# Patient Record
Sex: FEMALE | Race: WHITE | Employment: FULL TIME | URBAN - METROPOLITAN AREA
[De-identification: names, ages, dates, MRNs, and addresses within clinical notes are randomized per-mention and may not be internally consistent; named-entity substitution may affect disease eponyms.]

---

## 2018-06-26 ENCOUNTER — HOSPITAL ENCOUNTER (OUTPATIENT)
Age: 58
Discharge: HOME | End: 2018-06-28

## 2018-06-26 PROCEDURE — 88305 TISSUE EXAM BY PATHOLOGIST: CPT

## 2018-06-26 PROCEDURE — 88342 IMHCHEM/IMCYTCHM 1ST ANTB: CPT

## 2018-11-28 ENCOUNTER — HOSPITAL ENCOUNTER (OUTPATIENT)
Dept: GENERAL RADIOLOGY | Age: 58
Discharge: HOME | End: 2018-11-30
Payer: COMMERCIAL

## 2018-11-28 ENCOUNTER — HOSPITAL ENCOUNTER (OUTPATIENT)
Age: 58
Discharge: HOME | End: 2018-11-30
Payer: COMMERCIAL

## 2018-11-28 DIAGNOSIS — M54.9 DORSALGIA: ICD-10-CM

## 2018-11-28 PROCEDURE — 72110 X-RAY EXAM L-2 SPINE 4/>VWS: CPT

## 2018-12-13 ENCOUNTER — HOSPITAL ENCOUNTER (OUTPATIENT)
Age: 58
Discharge: HOME | End: 2018-12-15
Payer: COMMERCIAL

## 2018-12-13 ENCOUNTER — HOSPITAL ENCOUNTER (OUTPATIENT)
Dept: GENERAL RADIOLOGY | Age: 58
Discharge: HOME | End: 2018-12-15
Payer: COMMERCIAL

## 2018-12-13 DIAGNOSIS — R07.9 CHEST PAIN, UNSPECIFIED TYPE: ICD-10-CM

## 2018-12-13 PROCEDURE — 71101 X-RAY EXAM UNILAT RIBS/CHEST: CPT

## 2018-12-18 ENCOUNTER — HOSPITAL ENCOUNTER (OUTPATIENT)
Dept: ULTRASOUND IMAGING | Age: 58
Discharge: HOME | End: 2018-12-20
Payer: COMMERCIAL

## 2018-12-18 DIAGNOSIS — R10.9 STOMACH ACHE: ICD-10-CM

## 2018-12-18 PROCEDURE — 76700 US EXAM ABDOM COMPLETE: CPT

## 2019-07-05 ENCOUNTER — HOSPITAL ENCOUNTER (OUTPATIENT)
Age: 59
Discharge: HOME | End: 2019-07-07
Payer: COMMERCIAL

## 2019-07-05 ENCOUNTER — HOSPITAL ENCOUNTER (OUTPATIENT)
Dept: ULTRASOUND IMAGING | Age: 59
Discharge: HOME | End: 2019-07-07
Payer: COMMERCIAL

## 2019-07-05 DIAGNOSIS — R60.9 SWELLING: ICD-10-CM

## 2019-07-05 PROCEDURE — 93971 EXTREMITY STUDY: CPT

## 2020-02-14 ENCOUNTER — HOSPITAL ENCOUNTER (EMERGENCY)
Age: 60
Discharge: HOME | End: 2020-02-15
Attending: EMERGENCY MEDICINE
Payer: COMMERCIAL

## 2020-02-14 LAB
ALBUMIN SERPL-MCNC: 3.9 G/DL (ref 3.5–5.2)
ALP BLD-CCNC: 180 U/L (ref 35–104)
ALT SERPL-CCNC: 24 U/L (ref 0–32)
ANION GAP SERPL CALCULATED.3IONS-SCNC: 12 MMOL/L (ref 7–16)
AST SERPL-CCNC: 32 U/L (ref 0–31)
BILIRUB SERPL-MCNC: 0.4 MG/DL (ref 0–1.2)
BUN BLDV-MCNC: 9 MG/DL (ref 6–20)
CALCIUM SERPL-MCNC: 8.8 MG/DL (ref 8.6–10.2)
CHLORIDE BLD-SCNC: 103 MMOL/L (ref 98–107)
CO2: 24 MMOL/L (ref 22–29)
CREAT SERPL-MCNC: 0.6 MG/DL (ref 0.5–1)
GFR AFRICAN AMERICAN: >60
GFR NON-AFRICAN AMERICAN: >60 ML/MIN/1.73
GLUCOSE BLD-MCNC: 168 MG/DL (ref 74–99)
LIPASE: 57 U/L (ref 13–60)
POTASSIUM REFLEX MAGNESIUM: 3.9 MMOL/L (ref 3.5–5)
SODIUM BLD-SCNC: 139 MMOL/L (ref 132–146)
TOTAL PROTEIN: 6.7 G/DL (ref 6.4–8.3)

## 2020-02-14 PROCEDURE — 83690 ASSAY OF LIPASE: CPT

## 2020-02-14 PROCEDURE — 85025 COMPLETE CBC W/AUTO DIFF WBC: CPT

## 2020-02-14 PROCEDURE — 99284 EMERGENCY DEPT VISIT MOD MDM: CPT

## 2020-02-14 PROCEDURE — 80053 COMPREHEN METABOLIC PANEL: CPT

## 2020-02-14 ASSESSMENT — PAIN DESCRIPTION - PAIN TYPE: TYPE: CHRONIC PAIN

## 2020-02-14 ASSESSMENT — PAIN SCALES - GENERAL: PAINLEVEL_OUTOF10: 2

## 2020-02-15 VITALS
DIASTOLIC BLOOD PRESSURE: 60 MMHG | WEIGHT: 160 LBS | SYSTOLIC BLOOD PRESSURE: 95 MMHG | HEART RATE: 92 BPM | RESPIRATION RATE: 14 BRPM | BODY MASS INDEX: 29.44 KG/M2 | TEMPERATURE: 98.4 F | OXYGEN SATURATION: 97 % | HEIGHT: 62 IN

## 2020-02-15 LAB
ANISOCYTOSIS: ABNORMAL
BASOPHILS ABSOLUTE: 0 E9/L (ref 0–0.2)
BASOPHILS RELATIVE PERCENT: 0 % (ref 0–2)
EOSINOPHILS ABSOLUTE: 0.08 E9/L (ref 0.05–0.5)
EOSINOPHILS RELATIVE PERCENT: 2.6 % (ref 0–6)
HCT VFR BLD CALC: 35.2 % (ref 34–48)
HEMOGLOBIN: 11.7 G/DL (ref 11.5–15.5)
LYMPHOCYTES ABSOLUTE: 0.32 E9/L (ref 1.5–4)
LYMPHOCYTES RELATIVE PERCENT: 11.3 % (ref 20–42)
MCH RBC QN AUTO: 28.8 PG (ref 26–35)
MCHC RBC AUTO-ENTMCNC: 33.2 % (ref 32–34.5)
MCV RBC AUTO: 86.7 FL (ref 80–99.9)
MONOCYTES ABSOLUTE: 0.12 E9/L (ref 0.1–0.95)
MONOCYTES RELATIVE PERCENT: 3.5 % (ref 2–12)
NEUTROPHILS ABSOLUTE: 2.41 E9/L (ref 1.8–7.3)
NEUTROPHILS RELATIVE PERCENT: 82.6 % (ref 43–80)
NUCLEATED RED BLOOD CELLS: 0 /100 WBC
OVALOCYTES: ABNORMAL
PDW BLD-RTO: 15.2 FL (ref 11.5–15)
PLATELET # BLD: 92 E9/L (ref 130–450)
PLATELET CONFIRMATION: NORMAL
PMV BLD AUTO: 9.7 FL (ref 7–12)
POIKILOCYTES: ABNORMAL
RBC # BLD: 4.06 E12/L (ref 3.5–5.5)
WBC # BLD: 2.9 E9/L (ref 4.5–11.5)

## 2020-02-15 RX ORDER — ONDANSETRON 4 MG/1
4 TABLET, ORALLY DISINTEGRATING ORAL EVERY 8 HOURS PRN
Qty: 10 TABLET | Refills: 0 | Status: SHIPPED | OUTPATIENT
Start: 2020-02-15 | End: 2020-11-10 | Stop reason: ALTCHOICE

## 2020-02-15 ASSESSMENT — ENCOUNTER SYMPTOMS
SORE THROAT: 0
DIARRHEA: 0
EYE PAIN: 0
COLOR CHANGE: 0
NAUSEA: 1
RHINORRHEA: 0
VOMITING: 0
ABDOMINAL PAIN: 0
WHEEZING: 0
SINUS PRESSURE: 0
EYE REDNESS: 0
COUGH: 0
SHORTNESS OF BREATH: 0

## 2020-02-15 NOTE — ED NOTES
Pt given d/c instructions. Pt to f.u with pcp in 3 days. Pt given instructions on when to return to ED. Pt verbalized understanding of instructions. Pt left in stable and ambulatory condition. Pt IV d/c without any complications.      Jonathan Escobar RN  02/15/20 [id number]

## 2020-02-15 NOTE — ED PROVIDER NOTES
myalgias. Skin: Negative for color change and rash. Allergic/Immunologic: Positive for immunocompromised state. Neurological: Negative for syncope, light-headedness, numbness and headaches. Hematological: Negative for adenopathy. Does not bruise/bleed easily. Physical Exam  Vitals signs and nursing note reviewed. Constitutional:       General: She is not in acute distress. Appearance: Normal appearance. She is well-developed. She is not diaphoretic. HENT:      Head: Normocephalic and atraumatic. Mouth/Throat:      Pharynx: No oropharyngeal exudate. Eyes:      General: No scleral icterus. Right eye: No discharge. Left eye: No discharge. Conjunctiva/sclera: Conjunctivae normal.      Pupils: Pupils are equal, round, and reactive to light. Neck:      Musculoskeletal: Normal range of motion and neck supple. Thyroid: No thyromegaly. Vascular: No JVD. Trachea: No tracheal deviation. Cardiovascular:      Rate and Rhythm: Normal rate and regular rhythm. Pulses: Normal pulses. Heart sounds: Normal heart sounds. No murmur. No friction rub. No gallop. Pulmonary:      Effort: Pulmonary effort is normal. No accessory muscle usage or respiratory distress. Breath sounds: Normal breath sounds. No wheezing, rhonchi or rales. Chest:      Chest wall: No tenderness or crepitus. Abdominal:      General: Bowel sounds are normal. There is no distension or abdominal bruit. Palpations: Abdomen is soft. There is splenomegaly. There is no mass. Tenderness: There is no abdominal tenderness. There is no guarding or rebound. Hernia: No hernia is present. Musculoskeletal: Normal range of motion. General: No tenderness or deformity. Lymphadenopathy:      Cervical: No cervical adenopathy. Skin:     General: Skin is warm and dry. Capillary Refill: Capillary refill takes less than 2 seconds. Coloration: Skin is not pale. Findings: No erythema or rash. Neurological:      Mental Status: She is alert and oriented to person, place, and time. Cranial Nerves: No cranial nerve deficit. Motor: No abnormal muscle tone. Coordination: Coordination normal.          Procedures     MDM     ED Course as of Feb 15 0116   Sat Feb 15, 2020   0028 Patient is resting comfortably. She is in no distress is feeling well. She does report began to develop a dry very mild cough starting yesterday which may indicate a developing viral infection which may be exacerbating her leukopenia. I discussed with her her test results and the need to follow-up with her doctors for continued evaluation. I discussed with her strict return precautions if she develops worsening or other worrisome symptoms that may indicate inflammatory or obstructive process of the bowels or splenic rupture. Patient is agreeable with plan for discharge. All questions were answered. [JL]      ED Course User Index  [JL] [facility],         --------------------------------------------- PAST HISTORY ---------------------------------------------  Past Medical History:  has a past medical history of Abnormal Pap smear of vagina, Asthma, Diabetes mellitus ([facility] [address] Hypothyroidism, and Thyroid disease. Past Surgical History:  has a past surgical history that includes Hysterectomy (); Carpal tunnel release ();  section (); Tonsillectomy; Cholecystectomy (); Colonoscopy (2012); Upper gastrointestinal endoscopy (2012); and liver biopsy (2017). Social History:  reports that she has never smoked. She has never used smokeless tobacco. She reports current alcohol use. She reports that she does not use drugs. Family History: family history includes Diabetes in her mother; Heart Disease (age of onset: 79) in her father and mother; High Blood Pressure in her mother.      The patients home medications have been

## 2020-03-10 ENCOUNTER — HOSPITAL ENCOUNTER (OUTPATIENT)
Age: 60
Discharge: HOME | End: 2020-03-12
Payer: COMMERCIAL

## 2020-03-10 ENCOUNTER — HOSPITAL ENCOUNTER (OUTPATIENT)
Dept: GENERAL RADIOLOGY | Age: 60
Discharge: HOME | End: 2020-03-12
Payer: COMMERCIAL

## 2020-03-10 PROCEDURE — 73030 X-RAY EXAM OF SHOULDER: CPT

## 2020-03-10 PROCEDURE — 72110 X-RAY EXAM L-2 SPINE 4/>VWS: CPT

## 2020-03-13 ENCOUNTER — HOSPITAL ENCOUNTER (OUTPATIENT)
Dept: MRI IMAGING | Age: 60
Discharge: HOME | End: 2020-03-15
Payer: COMMERCIAL

## 2020-03-13 PROCEDURE — 72148 MRI LUMBAR SPINE W/O DYE: CPT

## 2020-11-06 ENCOUNTER — HOSPITAL ENCOUNTER (OUTPATIENT)
Age: 60
Discharge: HOME | End: 2020-11-08
Payer: COMMERCIAL

## 2020-11-06 PROCEDURE — U0003 INFECTIOUS AGENT DETECTION BY NUCLEIC ACID (DNA OR RNA); SEVERE ACUTE RESPIRATORY SYNDROME CORONAVIRUS 2 (SARS-COV-2) (CORONAVIRUS DISEASE [COVID-19]), AMPLIFIED PROBE TECHNIQUE, MAKING USE OF HIGH THROUGHPUT TECHNOLOGIES AS DESCRIBED BY CMS-2020-01-R: HCPCS

## 2020-11-08 LAB
SARS-COV-2: NOT DETECTED
SOURCE: NORMAL

## 2020-11-10 RX ORDER — SEMAGLUTIDE 1.34 MG/ML
0.5 INJECTION, SOLUTION SUBCUTANEOUS WEEKLY
COMMUNITY

## 2020-11-10 RX ORDER — ALENDRONATE SODIUM 70 MG/1
70 TABLET ORAL
COMMUNITY

## 2020-11-10 NOTE — PROGRESS NOTES
Have you been tested for COVID  Yes  Tested on 11-6-20 for OR scheduled 11-13-20          Have you been told you were positive for COVID No  Have you had any known exposure to someone that is positive for COVID No  Do you have a cough                   No              Do you have shortness of breath No                 Do you have a sore throat            No                Are you having chills                    No                Are you having muscle aches. No                    Please come to the hospital wearing a mask and have your significant other wear a mask as well. Both of you should check your temperature before leaving to come here,  if it is 100 or higher please call [phone] for instruction. [facility]    The Preadmission Testing patient is instructed accordingly using the following criteria (check applicable):    ARRIVAL INSTRUCTIONS:  [x] Parking the day of Surgery is located in the Main Entrance lot. Upon entering the door, make an immediate right to the surgery reception desk    [x] Bring photo ID and insurance card    [x] Bring in a copy of Living will or Durable Power of  papers.     [x] Please be sure to arrange for responsible adult to provide transportation to and from the hospital    [x] Please arrange for responsible adult to be with you for the 24 hour period post procedure due to having anesthesia      GENERAL INSTRUCTIONS:    [x] Nothing by mouth after midnight, including gum, candy, mints or water    [x] You may brush your teeth, but do not swallow any water    [] Take medications as instructed with 1-2 oz of water    [x] Stop herbal supplements and vitamins 5 days prior to procedure    [x] Follow preop dosing of blood thinners per physician instructions    [] Take 1/2 dose of evening insulin, but no insulin after midnight    [] No oral diabetic medications after midnight    [] If diabetic and have low blood sugar or feel symptomatic, take 1-2oz apple juice only    [] Bring inhalers day of surgery    [] Bring C-PAP/ Bi-Pap day of surgery    [] Bring urine specimen day of surgery    [x] Shower or bath with soap, lather and rinse well, AM of Surgery, no lotion, powders or creams to surgical site    [] Follow bowel prep as instructed per surgeon    [x] No tobacco products within 24 hours of surgery     [x] No alcohol or illegal drug use within 24 hours of surgery.     [x] Jewelry, body piercing's, eyeglasses, contact lenses and dentures are not permitted into surgery (bring cases)      [x] Please do not wear any nail polish, make up or hair products on the day of surgery    [x] You can expect a call the business day prior to procedure to notify you if your arrival time changes    [x] If you receive a survey after surgery we would greatly appreciate your comments    [] Parent/guardian of a minor must accompany their child and remain on the premises  the entire time they are under our care     [] Pediatric patients may bring favorite toy, blanket or comfort item with them    [] A caregiver or family member must remain with the patient during their stay if they are mentally handicapped, have dementia, disoriented or unable to use a call light or would be a safety concern if left unattended    [x] Please notify surgeon if you develop any illness between now and time of surgery (cold, cough, sore throat, fever, nausea, vomiting) or any signs of infections  including skin, wounds, and dental.    [x]  The Outpatient Pharmacy is available to fill your prescription here on your day of surgery, ask your preop nurse for details    [x] Other instructions    EDUCATIONAL MATERIALS PROVIDED:    [] PAT Preoperative Education Packet/Booklet     [] Medication List    [] Transfusion bracelet applied with instructions    [] Shower with soap, lather and rinse well, and use CHG wipes provided the evening before surgery as instructed    [] Incentive spirometer with instructions

## 2020-11-13 ENCOUNTER — HOSPITAL ENCOUNTER (OUTPATIENT)
Age: 60
Setting detail: OUTPATIENT SURGERY
Discharge: HOME | End: 2020-11-13
Attending: OTOLARYNGOLOGY | Admitting: OTOLARYNGOLOGY
Payer: COMMERCIAL

## 2020-11-13 ENCOUNTER — ANESTHESIA (OUTPATIENT)
Dept: OPERATING ROOM | Age: 60
End: 2020-11-13
Payer: COMMERCIAL

## 2020-11-13 ENCOUNTER — ANESTHESIA EVENT (OUTPATIENT)
Dept: OPERATING ROOM | Age: 60
End: 2020-11-13
Payer: COMMERCIAL

## 2020-11-13 VITALS
HEIGHT: 62 IN | BODY MASS INDEX: 33.13 KG/M2 | RESPIRATION RATE: 12 BRPM | OXYGEN SATURATION: 95 % | SYSTOLIC BLOOD PRESSURE: 109 MMHG | DIASTOLIC BLOOD PRESSURE: 55 MMHG | HEART RATE: 71 BPM | WEIGHT: 180 LBS | TEMPERATURE: 96.4 F

## 2020-11-13 VITALS — SYSTOLIC BLOOD PRESSURE: 140 MMHG | OXYGEN SATURATION: 96 % | DIASTOLIC BLOOD PRESSURE: 65 MMHG

## 2020-11-13 LAB
ANION GAP SERPL CALCULATED.3IONS-SCNC: 7 MMOL/L (ref 7–16)
BUN BLDV-MCNC: 10 MG/DL (ref 6–20)
CALCIUM SERPL-MCNC: 9 MG/DL (ref 8.6–10.2)
CHLORIDE BLD-SCNC: 104 MMOL/L (ref 98–107)
CO2: 25 MMOL/L (ref 22–29)
CREAT SERPL-MCNC: 0.6 MG/DL (ref 0.5–1)
EKG ATRIAL RATE: 75 BPM
EKG P AXIS: 22 DEGREES
EKG P-R INTERVAL: 160 MS
EKG Q-T INTERVAL: 402 MS
EKG QRS DURATION: 86 MS
EKG QTC CALCULATION (BAZETT): 448 MS
EKG R AXIS: 27 DEGREES
EKG T AXIS: 48 DEGREES
EKG VENTRICULAR RATE: 75 BPM
GFR AFRICAN AMERICAN: >60
GFR NON-AFRICAN AMERICAN: >60 ML/MIN/1.73
GLUCOSE BLD-MCNC: 216 MG/DL (ref 74–99)
HCT VFR BLD CALC: 40.7 % (ref 34–48)
HEMOGLOBIN: 12.9 G/DL (ref 11.5–15.5)
MCH RBC QN AUTO: 25.1 PG (ref 26–35)
MCHC RBC AUTO-ENTMCNC: 31.7 % (ref 32–34.5)
MCV RBC AUTO: 79.3 FL (ref 80–99.9)
METER GLUCOSE: 191 MG/DL (ref 74–99)
PDW BLD-RTO: 15.2 FL (ref 11.5–15)
PLATELET # BLD: 102 E9/L (ref 130–450)
PMV BLD AUTO: 9.9 FL (ref 7–12)
POTASSIUM SERPL-SCNC: 4.3 MMOL/L (ref 3.5–5)
RBC # BLD: 5.13 E12/L (ref 3.5–5.5)
SODIUM BLD-SCNC: 136 MMOL/L (ref 132–146)
WBC # BLD: 3.3 E9/L (ref 4.5–11.5)

## 2020-11-13 PROCEDURE — 2500000003 HC RX 250 WO HCPCS: Performed by: NURSE ANESTHETIST, CERTIFIED REGISTERED

## 2020-11-13 PROCEDURE — 88305 TISSUE EXAM BY PATHOLOGIST: CPT

## 2020-11-13 PROCEDURE — 2580000003 HC RX 258: Performed by: OTOLARYNGOLOGY

## 2020-11-13 PROCEDURE — 3600000012 HC SURGERY LEVEL 2 ADDTL 15MIN: Performed by: OTOLARYNGOLOGY

## 2020-11-13 PROCEDURE — 88331 PATH CONSLTJ SURG 1 BLK 1SPC: CPT

## 2020-11-13 PROCEDURE — 6360000002 HC RX W HCPCS: Performed by: NURSE ANESTHETIST, CERTIFIED REGISTERED

## 2020-11-13 PROCEDURE — 85027 COMPLETE CBC AUTOMATED: CPT

## 2020-11-13 PROCEDURE — 7100000001 HC PACU RECOVERY - ADDTL 15 MIN: Performed by: OTOLARYNGOLOGY

## 2020-11-13 PROCEDURE — 3600000002 HC SURGERY LEVEL 2 BASE: Performed by: OTOLARYNGOLOGY

## 2020-11-13 PROCEDURE — 82962 GLUCOSE BLOOD TEST: CPT

## 2020-11-13 PROCEDURE — 2500000003 HC RX 250 WO HCPCS: Performed by: OTOLARYNGOLOGY

## 2020-11-13 PROCEDURE — 6360000002 HC RX W HCPCS: Performed by: OTOLARYNGOLOGY

## 2020-11-13 PROCEDURE — 80048 BASIC METABOLIC PNL TOTAL CA: CPT

## 2020-11-13 PROCEDURE — 36415 COLL VENOUS BLD VENIPUNCTURE: CPT

## 2020-11-13 PROCEDURE — 7100000000 HC PACU RECOVERY - FIRST 15 MIN: Performed by: OTOLARYNGOLOGY

## 2020-11-13 PROCEDURE — 2709999900 HC NON-CHARGEABLE SUPPLY: Performed by: OTOLARYNGOLOGY

## 2020-11-13 PROCEDURE — 93005 ELECTROCARDIOGRAM TRACING: CPT

## 2020-11-13 PROCEDURE — 6370000000 HC RX 637 (ALT 250 FOR IP)

## 2020-11-13 PROCEDURE — 7100000010 HC PHASE II RECOVERY - FIRST 15 MIN: Performed by: OTOLARYNGOLOGY

## 2020-11-13 PROCEDURE — 3700000001 HC ADD 15 MINUTES (ANESTHESIA): Performed by: OTOLARYNGOLOGY

## 2020-11-13 PROCEDURE — 7100000011 HC PHASE II RECOVERY - ADDTL 15 MIN: Performed by: OTOLARYNGOLOGY

## 2020-11-13 PROCEDURE — 3700000000 HC ANESTHESIA ATTENDED CARE: Performed by: OTOLARYNGOLOGY

## 2020-11-13 RX ORDER — SODIUM CHLORIDE 0.9 % (FLUSH) 0.9 %
10 SYRINGE (ML) INJECTION PRN
Status: DISCONTINUED | OUTPATIENT
Start: 2020-11-13 | End: 2020-11-13 | Stop reason: HOSPADM

## 2020-11-13 RX ORDER — SODIUM CHLORIDE 0.9 % (FLUSH) 0.9 %
10 SYRINGE (ML) INJECTION EVERY 12 HOURS SCHEDULED
Status: DISCONTINUED | OUTPATIENT
Start: 2020-11-13 | End: 2020-11-13 | Stop reason: HOSPADM

## 2020-11-13 RX ORDER — ONDANSETRON 2 MG/ML
INJECTION INTRAMUSCULAR; INTRAVENOUS PRN
Status: DISCONTINUED | OUTPATIENT
Start: 2020-11-13 | End: 2020-11-13 | Stop reason: SDUPTHER

## 2020-11-13 RX ORDER — PROPOFOL 10 MG/ML
INJECTION, EMULSION INTRAVENOUS PRN
Status: DISCONTINUED | OUTPATIENT
Start: 2020-11-13 | End: 2020-11-13 | Stop reason: SDUPTHER

## 2020-11-13 RX ORDER — GLYCOPYRROLATE 1 MG/5 ML
SYRINGE (ML) INTRAVENOUS PRN
Status: DISCONTINUED | OUTPATIENT
Start: 2020-11-13 | End: 2020-11-13 | Stop reason: SDUPTHER

## 2020-11-13 RX ORDER — MIDAZOLAM HYDROCHLORIDE 1 MG/ML
INJECTION INTRAMUSCULAR; INTRAVENOUS PRN
Status: DISCONTINUED | OUTPATIENT
Start: 2020-11-13 | End: 2020-11-13 | Stop reason: SDUPTHER

## 2020-11-13 RX ORDER — ROCURONIUM BROMIDE 10 MG/ML
INJECTION, SOLUTION INTRAVENOUS PRN
Status: DISCONTINUED | OUTPATIENT
Start: 2020-11-13 | End: 2020-11-13 | Stop reason: SDUPTHER

## 2020-11-13 RX ORDER — LIDOCAINE HYDROCHLORIDE AND EPINEPHRINE 10; 10 MG/ML; UG/ML
INJECTION, SOLUTION INFILTRATION; PERINEURAL PRN
Status: DISCONTINUED | OUTPATIENT
Start: 2020-11-13 | End: 2020-11-13 | Stop reason: ALTCHOICE

## 2020-11-13 RX ORDER — SODIUM CHLORIDE, SODIUM LACTATE, POTASSIUM CHLORIDE, CALCIUM CHLORIDE 600; 310; 30; 20 MG/100ML; MG/100ML; MG/100ML; MG/100ML
INJECTION, SOLUTION INTRAVENOUS CONTINUOUS
Status: DISCONTINUED | OUTPATIENT
Start: 2020-11-13 | End: 2020-11-13 | Stop reason: HOSPADM

## 2020-11-13 RX ORDER — NEOSTIGMINE METHYLSULFATE 1 MG/ML
INJECTION, SOLUTION INTRAVENOUS PRN
Status: DISCONTINUED | OUTPATIENT
Start: 2020-11-13 | End: 2020-11-13 | Stop reason: SDUPTHER

## 2020-11-13 RX ORDER — DEXAMETHASONE SODIUM PHOSPHATE 4 MG/ML
INJECTION, SOLUTION INTRA-ARTICULAR; INTRALESIONAL; INTRAMUSCULAR; INTRAVENOUS; SOFT TISSUE PRN
Status: DISCONTINUED | OUTPATIENT
Start: 2020-11-13 | End: 2020-11-13 | Stop reason: SDUPTHER

## 2020-11-13 RX ORDER — FENTANYL CITRATE 50 UG/ML
INJECTION, SOLUTION INTRAMUSCULAR; INTRAVENOUS PRN
Status: DISCONTINUED | OUTPATIENT
Start: 2020-11-13 | End: 2020-11-13 | Stop reason: SDUPTHER

## 2020-11-13 RX ADMIN — PROPOFOL 200 MG: 10 INJECTION, EMULSION INTRAVENOUS at 10:21

## 2020-11-13 RX ADMIN — ONDANSETRON 4 MG: 2 INJECTION INTRAMUSCULAR; INTRAVENOUS at 10:21

## 2020-11-13 RX ADMIN — MIDAZOLAM 2 MG: 1 INJECTION INTRAMUSCULAR; INTRAVENOUS at 09:48

## 2020-11-13 RX ADMIN — Medication 3 MG: at 10:35

## 2020-11-13 RX ADMIN — ROCURONIUM BROMIDE 30 MG: 10 INJECTION, SOLUTION INTRAVENOUS at 10:21

## 2020-11-13 RX ADMIN — DEXAMETHASONE SODIUM PHOSPHATE 10 MG: 4 INJECTION, SOLUTION INTRAMUSCULAR; INTRAVENOUS at 10:24

## 2020-11-13 RX ADMIN — SUGAMMADEX 326 MG: 100 INJECTION, SOLUTION INTRAVENOUS at 10:36

## 2020-11-13 RX ADMIN — SODIUM CHLORIDE, POTASSIUM CHLORIDE, SODIUM LACTATE AND CALCIUM CHLORIDE: 600; 310; 30; 20 INJECTION, SOLUTION INTRAVENOUS at 11:27

## 2020-11-13 RX ADMIN — Medication 2 G: at 10:12

## 2020-11-13 RX ADMIN — SODIUM CHLORIDE, POTASSIUM CHLORIDE, SODIUM LACTATE AND CALCIUM CHLORIDE: 600; 310; 30; 20 INJECTION, SOLUTION INTRAVENOUS at 10:11

## 2020-11-13 RX ADMIN — Medication 0.6 MG: at 10:35

## 2020-11-13 RX ADMIN — FENTANYL CITRATE 50 MCG: 50 INJECTION, SOLUTION INTRAMUSCULAR; INTRAVENOUS at 10:23

## 2020-11-13 ASSESSMENT — PULMONARY FUNCTION TESTS
PIF_VALUE: 30
PIF_VALUE: 0
PIF_VALUE: 21
PIF_VALUE: 22
PIF_VALUE: 1
PIF_VALUE: 22
PIF_VALUE: 22
PIF_VALUE: 21
PIF_VALUE: 21
PIF_VALUE: 5
PIF_VALUE: 20
PIF_VALUE: 22
PIF_VALUE: 44
PIF_VALUE: 22
PIF_VALUE: 18
PIF_VALUE: 20
PIF_VALUE: 26
PIF_VALUE: 2
PIF_VALUE: 22
PIF_VALUE: 0
PIF_VALUE: 1
PIF_VALUE: 0
PIF_VALUE: 2
PIF_VALUE: 0
PIF_VALUE: 4
PIF_VALUE: 2
PIF_VALUE: 23
PIF_VALUE: 2

## 2020-11-13 ASSESSMENT — LIFESTYLE VARIABLES: SMOKING_STATUS: 0

## 2020-11-13 ASSESSMENT — PAIN SCALES - GENERAL
PAINLEVEL_OUTOF10: 0

## 2020-11-13 ASSESSMENT — PAIN - FUNCTIONAL ASSESSMENT: PAIN_FUNCTIONAL_ASSESSMENT: 0-10

## 2020-11-13 ASSESSMENT — ENCOUNTER SYMPTOMS: SHORTNESS OF BREATH: 0

## 2020-11-13 NOTE — ANESTHESIA PRE PROCEDURE
Past Surgical History:        Procedure Laterality Date    CARPAL TUNNEL RELEASE  2000    bilateral     SECTION  1992    CHOLECYSTECTOMY  1998    COLONOSCOPY  2012    HYSTERECTOMY  2003    LIVER BIOPSY  2017    TONSILLECTOMY      UPPER GASTROINTESTINAL ENDOSCOPY  2012       Social History:    Social History     Tobacco Use    Smoking status: Never Smoker    Smokeless tobacco: Never Used   Substance Use Topics    Alcohol use: Yes     Comment: occasional                                Counseling given: Not Answered      Vital Signs (Current):   Vitals:    11/10/20 1530   Weight: 180 lb (81.6 kg)   Height: 5' 2\" (1.575 m)                                              BP Readings from Last 3 Encounters:   02/15/20 95/60   18 110/73   18 (!) 121/57       NPO Status:                                                                                 BMI:   Wt Readings from Last 3 Encounters:   11/10/20 180 lb (81.6 kg)   20 160 lb (72.6 kg)   18 166 lb (75.3 kg)     Body mass index is 32.92 kg/m². CBC:   Lab Results   Component Value Date    WBC 2.9 2020    RBC 4.06 2020    HGB 11.7 2020    HCT 35.2 2020    MCV 86.7 2020    RDW 15.2 2020    PLT 92 2020       CMP:   Lab Results   Component Value Date     2020    K 3.9 2020     2020    CO2 24 2020    BUN 9 2020    CREATININE 0.6 2020    GFRAA >60 2020    LABGLOM >60 2020    GLUCOSE 168 2020    GLUCOSE 171 2012    PROT 6.7 2020    CALCIUM 8.8 2020    BILITOT 0.4 2020    ALKPHOS 180 2020    AST 32 2020    ALT 24 2020       POC Tests: No results for input(s): POCGLU, POCNA, POCK, POCCL, POCBUN, POCHEMO, POCHCT in the last 72 hours.     Coags:   Lab Results   Component Value Date    PROTIME 12.2 2017    PROTIME 12.4 2012    INR 1.1 2017    APTT 25.5 05/07/2012       HCG (If Applicable): No results found for: PREGTESTUR, PREGSERUM, HCG, HCGQUANT     ABGs: No results found for: PHART, PO2ART, BJB4CAH, GWT3HDO, BEART, Y1CGVKLS     Type & Screen (If Applicable):  No results found for: LABABO, LABRH    Drug/Infectious Status (If Applicable):  No results found for: HIV, HEPCAB    COVID-19 Screening (If Applicable):   Lab Results   Component Value Date    COVID19 Not Detected 11/06/2020         Anesthesia Evaluation  Patient summary reviewed and Nursing notes reviewed no history of anesthetic complications:   Airway: Mallampati: II  TM distance: >3 FB   Neck ROM: full  Mouth opening: > = 3 FB Dental:    (+) caps      Pulmonary: breath sounds clear to auscultation  (+) asthma:     (-) shortness of breath, recent URI and not a current smoker                           Cardiovascular:Negative CV ROS  Exercise tolerance: good (>4 METS),         ECG reviewed  Rhythm: regular  Rate: normal           Beta Blocker:  Not on Beta Blocker         Neuro/Psych:   Negative Neuro/Psych ROS              GI/Hepatic/Renal:             Endo/Other:    (+) DiabetesType II DM, , hypothyroidism: arthritis:., .                 Abdominal:           Vascular: negative vascular ROS. Anesthesia Plan      general     ASA 3       Induction: intravenous. Anesthetic plan and risks discussed with patient.                       Mary Mcleod MD   11/13/2020

## 2020-11-13 NOTE — ANESTHESIA POSTPROCEDURE EVALUATION
Department of Anesthesiology  Postprocedure Note    Patient: Gustavo Goyal  MRN: [id number]  YOB: 1960  Date of evaluation: 11/13/2020  Time:  5:35 PM     Procedure Summary     Date:  11/13/20 Room / Location:  [facility] 01 / 106 [facility]    Anesthesia Start:  1011 Anesthesia Stop:  6681    Procedure:  EXCISIONAL BIOPSY OF RIGHT NARES (PATHOLOGY NOTIFIED) (N/A Nose) Diagnosis:  (NASAL LESION)    Surgeon:  Rivka Rivera MD Responsible Provider:  Poncho Ricardo MD    Anesthesia Type:  general ASA Status:  3          Anesthesia Type: general    Mary Jo Phase I: Mary Jo Score: 9    Mary Jo Phase II: Mary Jo Score: 10    Last vitals: Reviewed and per EMR flowsheets.        Anesthesia Post Evaluation    Patient location during evaluation: PACU  Patient participation: complete - patient participated  Level of consciousness: awake and alert  Airway patency: patent  Nausea & Vomiting: no vomiting and no nausea  Complications: no  Cardiovascular status: blood pressure returned to baseline  Respiratory status: acceptable  Hydration status: euvolemic

## 2020-11-13 NOTE — PROGRESS NOTES
INTRAOPERATIVE CONSULTATION (with FROZEN SECTION)    Right nasal mass: Cutaneous horn. The differential diagnosis includes a burnt out verruca vulgaris and keratoacanthoma. The lesion appears completely excised.

## 2020-11-13 NOTE — BRIEF OP NOTE
Brief Postoperative Note      Patient: Arcadio Godinez  YOB: 1960  MRN: [id number]    Date of Procedure: 11/13/2020    Pre-Op Diagnosis: NASAL LESION    Post-Op Diagnosis: Same       Procedure(s):  EXCISIONAL BIOPSY OF RIGHT NARES (PATHOLOGY NOTIFIED)    Surgeon(s):  Arnol Swain MD    Assistant:  Resident: Antonietta Nuñez DO; Izzy Torrez DO    Anesthesia: General    Estimated Blood Loss (mL): Minimal    Complications: None    Specimens:   ID Type Source Tests Collected by Time Destination   A : RIGHT NASAL MASS Tissue Tissue SURGICAL PATHOLOGY Arnol Swain MD 11/13/2020 1027        Implants:  * No implants in log *      Drains: * No LDAs found *    Findings: cutaneous horn as the junction of the nasal vestibule-columella superiorly on the right    Electronically signed by Antnoietta Nuñez DO on 11/13/2020 at 11:26 AM

## 2020-11-13 NOTE — H&P
H&P reviewed, no changes. No issues. Questions and concerns were answered to the patient's satisfaction.  Will proceed with procedure    Will discuss with attending     Electronically signed by Cami Dolan DO on 11/13/20 at 9:39 AM EST

## 2020-11-14 NOTE — OP NOTE
[id number] [address]                                OPERATIVE REPORT    PATIENT NAME: Anai Chopra                     :        1960  MED REC NO:   [id number]                            ROOM:  ACCOUNT NO:   [de-identified]                           ADMIT DATE: 2020  PROVIDER:     Nicolas Dlegadillo DO    DATE OF PROCEDURE:  2020    PREOPERATIVE DIAGNOSIS:  Right nasal lesion. POSTOPERATIVE DIAGNOSIS:  Right nasal lesion. PROCEDURE PERFORMED:  Excisional biopsy of right nasal lesion. ANESTHESIA:  General endotracheal intubation. SURGEON:  Mayra Perez MD.    ASSISTANTS:  Angely Fernando DO, PGY-5 and Nicolas Delgadillo DO, PGY-2.    FLUIDS:  IV crystalloid. SPECIMENS:  Right nasal lesion. FINDINGS:  pedunculated lesion on right nasal septum just posterior to columella    INDICATIONS FOR PROCEDURE:  This is a 82-year-old female with history of  a prolonged right nasal lesion. Risks and benefits of the procedure  were discussed, but not limited to bleeding, infection, need for further  procedures, damage to adjacent structures were discussed with the  patient who agreed to proceed with the procedure. DESCRIPTION OF THE PROCEDURE:  The patient was brought back to the  operating room, placed supine on the table. SCDs were placed and  functioning. The patient was then handed to Anesthesia for proper  endotracheal intubation. The head of the bed was then turned 90 degrees  counterclockwise from the Anesthesia Unit. A 1% lidocaine with  epinephrine was injected over the lesion of interest.  Using a 15 blade  scalpel, an elliptical incision was made excising the right nasal  lesion. The right nasal lesion was handed off the field. A 4-0 chromic  suture was then placed in a horizontal mattress fashion closing the surgical defect. No further bleeding was noted.   The patient  tolerated the procedure without complication. The patient was handed  back to Anesthesia for proper awakening.         Magui Alvarez DO    D: 11/13/2020 10:34:15       T: 11/13/2020 11:43:38     [id number]  Job#: [id number]     Doc#: [id number]    CC:

## 2022-11-03 ENCOUNTER — HOSPITAL ENCOUNTER (OUTPATIENT)
Dept: HOSPITAL 83 - RESCLI | Age: 62
Discharge: HOME | End: 2022-11-03
Attending: EMERGENCY MEDICINE
Payer: COMMERCIAL

## 2022-11-03 DIAGNOSIS — Z79.899: ICD-10-CM

## 2022-11-03 DIAGNOSIS — E03.9: ICD-10-CM

## 2022-11-03 DIAGNOSIS — Z82.49: ICD-10-CM

## 2022-11-03 DIAGNOSIS — D86.9: ICD-10-CM

## 2022-11-03 DIAGNOSIS — D50.9: ICD-10-CM

## 2022-11-03 DIAGNOSIS — Z90.710: ICD-10-CM

## 2022-11-03 DIAGNOSIS — E11.9: Primary | ICD-10-CM

## 2022-11-03 DIAGNOSIS — Z90.49: ICD-10-CM

## 2022-11-03 DIAGNOSIS — M81.0: ICD-10-CM

## 2022-11-03 DIAGNOSIS — Z98.890: ICD-10-CM

## 2023-06-29 ENCOUNTER — HOSPITAL ENCOUNTER (OUTPATIENT)
Dept: ULTRASOUND IMAGING | Age: 63
Discharge: HOME | End: 2023-07-01
Payer: COMMERCIAL

## 2023-06-29 DIAGNOSIS — R22.42 MASS OF LOWER LEG, LEFT: ICD-10-CM

## 2023-06-29 PROCEDURE — 93971 EXTREMITY STUDY: CPT

## 2023-07-27 ENCOUNTER — HOSPITAL ENCOUNTER (OUTPATIENT)
Dept: ULTRASOUND IMAGING | Age: 63
Discharge: HOME | End: 2023-07-29
Payer: COMMERCIAL

## 2023-07-27 DIAGNOSIS — R10.9 STOMACH ACHE: ICD-10-CM

## 2023-07-27 PROCEDURE — 76856 US EXAM PELVIC COMPLETE: CPT

## 2023-09-18 ENCOUNTER — HOSPITAL ENCOUNTER (EMERGENCY)
Age: 63
Discharge: HOME | End: 2023-09-18
Attending: STUDENT IN AN ORGANIZED HEALTH CARE EDUCATION/TRAINING PROGRAM
Payer: COMMERCIAL

## 2023-09-18 ENCOUNTER — APPOINTMENT (OUTPATIENT)
Dept: GENERAL RADIOLOGY | Age: 63
End: 2023-09-18
Payer: COMMERCIAL

## 2023-09-18 VITALS
SYSTOLIC BLOOD PRESSURE: 112 MMHG | WEIGHT: 169 LBS | RESPIRATION RATE: 16 BRPM | DIASTOLIC BLOOD PRESSURE: 69 MMHG | OXYGEN SATURATION: 96 % | TEMPERATURE: 97.8 F | BODY MASS INDEX: 31.1 KG/M2 | HEIGHT: 62 IN | HEART RATE: 80 BPM

## 2023-09-18 DIAGNOSIS — R07.89 ATYPICAL CHEST PAIN: Primary | ICD-10-CM

## 2023-09-18 LAB
ALBUMIN SERPL-MCNC: 4.1 G/DL (ref 3.5–5.2)
ALP SERPL-CCNC: 99 U/L (ref 35–104)
ALT SERPL-CCNC: 21 U/L (ref 0–32)
ANION GAP SERPL CALCULATED.3IONS-SCNC: 10 MMOL/L (ref 7–16)
AST SERPL-CCNC: 21 U/L (ref 0–31)
BASOPHILS # BLD: 0.06 K/UL (ref 0–0.2)
BASOPHILS NFR BLD: 1 % (ref 0–2)
BILIRUB SERPL-MCNC: 1.9 MG/DL (ref 0–1.2)
BUN SERPL-MCNC: 13 MG/DL (ref 6–23)
CALCIUM SERPL-MCNC: 9.5 MG/DL (ref 8.6–10.2)
CHLORIDE SERPL-SCNC: 104 MMOL/L (ref 98–107)
CO2 SERPL-SCNC: 26 MMOL/L (ref 22–29)
CREAT SERPL-MCNC: 0.5 MG/DL (ref 0.5–1)
EOSINOPHIL # BLD: 0.15 K/UL (ref 0.05–0.5)
EOSINOPHILS RELATIVE PERCENT: 2 % (ref 0–6)
ERYTHROCYTE [DISTWIDTH] IN BLOOD BY AUTOMATED COUNT: 16.3 % (ref 11.5–15)
FLUAV RNA RESP QL NAA+PROBE: NOT DETECTED
FLUBV RNA RESP QL NAA+PROBE: NOT DETECTED
GFR SERPL CREATININE-BSD FRML MDRD: >60 ML/MIN/1.73M2
GLUCOSE SERPL-MCNC: 184 MG/DL (ref 74–99)
HCT VFR BLD AUTO: 41.2 % (ref 34–48)
HGB BLD-MCNC: 13.8 G/DL (ref 11.5–15.5)
IMM GRANULOCYTES # BLD AUTO: 0.04 K/UL (ref 0–0.58)
IMM GRANULOCYTES NFR BLD: 1 % (ref 0–5)
LYMPHOCYTES NFR BLD: 1 K/UL (ref 1.5–4)
LYMPHOCYTES RELATIVE PERCENT: 13 % (ref 20–42)
MCH RBC QN AUTO: 28.4 PG (ref 26–35)
MCHC RBC AUTO-ENTMCNC: 33.5 G/DL (ref 32–34.5)
MCV RBC AUTO: 84.8 FL (ref 80–99.9)
MONOCYTES NFR BLD: 0.63 K/UL (ref 0.1–0.95)
MONOCYTES NFR BLD: 8 % (ref 2–12)
NEUTROPHILS NFR BLD: 75 % (ref 43–80)
NEUTS SEG NFR BLD: 5.64 K/UL (ref 1.8–7.3)
PLATELET # BLD AUTO: 178 K/UL (ref 130–450)
PMV BLD AUTO: 8.9 FL (ref 7–12)
POTASSIUM SERPL-SCNC: 3.8 MMOL/L (ref 3.5–5)
PROT SERPL-MCNC: 6.4 G/DL (ref 6.4–8.3)
RBC # BLD AUTO: 4.86 M/UL (ref 3.5–5.5)
SARS-COV-2 RNA RESP QL NAA+PROBE: NOT DETECTED
SODIUM SERPL-SCNC: 140 MMOL/L (ref 132–146)
SOURCE: NORMAL
SPECIMEN DESCRIPTION: NORMAL
TROPONIN I SERPL HS-MCNC: <6 NG/L (ref 0–9)
TROPONIN I SERPL HS-MCNC: <6 NG/L (ref 0–9)
WBC OTHER # BLD: 7.5 K/UL (ref 4.5–11.5)

## 2023-09-18 PROCEDURE — 99285 EMERGENCY DEPT VISIT HI MDM: CPT

## 2023-09-18 PROCEDURE — 6370000000 HC RX 637 (ALT 250 FOR IP)

## 2023-09-18 PROCEDURE — 71045 X-RAY EXAM CHEST 1 VIEW: CPT

## 2023-09-18 PROCEDURE — 93005 ELECTROCARDIOGRAM TRACING: CPT

## 2023-09-18 PROCEDURE — 87636 SARSCOV2 & INF A&B AMP PRB: CPT

## 2023-09-18 PROCEDURE — 84484 ASSAY OF TROPONIN QUANT: CPT

## 2023-09-18 PROCEDURE — 80053 COMPREHEN METABOLIC PANEL: CPT

## 2023-09-18 PROCEDURE — 85025 COMPLETE CBC W/AUTO DIFF WBC: CPT

## 2023-09-18 RX ADMIN — ALUMINUM HYDROXIDE, MAGNESIUM HYDROXIDE, AND SIMETHICONE: 200; 200; 20 SUSPENSION ORAL at 18:51

## 2023-09-18 ASSESSMENT — LIFESTYLE VARIABLES
HOW MANY STANDARD DRINKS CONTAINING ALCOHOL DO YOU HAVE ON A TYPICAL DAY: PATIENT DOES NOT DRINK
HOW OFTEN DO YOU HAVE A DRINK CONTAINING ALCOHOL: NEVER

## 2023-09-18 NOTE — ED PROVIDER NOTES
[place]        Pt Name: Nya Cronin  MRN: [id number]  [address] [place] 1960  Date of evaluation: 9/18/2023  Provider: Kaushik Reed DO  PCP: Cuca Singer MD  Note Started: 6:20 PM EDT 9/18/23    CHIEF COMPLAINT       Chief Complaint   Patient presents with    Chest Pain    Shortness of Breath     States very fatigued and acid reflux        HISTORY OF PRESENT ILLNESS: 1 or more Elements   History From: Patient      Nya Cronin is a 63 y.o. female who presents to the emergency department for concerns of shortness of breath. Patient states that she woke up this morning and is felt short of breath since then. Patient states she is also had some fatigue. Patient has also been having indigestion. Patient states that she was so tired she can go to work today. Patient states she would like to be checked for COVID. Patient is also concerned patient has a stye in her right eye. Patient denies taking any medications for her symptoms. Patient states she would like a COVID test.    Review of Systems   Constitutional:  Positive for fatigue. Negative for chills and fever. HENT:  Negative for congestion, sinus pressure, sinus pain and sore throat. Respiratory:  Negative for cough and shortness of breath. Cardiovascular:  Positive for chest pain. Negative for palpitations and leg swelling. Gastrointestinal:  Negative for abdominal distention, abdominal pain, diarrhea, nausea and vomiting. Skin:  Negative for rash. Neurological:  Negative for light-headedness and headaches. Nursing Notes were all reviewed and agreed with or any disagreements were addressed in the HPI. REVIEW OF SYSTEMS :      Positives and Pertinent negatives as per HPI.      SURGICAL HISTORY     Past Surgical History:   Procedure Laterality Date    CARPAL TUNNEL RELEASE  2000    bilateral    [address]

## 2023-09-19 LAB
EKG ATRIAL RATE: 77 BPM
EKG P AXIS: 36 DEGREES
EKG P-R INTERVAL: 150 MS
EKG Q-T INTERVAL: 394 MS
EKG QRS DURATION: 90 MS
EKG QTC CALCULATION (BAZETT): 445 MS
EKG R AXIS: 24 DEGREES
EKG T AXIS: 46 DEGREES
EKG VENTRICULAR RATE: 77 BPM

## 2023-09-19 PROCEDURE — 93010 ELECTROCARDIOGRAM REPORT: CPT | Performed by: INTERNAL MEDICINE

## 2023-09-19 ASSESSMENT — ENCOUNTER SYMPTOMS
SINUS PAIN: 0
SINUS PRESSURE: 0
DIARRHEA: 0
ABDOMINAL DISTENTION: 0
VOMITING: 0
COUGH: 0
ABDOMINAL PAIN: 0
SORE THROAT: 0
SHORTNESS OF BREATH: 0
NAUSEA: 0

## 2023-09-20 ENCOUNTER — HOSPITAL ENCOUNTER (OUTPATIENT)
Dept: CT IMAGING | Age: 63
Discharge: HOME | End: 2023-09-22
Payer: COMMERCIAL

## 2023-09-20 DIAGNOSIS — R41.82 ALTERED MENTAL STATUS, UNSPECIFIED ALTERED MENTAL STATUS TYPE: ICD-10-CM

## 2023-09-20 DIAGNOSIS — R27.8 LOSS OF COORDINATION: ICD-10-CM

## 2023-09-20 PROCEDURE — 70450 CT HEAD/BRAIN W/O DYE: CPT

## 2023-09-25 ENCOUNTER — HOSPITAL ENCOUNTER (OUTPATIENT)
Dept: MRI IMAGING | Age: 63
Discharge: HOME | End: 2023-09-27
Payer: COMMERCIAL

## 2023-09-25 DIAGNOSIS — R41.82 ALTERED MENTAL STATUS, UNSPECIFIED ALTERED MENTAL STATUS TYPE: ICD-10-CM

## 2023-09-25 DIAGNOSIS — R27.8 OTHER LACK OF COORDINATION: ICD-10-CM

## 2023-09-25 DIAGNOSIS — D86.9 SARCOIDOSIS: ICD-10-CM

## 2023-09-25 PROCEDURE — 70551 MRI BRAIN STEM W/O DYE: CPT

## 2024-01-09 ENCOUNTER — HOSPITAL ENCOUNTER (OUTPATIENT)
Dept: OCCUPATIONAL THERAPY | Age: 64
Setting detail: THERAPIES SERIES
Discharge: HOME | End: 2024-01-09
Payer: COMMERCIAL

## 2024-01-09 PROCEDURE — 97165 OT EVAL LOW COMPLEX 30 MIN: CPT

## 2024-01-09 NOTE — PROGRESS NOTES
increased safety/participation in ADL/IADL tasks  * Therapeutic exercise to improve motor endurance, ROM, and functional strength for ADLs/functional transfers  * Therapeutic activities to facilitate/challenge dynamic balance, stand tolerance for increased safety and independence with ADLs  * Therapeutic activities to facilitate gross/fine motor skills for increased independence with ADLs  * Neuro-muscular re-education: facilitation of righting/equilibrium reactions, midline orientation, scapular stability/mobility, normalization of muscle tone, and facilitation of volitional active controled movement      Patient Specific Goal: Pt wants to work on memory and coordination                             GOALS (Long term same as Short term):  1) Patient will demonstrate good understanding of home program (exercises/activities/diagnosis/prognosis/goals) with good accuracy.   2) Increase in fine motor function as evidenced by ability to complete 9 hole peg test in 25 seconds or less with BUEs for enhanced ADL participation  3) Patient will pay her bills with [employer] [place] and use of adaptive strategies as needed  4) Patient will decrease QuickDASH score to 15% or less for increased participation in daily functional activities.   5) Patient to demonstrate proper follow through of home modification/adaptive recommendations (e.g., memory aides) to increase safe functional ADL/IADLs.  6) Patient will demonstrate Fair+ dynamic standing balance during ADL/IADLs with ability to use righting reactions 100% of time when LOB occurs  7) Patient will complete meal prep and with Indep and no cueing for problem solving, memory, or sequencing        PMH/PRIOR LEVEL OF FUNCTION     Past Medical History:   Past Medical History:   Diagnosis Date    Asthma     stable, is mild    Back pain     Diabetes mellitus (HCC)     Hypothyroidism     Mass of nasal sinus     for OR 11-13-20     Thyroid disease      Past Surgical History:   Past  glasses

## 2024-01-16 ENCOUNTER — APPOINTMENT (OUTPATIENT)
Dept: OCCUPATIONAL THERAPY | Age: 64
End: 2024-01-16
Payer: MEDICAID

## 2024-01-18 ENCOUNTER — HOSPITAL ENCOUNTER (OUTPATIENT)
Dept: OCCUPATIONAL THERAPY | Age: 64
Setting detail: THERAPIES SERIES
Discharge: HOME | End: 2024-01-18
Payer: MEDICAID

## 2024-01-18 PROCEDURE — 97530 THERAPEUTIC ACTIVITIES: CPT

## 2024-01-18 NOTE — PROGRESS NOTES
Occupational Therapy    OCCUPATIONAL THERAPY DAILY NOTE    Date:  2024  Initial Evaluation Date: 24                                  Evaluating Therapist: Lucila Hensley OT     Patient Name:  Erna Stoner                      :  1960     Restrictions/Precautions:  none, moderate fall risk  Diagnosis:  R27.8 (ICD-10-CM) - Other lack of coordination                                                             Date of Surgery/Injury: Ongoing since September     Insurance/Certification information:  [place] Medicaid (no auth required)  Plan of care signed (Y/N): N  Visit# / total visits: -      Referring Practitioner:  Elia Daniel MD   Specific Practitioner Orders: OT eval & Treat     Reason for Referral: In September pt experienced AMS, fatigue, and now demonstrates a lack of coordination. Pt was referred to OT for eval & treat.      Assessment of current deficits   [x] Functional mobility             [x] ADLs           [] Strength                  [x] Cognition   [] Functional transfers           [x] IADLs          [x] Safety Awareness  [x] Endurance   [x] Fine Motor Coordination    [x] Balance      [x] Vision/perception    [] Sensation     [x] Gross Motor Coordination [] ROM           [x] Pain                        [] Edema          [] Scar Adhesion/Skin Integrity [x] Motor Control [] Postural Alignment      OT PLAN OF CARE   OT POC based on physician orders, patient diagnosis and results of clinical assessment     Frequency/Duration: Frequency/Duration 1-2 / week for 12- 18 visits.   Certification period From: 24  To: 24  Specific OT Treatment to include:      * Instruction/training on adapted ADL techniques and AE recommendations to increase functional independence within precautions       * Recommendation of environmental modifications for increased safety with functional transfers/mobility and ADLs  * Cognitive retraining/development of therapeutic activities to

## 2024-01-23 ENCOUNTER — HOSPITAL ENCOUNTER (OUTPATIENT)
Dept: OCCUPATIONAL THERAPY | Age: 64
Setting detail: THERAPIES SERIES
Discharge: HOME | End: 2024-01-23
Payer: MEDICAID

## 2024-01-23 PROCEDURE — 97530 THERAPEUTIC ACTIVITIES: CPT

## 2024-01-23 NOTE — PROGRESS NOTES
Occupational Therapy    OCCUPATIONAL THERAPY DAILY NOTE    Date:  2024  Initial Evaluation Date: 24                                  Evaluating Therapist: Lucila Hensley OT     Patient Name:  Erna Stoner                      :  1960     Restrictions/Precautions:  none, moderate fall risk  Diagnosis:  R27.8 (ICD-10-CM) - Other lack of coordination                                                             Date of Surgery/Injury: Ongoing since September     Insurance/Certification information:  [place] Medicaid (no auth required)  Plan of care signed (Y/N): N  Visit# / total visits: 3 / 12-      Referring Practitioner:  Elia Daniel MD   Specific Practitioner Orders: OT eval & Treat     Reason for Referral: In September pt experienced AMS, fatigue, and now demonstrates a lack of coordination. Pt was referred to OT for eval & treat.      Assessment of current deficits   [x] Functional mobility             [x] ADLs           [] Strength                  [x] Cognition   [] Functional transfers           [x] IADLs          [x] Safety Awareness  [x] Endurance   [x] Fine Motor Coordination    [x] Balance      [x] Vision/perception    [] Sensation     [x] Gross Motor Coordination [] ROM           [x] Pain                        [] Edema          [] Scar Adhesion/Skin Integrity [x] Motor Control [] Postural Alignment      OT PLAN OF CARE   OT POC based on physician orders, patient diagnosis and results of clinical assessment     Frequency/Duration: Frequency/Duration 1-2 / week for 12- 18 visits.   Certification period From: 24  To: 24  Specific OT Treatment to include:      * Instruction/training on adapted ADL techniques and AE recommendations to increase functional independence within precautions       * Recommendation of environmental modifications for increased safety with functional transfers/mobility and ADLs  * Cognitive retraining/development of therapeutic activities to

## 2024-01-30 ENCOUNTER — HOSPITAL ENCOUNTER (OUTPATIENT)
Dept: OCCUPATIONAL THERAPY | Age: 64
Setting detail: THERAPIES SERIES
Discharge: HOME | End: 2024-01-30
Payer: MEDICAID

## 2024-01-30 PROCEDURE — 97530 THERAPEUTIC ACTIVITIES: CPT

## 2024-01-30 NOTE — PROGRESS NOTES
improve problem solving, judgement, memory, and attention for increased safety/participation in ADL/IADL tasks  * Therapeutic exercise to improve motor endurance, ROM, and functional strength for ADLs/functional transfers  * Therapeutic activities to facilitate/challenge dynamic balance, stand tolerance for increased safety and independence with ADLs  * Therapeutic activities to facilitate gross/fine motor skills for increased independence with ADLs  * Neuro-muscular re-education: facilitation of righting/equilibrium reactions, midline orientation, scapular stability/mobility, normalization of muscle tone, and facilitation of volitional active controled movement     Patient Specific Goal: Pt wants to work on memory and coordination                             GOALS (Long term same as Short term):  1) Patient will demonstrate good understanding of home program (exercises/activities/diagnosis/prognosis/goals) with good accuracy.   2) Increase in fine motor function as evidenced by ability to complete 9 hole peg test in 25 seconds or less with BUEs for enhanced ADL participation  3) Patient will pay her bills with [employer] [place] and use of adaptive strategies as needed  4) Patient will decrease QuickDASH score to 15% or less for increased participation in daily functional activities.   5) Patient to demonstrate proper follow through of home modification/adaptive recommendations (e.g., memory aides) to increase safe functional ADL/IADLs.  6) Patient will demonstrate Fair+ dynamic standing balance during ADL/IADLs with ability to use righting reactions 100% of time when LOB occurs  7) Patient will complete meal prep and with Indep and no cueing for problem solving, memory, or sequencing       Pain Level: No pain reports    Subjective: \" I'm confused\" several times during therapy session tasks requiring added explanation of task    Objective:  Updated POC to be completed by 10th visit.    INTERVENNotes      TION:

## 2024-02-06 ENCOUNTER — HOSPITAL ENCOUNTER (OUTPATIENT)
Dept: PHYSICAL THERAPY | Age: 64
Setting detail: THERAPIES SERIES
Discharge: HOME | End: 2024-02-06
Payer: COMMERCIAL

## 2024-02-06 ENCOUNTER — HOSPITAL ENCOUNTER (OUTPATIENT)
Dept: OCCUPATIONAL THERAPY | Age: 64
Setting detail: THERAPIES SERIES
Discharge: HOME | End: 2024-02-06
Payer: COMMERCIAL

## 2024-02-06 PROCEDURE — 97161 PT EVAL LOW COMPLEX 20 MIN: CPT

## 2024-02-06 PROCEDURE — 97530 THERAPEUTIC ACTIVITIES: CPT

## 2024-02-06 NOTE — PROGRESS NOTES
her established POC goals as tolerated.    .   -Rehab Potential: Good    -Requires OT Follow Up: Yes    Time In: 12;30 pm            Time Out: 1:30pm            Visit #: 5/12- 18    Treatment Charges: Mins Units   Modalities     Ther Exercise     Manual Therapy     Thera Activities 60 4   ADL/Home Mgt      Neuro Re-education     Gait Training     Group Therapy     Non-Billable Service Time     Other     Total Time/Units 60 4       -Response to Treatment: Good. Pt likes the challenges presented today    Plan:   [x]  Continues Plan of care: Treatment covered based on POC and graduated to patient's progress. Pt education continues at each visit to obtain maximum benefits from skilled OT intervention.  []  Alter Plan of care:   []  Discharge:      Jelena VAZQUEZ, [id number]

## 2024-02-06 NOTE — PROGRESS NOTES
Physical Therapy    Physical Therapy: Initial Evaluation    Patient: Erna Stoner (63 y.o. female)   Examination Date: 2024  Plan of Care Certification Period: 2024 to  2024      :  1960 ;    Confirmed: Yes MRN: [id number]  CSN: [id number]   Insurance: Payor: [facility] / Plan: [facility] MEDICAID / Product Type: *No Product type* /   Insurance ID: [id number] - (Medicaid Managed) Secondary Insurance (if applicable):    Referring Physician: Elia Daniel MD     PCP: Elia Daniel MD Visits to Date/Visits Approved:   /      No Show/Cancelled Appts:   /       Medical Diagnosis: No admission diagnoses are documented for this encounter.    Treatment Diagnosis:       PERTINENT MEDICAL HISTORY           Medical History:     Past Medical History:   Diagnosis Date    Asthma     stable, is mild    Back pain     Diabetes mellitus (HCC)     Hypothyroidism     Mass of nasal sinus     for OR 20     Thyroid disease      Surgical History:   Past Surgical History:   Procedure Laterality Date    CARPAL TUNNEL RELEASE      bilateral     SECTION      CHOLECYSTECTOMY      COLONOSCOPY  2012    HYSTERECTOMY (CERVIX STATUS UNKNOWN)  2003    LIVER BIOPSY  2017    NOSE SURGERY N/A 2020    EXCISIONAL BIOPSY OF RIGHT NARES (PATHOLOGY NOTIFIED) performed by Bandar Solis Jr., MD at [facility] OR    TONSILLECTOMY      UPPER GASTROINTESTINAL ENDOSCOPY  2012       Medications:   Current Outpatient Medications:     Semaglutide, 1 MG/DOSE, (OZEMPIC, 1 MG/DOSE,) 2 MG/1.5ML SOPN, Inject 0.5 mg into the skin once a week , Disp: , Rfl:     alendronate (FOSAMAX) 70 MG tablet, Take 70 mg by mouth every 7 days , Disp: , Rfl:     ferrous sulfate 325 (65 Fe) MG tablet, Take 325 mg by mouth daily (with breakfast) , Disp: , Rfl:     levothyroxine (SYNTHROID) 100 MCG tablet, Take 88 mcg by mouth daily. Instructed to take morning of surgery with a sip

## 2024-02-13 ENCOUNTER — HOSPITAL ENCOUNTER (OUTPATIENT)
Dept: OCCUPATIONAL THERAPY | Age: 64
Setting detail: THERAPIES SERIES
Discharge: HOME | End: 2024-02-13
Payer: COMMERCIAL

## 2024-02-13 ENCOUNTER — HOSPITAL ENCOUNTER (OUTPATIENT)
Dept: PHYSICAL THERAPY | Age: 64
Setting detail: THERAPIES SERIES
Discharge: HOME | End: 2024-02-13
Payer: COMMERCIAL

## 2024-02-13 ENCOUNTER — APPOINTMENT (OUTPATIENT)
Dept: OCCUPATIONAL THERAPY | Age: 64
End: 2024-02-13
Payer: COMMERCIAL

## 2024-02-13 PROCEDURE — 97530 THERAPEUTIC ACTIVITIES: CPT

## 2024-02-13 NOTE — PROGRESS NOTES
Occupational Therapy    OCCUPATIONAL THERAPY DAILY NOTE    Date:  2024  Initial Evaluation Date: 24                                  Evaluating Therapist: Lucila Hensley OT     Patient Name:  Erna Stoner                      :  1960     Restrictions/Precautions:  none, moderate fall risk  Diagnosis:  R27.8 (ICD-10-CM) - Other lack of coordination                                                             Date of Surgery/Injury: Ongoing since September     Insurance/Certification information:  [place] Medicaid (no auth required)  Plan of care signed (Y/N): N  Visit# / total visits: -      Referring Practitioner:  Elia Daniel MD   Specific Practitioner Orders: OT eval & Treat     Reason for Referral: In September pt experienced AMS, fatigue, and now demonstrates a lack of coordination. Pt was referred to OT for eval & treat.      Assessment of current deficits   [x] Functional mobility             [x] ADLs           [] Strength                  [x] Cognition   [] Functional transfers           [x] IADLs          [x] Safety Awareness  [x] Endurance   [x] Fine Motor Coordination    [x] Balance      [x] Vision/perception    [] Sensation     [x] Gross Motor Coordination [] ROM           [x] Pain                        [] Edema          [] Scar Adhesion/Skin Integrity [x] Motor Control [] Postural Alignment      OT PLAN OF CARE   OT POC based on physician orders, patient diagnosis and results of clinical assessment     Frequency/Duration: Frequency/Duration 1-2 / week for 12- 18 visits.   Certification period From: 24  To: 24  Specific OT Treatment to include:      * Instruction/training on adapted ADL techniques and AE recommendations to increase functional independence within precautions       * Recommendation of environmental modifications for increased safety with functional transfers/mobility and ADLs  * Cognitive retraining/development of therapeutic activities to improve

## 2024-02-13 NOTE — PROGRESS NOTES
[facility]          Phone: [phone] Fax: [phone]    Physical Therapy Daily Treatment Note  Date:  2024    Physical Therapy: Initial Evaluation    Patient: Erna Stoner (63 y.o. female)   Examination Date: 2024  Plan of Care Certification Period: 2024 to  2024   :  1960 ;    Confirmed: Yes MRN: [id number]  CSN: [id number]   Insurance: Payor: [facility] / Plan: [facility] MEDICAID / Product Type: *No Product type* /   Insurance ID: [id number] - (Medicaid Managed) Secondary Insurance (if applicable):    Referring Physician: Elia Daniel MD     PCP: Elia Daniel MD Visits to Date/Visits Approved:   /      No Show/Cancelled Appts:   /       Medical Diagnosis: No admission diagnoses are documented for this encounter.    Treatment Diagnosis:        Visit# / total visits:    Pain level: 0/10   Time In:  1415  Time Out:  1500    Subjective:  Pt reports feeling good today. Pt continues to report memory deficits, but denies having any imbalance or falls since last visit.        OBJECTIVE:     Functional Activities:   Transfers (sit to stand ):  WFL    Gait Testing:   Gait Deviations (firm surface/linoleum): None  Assistive Device Used: None  Steps: 16 steps with 1 rail in reciprocal manner.    Strength Testing: NT  ROM: NT    Exercises:  Exercise/Equipment Resistance/Repetitions Other comments     Negotiating around 6 cones placed 2 feet apart 4x       Stepping over 6 cones placed 2 feet apart 4x R/L Close SBA for safety     Picking up 6 cones placed 2 feet apart  No imbalance observed     4 way resisted walking 5x ea Mild imbalance when stepping to the left     [place]:  Stepping fwd  Alternating Fwd/behind   30 feet x 2  30 feet x 2 Verbal cues for technique, hands on for safety     Hip/Knee flexion/extension 20x ea with black band Verbal cues for proper technique

## 2024-02-14 ENCOUNTER — TRANSCRIBE ORDERS (OUTPATIENT)
Dept: ADMINISTRATIVE | Age: 64
End: 2024-02-14

## 2024-02-14 ENCOUNTER — HOSPITAL ENCOUNTER (OUTPATIENT)
Dept: ULTRASOUND IMAGING | Age: 64
Discharge: HOME | End: 2024-02-16
Payer: COMMERCIAL

## 2024-02-14 DIAGNOSIS — I51.9 MYXEDEMA HEART DISEASE: ICD-10-CM

## 2024-02-14 DIAGNOSIS — E03.9 HYPOTHYROIDISM, UNSPECIFIED TYPE: ICD-10-CM

## 2024-02-14 DIAGNOSIS — R94.6 NONSPECIFIC ABNORMAL RESULTS OF THYROID FUNCTION STUDY: ICD-10-CM

## 2024-02-14 DIAGNOSIS — R94.6 ABNORMAL RESULTS OF THYROID FUNCTION STUDIES: Primary | ICD-10-CM

## 2024-02-14 DIAGNOSIS — E04.9 ENLARGEMENT OF THYROID: ICD-10-CM

## 2024-02-14 DIAGNOSIS — E03.9 MYXEDEMA HEART DISEASE: ICD-10-CM

## 2024-02-14 PROCEDURE — 76536 US EXAM OF HEAD AND NECK: CPT

## 2024-02-20 ENCOUNTER — HOSPITAL ENCOUNTER (OUTPATIENT)
Dept: OCCUPATIONAL THERAPY | Age: 64
Setting detail: THERAPIES SERIES
Discharge: HOME | End: 2024-02-20
Payer: COMMERCIAL

## 2024-02-20 ENCOUNTER — HOSPITAL ENCOUNTER (OUTPATIENT)
Dept: PHYSICAL THERAPY | Age: 64
Setting detail: THERAPIES SERIES
Discharge: HOME | End: 2024-02-20
Payer: COMMERCIAL

## 2024-02-20 ENCOUNTER — APPOINTMENT (OUTPATIENT)
Dept: OCCUPATIONAL THERAPY | Age: 64
End: 2024-02-20
Payer: COMMERCIAL

## 2024-02-20 NOTE — PROGRESS NOTES
[facility]  Phone: [phone] Fax: [phone]     Physical Therapy  Cancellation/No-show Note  Patient Name:  Erna Stoner  :  1960   Date:  2024    For today's appointment patient:  [x]  Cancelled  []  Rescheduled appointment  []  No-show     Reason given by patient:  [x]  Patient ill  []  Conflicting appointment  []  No transportation    []  Conflict with work  []  No reason given  []  Other:     Comments:      Electronically signed by:    Fernanda Sparks PT, DPT  License [id number]

## 2024-02-20 NOTE — PROGRESS NOTES
Occupational Therapy      Phone: [phone] Fax: [phone]     Occupational Therapy   Cancellation Note     Patient Name:  Erna Stoner  : 1960  Date:  2024  MRN: [id number]    For today's appointment patient:   [x]  Cancelled   []  Rescheduled appointment   []  No-show     Reason given by patient:   [x]  Patient ill   []  Conflicting appointment   []  No transportation   []  Conflict with work   []  No reason given   []  Other:    Comments:     Electronically signed by: Jelena VAZQUEZ, [id number]

## 2024-02-27 ENCOUNTER — HOSPITAL ENCOUNTER (OUTPATIENT)
Dept: PHYSICAL THERAPY | Age: 64
Setting detail: THERAPIES SERIES
Discharge: HOME | End: 2024-02-27
Payer: COMMERCIAL

## 2024-02-27 ENCOUNTER — HOSPITAL ENCOUNTER (OUTPATIENT)
Dept: OCCUPATIONAL THERAPY | Age: 64
Setting detail: THERAPIES SERIES
Discharge: HOME | End: 2024-02-27
Payer: COMMERCIAL

## 2024-02-27 ENCOUNTER — APPOINTMENT (OUTPATIENT)
Dept: OCCUPATIONAL THERAPY | Age: 64
End: 2024-02-27
Payer: COMMERCIAL

## 2024-02-27 PROCEDURE — 97530 THERAPEUTIC ACTIVITIES: CPT

## 2024-02-27 NOTE — PROGRESS NOTES
feet, no imbalance  Tandem gait in // bars 8 feet x 4 with use of UE for balance    Modified CTSIB  Balance Screen:      mCTSIB/[place] SOP (N=Normal, S=Sway, F=Fall):   Condition #1 (Romberg eyes open): N  Condition #2 ([place] eyes closed): S  Condition #3 (Tandem [place] eyes open):  S  Condition #4 (Tandem [place] eyes closed): S  Condition #5 (CTSIB eyes open):  S  Condition #6 (CTSIB eyes closed):  F  Condition #7 (Stepping [place]): NT      Interpretation of Score: Modified CTSIB is a timed test that systematically measures the influence of visual, vestibular, and somatosensory input on standing balance.     Home Exercise Program:  NA    Manual Treatments:  NA    Modalities:  NA    Comments:  Pt did very well with exercises despite stating she was feeling a little weak. Pt with good balance when performing [place], and she was able to remember sequencing without further cues from therapist. Pt did require occasional rest breaks but able to perform all exercises. Will progress as able.      Treatment Charges: Mins Units   Initial Evaluation       Re-Evaluation       Ther Exercise         TE     Manual Therapy     MT       Ther Activities        TA 45   3   Gait Training          GT       Neuro Re-education NR       Modalities     Non-Billable Service Time     Other       Total Time/Units 45 3        Treatment/Activity Tolerance:  [x] Patient tolerated treatment well [] Patient limited by fatigue  [] Patient limited by pain  [] Patient limited by other medical complications  [] Other:     Prognosis: [] Good [] Fair  [] Poor    Patient Requires Follow-up: [] Yes  [] No    Plan:   [x] Continue per plan of care [] Alter current plan (see comments)  [] Plan of care initiated [] Hold pending MD visit [] Discharge  Plan for Next Session:        Electronically signed by:    Fernanda Sparks PT, DPT  License [id number]

## 2024-02-27 NOTE — PROGRESS NOTES
Occupational Therapy    OCCUPATIONAL THERAPY DAILY NOTE    Date:  2024  Initial Evaluation Date: 24                                  Evaluating Therapist: Lucila Hensley OT     Patient Name:  Erna Stoner                      :  1960     Restrictions/Precautions:  none, moderate fall risk  Diagnosis:  R27.8 (ICD-10-CM) - Other lack of coordination                                                             Date of Surgery/Injury: Ongoing since September     Insurance/Certification information:  [place] Medicaid (no auth required)  Plan of care signed (Y/N): N  Visit# / total visits: -      Referring Practitioner:  Elia Daniel MD   Specific Practitioner Orders: OT eval & Treat     Reason for Referral: In September pt experienced AMS, fatigue, and now demonstrates a lack of coordination. Pt was referred to OT for eval & treat.      Assessment of current deficits   [x] Functional mobility             [x] ADLs           [] Strength                  [x] Cognition   [] Functional transfers           [x] IADLs          [x] Safety Awareness  [x] Endurance   [x] Fine Motor Coordination    [x] Balance      [x] Vision/perception    [] Sensation     [x] Gross Motor Coordination [] ROM           [x] Pain                        [] Edema          [] Scar Adhesion/Skin Integrity [x] Motor Control [] Postural Alignment      OT PLAN OF CARE   OT POC based on physician orders, patient diagnosis and results of clinical assessment     Frequency/Duration: Frequency/Duration 1-2 / week for 12- 18 visits.   Certification period From: 24  To: 24  Specific OT Treatment to include:      * Instruction/training on adapted ADL techniques and AE recommendations to increase functional independence within precautions       * Recommendation of environmental modifications for increased safety with functional transfers/mobility and ADLs  * Cognitive retraining/development of therapeutic activities to improve

## 2024-02-29 ENCOUNTER — OFFICE VISIT (OUTPATIENT)
Dept: PODIATRY | Age: 64
End: 2024-02-29
Payer: COMMERCIAL

## 2024-02-29 VITALS — HEIGHT: 62 IN | BODY MASS INDEX: 28.16 KG/M2 | WEIGHT: 153 LBS

## 2024-02-29 DIAGNOSIS — G60.8 HEREDITARY SENSORY NEUROPATHY: ICD-10-CM

## 2024-02-29 DIAGNOSIS — Z79.4 TYPE 2 DIABETES MELLITUS WITHOUT COMPLICATION, WITH LONG-TERM CURRENT USE OF INSULIN (HCC): Primary | ICD-10-CM

## 2024-02-29 DIAGNOSIS — L84 CORNS AND CALLOSITIES: ICD-10-CM

## 2024-02-29 DIAGNOSIS — B35.1 TINEA UNGUIUM: ICD-10-CM

## 2024-02-29 DIAGNOSIS — E11.9 TYPE 2 DIABETES MELLITUS WITHOUT COMPLICATION, WITH LONG-TERM CURRENT USE OF INSULIN (HCC): Primary | ICD-10-CM

## 2024-02-29 PROCEDURE — 1036F TOBACCO NON-USER: CPT | Performed by: PODIATRIST

## 2024-02-29 PROCEDURE — G8427 DOCREV CUR MEDS BY ELIG CLIN: HCPCS | Performed by: PODIATRIST

## 2024-02-29 PROCEDURE — 3046F HEMOGLOBIN A1C LEVEL >9.0%: CPT | Performed by: PODIATRIST

## 2024-02-29 PROCEDURE — G8484 FLU IMMUNIZE NO ADMIN: HCPCS | Performed by: PODIATRIST

## 2024-02-29 PROCEDURE — 11056 PARNG/CUTG B9 HYPRKR LES 2-4: CPT | Performed by: PODIATRIST

## 2024-02-29 PROCEDURE — 11721 DEBRIDE NAIL 6 OR MORE: CPT | Performed by: PODIATRIST

## 2024-02-29 PROCEDURE — 3017F COLORECTAL CA SCREEN DOC REV: CPT | Performed by: PODIATRIST

## 2024-02-29 PROCEDURE — 99203 OFFICE O/P NEW LOW 30 MIN: CPT | Performed by: PODIATRIST

## 2024-02-29 PROCEDURE — G8419 CALC BMI OUT NRM PARAM NOF/U: HCPCS | Performed by: PODIATRIST

## 2024-02-29 PROCEDURE — 2022F DILAT RTA XM EVC RTNOPTHY: CPT | Performed by: PODIATRIST

## 2024-02-29 RX ORDER — ALBUTEROL SULFATE 90 UG/1
AEROSOL, METERED RESPIRATORY (INHALATION)
COMMUNITY
Start: 2024-02-08

## 2024-02-29 RX ORDER — CELECOXIB 200 MG/1
CAPSULE ORAL
COMMUNITY
Start: 2024-01-10

## 2024-02-29 RX ORDER — METHOTREXATE 25 MG/ML
INJECTION, SOLUTION INTRA-ARTERIAL; INTRAMUSCULAR; INTRAVENOUS
COMMUNITY

## 2024-02-29 RX ORDER — EMPAGLIFLOZIN, METFORMIN HYDROCHLORIDE 12.5; 1 MG/1; MG/1
TABLET, EXTENDED RELEASE ORAL
COMMUNITY
Start: 2024-02-23

## 2024-02-29 RX ORDER — FOLIC ACID 1 MG/1
TABLET ORAL
COMMUNITY
Start: 2023-04-27

## 2024-02-29 RX ORDER — RIFAXIMIN 550 MG/1
TABLET ORAL
COMMUNITY
Start: 2024-01-16

## 2024-02-29 RX ORDER — LACTULOSE 10 G/15ML
SOLUTION ORAL
COMMUNITY
Start: 2024-02-17

## 2024-02-29 RX ORDER — SYRINGE,SAFETY WITH NEEDLE,1ML 28GX1/2"
SYRINGE, EMPTY DISPOSABLE MISCELLANEOUS
COMMUNITY
Start: 2023-12-19

## 2024-02-29 RX ORDER — OMEPRAZOLE 40 MG/1
CAPSULE, DELAYED RELEASE ORAL
COMMUNITY
Start: 2023-12-18

## 2024-02-29 RX ORDER — ZINC GLUCONATE 100 MG
100 TABLET ORAL DAILY
COMMUNITY
Start: 2024-01-18 | End: 2024-04-17

## 2024-02-29 RX ORDER — HYDROXYZINE HYDROCHLORIDE 25 MG/1
TABLET, FILM COATED ORAL
COMMUNITY
Start: 2024-02-16

## 2024-02-29 RX ORDER — AMMONIUM LACTATE 12 G/100G
LOTION TOPICAL
Qty: 400 G | Refills: 2 | Status: SHIPPED | OUTPATIENT
Start: 2024-02-29

## 2024-02-29 NOTE — PROGRESS NOTES
Erna Stoner is here today for nail care. her PCP is Elia Daniel MD last OV was 2024.     24  Erna Stoner : 1960 Sex: female  Age: 63 y.o.    Patient was referred by Elia Daniel MD    CC:   Diabetic foot exam and painful elongated toenails 1-5 right and left    HPI:   This pleasant 63-year-old female patient referred to me today diabetic foot ankle exam.  Does have dry skin on bottom both feet.  No open wounds.  Callus tissue on the bottom both feet which does occasionally bother her.  Denies any history of diabetic ulcerations of her foot or ankle.  Here today for diabetic foot exam and painful elongated toenails 1-5 right and left.    No additional pedal complaints at this time.    ROS:  Const: Denies constitutional symptoms  Musculo: Denies symptoms other than stated above  Skin: Denies symptoms other than stated above      Current Outpatient Medications:     albuterol sulfate HFA (PROVENTIL;VENTOLIN;PROAIR) 108 (90 Base) MCG/ACT inhaler, INHALE 1 TO 2 PUFFS BY MOUTH EVERY 4 TO 6 HOURS AS NEEDED, Disp: , Rfl:     celecoxib (CELEBREX) 200 MG capsule, , Disp: , Rfl:     vitamin D (CHOLECALCIFEROL) 50 MCG (2000 UT) CAPS capsule, Take 1 capsule by mouth daily, Disp: , Rfl:     SYNJARDY XR 12.5-1000 MG TB24, TAKE (1)ONE TABLET TWICE DAILY WITH MEALS. SWALLOW WHOLE-DO NOT CRUSH OR CHEW., Disp: , Rfl:     folic acid (FOLVITE) 1 MG tablet, Take by mouth, Disp: , Rfl:     hydrOXYzine HCl (ATARAX) 25 MG tablet, Take by mouth, Disp: , Rfl:     lactulose (CHRONULAC) 10 GM/15ML solution, TAKE 15 ML BY MOUTH TWICE DAILY, Disp: , Rfl:     omeprazole (PRILOSEC) 40 MG delayed release capsule, TAKE 1 CAPSULE BY MOUTH IN THE MORNING 1/2 HOUR PRIOR TO BREAKFAST, Disp: , Rfl:     XIFAXAN 550 MG tablet, , Disp: , Rfl:     MONOJECT TB SAFETY SYRINGE 28G X 1/2\" 1 ML MISC, USE WITH METHOTREXATE, Disp: , Rfl:     zinc gluconate 100 MG tablet, Take 1 tablet by mouth daily, Disp: , Rfl:

## 2024-03-05 ENCOUNTER — HOSPITAL ENCOUNTER (OUTPATIENT)
Dept: OCCUPATIONAL THERAPY | Age: 64
Setting detail: THERAPIES SERIES
Discharge: HOME | End: 2024-03-05
Payer: COMMERCIAL

## 2024-03-05 ENCOUNTER — HOSPITAL ENCOUNTER (OUTPATIENT)
Dept: PHYSICAL THERAPY | Age: 64
Setting detail: THERAPIES SERIES
Discharge: HOME | End: 2024-03-05
Payer: COMMERCIAL

## 2024-03-05 PROCEDURE — 97530 THERAPEUTIC ACTIVITIES: CPT

## 2024-03-05 NOTE — PROGRESS NOTES
[facility]          Phone: [phone] Fax: [phone]    Physical Therapy Daily Treatment Note  Date:  3/5/2024    Physical Therapy: Initial Evaluation    Patient: Erna Stoner (63 y.o. female)   Examination Date: 2024  Plan of Care Certification Period: 2024 to  2024   :  1960 ;    Confirmed: Yes MRN: [id number]  CSN: [id number]   Insurance: Payor: [facility] / Plan: [facility] MEDICAID / Product Type: *No Product type* /   Insurance ID: [id number] - (Medicaid Managed) Secondary Insurance (if applicable):    Referring Physician: Elia Daniel MD     PCP: Elia Daniel MD Visits to Date/Visits Approved:   /      No Show/Cancelled Appts:   /       Medical Diagnosis: No admission diagnoses are documented for this encounter.    Treatment Diagnosis:        Visit# / total visits:    Pain level: 0/10   Time In:  1400  Time Out:  1445    Subjective:  Pt with no c/o today other than being a little tired.        OBJECTIVE:     Functional Activities:   Transfers (sit to stand ):  WFL    Gait Testing:   Gait Deviations (firm surface/linoleum): None  Assistive Device Used: None  Steps: NT    Strength Testing: NT  ROM: NT    Exercises:  Exercise/Equipment Resistance/Repetitions Other comments     Negotiating around 6 cones placed 2 feet apart 4x       Stepping over 6 cones placed 2 feet apart 4x R/L Supervision     Picking up 6 cones placed 2 feet apart  No imbalance observed     4 way resisted walking 5x ea Improved balance     [place]:  Stepping fwd  Stepping behind  Alternating Fwd/behind   30 feet x 2  30 feet x 2  30 feet x 2 Good balance and memory with completing tasks, mild imbalance with stepping behind     Hip/Knee flexion/extension 20x ea with purple band Verbal cues for proper technique     Bike  10 min                                                              Other Therapeutic Activities:    Tandem gait 30 feet x 4

## 2024-03-05 NOTE — PROGRESS NOTES
problem solving, judgement, memory, and attention for increased safety/participation in ADL/IADL tasks  * Therapeutic exercise to improve motor endurance, ROM, and functional strength for ADLs/functional transfers  * Therapeutic activities to facilitate/challenge dynamic balance, stand tolerance for increased safety and independence with ADLs  * Therapeutic activities to facilitate gross/fine motor skills for increased independence with ADLs  * Neuro-muscular re-education: facilitation of righting/equilibrium reactions, midline orientation, scapular stability/mobility, normalization of muscle tone, and facilitation of volitional active controled movement     Patient Specific Goal: Pt wants to work on memory and coordination                             GOALS (Long term same as Short term):  1) Patient will demonstrate good understanding of home program (exercises/activities/diagnosis/prognosis/goals) with good accuracy.   2) Increase in fine motor function as evidenced by ability to complete 9 hole peg test in 25 seconds or less with BUEs for enhanced ADL participation  3) Patient will pay her bills with [employer] [place] and use of adaptive strategies as needed  4) Patient will decrease QuickDASH score to 15% or less for increased participation in daily functional activities.   5) Patient to demonstrate proper follow through of home modification/adaptive recommendations (e.g., memory aides) to increase safe functional ADL/IADLs.  6) Patient will demonstrate Fair+ dynamic standing balance during ADL/IADLs with ability to use righting reactions 100% of time when LOB occurs  7) Patient will complete meal prep and with Indep and no cueing for problem solving, memory, or sequencing       Pain Level: No pain reports    Subjective: Pt states,\" I am feeling a little stronger but the confusion is still there. \"    Objective:  Updated POC to be completed by 10th visit.    INTERVENNotes      TION: COMPLETED:

## 2024-03-12 ENCOUNTER — HOSPITAL ENCOUNTER (OUTPATIENT)
Dept: PHYSICAL THERAPY | Age: 64
Setting detail: THERAPIES SERIES
Discharge: HOME | End: 2024-03-12
Payer: COMMERCIAL

## 2024-03-12 ENCOUNTER — HOSPITAL ENCOUNTER (OUTPATIENT)
Dept: SPEECH THERAPY | Age: 64
Setting detail: THERAPIES SERIES
Discharge: HOME | End: 2024-03-12
Payer: COMMERCIAL

## 2024-03-12 ENCOUNTER — HOSPITAL ENCOUNTER (OUTPATIENT)
Dept: OCCUPATIONAL THERAPY | Age: 64
Setting detail: THERAPIES SERIES
Discharge: HOME | End: 2024-03-12
Payer: COMMERCIAL

## 2024-03-12 PROCEDURE — 92523 SPEECH SOUND LANG COMPREHEN: CPT | Performed by: SPEECH-LANGUAGE PATHOLOGIST

## 2024-03-12 PROCEDURE — 97530 THERAPEUTIC ACTIVITIES: CPT

## 2024-03-12 NOTE — PROGRESS NOTES
active problem list, as listed below have been reviewed prior to initiation of this evaluation.        ACTIVE PROBLEM LIST: There is no problem list on file for this patient.      Jennifer Miles MS, CCC-SLP    [facility]        Phone: [phone]     If you have any questions or concerns, please don't hesitate to call.  Thank you for your referral.    Physician/Provider Signature:________________________________Date:__________________  By signing above, the therapist’s plan is approved by the physician/provider. All patients under Medicare Insurance must be signed by physician/provider.

## 2024-03-12 NOTE — PROGRESS NOTES
instructs to assist pt though she did overall display better memory with task then previous attempt   Tasks for building kiran hand endurance X  X     Weighted hand ball tasks  Isometric use of hand balls for fingertip holds  Red Therabar tasks        Other:                 Assessment/Comments: Pt arrives to therapy appt reporting she has her first appt with speech therapy today. Therapist focuses more of todays session tasks on pts coordination & strength/ endurance while adding memory & cognition element. Therapist gives pt verbal instructions for how to complete an exercise activity and pts short term memory is challenged to remember steps of tasks where she isn't as focused on task. Pt endurance to light strengthening & coordination tasks overall improving. Pt conts to display minimal confusion & short term memory deficits during OT session tasks today. Pt also reports she feels off balance with hand balancing tasks utilizing Bosu ball.  Will cont to progress pt toward her established POC goals as tolerated.    .   -Rehab Potential: Good    -Requires OT Follow Up: Yes    Time In: 1 pm            Time Out: 2 pm            Visit #: 9/12- 18    Treatment Charges: Mins Units   Modalities     Ther Exercise     Manual Therapy     Thera Activities 60 4   ADL/Home Mgt      Neuro Re-education     Gait Training     Group Therapy     Non-Billable Service Time     Other     Total Time/Units 60 4       -Response to Treatment: Good. Pt likes the challenges presented today    Plan:   [x]  Continues Plan of care: Treatment covered based on POC and graduated to patient's progress. Pt education continues at each visit to obtain maximum benefits from skilled OT intervention.  []  Alter Plan of care:   []  Discharge:      Jelena VAZQUEZ, [id number]

## 2024-03-19 ENCOUNTER — HOSPITAL ENCOUNTER (OUTPATIENT)
Dept: PHYSICAL THERAPY | Age: 64
Setting detail: THERAPIES SERIES
Discharge: HOME | End: 2024-03-19
Payer: COMMERCIAL

## 2024-03-19 ENCOUNTER — HOSPITAL ENCOUNTER (OUTPATIENT)
Dept: OCCUPATIONAL THERAPY | Age: 64
Setting detail: THERAPIES SERIES
Discharge: HOME | End: 2024-03-19
Payer: COMMERCIAL

## 2024-03-19 PROCEDURE — 97530 THERAPEUTIC ACTIVITIES: CPT

## 2024-03-19 NOTE — PROGRESS NOTES
[facility]          Phone: [phone] Fax: [phone]    Physical Therapy Daily Treatment Note  Date:  3/19/2024    Physical Therapy: Initial Evaluation    Patient: Erna Stoner (63 y.o. female)   Examination Date: 2024  Plan of Care Certification Period: 2024 to  2024   :  1960 ;    Confirmed: Yes MRN: [id number]  CSN: [id number]   Insurance: Payor: [facility] / Plan: [facility] [place] MEDICAID / Product Type: *No Product type* /   Insurance ID: [id number] - (Medicaid Managed) Secondary Insurance (if applicable):    Referring Physician: Elia Daniel MD     PCP: Elia Daniel MD Visits to Date/Visits Approved:   /      No Show/Cancelled Appts:   /       Medical Diagnosis: No admission diagnoses are documented for this encounter.    Treatment Diagnosis:        Visit# / total visits:    Pain level: 0/10   Time In:  1400  Time Out:  1445    Subjective:  Pt reports OT really working my arms today. Pt reports some fatigue following session.        OBJECTIVE:     Functional Activities:   Transfers (sit to stand ):  WFL    Gait Testing:   Gait Deviations (firm surface/linoleum): None  Assistive Device Used: None  Steps: NT    Strength Testing: NT  ROM: NT    Exercises:  Exercise/Equipment Resistance/Repetitions Other comments     Negotiating around 6 cones placed 2 feet apart 4x  No imbalance     Stepping over 6 cones placed 2 feet apart 4x R/L No imbalance     Picking up 6 cones placed 2 feet apart  No imbalance     4 way resisted walking 6x ea Improved balance     [place]:  Stepping fwd  Stepping behind  Alternating Fwd/behind   30 feet x 4  30 feet x 4  Good balance and memory with completing tasks     Hip/Knee flexion/extension 20x ea with purple band Verbal cues for proper technique     Bike  10 min      Downward rows with green TB while seated on yellow ball      Rows with green TB while seated on yellow ball      LAQ while

## 2024-03-19 NOTE — PROGRESS NOTES
Occupational Therapy    OCCUPATIONAL THERAPY PROGRESS UPDATE    Date:  3/19/2024  Initial Evaluation Date: 24                                  Evaluating Therapist: Lucila Hensley OT     Patient Name:  Erna Stoner                      :  1960     Restrictions/Precautions:  none, moderate fall risk  Diagnosis:  R27.8 (ICD-10-CM) - Other lack of coordination                                                             Date of Surgery/Injury: Ongoing since September     Insurance/Certification information:  [place] Medicaid (no auth required)  Plan of care signed (Y/N): N  Visit# / total visits: 10/ 12-      Referring Practitioner:  Elia Daniel MD   Specific Practitioner Orders: OT eval & Treat     Reason for Referral: In September pt experienced AMS, fatigue, and now demonstrates a lack of coordination. Pt was referred to OT for eval & treat.      Assessment of current deficits   [x] Functional mobility             [x] ADLs           [] Strength                  [x] Cognition   [] Functional transfers           [x] IADLs          [x] Safety Awareness  [x] Endurance   [x] Fine Motor Coordination    [x] Balance      [x] Vision/perception    [] Sensation     [x] Gross Motor Coordination [] ROM           [x] Pain                        [] Edema          [] Scar Adhesion/Skin Integrity [x] Motor Control [] Postural Alignment      OT PLAN OF CARE   OT POC based on physician orders, patient diagnosis and results of clinical assessment     Frequency/Duration: Frequency/Duration 1-2 / week for 12- 18 visits.   Certification period From: 24  To: 24  Specific OT Treatment to include:      * Instruction/training on adapted ADL techniques and AE recommendations to increase functional independence within precautions       * Recommendation of environmental modifications for increased safety with functional transfers/mobility and ADLs  * Cognitive retraining/development of therapeutic activities to

## 2024-03-26 ENCOUNTER — HOSPITAL ENCOUNTER (OUTPATIENT)
Dept: PHYSICAL THERAPY | Age: 64
Setting detail: THERAPIES SERIES
Discharge: HOME | End: 2024-03-26
Payer: COMMERCIAL

## 2024-03-26 ENCOUNTER — HOSPITAL ENCOUNTER (OUTPATIENT)
Dept: OCCUPATIONAL THERAPY | Age: 64
Setting detail: THERAPIES SERIES
Discharge: HOME | End: 2024-03-26
Payer: COMMERCIAL

## 2024-03-26 ENCOUNTER — HOSPITAL ENCOUNTER (OUTPATIENT)
Dept: SPEECH THERAPY | Age: 64
Setting detail: THERAPIES SERIES
Discharge: HOME | End: 2024-03-26
Payer: COMMERCIAL

## 2024-03-26 PROCEDURE — 97129 THER IVNTJ 1ST 15 MIN: CPT | Performed by: SPEECH-LANGUAGE PATHOLOGIST

## 2024-03-26 PROCEDURE — 97130 THER IVNTJ EA ADDL 15 MIN: CPT | Performed by: SPEECH-LANGUAGE PATHOLOGIST

## 2024-03-26 PROCEDURE — 97530 THERAPEUTIC ACTIVITIES: CPT

## 2024-03-26 PROCEDURE — 97110 THERAPEUTIC EXERCISES: CPT

## 2024-03-26 NOTE — PROGRESS NOTES
[facility]          Phone: [phone] Fax: [phone]    Physical Therapy Daily Treatment Note  Date:  3/26/2024    Physical Therapy: Initial Evaluation    Patient: Erna Stoner (63 y.o. female)   Examination Date: 2024  Plan of Care Certification Period: 2024 to  2024   :  1960 ;    Confirmed: Yes MRN: [id number]  CSN: [id number]   Insurance: Payor: [facility] / Plan: [facility] [place] MEDICAID / Product Type: *No Product type* /   Insurance ID: [id number] - (Medicaid Managed) Secondary Insurance (if applicable):    Referring Physician: Elia Daniel MD     PCP: Elia Daniel MD Visits to Date/Visits Approved:   /      No Show/Cancelled Appts:   /       Medical Diagnosis: No admission diagnoses are documented for this encounter.    Treatment Diagnosis:        Visit# / total visits:    Pain level: 0/10   Time In:  1410  Time Out:  1445    Subjective:  Pt reports being really tired today and reports not being able to eat too much since having an injection for her liver.         OBJECTIVE:     Functional Activities:   Transfers (sit to stand ):  WFL    Gait Testing:   Gait Deviations (firm surface/linoleum): None  Assistive Device Used: None  Steps: ascend/descend 14 steps 2x with left rail.    Strength Testing: NT  ROM: NT    Exercises:  Exercise/Equipment Resistance/Repetitions Other comments     Negotiating around 6 cones placed 2 feet apart nt     Stepping over 6 cones placed 2 feet apart nt     Picking up 6 cones placed 2 feet apart  nt     4 way resisted walking 6x ea Improved balance     [place]:  Stepping fwd  Stepping behind  Alternating Fwd/behind   30 feet x 4  30 feet x 4  30 feet x 2 Good balance and fair memory with completing tasks     Hip/Knee flexion/extension 20x ea with purple band Verbal cues for proper technique     Bike       Downward rows with green TB while seated on yellow ball      Rows with green TB

## 2024-03-26 NOTE — PROGRESS NOTES
SPEECH LANGUAGE PATHOLOGY  DAILY PROGRESS NOTE        PATIENT NAME:  Erna Stoner      :  1960          TODAY'S DATE:  3/26/2024        POC:      Pt will improve immediate, short term, recent memory during structured and unstructured tasks with 75% accuracy.     Pt will improve problem solving/thought organization during structured and unstructured tasks with 75% accuracy.          Subjective:    Pt was seen this date for 30 minute cognitive therapy session. Pt was engaged and cooperative during all therapy activities. Pt reported no change since evaluation.     Objective:    The clinician utilized this session to administer the remaining \"reasoning\" portion of the SCATBI to further assess problem solving and thought processing. The pt demonstrated significant difficulty in the areas of following directions/processing for moderately complex problem solving activities. Further difficulties noted in the areas of pattern completion, proverb/idiom identification, and deductive reasoning puzzles. The pt benefited from repetitions of directions, modeling of problem solving strategies, and break down of problems into step-by step manageable parts. Pt often stated \"I can't think\" or \"I don't know\" during activities.       Assessment:    Pt continues to make progress toward therapy goals.       Plan:    Continue to implement current intervention plan.                                                       Jennifer Miles MS, CCC-SLP      CPT code(s) 03241  therapeutic interventions that focus on cognitive function , initial  15 min  96363  therapeutic interventions that focus on cognitive function, each additional 15 min  Total minutes :  30 minutes

## 2024-03-26 NOTE — PROGRESS NOTES
Follow Up: Yes    Time In: 1 pm            Time Out: 2 pm            Visit #: 11/12- 18    Treatment Charges: Mins Units   Modalities     Ther Exercise 30 2   Manual Therapy     Thera Activities 30 2   ADL/Home Mgt      Neuro Re-education     Gait Training     Group Therapy     Non-Billable Service Time     Other     Total Time/Units 60 4       -Response to Treatment: Good.     Plan:   [x]  Continues Plan of care: Treatment covered based on POC and graduated to patient's progress. Pt education continues at each visit to obtain maximum benefits from skilled OT intervention.  []  Alter Plan of care:   []  Discharge:      Jelena VAZQUEZ, [id number]

## 2024-04-02 ENCOUNTER — HOSPITAL ENCOUNTER (OUTPATIENT)
Dept: OCCUPATIONAL THERAPY | Age: 64
Setting detail: THERAPIES SERIES
End: 2024-04-02
Payer: COMMERCIAL

## 2024-04-09 ENCOUNTER — APPOINTMENT (OUTPATIENT)
Dept: OCCUPATIONAL THERAPY | Age: 64
End: 2024-04-09
Payer: COMMERCIAL

## 2024-04-10 ENCOUNTER — HOSPITAL ENCOUNTER (OUTPATIENT)
Dept: SPEECH THERAPY | Age: 64
Setting detail: THERAPIES SERIES
Discharge: HOME | End: 2024-04-10
Payer: COMMERCIAL

## 2024-04-10 PROCEDURE — 97129 THER IVNTJ 1ST 15 MIN: CPT | Performed by: SPEECH-LANGUAGE PATHOLOGIST

## 2024-04-10 PROCEDURE — 97130 THER IVNTJ EA ADDL 15 MIN: CPT | Performed by: SPEECH-LANGUAGE PATHOLOGIST

## 2024-04-11 ENCOUNTER — HOSPITAL ENCOUNTER (OUTPATIENT)
Dept: MAMMOGRAPHY | Age: 64
Discharge: HOME | End: 2024-04-13
Payer: COMMERCIAL

## 2024-04-11 ENCOUNTER — HOSPITAL ENCOUNTER (OUTPATIENT)
Dept: PHYSICAL THERAPY | Age: 64
Setting detail: THERAPIES SERIES
Discharge: HOME | End: 2024-04-11
Payer: COMMERCIAL

## 2024-04-11 ENCOUNTER — HOSPITAL ENCOUNTER (OUTPATIENT)
Dept: OCCUPATIONAL THERAPY | Age: 64
Setting detail: THERAPIES SERIES
Discharge: HOME | End: 2024-04-11
Payer: COMMERCIAL

## 2024-04-11 VITALS — HEIGHT: 62 IN | BODY MASS INDEX: 28.71 KG/M2 | WEIGHT: 156 LBS

## 2024-04-11 DIAGNOSIS — Z12.31 ENCOUNTER FOR SCREENING MAMMOGRAM FOR MALIGNANT NEOPLASM OF BREAST: ICD-10-CM

## 2024-04-11 PROCEDURE — 77063 BREAST TOMOSYNTHESIS BI: CPT

## 2024-04-11 PROCEDURE — 97530 THERAPEUTIC ACTIVITIES: CPT

## 2024-04-11 PROCEDURE — 97110 THERAPEUTIC EXERCISES: CPT

## 2024-04-11 NOTE — PROGRESS NOTES
I spoke with Neela Ramirez and updated pt on results/recommendations from provider.  Pt verbalized understanding and has no further questions at this time.    Thank you,    Kathryn Pascual, RN  Triage [facility]  07/27/23 15:22 EDT    
Left voicemail for Neela REBECA James requesting callback.    Thank you,  Liliana MACIAS RN  Triage Nurse [facility]   09:43 EDT   
Pls call - potassium slightly high - avoid oranges, raisins, apricots, avocadoes and bananas.  Otherwise labs look good    rm  
score noted below  5) Patient to demonstrate proper follow through of home modification/adaptive recommendations (e.g., memory aides) to increase safe functional ADL/IADLs. Goal Progressing: Pt uses Alis, Post it notes for cognitive aide. Pt uses grab bars in shower for safety.   6) Patient will demonstrate Fair+ dynamic standing balance during ADL/IADLs with ability to use righting reactions 100% of time when LOB occurs Goal Progressing: Pt reports she is not losing her balance as much  7) Patient will complete meal prep and with Indep and no cueing for problem solving, memory, or sequencing Goal Progressing: Pt reports she is trying to cook more but she still struggles remembering ingredients and forgetting steps to what she is doing       Pain Level: No pain reports    Subjective: Pt states,\" I'm sorry I don't feel good today. I didn't sleep good last night.\"    Objective:  Updated POC completed 3/19/24.    INTERVENNotes      TION: COMPLETED: SPECIFICS/COMMENTS:   Modality:                              Coordination:     Gross motor coordination  Ball tasks with 1# & 2# hand balls. Synchronized w/ self & therapist & counted tasks included   Fine motor coordination X Tasks w/ small puzzle pieces. Pt not pushing all pieces together as needed   NeuroMusular Amor:     Tasks to facilitate /challenge dynamic balance            Standing ball tasks. No loss of balance   Bosu ball BUE weight bearing & rocking standing tasks- pt reports she feels \"off balance\"  Balance disc utilized when pt performs ball ex's with good result- no dizziness   Tasks for facilitation of righting/equilibrium reactions  Standing ball tasks.    Therapeutic Activities:     Cognition       X  X Card games- also involving simple subtraction/addition. multiplication/division  Problem solving tasks  Puzzle activity. Pt able to complete border of a 100 piece puzzle with 50% assist from therapist. Pt displays visual discrimination & field ground

## 2024-04-11 NOTE — PROGRESS NOTES
[facility]  Phone: [phone] Fax: [phone]     Physical Therapy  Cancellation/No-show Note  Patient Name:  Erna Stoner  :  1960   Date:  2024    For today's appointment patient:  [x]  Cancelled  []  Rescheduled appointment  []  No-show     Reason given by patient:  [x]  Patient ill  []  Conflicting appointment  []  No transportation    []  Conflict with work  []  No reason given  []  Other:     Comments:  Pt was in OT prior to PT appointment. Pt reported to OT that she was feeling ill and had to leave and not able to stay for PT session.    Electronically signed by:    Fernanda Sparks PT, DPT  License [id number]

## 2024-04-11 NOTE — PROGRESS NOTES
SPEECH LANGUAGE PATHOLOGY  DAILY PROGRESS NOTE        PATIENT NAME:  Erna Stoner      :  1960          TODAY'S DATE:  2024        POC:      Pt will improve immediate, short term, recent memory during structured and unstructured tasks with 75% accuracy.     Pt will improve problem solving/thought organization during structured and unstructured tasks with 75% accuracy.          Subjective:    Pt was seen this date for 30 minute cognitive therapy session. Pt was engaged and cooperative during all therapy activities. Pt reported no change in the past several weeks- pt to have MRI next week for monitoring purposes.    Objective:    The clinician utilized deductive reasoning puzzles to target problem solving and thought processing. The pt was given a grid and asked to fill out the grid given a list of 10 clues- some of the clues were straight forward and others required inferential reasoning to complete. The clinician completed the first puzzle with the pt by modeling the thought process and how to work through each clue. The clinician then provided the pt with a puzzle and asked her to complete independently- the pt worked through approximately half the puzzle and then benefited from clinician models and prompts to complete the inferential portion of the clues. Homework provided.      Assessment:    Pt continues to make progress toward therapy goals.       Plan:    Continue to implement current intervention plan.                                                       Jennifer Miles MS, CCC-SLP      CPT code(s) 25462  therapeutic interventions that focus on cognitive function , initial  15 min  79302  therapeutic interventions that focus on cognitive function, each additional 15 min  Total minutes :  30 minutes

## 2024-04-16 ENCOUNTER — APPOINTMENT (OUTPATIENT)
Dept: GENERAL RADIOLOGY | Age: 64
DRG: 813 | End: 2024-04-16
Payer: COMMERCIAL

## 2024-04-16 ENCOUNTER — HOSPITAL ENCOUNTER (INPATIENT)
Age: 64
LOS: 2 days | Discharge: ANOTHER HOSPITAL | DRG: 813 | End: 2024-04-18
Attending: EMERGENCY MEDICINE | Admitting: INTERNAL MEDICINE
Payer: COMMERCIAL

## 2024-04-16 ENCOUNTER — HOSPITAL ENCOUNTER (OUTPATIENT)
Dept: PHYSICAL THERAPY | Age: 64
Setting detail: THERAPIES SERIES
Discharge: HOME | End: 2024-04-16
Payer: COMMERCIAL

## 2024-04-16 ENCOUNTER — HOSPITAL ENCOUNTER (OUTPATIENT)
Dept: OCCUPATIONAL THERAPY | Age: 64
Setting detail: THERAPIES SERIES
Discharge: HOME | End: 2024-04-16
Payer: COMMERCIAL

## 2024-04-16 DIAGNOSIS — K92.0 HEMATEMESIS WITHOUT NAUSEA: ICD-10-CM

## 2024-04-16 DIAGNOSIS — K92.2 GASTROINTESTINAL HEMORRHAGE, UNSPECIFIED GASTROINTESTINAL HEMORRHAGE TYPE: ICD-10-CM

## 2024-04-16 DIAGNOSIS — K92.0 HEMATEMESIS, UNSPECIFIED WHETHER NAUSEA PRESENT: Primary | ICD-10-CM

## 2024-04-16 LAB
ABO + RH BLD: NORMAL
ALBUMIN SERPL-MCNC: 3.7 G/DL (ref 3.5–5.2)
ALP SERPL-CCNC: 70 U/L (ref 35–104)
ALT SERPL-CCNC: 24 U/L (ref 0–32)
ANION GAP SERPL CALCULATED.3IONS-SCNC: 11 MMOL/L (ref 7–16)
ARM BAND NUMBER: NORMAL
AST SERPL-CCNC: 37 U/L (ref 0–31)
BASOPHILS # BLD: 0.03 K/UL (ref 0–0.2)
BASOPHILS NFR BLD: 1 % (ref 0–2)
BILIRUB SERPL-MCNC: 1.8 MG/DL (ref 0–1.2)
BLOOD BANK SAMPLE EXPIRATION: NORMAL
BLOOD GROUP ANTIBODIES SERPL: NEGATIVE
BUN SERPL-MCNC: 29 MG/DL (ref 6–23)
CALCIUM SERPL-MCNC: 9 MG/DL (ref 8.6–10.2)
CHLORIDE SERPL-SCNC: 105 MMOL/L (ref 98–107)
CO2 SERPL-SCNC: 25 MMOL/L (ref 22–29)
CREAT SERPL-MCNC: 0.5 MG/DL (ref 0.5–1)
EOSINOPHIL # BLD: 0.12 K/UL (ref 0.05–0.5)
EOSINOPHILS RELATIVE PERCENT: 2 % (ref 0–6)
ERYTHROCYTE [DISTWIDTH] IN BLOOD BY AUTOMATED COUNT: 16 % (ref 11.5–15)
GFR SERPL CREATININE-BSD FRML MDRD: >90 ML/MIN/1.73M2
GLUCOSE SERPL-MCNC: 79 MG/DL (ref 74–99)
HCT VFR BLD AUTO: 28.4 % (ref 34–48)
HGB BLD-MCNC: 9.3 G/DL (ref 11.5–15.5)
IMM GRANULOCYTES # BLD AUTO: <0.03 K/UL (ref 0–0.58)
IMM GRANULOCYTES NFR BLD: 0 % (ref 0–5)
LACTATE BLDV-SCNC: 2.8 MMOL/L (ref 0.5–2.2)
LIPASE SERPL-CCNC: 55 U/L (ref 13–60)
LYMPHOCYTES NFR BLD: 1.07 K/UL (ref 1.5–4)
LYMPHOCYTES RELATIVE PERCENT: 20 % (ref 20–42)
MCH RBC QN AUTO: 29.2 PG (ref 26–35)
MCHC RBC AUTO-ENTMCNC: 32.7 G/DL (ref 32–34.5)
MCV RBC AUTO: 89.3 FL (ref 80–99.9)
MONOCYTES NFR BLD: 0.42 K/UL (ref 0.1–0.95)
MONOCYTES NFR BLD: 8 % (ref 2–12)
NEUTROPHILS NFR BLD: 70 % (ref 43–80)
NEUTS SEG NFR BLD: 3.84 K/UL (ref 1.8–7.3)
PLATELET # BLD AUTO: 162 K/UL (ref 130–450)
PMV BLD AUTO: 10.1 FL (ref 7–12)
POTASSIUM SERPL-SCNC: 4.5 MMOL/L (ref 3.5–5)
PROT SERPL-MCNC: 5.6 G/DL (ref 6.4–8.3)
RBC # BLD AUTO: 3.18 M/UL (ref 3.5–5.5)
SODIUM SERPL-SCNC: 141 MMOL/L (ref 132–146)
WBC OTHER # BLD: 5.5 K/UL (ref 4.5–11.5)

## 2024-04-16 PROCEDURE — 83605 ASSAY OF LACTIC ACID: CPT

## 2024-04-16 PROCEDURE — 97530 THERAPEUTIC ACTIVITIES: CPT

## 2024-04-16 PROCEDURE — 1200000000 HC SEMI PRIVATE

## 2024-04-16 PROCEDURE — A4216 STERILE WATER/SALINE, 10 ML: HCPCS

## 2024-04-16 PROCEDURE — 86900 BLOOD TYPING SEROLOGIC ABO: CPT

## 2024-04-16 PROCEDURE — 80053 COMPREHEN METABOLIC PANEL: CPT

## 2024-04-16 PROCEDURE — 71045 X-RAY EXAM CHEST 1 VIEW: CPT

## 2024-04-16 PROCEDURE — 96374 THER/PROPH/DIAG INJ IV PUSH: CPT

## 2024-04-16 PROCEDURE — 85025 COMPLETE CBC W/AUTO DIFF WBC: CPT

## 2024-04-16 PROCEDURE — C9113 INJ PANTOPRAZOLE SODIUM, VIA: HCPCS

## 2024-04-16 PROCEDURE — 97110 THERAPEUTIC EXERCISES: CPT

## 2024-04-16 PROCEDURE — 2580000003 HC RX 258

## 2024-04-16 PROCEDURE — 6360000002 HC RX W HCPCS

## 2024-04-16 PROCEDURE — 71046 X-RAY EXAM CHEST 2 VIEWS: CPT

## 2024-04-16 PROCEDURE — 99285 EMERGENCY DEPT VISIT HI MDM: CPT

## 2024-04-16 PROCEDURE — 83690 ASSAY OF LIPASE: CPT

## 2024-04-16 PROCEDURE — 86850 RBC ANTIBODY SCREEN: CPT

## 2024-04-16 PROCEDURE — 86901 BLOOD TYPING SEROLOGIC RH(D): CPT

## 2024-04-16 RX ORDER — 0.9 % SODIUM CHLORIDE 0.9 %
1000 INTRAVENOUS SOLUTION INTRAVENOUS ONCE
Status: COMPLETED | OUTPATIENT
Start: 2024-04-16 | End: 2024-04-16

## 2024-04-16 RX ORDER — OCTREOTIDE ACETATE 50 UG/ML
50 INJECTION, SOLUTION INTRAVENOUS; SUBCUTANEOUS ONCE
Status: DISCONTINUED | OUTPATIENT
Start: 2024-04-16 | End: 2024-04-16

## 2024-04-16 RX ADMIN — SODIUM CHLORIDE 1000 ML: 9 INJECTION, SOLUTION INTRAVENOUS at 20:40

## 2024-04-16 RX ADMIN — PANTOPRAZOLE SODIUM 80 MG: 40 INJECTION, POWDER, FOR SOLUTION INTRAVENOUS at 20:35

## 2024-04-16 ASSESSMENT — PAIN DESCRIPTION - ORIENTATION: ORIENTATION: LOWER;MID

## 2024-04-16 ASSESSMENT — PAIN DESCRIPTION - PAIN TYPE: TYPE: ACUTE PAIN

## 2024-04-16 ASSESSMENT — PAIN DESCRIPTION - FREQUENCY: FREQUENCY: CONTINUOUS

## 2024-04-16 ASSESSMENT — PAIN - FUNCTIONAL ASSESSMENT: PAIN_FUNCTIONAL_ASSESSMENT: NONE - DENIES PAIN

## 2024-04-16 ASSESSMENT — PAIN DESCRIPTION - LOCATION: LOCATION: ABDOMEN

## 2024-04-16 NOTE — PROGRESS NOTES
limited by pain  [] Patient limited by other medical complications  [] Other:     Prognosis: [] Good [x] Fair  [] Poor    Patient Requires Follow-up: [x] Yes  [] No    Plan:   [x] Continue per plan of care [] Alter current plan (see comments)  [] Plan of care initiated [] Hold pending MD visit [] Discharge  Plan for Next Session:        Electronically signed by:    Fernanda Sparks PT, DPT  License [id number]

## 2024-04-16 NOTE — PROGRESS NOTES
Occupational Therapy    OCCUPATIONAL THERAPY DAILY NOTE    Date:  2024  Initial Evaluation Date: 24                                  Evaluating Therapist: Lucila Hensley OT     Patient Name:  Erna Stoner                      :  1960     Restrictions/Precautions:  none, moderate fall risk  Diagnosis:  R27.8 (ICD-10-CM) - Other lack of coordination                                                             Date of Surgery/Injury: Ongoing since September     Insurance/Certification information:  [place] Medicaid (no auth required)  Plan of care signed (Y/N): N  Visit# / total visits: -      Referring Practitioner:  Elia Daniel MD   Specific Practitioner Orders: OT eval & Treat     Reason for Referral: In September pt experienced AMS, fatigue, and now demonstrates a lack of coordination. Pt was referred to OT for eval & treat.      Assessment of current deficits   [x] Functional mobility             [x] ADLs           [] Strength                  [x] Cognition   [] Functional transfers           [x] IADLs          [x] Safety Awareness  [x] Endurance   [x] Fine Motor Coordination    [x] Balance      [x] Vision/perception    [] Sensation     [x] Gross Motor Coordination [] ROM           [x] Pain                        [] Edema          [] Scar Adhesion/Skin Integrity [x] Motor Control [] Postural Alignment      OT PLAN OF CARE   OT POC based on physician orders, patient diagnosis and results of clinical assessment     Frequency/Duration: Frequency/Duration 1-2 / week for 12- 18 visits.   Certification period From: 24  To: 24  Specific OT Treatment to include:      * Instruction/training on adapted ADL techniques and AE recommendations to increase functional independence within precautions       * Recommendation of environmental modifications for increased safety with functional transfers/mobility and ADLs  * Cognitive retraining/development of therapeutic activities to

## 2024-04-17 ENCOUNTER — ANESTHESIA (OUTPATIENT)
Dept: ENDOSCOPY | Age: 64
End: 2024-04-17
Payer: COMMERCIAL

## 2024-04-17 ENCOUNTER — ANESTHESIA EVENT (OUTPATIENT)
Dept: ENDOSCOPY | Age: 64
End: 2024-04-17
Payer: COMMERCIAL

## 2024-04-17 LAB
ALBUMIN SERPL-MCNC: 3 G/DL (ref 3.5–5.2)
ALP SERPL-CCNC: 53 U/L (ref 35–104)
ALT SERPL-CCNC: 19 U/L (ref 0–32)
ANION GAP SERPL CALCULATED.3IONS-SCNC: 9 MMOL/L (ref 7–16)
AST SERPL-CCNC: 27 U/L (ref 0–31)
BILIRUB SERPL-MCNC: 1.3 MG/DL (ref 0–1.2)
BUN SERPL-MCNC: 22 MG/DL (ref 6–23)
CALCIUM SERPL-MCNC: 8 MG/DL (ref 8.6–10.2)
CHLORIDE SERPL-SCNC: 109 MMOL/L (ref 98–107)
CO2 SERPL-SCNC: 23 MMOL/L (ref 22–29)
CREAT SERPL-MCNC: 0.5 MG/DL (ref 0.5–1)
GFR SERPL CREATININE-BSD FRML MDRD: >90 ML/MIN/1.73M2
GLUCOSE BLD-MCNC: 64 MG/DL (ref 74–99)
GLUCOSE BLD-MCNC: 65 MG/DL (ref 74–99)
GLUCOSE BLD-MCNC: 67 MG/DL (ref 74–99)
GLUCOSE BLD-MCNC: 67 MG/DL (ref 74–99)
GLUCOSE BLD-MCNC: 73 MG/DL (ref 74–99)
GLUCOSE BLD-MCNC: 91 MG/DL (ref 74–99)
GLUCOSE BLD-MCNC: 97 MG/DL (ref 74–99)
GLUCOSE SERPL-MCNC: 67 MG/DL (ref 74–99)
HCT VFR BLD AUTO: 22.5 % (ref 34–48)
HCT VFR BLD AUTO: 22.5 % (ref 34–48)
HCT VFR BLD AUTO: 22.6 % (ref 34–48)
HGB BLD-MCNC: 7.3 G/DL (ref 11.5–15.5)
HGB BLD-MCNC: 7.4 G/DL (ref 11.5–15.5)
HGB BLD-MCNC: 7.4 G/DL (ref 11.5–15.5)
INR PPP: 1.4
IRON SATN MFR SERPL: 11 % (ref 15–50)
IRON SERPL-MCNC: 29 UG/DL (ref 37–145)
LACTATE BLDV-SCNC: 2.1 MMOL/L (ref 0.5–2.2)
PARTIAL THROMBOPLASTIN TIME: 24 SEC (ref 24.5–35.1)
POTASSIUM SERPL-SCNC: 3.8 MMOL/L (ref 3.5–5)
PROT SERPL-MCNC: 4.5 G/DL (ref 6.4–8.3)
PROTHROMBIN TIME: 15 SEC (ref 9.3–12.4)
SODIUM SERPL-SCNC: 141 MMOL/L (ref 132–146)
TIBC SERPL-MCNC: 271 UG/DL (ref 250–450)

## 2024-04-17 PROCEDURE — 3700000000 HC ANESTHESIA ATTENDED CARE: Performed by: SURGERY

## 2024-04-17 PROCEDURE — 88305 TISSUE EXAM BY PATHOLOGIST: CPT

## 2024-04-17 PROCEDURE — 0W3P8ZZ CONTROL BLEEDING IN GASTROINTESTINAL TRACT, VIA NATURAL OR ARTIFICIAL OPENING ENDOSCOPIC: ICD-10-PCS | Performed by: SURGERY

## 2024-04-17 PROCEDURE — 2580000003 HC RX 258: Performed by: STUDENT IN AN ORGANIZED HEALTH CARE EDUCATION/TRAINING PROGRAM

## 2024-04-17 PROCEDURE — 0DB78ZX EXCISION OF STOMACH, PYLORUS, VIA NATURAL OR ARTIFICIAL OPENING ENDOSCOPIC, DIAGNOSTIC: ICD-10-PCS | Performed by: SURGERY

## 2024-04-17 PROCEDURE — 2720000010 HC SURG SUPPLY STERILE: Performed by: SURGERY

## 2024-04-17 PROCEDURE — 2060000000 HC ICU INTERMEDIATE R&B

## 2024-04-17 PROCEDURE — 6360000002 HC RX W HCPCS: Performed by: STUDENT IN AN ORGANIZED HEALTH CARE EDUCATION/TRAINING PROGRAM

## 2024-04-17 PROCEDURE — 3700000001 HC ADD 15 MINUTES (ANESTHESIA): Performed by: SURGERY

## 2024-04-17 PROCEDURE — 2580000003 HC RX 258: Performed by: FAMILY MEDICINE

## 2024-04-17 PROCEDURE — 80053 COMPREHEN METABOLIC PANEL: CPT

## 2024-04-17 PROCEDURE — 2709999900 HC NON-CHARGEABLE SUPPLY: Performed by: SURGERY

## 2024-04-17 PROCEDURE — 7100000011 HC PHASE II RECOVERY - ADDTL 15 MIN: Performed by: SURGERY

## 2024-04-17 PROCEDURE — 2580000003 HC RX 258

## 2024-04-17 PROCEDURE — A4216 STERILE WATER/SALINE, 10 ML: HCPCS | Performed by: STUDENT IN AN ORGANIZED HEALTH CARE EDUCATION/TRAINING PROGRAM

## 2024-04-17 PROCEDURE — 83540 ASSAY OF IRON: CPT

## 2024-04-17 PROCEDURE — 6370000000 HC RX 637 (ALT 250 FOR IP)

## 2024-04-17 PROCEDURE — 85014 HEMATOCRIT: CPT

## 2024-04-17 PROCEDURE — 83550 IRON BINDING TEST: CPT

## 2024-04-17 PROCEDURE — C9113 INJ PANTOPRAZOLE SODIUM, VIA: HCPCS | Performed by: STUDENT IN AN ORGANIZED HEALTH CARE EDUCATION/TRAINING PROGRAM

## 2024-04-17 PROCEDURE — 83036 HEMOGLOBIN GLYCOSYLATED A1C: CPT

## 2024-04-17 PROCEDURE — 6360000002 HC RX W HCPCS

## 2024-04-17 PROCEDURE — 82962 GLUCOSE BLOOD TEST: CPT

## 2024-04-17 PROCEDURE — 7100000010 HC PHASE II RECOVERY - FIRST 15 MIN: Performed by: SURGERY

## 2024-04-17 PROCEDURE — 85018 HEMOGLOBIN: CPT

## 2024-04-17 PROCEDURE — 85610 PROTHROMBIN TIME: CPT

## 2024-04-17 PROCEDURE — 85730 THROMBOPLASTIN TIME PARTIAL: CPT

## 2024-04-17 PROCEDURE — 3609013000 HC EGD TRANSORAL CONTROL BLEEDING ANY METHOD: Performed by: SURGERY

## 2024-04-17 RX ORDER — ONDANSETRON 2 MG/ML
4 INJECTION INTRAMUSCULAR; INTRAVENOUS EVERY 6 HOURS PRN
Status: CANCELLED | OUTPATIENT
Start: 2024-04-17

## 2024-04-17 RX ORDER — VITAMIN B COMPLEX
2000 TABLET ORAL DAILY
Status: DISCONTINUED | OUTPATIENT
Start: 2024-04-17 | End: 2024-04-18 | Stop reason: HOSPADM

## 2024-04-17 RX ORDER — FERROUS SULFATE 325(65) MG
325 TABLET ORAL
Status: DISCONTINUED | OUTPATIENT
Start: 2024-04-17 | End: 2024-04-18 | Stop reason: HOSPADM

## 2024-04-17 RX ORDER — FERROUS SULFATE 325(65) MG
325 TABLET ORAL
Status: CANCELLED | OUTPATIENT
Start: 2024-04-18

## 2024-04-17 RX ORDER — METFORMIN HYDROCHLORIDE 500 MG/1
1000 TABLET, EXTENDED RELEASE ORAL 2 TIMES DAILY WITH MEALS
Status: DISCONTINUED | OUTPATIENT
Start: 2024-04-17 | End: 2024-04-18 | Stop reason: HOSPADM

## 2024-04-17 RX ORDER — DEXTROSE MONOHYDRATE 100 MG/ML
INJECTION, SOLUTION INTRAVENOUS CONTINUOUS PRN
Status: DISCONTINUED | OUTPATIENT
Start: 2024-04-17 | End: 2024-04-18 | Stop reason: HOSPADM

## 2024-04-17 RX ORDER — PROPOFOL 10 MG/ML
INJECTION, EMULSION INTRAVENOUS PRN
Status: DISCONTINUED | OUTPATIENT
Start: 2024-04-17 | End: 2024-04-17 | Stop reason: SDUPTHER

## 2024-04-17 RX ORDER — ALENDRONATE SODIUM 70 MG/1
70 TABLET ORAL
Status: DISCONTINUED | OUTPATIENT
Start: 2024-04-17 | End: 2024-04-17 | Stop reason: CLARIF

## 2024-04-17 RX ORDER — ONDANSETRON 4 MG/1
4 TABLET, ORALLY DISINTEGRATING ORAL EVERY 8 HOURS PRN
Status: CANCELLED | OUTPATIENT
Start: 2024-04-17

## 2024-04-17 RX ORDER — SODIUM CHLORIDE 9 MG/ML
INJECTION, SOLUTION INTRAVENOUS CONTINUOUS
Status: CANCELLED | OUTPATIENT
Start: 2024-04-17

## 2024-04-17 RX ORDER — LEVOTHYROXINE SODIUM 88 UG/1
88 TABLET ORAL DAILY
Status: DISCONTINUED | OUTPATIENT
Start: 2024-04-17 | End: 2024-04-18 | Stop reason: HOSPADM

## 2024-04-17 RX ORDER — INSULIN LISPRO 100 [IU]/ML
0-4 INJECTION, SOLUTION INTRAVENOUS; SUBCUTANEOUS NIGHTLY
Status: CANCELLED | OUTPATIENT
Start: 2024-04-17

## 2024-04-17 RX ORDER — SODIUM CHLORIDE 0.9 % (FLUSH) 0.9 %
5-40 SYRINGE (ML) INJECTION PRN
Status: DISCONTINUED | OUTPATIENT
Start: 2024-04-17 | End: 2024-04-18 | Stop reason: HOSPADM

## 2024-04-17 RX ORDER — METFORMIN HYDROCHLORIDE 500 MG/1
1000 TABLET, EXTENDED RELEASE ORAL 2 TIMES DAILY WITH MEALS
Status: CANCELLED | OUTPATIENT
Start: 2024-04-18

## 2024-04-17 RX ORDER — LEVOTHYROXINE SODIUM 88 UG/1
88 TABLET ORAL DAILY
Status: CANCELLED | OUTPATIENT
Start: 2024-04-18

## 2024-04-17 RX ORDER — CELECOXIB 100 MG/1
200 CAPSULE ORAL DAILY
Status: DISCONTINUED | OUTPATIENT
Start: 2024-04-17 | End: 2024-04-18 | Stop reason: HOSPADM

## 2024-04-17 RX ORDER — DEXTROSE MONOHYDRATE 100 MG/ML
INJECTION, SOLUTION INTRAVENOUS CONTINUOUS PRN
Status: CANCELLED | OUTPATIENT
Start: 2024-04-17

## 2024-04-17 RX ORDER — ALBUTEROL SULFATE 2.5 MG/3ML
2.5 SOLUTION RESPIRATORY (INHALATION) EVERY 4 HOURS PRN
Status: CANCELLED | OUTPATIENT
Start: 2024-04-17

## 2024-04-17 RX ORDER — ZINC GLUCONATE 100 MG
100 TABLET ORAL DAILY
Status: DISCONTINUED | OUTPATIENT
Start: 2024-04-17 | End: 2024-04-17 | Stop reason: CLARIF

## 2024-04-17 RX ORDER — SODIUM CHLORIDE 0.9 % (FLUSH) 0.9 %
5-40 SYRINGE (ML) INJECTION PRN
Status: CANCELLED | OUTPATIENT
Start: 2024-04-17

## 2024-04-17 RX ORDER — PANTOPRAZOLE SODIUM 40 MG/1
40 TABLET, DELAYED RELEASE ORAL 2 TIMES DAILY
Status: ON HOLD | COMMUNITY
Start: 2024-04-15

## 2024-04-17 RX ORDER — INSULIN LISPRO 100 [IU]/ML
0-4 INJECTION, SOLUTION INTRAVENOUS; SUBCUTANEOUS
Status: CANCELLED | OUTPATIENT
Start: 2024-04-18

## 2024-04-17 RX ORDER — DEXTROSE AND SODIUM CHLORIDE 5; .45 G/100ML; G/100ML
INJECTION, SOLUTION INTRAVENOUS CONTINUOUS
Status: DISCONTINUED | OUTPATIENT
Start: 2024-04-17 | End: 2024-04-18 | Stop reason: HOSPADM

## 2024-04-17 RX ORDER — GLUCAGON 1 MG/ML
1 KIT INJECTION PRN
Status: CANCELLED | OUTPATIENT
Start: 2024-04-17

## 2024-04-17 RX ORDER — ONDANSETRON 2 MG/ML
4 INJECTION INTRAMUSCULAR; INTRAVENOUS EVERY 6 HOURS PRN
Status: DISCONTINUED | OUTPATIENT
Start: 2024-04-17 | End: 2024-04-18 | Stop reason: HOSPADM

## 2024-04-17 RX ORDER — INSULIN LISPRO 100 [IU]/ML
0-4 INJECTION, SOLUTION INTRAVENOUS; SUBCUTANEOUS
Status: DISCONTINUED | OUTPATIENT
Start: 2024-04-17 | End: 2024-04-18 | Stop reason: HOSPADM

## 2024-04-17 RX ORDER — ZINC SULFATE 50(220)MG
50 CAPSULE ORAL DAILY
Status: DISCONTINUED | OUTPATIENT
Start: 2024-04-17 | End: 2024-04-18 | Stop reason: HOSPADM

## 2024-04-17 RX ORDER — ACETAMINOPHEN 650 MG/1
650 SUPPOSITORY RECTAL EVERY 6 HOURS PRN
Status: DISCONTINUED | OUTPATIENT
Start: 2024-04-17 | End: 2024-04-18 | Stop reason: HOSPADM

## 2024-04-17 RX ORDER — INSULIN LISPRO 100 [IU]/ML
0-4 INJECTION, SOLUTION INTRAVENOUS; SUBCUTANEOUS NIGHTLY
Status: DISCONTINUED | OUTPATIENT
Start: 2024-04-17 | End: 2024-04-18 | Stop reason: HOSPADM

## 2024-04-17 RX ORDER — SODIUM CHLORIDE 9 MG/ML
INJECTION, SOLUTION INTRAVENOUS CONTINUOUS PRN
Status: DISCONTINUED | OUTPATIENT
Start: 2024-04-17 | End: 2024-04-17 | Stop reason: SDUPTHER

## 2024-04-17 RX ORDER — IRON POLYSACCHARIDE COMPLEX 180 MG
180 CAPSULE ORAL EVERY MORNING
Status: ON HOLD | COMMUNITY

## 2024-04-17 RX ORDER — GLUCAGON 1 MG/ML
1 KIT INJECTION PRN
Status: DISCONTINUED | OUTPATIENT
Start: 2024-04-17 | End: 2024-04-18 | Stop reason: HOSPADM

## 2024-04-17 RX ORDER — FOLIC ACID 1 MG/1
1 TABLET ORAL DAILY
Status: DISCONTINUED | OUTPATIENT
Start: 2024-04-17 | End: 2024-04-18 | Stop reason: HOSPADM

## 2024-04-17 RX ORDER — ZINC SULFATE 50(220)MG
50 CAPSULE ORAL DAILY
Status: CANCELLED | OUTPATIENT
Start: 2024-04-18

## 2024-04-17 RX ORDER — SODIUM CHLORIDE 9 MG/ML
INJECTION, SOLUTION INTRAVENOUS CONTINUOUS
Status: DISCONTINUED | OUTPATIENT
Start: 2024-04-17 | End: 2024-04-18

## 2024-04-17 RX ORDER — ONDANSETRON 2 MG/ML
INJECTION INTRAMUSCULAR; INTRAVENOUS PRN
Status: DISCONTINUED | OUTPATIENT
Start: 2024-04-17 | End: 2024-04-17 | Stop reason: SDUPTHER

## 2024-04-17 RX ORDER — VITAMIN B COMPLEX
2000 TABLET ORAL DAILY
Status: CANCELLED | OUTPATIENT
Start: 2024-04-18

## 2024-04-17 RX ORDER — SODIUM CHLORIDE 0.9 % (FLUSH) 0.9 %
5-40 SYRINGE (ML) INJECTION EVERY 12 HOURS SCHEDULED
Status: CANCELLED | OUTPATIENT
Start: 2024-04-17

## 2024-04-17 RX ORDER — FOLIC ACID 1 MG/1
1 TABLET ORAL DAILY
Status: CANCELLED | OUTPATIENT
Start: 2024-04-18

## 2024-04-17 RX ORDER — ALBUTEROL SULFATE 2.5 MG/3ML
2.5 SOLUTION RESPIRATORY (INHALATION) EVERY 4 HOURS PRN
Status: DISCONTINUED | OUTPATIENT
Start: 2024-04-17 | End: 2024-04-18 | Stop reason: HOSPADM

## 2024-04-17 RX ORDER — ONDANSETRON 4 MG/1
4 TABLET, FILM COATED ORAL EVERY 8 HOURS PRN
Status: ON HOLD | COMMUNITY
Start: 2024-03-13

## 2024-04-17 RX ORDER — ALBUTEROL SULFATE 2.5 MG/3ML
2.5 SOLUTION RESPIRATORY (INHALATION) EVERY 4 HOURS PRN
Status: ON HOLD | COMMUNITY
Start: 2024-03-15

## 2024-04-17 RX ORDER — ONDANSETRON 4 MG/1
4 TABLET, ORALLY DISINTEGRATING ORAL EVERY 8 HOURS PRN
Status: DISCONTINUED | OUTPATIENT
Start: 2024-04-17 | End: 2024-04-18 | Stop reason: HOSPADM

## 2024-04-17 RX ORDER — ACETAMINOPHEN 325 MG/1
650 TABLET ORAL EVERY 6 HOURS PRN
Status: DISCONTINUED | OUTPATIENT
Start: 2024-04-17 | End: 2024-04-18 | Stop reason: HOSPADM

## 2024-04-17 RX ORDER — ACETAMINOPHEN 325 MG/1
650 TABLET ORAL EVERY 6 HOURS PRN
Status: CANCELLED | OUTPATIENT
Start: 2024-04-17

## 2024-04-17 RX ORDER — AMMONIUM LACTATE 12 G/100G
LOTION TOPICAL 2 TIMES DAILY PRN
Status: ON HOLD | COMMUNITY

## 2024-04-17 RX ORDER — SUCRALFATE 1 G/1
1 TABLET ORAL
Status: ON HOLD | COMMUNITY

## 2024-04-17 RX ORDER — SODIUM CHLORIDE 0.9 % (FLUSH) 0.9 %
5-40 SYRINGE (ML) INJECTION EVERY 12 HOURS SCHEDULED
Status: DISCONTINUED | OUTPATIENT
Start: 2024-04-17 | End: 2024-04-18 | Stop reason: HOSPADM

## 2024-04-17 RX ORDER — ACETAMINOPHEN 650 MG/1
650 SUPPOSITORY RECTAL EVERY 6 HOURS PRN
Status: CANCELLED | OUTPATIENT
Start: 2024-04-17

## 2024-04-17 RX ORDER — SODIUM CHLORIDE 9 MG/ML
INJECTION, SOLUTION INTRAVENOUS PRN
Status: DISCONTINUED | OUTPATIENT
Start: 2024-04-17 | End: 2024-04-18 | Stop reason: HOSPADM

## 2024-04-17 RX ADMIN — DEXTROSE MONOHYDRATE 125 ML: 100 INJECTION, SOLUTION INTRAVENOUS at 12:50

## 2024-04-17 RX ADMIN — SODIUM CHLORIDE, PRESERVATIVE FREE 10 ML: 5 INJECTION INTRAVENOUS at 09:47

## 2024-04-17 RX ADMIN — DEXTROSE MONOHYDRATE 125 ML: 100 INJECTION, SOLUTION INTRAVENOUS at 09:44

## 2024-04-17 RX ADMIN — SODIUM CHLORIDE: 9 INJECTION, SOLUTION INTRAVENOUS at 15:44

## 2024-04-17 RX ADMIN — PANTOPRAZOLE SODIUM 40 MG: 40 INJECTION, POWDER, FOR SOLUTION INTRAVENOUS at 09:47

## 2024-04-17 RX ADMIN — SODIUM CHLORIDE: 9 INJECTION, SOLUTION INTRAVENOUS at 06:49

## 2024-04-17 RX ADMIN — PROPOFOL 310 MG: 10 INJECTION, EMULSION INTRAVENOUS at 15:26

## 2024-04-17 RX ADMIN — ACETAMINOPHEN 650 MG: 325 TABLET ORAL at 20:07

## 2024-04-17 RX ADMIN — SODIUM CHLORIDE: 9 INJECTION, SOLUTION INTRAVENOUS at 15:24

## 2024-04-17 RX ADMIN — PANTOPRAZOLE SODIUM 40 MG: 40 INJECTION, POWDER, FOR SOLUTION INTRAVENOUS at 20:06

## 2024-04-17 RX ADMIN — DEXTROSE AND SODIUM CHLORIDE: 5; 450 INJECTION, SOLUTION INTRAVENOUS at 17:57

## 2024-04-17 RX ADMIN — ONDANSETRON 4 MG: 2 INJECTION INTRAMUSCULAR; INTRAVENOUS at 15:39

## 2024-04-17 ASSESSMENT — PAIN - FUNCTIONAL ASSESSMENT
PAIN_FUNCTIONAL_ASSESSMENT: NONE - DENIES PAIN
PAIN_FUNCTIONAL_ASSESSMENT: NONE - DENIES PAIN
PAIN_FUNCTIONAL_ASSESSMENT: ACTIVITIES ARE NOT PREVENTED
PAIN_FUNCTIONAL_ASSESSMENT: 0-10
PAIN_FUNCTIONAL_ASSESSMENT: NONE - DENIES PAIN
PAIN_FUNCTIONAL_ASSESSMENT: 0-10

## 2024-04-17 ASSESSMENT — PAIN SCALES - GENERAL: PAINLEVEL_OUTOF10: 7

## 2024-04-17 ASSESSMENT — LIFESTYLE VARIABLES
HOW OFTEN DO YOU HAVE A DRINK CONTAINING ALCOHOL: MONTHLY OR LESS
HOW MANY STANDARD DRINKS CONTAINING ALCOHOL DO YOU HAVE ON A TYPICAL DAY: 1 OR 2
SMOKING_STATUS: 0

## 2024-04-17 ASSESSMENT — PAIN DESCRIPTION - DESCRIPTORS: DESCRIPTORS: ACHING;DISCOMFORT;SORE

## 2024-04-17 ASSESSMENT — PAIN DESCRIPTION - LOCATION: LOCATION: HEAD

## 2024-04-17 NOTE — PROGRESS NOTES
Erna Stoner was ordered semaglutide (Ozempic) which is a nonformulary medication. Nurse is going to check with patient to see if home supply of this medication will be brought in to the hospital for inpatient use.  A pharmacist will follow-up with the nurse of the patient to assess the capability of the patient to bring in the medication.  If it is determined that the patient cannot supply the medication and it is not available to be dispensed from the pharmacy, a call will be placed to the ordering provider to discuss alternative options.     Sergo Gaffney, PharmD  04/17/24 4:53 PM

## 2024-04-17 NOTE — ANESTHESIA POSTPROCEDURE EVALUATION
Department of Anesthesiology  Postprocedure Note    Patient: Erna Stoner  MRN: [id number]  YOB: 1960  Date of evaluation: 4/17/2024    Procedure Summary       Date: 04/17/24 Room / Location: [place] [address] / [facility]    Anesthesia Start: 1524 Anesthesia Stop: 1551    Procedures:       ESOPHAGOGASTRODUODENOSCOPY CONTROL HEMORRHAGE      ESOPHAGOGASTRODUODENOSCOPY BIOPSY Diagnosis:       Hematemesis without nausea      (Hematemesis without nausea [K92.0])    Surgeons: Noé Montilla MD Responsible Provider: Milly Ku DO    Anesthesia Type: MAC ASA Status: 3            Anesthesia Type: No value filed.    Mary Jo Phase I: Mary Jo Score: 10    Mary Jo Phase II:      Anesthesia Post Evaluation    Patient location during evaluation: bedside  Patient participation: complete - patient participated  Level of consciousness: awake  Pain score: 0  Airway patency: patent  Nausea & Vomiting: no nausea and no vomiting  Cardiovascular status: blood pressure returned to baseline and hemodynamically stable  Respiratory status: acceptable  Hydration status: euvolemic  Pain management: adequate        No notable events documented.

## 2024-04-17 NOTE — ED NOTES
Zev called to inform this RN that the patient is scheduled for an EGD at 1500 this afternoon and to hold all PO medications. Zev also requests that they be notified at #[id number] of the patient's location if they leave the emergency department.

## 2024-04-17 NOTE — ED PROVIDER NOTES
[facility] EMERGENCY DEPARTMENT  EMERGENCY DEPARTMENT ENCOUNTER      Pt Name: Erna Stoner  MRN: [id number]  Birthdate 1960  Date of evaluation: 2024  Provider: Sai Jones MD  PCP: Elia Daniel MD  Note Started: 8:05 PM EDT 24    CHIEF COMPLAINT       Chief Complaint   Patient presents with    Hematemesis     Started today. Sent by PCP for eval.       HISTORY OF PRESENT ILLNESS: 1 or more Elements   History From: Patient  Limitations to history : None    Erna Stoner is a 63 y.o. female who presents with complaints of hematemesis associated with nausea, emesis and generalized abdominal pain onset past couple days.  Patient reports she was seen as an outpatient at PCP office and advised to present to ED for further evaluation.  He does report that she takes Celebrex and was recently changed to Celebrex for due to concern for ulcers.  Per chart patient status post EGD most recently in October which did show isolated gastric varices.  She currently denies fever, chills, chest pain, shortness of breath, dizziness, weakness, numbness, tingling extremities, dysuria, constipation, diarrhea, melena.    Nursing Notes were all reviewed and agreed with or any disagreements were addressed in the HPI.    REVIEW OF SYSTEMS :    Positives and Pertinent negatives as per HPI.     PAST MEDICAL HISTORY/Chronic Conditions Affecting Care    has a past medical history of Asthma, Back pain, Diabetes mellitus (HCC), Hypothyroidism, Mass of nasal sinus, and Thyroid disease.     SURGICAL HISTORY     Past Surgical History:   Procedure Laterality Date    CARPAL TUNNEL RELEASE      bilateral     SECTION      CHOLECYSTECTOMY      COLONOSCOPY  2012    HYSTERECTOMY (CERVIX STATUS UNKNOWN)  2003    LIVER BIOPSY  2017    NOSE SURGERY N/A 2020    EXCISIONAL BIOPSY OF RIGHT NARES (PATHOLOGY NOTIFIED) performed by Bandar Solis Jr., MD at [facility]  in chart which was 13.0/41.2.    Chest x-ray pending at this time.    On reevaluation patient malia in no acute distress.  She will require admission for likely endoscopy with GI.  Consulted internal medicine for admission, Dr. Pizano will admit patient.  Patient received 1 L fluids and 80 mg Protonix IV.    I discussed the results of the workup with the patient as well as the recommendation for admission.  Patient verbalizes their understanding and is agreeable and amenable to the plan at this time.  All questions answered, through shared decision making we will plan for admission.    Disposition Considerations (Tests not ordered but considered, Shared Decision Making, Pt Expectation of Test or Tx.): Admit    FINAL IMPRESSION      1. Hematemesis, unspecified whether nausea present    2. Gastrointestinal hemorrhage, unspecified gastrointestinal hemorrhage type          DISPOSITION/PLAN     DISPOSITION Admitted 04/16/2024 08:46:02 PM    PATIENT REFERRED TO:  No follow-up provider specified.    DISCHARGE MEDICATIONS:  New Prescriptions    No medications on file       DISCONTINUED MEDICATIONS:  Discontinued Medications    No medications on file            (Please note that portions of this note were completed with a voice recognition program.  Efforts were made to edit the dictations but occasionally words are mis-transcribed.)    Sai Jones MD (electronically signed)

## 2024-04-17 NOTE — H&P
[facility]  History and Physical      CHIEF COMPLAINT:    Chief Complaint   Patient presents with    Hematemesis     Started today. Sent by PCP for eval.        Patient of Elia Daniel MD presents with:  Hematemesis    History of Present Illness:   Patient is a 63-year-old female with a past medical history of asthma, back pain, diabetes, hypothyroidism, and gastric ulcers, sarcoidosis with liver involvement and esophageal varices for which she follows at [facility] with a hepatologist.  Patient presented to the ED at the recommendation of her PCP secondary to vomiting up blood.  Patient went to see her PCP that she was having hematic emesis associated with nausea and generalized abdominal pain that has been ongoing for the past 2 days.  Patient does admit that she takes Celebrex and was recently changed to Celebrex for due to concerns for ulcers.    Patient had an EGD in October which did show isolated gastric varices.    ER workup revealed lactic acid 2.8, total bilirubin 1.8, H&H 9.3/28.4-repeat 7.4/22.5, INR 1.4.  Patient was started on IV fluids, general surgery has been consulted and patient is admitted to telemetry unit for further treatment.  She is able to provide somewhat of a limited history on my evaluation but does indicate that she follows at [facility].  She was told that she has fatty liver however on further review of records it appears that she has had coiling performed historically.  She denies any complaints of hematochezia or melena.  No other complaints are reported on my evaluation.  Her blood pressure is in the 90s systolic but she indicates this is normal and baseline for her to be in the low range.    REVIEW OF SYSTEMS:  Pertinent negatives are above in HPI.  10 point ROS otherwise negative.      Past Medical History:   Diagnosis Date    Asthma     stable, is mild    Back pain     Diabetes mellitus (HCC)     Hypothyroidism     Mass of nasal sinus     for OR 11-13-20     Thyroid  disease          Past Surgical History:   Procedure Laterality Date    CARPAL TUNNEL RELEASE      bilateral     SECTION      CHOLECYSTECTOMY      COLONOSCOPY  2012    HYSTERECTOMY (CERVIX STATUS UNKNOWN)      LIVER BIOPSY  2017    NOSE SURGERY N/A 2020    EXCISIONAL BIOPSY OF RIGHT NARES (PATHOLOGY NOTIFIED) performed by Bandar Solis Jr., MD at [facility] OR    OVARY REMOVAL      TONSILLECTOMY      UPPER GASTROINTESTINAL ENDOSCOPY  2012       Medications Prior to Admission:    Not in a hospital admission.    Note that the patient's home medications were reviewed and the above list is accurate to the best of my knowledge at the time of the exam.    Allergies:    Patient has no known allergies.    Social History:    reports that she has never smoked. She has never used smokeless tobacco. She reports current alcohol use. She reports that she does not use drugs.    Family History:   family history includes Breast Cancer (age of onset: 80 - 89) in her maternal aunt and maternal aunt; Diabetes in her mother; Heart Disease (age of onset: 70) in her father and mother; High Blood Pressure in her mother.      PHYSICAL EXAM:    Vitals:  BP (!) 106/54   Pulse 85   Temp 98.6 °F (37 °C) (Oral)   Resp 17   Ht 1.575 m (5' 2\")   Wt 70.8 kg (156 lb)   SpO2 98%   BMI 28.53 kg/m²       General appearance: NAD, conversant  Eyes: Sclerae anicteric, PERRLA  HEENT: AT/NC, MMM  Neck: FROM, supple, no thyromegaly  Lymph: No cervical / supraclavicular lymphadenopathy  Lungs: Clear to auscultation, WOB normal  CV: RRR, no MRGs, no lower extremity edema  Abdomen: Soft, non-tender; no masses or HSM, +BS  Extremities: FROM without synovitis.  No clubbing or cyanosis of the hands.  Skin: no rash, induration, lesions, or ulcers  Psych: Calm and cooperative.  Normal judgement and insight.  Normal mood and affect.  Neuro: Alert and interactive, face symmetric, speech fluent.    LABS:  All labs  reviewed.  Of note:  CBC with Differential:    Lab Results   Component Value Date/Time    WBC 5.5 04/16/2024 07:37 PM    RBC 3.18 04/16/2024 07:37 PM    HGB 7.4 04/17/2024 06:39 AM    HCT 22.5 04/17/2024 06:39 AM     04/16/2024 07:37 PM    MCV 89.3 04/16/2024 07:37 PM    MCH 29.2 04/16/2024 07:37 PM    MCHC 32.7 04/16/2024 07:37 PM    RDW 16.0 04/16/2024 07:37 PM    NRBC 0.0 02/14/2020 10:39 PM    SEGSPCT 60 05/07/2012 10:20 AM    LYMPHOPCT 20 04/16/2024 07:37 PM    MONOPCT 8 04/16/2024 07:37 PM    BASOPCT 1 04/16/2024 07:37 PM    MONOSABS 0.42 04/16/2024 07:37 PM    LYMPHSABS 1.07 04/16/2024 07:37 PM    EOSABS 0.12 04/16/2024 07:37 PM    BASOSABS 0.03 04/16/2024 07:37 PM     CMP:    Lab Results   Component Value Date/Time     04/16/2024 07:37 PM    K 4.5 04/16/2024 07:37 PM    K 3.9 02/14/2020 10:39 PM     04/16/2024 07:37 PM    CO2 25 04/16/2024 07:37 PM    BUN 29 04/16/2024 07:37 PM    CREATININE 0.5 04/16/2024 07:37 PM    GFRAA >60 11/13/2020 09:35 AM    LABGLOM >90 04/16/2024 07:37 PM    GLUCOSE 79 04/16/2024 07:37 PM    GLUCOSE 171 05/07/2012 10:20 AM    PROT 5.6 04/16/2024 07:37 PM    CALCIUM 9.0 04/16/2024 07:37 PM    BILITOT 1.8 04/16/2024 07:37 PM    ALKPHOS 70 04/16/2024 07:37 PM    AST 37 04/16/2024 07:37 PM    ALT 24 04/16/2024 07:37 PM       Imaging:  CXR: No acute process.    EKG:  I've personally reviewed the patient's EKG:  NSR    Telemetry:  I've personally reviewed the patient's telemetry:      ASSESSMENT/PLAN:  Principal Problem:    Hematemesis  Resolved Problems:    * No resolved hospital problems. *    63-year-old female with with a history of sarcoidosis with liver involvement/esophageal varices, diabetes, gastric ulcers and hypothyroidism presents to the ED with complaints of hematemesis with abdominal pain and is admitted to telemetry unit     Hematemesis in patient with known history of sarcoid liver disease with history of varices status post coiling at [facility]  EGD per

## 2024-04-17 NOTE — ED NOTES
ED to Inpatient Handoff Report    Notified 6W Raya that electronic handoff available and patient ready for transport to room 604.    Safety Risks: None identified    Patient in Restraints: no    Constant Observer or Patient : no    Telemetry Monitoring Ordered: Yes          Order to transfer to unit without monitor: NO    Last MEWS: 2 Time completed: 1432    Deterioration Index: 21.65    Vitals:    04/17/24 1151 04/17/24 1251 04/17/24 1402 04/17/24 1432   BP: (!) 97/54 (!) 98/53 (!) 98/52 (!) 99/50   Pulse: 85 87 88 84   Resp: 18 19 19 18   Temp:    98.7 °F (37.1 °C)   TempSrc:       SpO2: 95% 96% 96% 96%   Weight:       Height:           Opportunity for questions and clarification was provided.

## 2024-04-17 NOTE — PROGRESS NOTES
4 Eyes Skin Assessment     NAME:  Erna Stoner  YOB: 1960  MEDICAL RECORD NUMBER:  [id number]    The patient is being assessed for  Admission    I agree that at least one RN has performed a thorough Head to Toe Skin Assessment on the patient. ALL assessment sites listed below have been assessed.      Areas assessed by both nurses:    Head, Face, Ears, Shoulders, Back, Chest, Arms, Elbows, Hands, Sacrum. Buttock, Coccyx, Ischium, Legs. Feet and Heels, and Under Medical Devices         Does the Patient have a Wound? No noted wound(s)       Jose M Prevention initiated by RN: No  Wound Care Orders initiated by RN: No    Pressure Injury (Stage 3,4, Unstageable, DTI, NWPT, and Complex wounds) if present, place Wound referral order by RN under : No    New Ostomies, if present place, Ostomy referral order under : No     Nurse 1 eSignature: Electronically signed by Shae Tamez RN on 4/17/24 at 7:50 PM EDT    **SHARE this note so that the co-signing nurse can place an eSignature**    Nurse 2 eSignature: {Esignature:[id number]}

## 2024-04-17 NOTE — ANESTHESIA PRE PROCEDURE
Take 1 tablet by mouth 2 times daily      albuterol (PROVENTIL) (2.5 MG/3ML) 0.083% nebulizer solution Take 3 mLs by nebulization every 4 hours as needed for Wheezing or Shortness of Breath      sucralfate (CARAFATE) 1 GM tablet Take 1 tablet by mouth 4 times daily (before meals and nightly)      PROFE 391.3 (180 Fe) MG CAPS Take 180 capsules by mouth every morning      ondansetron (ZOFRAN) 4 MG tablet Take 1 tablet by mouth every 8 hours as needed for Nausea or Vomiting      albuterol sulfate HFA (PROVENTIL;VENTOLIN;PROAIR) 108 (90 Base) MCG/ACT inhaler Inhale 1-2 puffs into the lungs every 4-6 hours as needed for Wheezing or Shortness of Breath      vitamin D (CHOLECALCIFEROL) 50 MCG (2000 UT) CAPS capsule Take 1 capsule by mouth every morning      SYNJARDY XR 12.5-1000 MG TB24 Take 2 tablets by mouth every morning      folic acid (FOLVITE) 1 MG tablet Take 1 tablet by mouth every morning      zinc gluconate 100 MG tablet Take 1 tablet by mouth every morning      Semaglutide, 1 MG/DOSE, (OZEMPIC, 1 MG/DOSE,) 2 MG/1.5ML SOPN Inject 0.5 mg into the skin once a week **      alendronate (FOSAMAX) 70 MG tablet Take 1 tablet by mouth once a week **      ferrous sulfate 325 (65 Fe) MG tablet Take 1 tablet by mouth every morning      levothyroxine (SYNTHROID) 100 MCG tablet Take 1 tablet by mouth every morning (before breakfast)         Allergies:  No Known Allergies    Problem List:    Patient Active Problem List   Diagnosis Code    Hematemesis K92.0       Past Medical History:        Diagnosis Date    Asthma     stable, is mild    Back pain     Diabetes mellitus (HCC)     Hypothyroidism     Mass of nasal sinus     for OR 20     Thyroid disease        Past Surgical History:        Procedure Laterality Date    CARPAL TUNNEL RELEASE      bilateral     SECTION  1992    CHOLECYSTECTOMY      COLONOSCOPY  2012    HYSTERECTOMY (CERVIX STATUS UNKNOWN)      LIVER BIOPSY

## 2024-04-17 NOTE — OP NOTE
ESOPHAGOGASTRODUODENOSCOPY PROCEDURE NOTE    DATE OF PROCEDURE: 4/17/2024    PREOPERATIVE DIAGNOSIS:  gib    POSTOPERATIVE DIAGNOSIS/FINDINGS:  bleeding from eroded coil for IR procedure     SURGEON: Noé Montilla MD    ASSISTANT: None    OPERATION: Esophagogastroduodenoscopy control of bleeding     ANESTHESIA: Local monitored anesthesia.     CONDITION: Stable    COMPLICATIONS: None.     Specimens Removed: as above    EBL: None    BRIEF HISTORY:  This is a 63 y.o. female who presents with the complaint of gib.  It was recommended the patient undergo an esophagogastrduodenoscopy.  The risks/benefits/alternatives/expected outcomes were explained the the patient.  The patient verbalized understanding and agreed to proceed.    PROCEDURE:  The patient was brought into the endoscopy suite and placed in the left lateral decubitus position.  A bite block was placed in the patients mouth.  After the initiation of LMAC anesthesia, a gastroscope was inserted into the patient's mouth and passed into the esophagus and into the stomach.  Immediately upon entering the stomach the note of coil at proximal stomach was made.  The scope was advanced through the pylorus into the first and second portion of the duodenum making the note of normal mucosa.  The scope was then withdrawn back into the stomach where it was retroflexed.  There was small hiatal hernia noted.  The scope was then straightened and antrum bx done.  Suture also appeared near the coil.  Upon pulling on the suture the coil was moved as well and bright bleeding noted.  Controlled with hemoclip x 1 without any problems. Monitored for appropriate time and no further bleeding noted . The scope was then withdrawn the entire length of the esophagus, making the note of a normal esophagus without masses, ulcerations, or lesions noted.  The scope was withdrawn entirely.  The patient tolerated the procedure well and there were no complications.         Noé Montilla MD,  MD  4/17/2024

## 2024-04-17 NOTE — PROGRESS NOTES
Patient goes to outpatient PT/OT/SLP at [facility].    Electronically signed by Shae Tamez RN on 4/17/2024 at 6:21 PM

## 2024-04-18 ENCOUNTER — ANESTHESIA EVENT (OUTPATIENT)
Dept: ENDOSCOPY | Age: 64
End: 2024-04-18
Payer: COMMERCIAL

## 2024-04-18 ENCOUNTER — HOSPITAL ENCOUNTER (INPATIENT)
Age: 64
LOS: 11 days | Discharge: HOME | DRG: 260 | End: 2024-04-29
Attending: INTERNAL MEDICINE | Admitting: INTERNAL MEDICINE
Payer: COMMERCIAL

## 2024-04-18 ENCOUNTER — ANESTHESIA (OUTPATIENT)
Dept: ENDOSCOPY | Age: 64
End: 2024-04-18
Payer: COMMERCIAL

## 2024-04-18 VITALS
TEMPERATURE: 98.8 F | RESPIRATION RATE: 16 BRPM | BODY MASS INDEX: 27.92 KG/M2 | HEART RATE: 95 BPM | DIASTOLIC BLOOD PRESSURE: 61 MMHG | OXYGEN SATURATION: 100 % | SYSTOLIC BLOOD PRESSURE: 107 MMHG | HEIGHT: 62 IN | WEIGHT: 151.7 LBS

## 2024-04-18 DIAGNOSIS — Z95.828 S/P TIPS (TRANSJUGULAR INTRAHEPATIC PORTOSYSTEMIC SHUNT): ICD-10-CM

## 2024-04-18 DIAGNOSIS — I86.4 GASTRIC VARICES: Primary | ICD-10-CM

## 2024-04-18 LAB
ALBUMIN SERPL-MCNC: 2.9 G/DL (ref 3.5–5.2)
ALP SERPL-CCNC: 45 U/L (ref 35–104)
ALT SERPL-CCNC: 16 U/L (ref 0–32)
ANION GAP SERPL CALCULATED.3IONS-SCNC: 7 MMOL/L (ref 7–16)
AST SERPL-CCNC: 28 U/L (ref 0–31)
BILIRUB SERPL-MCNC: 0.9 MG/DL (ref 0–1.2)
BUN SERPL-MCNC: 10 MG/DL (ref 6–23)
CALCIUM SERPL-MCNC: 7.5 MG/DL (ref 8.6–10.2)
CHLORIDE SERPL-SCNC: 110 MMOL/L (ref 98–107)
CO2 SERPL-SCNC: 24 MMOL/L (ref 22–29)
CREAT SERPL-MCNC: 0.6 MG/DL (ref 0.5–1)
GFR SERPL CREATININE-BSD FRML MDRD: >90 ML/MIN/1.73M2
GLUCOSE BLD-MCNC: 105 MG/DL (ref 74–99)
GLUCOSE BLD-MCNC: 131 MG/DL (ref 74–99)
GLUCOSE BLD-MCNC: 73 MG/DL (ref 74–99)
GLUCOSE BLD-MCNC: 87 MG/DL (ref 74–99)
GLUCOSE SERPL-MCNC: 252 MG/DL (ref 74–99)
HCT VFR BLD AUTO: 20.8 % (ref 34–48)
HCT VFR BLD AUTO: 21.8 % (ref 34–48)
HCT VFR BLD AUTO: 25.2 % (ref 34–48)
HCT VFR BLD AUTO: 25.5 % (ref 34–48)
HGB BLD-MCNC: 6.6 G/DL (ref 11.5–15.5)
HGB BLD-MCNC: 7 G/DL (ref 11.5–15.5)
HGB BLD-MCNC: 8.2 G/DL (ref 11.5–15.5)
HGB BLD-MCNC: 8.3 G/DL (ref 11.5–15.5)
MAGNESIUM SERPL-MCNC: 1.8 MG/DL (ref 1.6–2.6)
POTASSIUM SERPL-SCNC: 3.5 MMOL/L (ref 3.5–5)
PROT SERPL-MCNC: 4.2 G/DL (ref 6.4–8.3)
SODIUM SERPL-SCNC: 141 MMOL/L (ref 132–146)

## 2024-04-18 PROCEDURE — 83735 ASSAY OF MAGNESIUM: CPT

## 2024-04-18 PROCEDURE — 3700000000 HC ANESTHESIA ATTENDED CARE: Performed by: STUDENT IN AN ORGANIZED HEALTH CARE EDUCATION/TRAINING PROGRAM

## 2024-04-18 PROCEDURE — 85018 HEMOGLOBIN: CPT

## 2024-04-18 PROCEDURE — 3609013800 HC EGD SUBMUCOSAL/BOTOX INJECTION: Performed by: STUDENT IN AN ORGANIZED HEALTH CARE EDUCATION/TRAINING PROGRAM

## 2024-04-18 PROCEDURE — 2580000003 HC RX 258

## 2024-04-18 PROCEDURE — 82962 GLUCOSE BLOOD TEST: CPT

## 2024-04-18 PROCEDURE — 6360000002 HC RX W HCPCS

## 2024-04-18 PROCEDURE — C9113 INJ PANTOPRAZOLE SODIUM, VIA: HCPCS | Performed by: STUDENT IN AN ORGANIZED HEALTH CARE EDUCATION/TRAINING PROGRAM

## 2024-04-18 PROCEDURE — 43243 EGD INJECTION VARICES: CPT | Performed by: STUDENT IN AN ORGANIZED HEALTH CARE EDUCATION/TRAINING PROGRAM

## 2024-04-18 PROCEDURE — 6370000000 HC RX 637 (ALT 250 FOR IP): Performed by: FAMILY MEDICINE

## 2024-04-18 PROCEDURE — 7100000010 HC PHASE II RECOVERY - FIRST 15 MIN: Performed by: STUDENT IN AN ORGANIZED HEALTH CARE EDUCATION/TRAINING PROGRAM

## 2024-04-18 PROCEDURE — 2500000003 HC RX 250 WO HCPCS

## 2024-04-18 PROCEDURE — 85014 HEMATOCRIT: CPT

## 2024-04-18 PROCEDURE — 2580000003 HC RX 258: Performed by: NURSE PRACTITIONER

## 2024-04-18 PROCEDURE — 7100000011 HC PHASE II RECOVERY - ADDTL 15 MIN: Performed by: STUDENT IN AN ORGANIZED HEALTH CARE EDUCATION/TRAINING PROGRAM

## 2024-04-18 PROCEDURE — 2500000003 HC RX 250 WO HCPCS: Performed by: STUDENT IN AN ORGANIZED HEALTH CARE EDUCATION/TRAINING PROGRAM

## 2024-04-18 PROCEDURE — 80053 COMPREHEN METABOLIC PANEL: CPT

## 2024-04-18 PROCEDURE — 36415 COLL VENOUS BLD VENIPUNCTURE: CPT

## 2024-04-18 PROCEDURE — 6360000002 HC RX W HCPCS: Performed by: NURSE PRACTITIONER

## 2024-04-18 PROCEDURE — 3E0G8GC INTRODUCTION OF OTHER THERAPEUTIC SUBSTANCE INTO UPPER GI, VIA NATURAL OR ARTIFICIAL OPENING ENDOSCOPIC: ICD-10-PCS | Performed by: STUDENT IN AN ORGANIZED HEALTH CARE EDUCATION/TRAINING PROGRAM

## 2024-04-18 PROCEDURE — 0W3P8ZZ CONTROL BLEEDING IN GASTROINTESTINAL TRACT, VIA NATURAL OR ARTIFICIAL OPENING ENDOSCOPIC: ICD-10-PCS | Performed by: STUDENT IN AN ORGANIZED HEALTH CARE EDUCATION/TRAINING PROGRAM

## 2024-04-18 PROCEDURE — 2580000003 HC RX 258: Performed by: STUDENT IN AN ORGANIZED HEALTH CARE EDUCATION/TRAINING PROGRAM

## 2024-04-18 PROCEDURE — 6370000000 HC RX 637 (ALT 250 FOR IP)

## 2024-04-18 PROCEDURE — A4216 STERILE WATER/SALINE, 10 ML: HCPCS | Performed by: STUDENT IN AN ORGANIZED HEALTH CARE EDUCATION/TRAINING PROGRAM

## 2024-04-18 PROCEDURE — 3700000001 HC ADD 15 MINUTES (ANESTHESIA): Performed by: STUDENT IN AN ORGANIZED HEALTH CARE EDUCATION/TRAINING PROGRAM

## 2024-04-18 PROCEDURE — 99222 1ST HOSP IP/OBS MODERATE 55: CPT | Performed by: STUDENT IN AN ORGANIZED HEALTH CARE EDUCATION/TRAINING PROGRAM

## 2024-04-18 PROCEDURE — 2140000000 HC CCU INTERMEDIATE R&B

## 2024-04-18 PROCEDURE — 83036 HEMOGLOBIN GLYCOSYLATED A1C: CPT

## 2024-04-18 PROCEDURE — 6360000002 HC RX W HCPCS: Performed by: STUDENT IN AN ORGANIZED HEALTH CARE EDUCATION/TRAINING PROGRAM

## 2024-04-18 PROCEDURE — 2709999900 HC NON-CHARGEABLE SUPPLY: Performed by: STUDENT IN AN ORGANIZED HEALTH CARE EDUCATION/TRAINING PROGRAM

## 2024-04-18 PROCEDURE — 2580000003 HC RX 258: Performed by: FAMILY MEDICINE

## 2024-04-18 RX ORDER — LIDOCAINE HYDROCHLORIDE 20 MG/ML
INJECTION, SOLUTION EPIDURAL; INFILTRATION; INTRACAUDAL; PERINEURAL PRN
Status: DISCONTINUED | OUTPATIENT
Start: 2024-04-18 | End: 2024-04-18 | Stop reason: SDUPTHER

## 2024-04-18 RX ORDER — ACETAMINOPHEN 650 MG/1
650 SUPPOSITORY RECTAL EVERY 6 HOURS PRN
Status: DISCONTINUED | OUTPATIENT
Start: 2024-04-18 | End: 2024-04-29 | Stop reason: HOSPADM

## 2024-04-18 RX ORDER — ACETAMINOPHEN 325 MG/1
650 TABLET ORAL EVERY 6 HOURS PRN
Status: DISCONTINUED | OUTPATIENT
Start: 2024-04-18 | End: 2024-04-29 | Stop reason: HOSPADM

## 2024-04-18 RX ORDER — INSULIN LISPRO 100 [IU]/ML
0-4 INJECTION, SOLUTION INTRAVENOUS; SUBCUTANEOUS NIGHTLY
Status: DISCONTINUED | OUTPATIENT
Start: 2024-04-19 | End: 2024-04-29 | Stop reason: HOSPADM

## 2024-04-18 RX ORDER — ZINC SULFATE 50(220)MG
50 CAPSULE ORAL DAILY
Status: DISCONTINUED | OUTPATIENT
Start: 2024-04-19 | End: 2024-04-29 | Stop reason: HOSPADM

## 2024-04-18 RX ORDER — VITAMIN B COMPLEX
2000 TABLET ORAL DAILY
Status: DISCONTINUED | OUTPATIENT
Start: 2024-04-19 | End: 2024-04-29 | Stop reason: HOSPADM

## 2024-04-18 RX ORDER — LEVOTHYROXINE SODIUM 0.1 MG/1
100 TABLET ORAL DAILY
Status: DISCONTINUED | OUTPATIENT
Start: 2024-04-19 | End: 2024-04-29 | Stop reason: HOSPADM

## 2024-04-18 RX ORDER — ONDANSETRON 4 MG/1
4 TABLET, ORALLY DISINTEGRATING ORAL EVERY 8 HOURS PRN
Status: DISCONTINUED | OUTPATIENT
Start: 2024-04-18 | End: 2024-04-29 | Stop reason: HOSPADM

## 2024-04-18 RX ORDER — DEXTROSE AND SODIUM CHLORIDE 5; .45 G/100ML; G/100ML
INJECTION, SOLUTION INTRAVENOUS CONTINUOUS
Status: DISCONTINUED | OUTPATIENT
Start: 2024-04-18 | End: 2024-04-25

## 2024-04-18 RX ORDER — SODIUM CHLORIDE 9 MG/ML
INJECTION, SOLUTION INTRAVENOUS CONTINUOUS PRN
Status: DISCONTINUED | OUTPATIENT
Start: 2024-04-18 | End: 2024-04-18 | Stop reason: SDUPTHER

## 2024-04-18 RX ORDER — DEXTROSE MONOHYDRATE 100 MG/ML
INJECTION, SOLUTION INTRAVENOUS CONTINUOUS PRN
Status: DISCONTINUED | OUTPATIENT
Start: 2024-04-18 | End: 2024-04-29 | Stop reason: HOSPADM

## 2024-04-18 RX ORDER — SODIUM CHLORIDE 0.9 % (FLUSH) 0.9 %
5-40 SYRINGE (ML) INJECTION EVERY 12 HOURS SCHEDULED
Status: DISCONTINUED | OUTPATIENT
Start: 2024-04-19 | End: 2024-04-29 | Stop reason: HOSPADM

## 2024-04-18 RX ORDER — FOLIC ACID 1 MG/1
1 TABLET ORAL DAILY
Status: DISCONTINUED | OUTPATIENT
Start: 2024-04-19 | End: 2024-04-29 | Stop reason: HOSPADM

## 2024-04-18 RX ORDER — LEVOTHYROXINE SODIUM 88 UG/1
88 TABLET ORAL DAILY
Status: DISCONTINUED | OUTPATIENT
Start: 2024-04-19 | End: 2024-04-18

## 2024-04-18 RX ORDER — FERROUS SULFATE 325(65) MG
325 TABLET ORAL
Status: DISCONTINUED | OUTPATIENT
Start: 2024-04-19 | End: 2024-04-29 | Stop reason: HOSPADM

## 2024-04-18 RX ORDER — ONDANSETRON 2 MG/ML
4 INJECTION INTRAMUSCULAR; INTRAVENOUS EVERY 6 HOURS PRN
Status: DISCONTINUED | OUTPATIENT
Start: 2024-04-18 | End: 2024-04-29 | Stop reason: HOSPADM

## 2024-04-18 RX ORDER — PROPOFOL 10 MG/ML
INJECTION, EMULSION INTRAVENOUS PRN
Status: DISCONTINUED | OUTPATIENT
Start: 2024-04-18 | End: 2024-04-18 | Stop reason: SDUPTHER

## 2024-04-18 RX ORDER — GLUCAGON 1 MG/ML
1 KIT INJECTION PRN
Status: DISCONTINUED | OUTPATIENT
Start: 2024-04-18 | End: 2024-04-29 | Stop reason: HOSPADM

## 2024-04-18 RX ORDER — ALBUTEROL SULFATE 2.5 MG/3ML
2.5 SOLUTION RESPIRATORY (INHALATION) EVERY 4 HOURS PRN
Status: DISCONTINUED | OUTPATIENT
Start: 2024-04-18 | End: 2024-04-29 | Stop reason: HOSPADM

## 2024-04-18 RX ORDER — METFORMIN HYDROCHLORIDE 500 MG/1
1000 TABLET, EXTENDED RELEASE ORAL 2 TIMES DAILY WITH MEALS
Status: DISCONTINUED | OUTPATIENT
Start: 2024-04-19 | End: 2024-04-29 | Stop reason: HOSPADM

## 2024-04-18 RX ORDER — INSULIN LISPRO 100 [IU]/ML
0-4 INJECTION, SOLUTION INTRAVENOUS; SUBCUTANEOUS
Status: DISCONTINUED | OUTPATIENT
Start: 2024-04-19 | End: 2024-04-29 | Stop reason: HOSPADM

## 2024-04-18 RX ORDER — SODIUM CHLORIDE 0.9 % (FLUSH) 0.9 %
5-40 SYRINGE (ML) INJECTION PRN
Status: DISCONTINUED | OUTPATIENT
Start: 2024-04-18 | End: 2024-04-29 | Stop reason: HOSPADM

## 2024-04-18 RX ADMIN — FERROUS SULFATE TAB 325 MG (65 MG ELEMENTAL FE) 325 MG: 325 (65 FE) TAB at 09:46

## 2024-04-18 RX ADMIN — FOLIC ACID 1 MG: 1 TABLET ORAL at 09:46

## 2024-04-18 RX ADMIN — LEVOTHYROXINE SODIUM 88 MCG: 0.09 TABLET ORAL at 09:46

## 2024-04-18 RX ADMIN — OCTREOTIDE ACETATE 50 MCG/HR: 500 INJECTION, SOLUTION INTRAVENOUS; SUBCUTANEOUS at 11:46

## 2024-04-18 RX ADMIN — SODIUM CHLORIDE: 9 INJECTION, SOLUTION INTRAVENOUS at 13:16

## 2024-04-18 RX ADMIN — Medication 2000 UNITS: at 09:46

## 2024-04-18 RX ADMIN — DEXTROSE AND SODIUM CHLORIDE: 5; 450 INJECTION, SOLUTION INTRAVENOUS at 23:02

## 2024-04-18 RX ADMIN — ACETAMINOPHEN 650 MG: 325 TABLET ORAL at 21:39

## 2024-04-18 RX ADMIN — OCTREOTIDE ACETATE 50 MCG/HR: 500 INJECTION, SOLUTION INTRAVENOUS; SUBCUTANEOUS at 23:02

## 2024-04-18 RX ADMIN — ZINC SULFATE 220 MG (50 MG) CAPSULE 50 MG: CAPSULE at 09:46

## 2024-04-18 RX ADMIN — ONDANSETRON 4 MG: 2 INJECTION INTRAMUSCULAR; INTRAVENOUS at 15:41

## 2024-04-18 RX ADMIN — DEXTROSE AND SODIUM CHLORIDE: 5; 450 INJECTION, SOLUTION INTRAVENOUS at 19:57

## 2024-04-18 RX ADMIN — PROPOFOL 350 MG: 10 INJECTION, EMULSION INTRAVENOUS at 13:31

## 2024-04-18 RX ADMIN — SODIUM CHLORIDE, PRESERVATIVE FREE 10 ML: 5 INJECTION INTRAVENOUS at 09:59

## 2024-04-18 RX ADMIN — CEFTRIAXONE SODIUM 2000 MG: 2 INJECTION, POWDER, FOR SOLUTION INTRAMUSCULAR; INTRAVENOUS at 09:47

## 2024-04-18 RX ADMIN — LIDOCAINE HYDROCHLORIDE 100 MG: 20 INJECTION, SOLUTION EPIDURAL; INFILTRATION; INTRACAUDAL; PERINEURAL at 13:29

## 2024-04-18 RX ADMIN — PANTOPRAZOLE SODIUM 40 MG: 40 INJECTION, POWDER, FOR SOLUTION INTRAVENOUS at 19:50

## 2024-04-18 RX ADMIN — PANTOPRAZOLE SODIUM 40 MG: 40 INJECTION, POWDER, FOR SOLUTION INTRAVENOUS at 09:47

## 2024-04-18 ASSESSMENT — PAIN SCALES - GENERAL
PAINLEVEL_OUTOF10: 5
PAINLEVEL_OUTOF10: 7

## 2024-04-18 ASSESSMENT — LIFESTYLE VARIABLES: SMOKING_STATUS: 0

## 2024-04-18 ASSESSMENT — PAIN DESCRIPTION - LOCATION: LOCATION: ABDOMEN

## 2024-04-18 ASSESSMENT — PAIN DESCRIPTION - DESCRIPTORS: DESCRIPTORS: STABBING;DISCOMFORT;SORE

## 2024-04-18 ASSESSMENT — PAIN - FUNCTIONAL ASSESSMENT: PAIN_FUNCTIONAL_ASSESSMENT: PREVENTS OR INTERFERES SOME ACTIVE ACTIVITIES AND ADLS

## 2024-04-18 ASSESSMENT — PAIN DESCRIPTION - ORIENTATION: ORIENTATION: MID

## 2024-04-18 NOTE — PROGRESS NOTES
General Surgery   Daily Progress Note      Patient's Name/Date of Birth: Erna Stoner / 1960    Date: April 18, 2024     Chief Complaint: UGIB    Patient Active Problem List   Diagnosis    Hematemesis       Subjective: No signs of bleeding overnight. Feels well, no diarrhea no pain.     Soft sbp 106  Hb 7.3 to 6.6     Objective:  BP (!) 104/58   Pulse 83   Temp 99 °F (37.2 °C) (Oral)   Resp 18   Ht 1.575 m (5' 2\")   Wt 68.8 kg (151 lb 11.2 oz)   SpO2 99%   BMI 27.75 kg/m²   Labs:  Recent Labs     04/16/24  1937 04/17/24  0639 04/17/24  1054 04/17/24  1805 04/18/24  0609   WBC 5.5  --   --   --   --    HGB 9.3*   < > 7.4* 7.3* 6.6*   HCT 28.4*   < > 22.5* 22.6* 20.8*    < > = values in this interval not displayed.     Lab Results   Component Value Date    CREATININE 0.6 04/18/2024    BUN 10 04/18/2024     04/18/2024    K 3.5 04/18/2024     (H) 04/18/2024    CO2 24 04/18/2024     Recent Labs     04/16/24 1937   LIPASE 55       General appearance:  NAD  Head: NCAT, PERRLA, EOMI, red conjunctiva  Neck: supple, no masses  Lungs: symmetric chest rise, no audible wheezes  Heart: normal rate per peripheral pulse  Abdomen: soft, nondistended, non tender  Skin: no visible lesions  Gu: no cva tenderness  Extremities: extremities normal, atraumatic, no cyanosis or edema      Assessment/Plan:  Erna Stoner is a 63 y.o. female with GIB s/p EGD with eroded gastric variceal coil, variceal clip x1 4/17    Accepted for transfer to [facility]    Discussed with her prior advanced GI specialist at [facility] as well as IM attending at [facility] yesterday for possible transfer. Stated that the endoscopist would not consider recoiling as it would likely have minimal effect if she was bleeding from these coils. Charting states banding done last year at [facility] but they were unable to find these records there. They stated at this time they would not accept transfer as she was stable. Transferred to [facility] for monitoring in case her  clip were to come off and she would require repeat banding or IR intervention     Monitor H/H, transfusion as indicated per primary   NPO, IVF     Transfer when able.       Consult to Dr Montes De Oca should anything occur while awaiting transfer she may require repeated banding.     Nadia Allen MD  PGY-3 General Surgery Resident

## 2024-04-18 NOTE — ANESTHESIA PRE PROCEDURE
consumption: 0947                        Time of last solid consumption: 1600                        Date of last liquid consumption: 04/18/24                        Date of last solid food consumption: 04/17/24    BMI:   Wt Readings from Last 3 Encounters:   04/18/24 68.8 kg (151 lb 11.2 oz)   04/11/24 70.8 kg (156 lb)   02/29/24 69.4 kg (153 lb)     Body mass index is 27.75 kg/m².    CBC:   Lab Results   Component Value Date/Time    WBC 5.5 04/16/2024 07:37 PM    RBC 3.18 04/16/2024 07:37 PM    HGB 7.0 04/18/2024 07:36 AM    HCT 21.8 04/18/2024 07:36 AM    MCV 89.3 04/16/2024 07:37 PM    RDW 16.0 04/16/2024 07:37 PM     04/16/2024 07:37 PM       CMP:   Lab Results   Component Value Date/Time     04/18/2024 06:09 AM    K 3.5 04/18/2024 06:09 AM    K 3.9 02/14/2020 10:39 PM     04/18/2024 06:09 AM    CO2 24 04/18/2024 06:09 AM    BUN 10 04/18/2024 06:09 AM    CREATININE 0.6 04/18/2024 06:09 AM    GFRAA >60 11/13/2020 09:35 AM    LABGLOM >90 04/18/2024 06:09 AM    GLUCOSE 252 04/18/2024 06:09 AM    GLUCOSE 171 05/07/2012 10:20 AM    PROT 4.2 04/18/2024 06:09 AM    CALCIUM 7.5 04/18/2024 06:09 AM    BILITOT 0.9 04/18/2024 06:09 AM    ALKPHOS 45 04/18/2024 06:09 AM    AST 28 04/18/2024 06:09 AM    ALT 16 04/18/2024 06:09 AM       POC Tests:   Recent Labs     04/18/24  1119   POCGLU 87         Coags:   Lab Results   Component Value Date/Time    PROTIME 15.0 04/17/2024 06:39 AM    PROTIME 12.4 05/07/2012 10:20 AM    INR 1.4 04/17/2024 06:39 AM    APTT 24.0 04/17/2024 06:39 AM       HCG (If Applicable): No results found for: \"PREGTESTUR\", \"PREGSERUM\", \"HCG\", \"HCGQUANT\"     ABGs: No results found for: \"PHART\", \"PO2ART\", \"XYV6PCH\", \"UJN2JWC\", \"BEART\", \"D0MRQZEN\"     Type & Screen (If Applicable):  No results found for: \"LABABO\", \"LABRH\"    Drug/Infectious Status (If Applicable):  No results found for: \"HIV\", \"HEPCAB\"    COVID-19 Screening (If Applicable):   Lab Results   Component Value Date/Time

## 2024-04-18 NOTE — ANESTHESIA POSTPROCEDURE EVALUATION
Department of Anesthesiology  Postprocedure Note    Patient: Erna Stoner  MRN: [id number]  YOB: 1960  Date of evaluation: 4/18/2024    Procedure Summary       Date: 04/18/24 Room / Location: [place] [address] / [facility]    Anesthesia Start: 1321 Anesthesia Stop: 1352    Procedure: ESOPHAGOGASTRODUODENOSCOPY SUBMUCOSAL INJECTION (Upper GI Region) Diagnosis:       Hematemesis      (Hematemesis [K92.0])    Surgeons: Augie Montes De Oca MD Responsible Provider: Nikky Acosta DO    Anesthesia Type: MAC ASA Status: 3            Anesthesia Type: MAC    Mary Jo Phase I: Mary Jo Score: 10    Mary Jo Phase II: Mary Jo Score: 10    Anesthesia Post Evaluation    Patient location during evaluation: PACU  Patient participation: complete - patient participated  Level of consciousness: awake and alert  Airway patency: patent  Nausea & Vomiting: no nausea and no vomiting  Cardiovascular status: hemodynamically stable  Respiratory status: acceptable  Hydration status: euvolemic  Pain management: adequate    No notable events documented.

## 2024-04-18 NOTE — PROGRESS NOTES
Called the nurse at [facility] to give update on pickup time for patient that will be going to room 6515.

## 2024-04-18 NOTE — PLAN OF CARE
Problem: Chronic Conditions and Co-morbidities  Goal: Patient's chronic conditions and co-morbidity symptoms are monitored and maintained or improved  Outcome: Progressing     Problem: Pain  Goal: Verbalizes/displays adequate comfort level or baseline comfort level  Outcome: Progressing     Problem: Discharge Planning  Goal: Discharge to home or other facility with appropriate resources  Outcome: Progressing     Problem: Safety - Adult  Goal: Free from fall injury  Outcome: Progressing     Problem: ABCDS Injury Assessment  Goal: Absence of physical injury  Outcome: Progressing

## 2024-04-18 NOTE — PROGRESS NOTES
While rounding patient not in room, off unit at the time for a procedure. Unable to visit, but  remains available for support.

## 2024-04-18 NOTE — BRIEF OP NOTE
Brief Postoperative Note      Patient: Erna Stoner  YOB: 1960  MRN: [id number]    Date of Procedure: 4/18/2024    Pre-Op Diagnosis Codes:     * Hematemesis [K92.0]    Post-Op Diagnosis: Gastric varix       Procedure(s):  ESOPHAGOGASTRODUODENOSCOPY SUBMUCOSAL INJECTION    Surgeon(s):  Augie Montes De Oca MD    Assistant:  * No surgical staff found *    Anesthesia: * No anesthesia type entered *    Estimated Blood Loss (mL): less than 50     Complications: None    Specimens:   * No specimens in log *    Implants:  * No implants in log *      Drains: * No LDAs found *    Findings:    Normal esophagus.  Gastric varix with eroding coil and clip in place. Sclerotherapy performed.   Mild gastritis.  Normal duodenum.    PLAN:  - Continue PPI BID, Octreotide, and Ceftriaxone.  - Being transferred to [facility].  - I will begin evaluation for TIPS including CTA A/P and TTE.  - Will plan for TIPS placement after further workup.   - Trend Hgb daily and transfuse to Hgb>7.   -         Electronically signed by Augie Montes De Oca MD on 4/18/2024 at 1:54 PM

## 2024-04-18 NOTE — CONSULTS
[facility]   Gastroenterology, Hepatology, &  Advanced Endoscopy    Consult       ASSESSMENT AND PLAN:    Will repeat EGD today with sclerotherapy and hemospray application  Will evaluate state of cirrhosis and begin evaluation for TIPS          HISTORY OF PRESENT ILLNESS:      ***    Past Medical History:        Diagnosis Date    Asthma     stable, is mild    Back pain     Diabetes mellitus (HCC)     Hypothyroidism     Mass of nasal sinus     for OR 20     Thyroid disease      Past Surgical History:        Procedure Laterality Date    CARPAL TUNNEL RELEASE      bilateral     SECTION      CHOLECYSTECTOMY      COLONOSCOPY  2012    HYSTERECTOMY (CERVIX STATUS UNKNOWN)  2003    LIVER BIOPSY  2017    NOSE SURGERY N/A 2020    EXCISIONAL BIOPSY OF RIGHT NARES (PATHOLOGY NOTIFIED) performed by Bandar Solis Jr., MD at [facility] OR    OVARY REMOVAL      TONSILLECTOMY      UPPER GASTROINTESTINAL ENDOSCOPY  2012    UPPER GASTROINTESTINAL ENDOSCOPY  2024    ESOPHAGOGASTRODUODENOSCOPY CONTROL HEMORRHAGE performed by Noé Montilla MD at [facility] ENDOSCOPY     Social History:    TOBACCO:   reports that she has never smoked. She has never used smokeless tobacco.  ETOH:   reports current alcohol use.  DRUGS:   reports no history of drug use.  Family History:       Problem Relation Age of Onset    Diabetes Mother     High Blood Pressure Mother     Heart Disease Mother 70        stent    Heart Disease Father 70        CAD    Breast Cancer Maternal Aunt 80 - 89    Breast Cancer Maternal Aunt 80 - 89         Current Facility-Administered Medications:     octreotide (SANDOSTATIN) 500 mcg in sodium chloride 0.9 % 100 mL infusion, 50 mcg/hr, IntraVENous, Continuous, Augie Montes De Oca MD    cefTRIAXone (ROCEPHIN) 2,000 mg in sterile water 20 mL IV syringe, 2,000 mg, IntraVENous, Q24H, Augie Montes De Oca MD, 2,000 mg at 24 0947    albuterol (PROVENTIL) (2.5 MG/3ML) 0.083% nebulizer  solution 2.5 mg, 2.5 mg, Nebulization, Q4H PRN, Hailey Bello APRN - CNP    celecoxib (CELEBREX) capsule 200 mg, 200 mg, Oral, Daily, Hailey Bello APRN - CNP    ferrous sulfate (IRON 325) tablet 325 mg, 325 mg, Oral, Daily with breakfast, Hailey Bello APRN - CNP, 325 mg at 04/18/24 0946    folic acid (FOLVITE) tablet 1 mg, 1 mg, Oral, Daily, Hailey Bello APRN - CNP, 1 mg at 04/18/24 0946    levothyroxine (SYNTHROID) tablet 88 mcg, 88 mcg, Oral, Daily, Hailey Bello APRN - CNP, 88 mcg at 04/18/24 0946    Semaglutide (1 MG/DOSE) SOPN 0.5 mg (Patient Supplied), 0.5 mg, SubCUTAneous, Weekly, Hailey Bello APRN - CNP    rifAXIMin (XIFAXAN) tablet 400 mg, 400 mg, Oral, TID, Hailey Bello APRN - CNP    Vitamin D (CHOLECALCIFEROL) tablet 2,000 Units, 2,000 Units, Oral, Daily, Hailey Bello APRN - CNP, 2,000 Units at 04/18/24 0946    dextrose bolus 10% 125 mL, 125 mL, IntraVENous, PRN, Stopped at 04/17/24 1307 **OR** dextrose bolus 10% 250 mL, 250 mL, IntraVENous, PRN, Hailey Bello APRN - CNP    glucagon injection 1 mg, 1 mg, SubCUTAneous, PRN, Hailey Bello APRN - CNP    dextrose 10 % infusion, , IntraVENous, Continuous PRN, Hailey Bello APRN - CNP    0.9 % sodium chloride infusion, , IntraVENous, Continuous, Hailey Bello APRN - CNP, Last Rate: 50 mL/hr at 04/17/24 1226, Rate Verify at 04/17/24 1226    sodium chloride flush 0.9 % injection 5-40 mL, 5-40 mL, IntraVENous, 2 times per day, Hailey Bello APRN - CNP, 10 mL at 04/18/24 0959    sodium chloride flush 0.9 % injection 5-40 mL, 5-40 mL, IntraVENous, PRN, Hailey Bello APRN - CNP    0.9 % sodium chloride infusion, , IntraVENous, PRN, Hailey Bello APRN - CNP    ondansetron (ZOFRAN-ODT) disintegrating tablet 4 mg, 4 mg, Oral, Q8H PRN **OR** ondansetron (ZOFRAN) injection 4 mg, 4 mg, IntraVENous, Q6H PRN, Hailey Bello APRN - CNP    acetaminophen (TYLENOL) tablet 650 mg, 650 mg, Oral, Q6H PRN, 650 mg at 04/17/24 2007 **OR**  tablet 1,000 mg, 1,000 mg, Oral, BID WC, Hailey Bello APRN - CNP    pantoprazole (PROTONIX) 40 mg in sodium chloride (PF) 0.9 % 10 mL injection, 40 mg, IntraVENous, Q12H, Yue Irwin MD, 40 mg at 04/18/24 0947    dextrose 5 % and 0.45 % sodium chloride infusion, , IntraVENous, Continuous, Aspen, SHANE Andujar CNP, Last Rate: 75 mL/hr at 04/17/24 1757, New Bag at 04/17/24 1757     Allergies:  Patient has no known allergies.    ROS:  General: Patient denies f/c or weight loss.  HEENT: Patient denies persistent postnasal drip, scleral icterus, drooling, persistent bleeding from nose/mouth.  Resp: Patient denies SOB, wheezing, productive cough.  Cards: Patient denies CP, palpitations, significant edema  GI: As above.  Derm: Patient denies jaundice/rashes.   Musc: Patient denies diffuse/irregular joint swelling or myalgias.     PHYSICAL EXAM:  BP (!) 104/58   Pulse 83   Temp 99 °F (37.2 °C) (Oral)   Resp 18   Ht 1.575 m (5' 2\")   Wt 68.8 kg (151 lb 11.2 oz)   SpO2 99%   BMI 27.75 kg/m²   Physical Exam:  General: Overall well-appearing, NAD  HEENT: PERRLA, EOMI, Anicteric sclera, MMM, no rhinorrhea  Cards: RRR, no LE edema  Resp: Breathing comfortably on room air, good air movement, no use of accessory muscles, no audible wheezing  Abdomen: soft, NT, ND.   Extremities: Moves all extremities, no effusions or bruising.  Skin: No rashes or jaundice  Neuro: A&O x 3, CN grossly intact, non-focal exam

## 2024-04-18 NOTE — PROGRESS NOTES
Comprehensive Nutrition Assessment    Type and Reason for Visit:  Initial, Positive Nutrition Screen    Nutrition Recommendations/Plan:   Continue NPO, ADAT  Will continue to monitor while inpatient     Malnutrition Assessment:  Malnutrition Status:  At risk for malnutrition (Comment) (04/18/24 7747)    Context:  Chronic Illness     Findings of the 6 clinical characteristics of malnutrition:  Energy Intake:  Mild decrease in energy intake (Comment)  Weight Loss:  No significant weight loss     Body Fat Loss:  No significant body fat loss     Muscle Mass Loss:  Mild muscle mass loss Temples (temporalis)  Fluid Accumulation:  Unable to assess (d/t multifactorial)     Strength:  Not Performed    Nutrition Assessment:    Pt with hematemesis. Hx DM, hypothyroidism, sarcoidosis with liver involvement/esophageal varices. EGD 4/18- gastric varix, bleeding from eroded coil. Plan to transfer to [facility]. Continue NPO, ADAT. Will continue to monitor.    Nutrition Related Findings:    A&O, abd soft, +BS, nausea, no edema, +1.3 L, gluc 252 Wound Type: None       Current Nutrition Intake & Therapies:    Average Meal Intake: NPO  Average Supplements Intake: NPO  No diet orders on file    Anthropometric Measures:  Height: 157.5 cm (5' 2\")  Ideal Body Weight (IBW): 110 lbs (50 kg)    Admission Body Weight: 68.8 kg (151 lb 11.2 oz) (4/18 bed scale)  Current Body Weight: 68.8 kg (151 lb 11.2 oz) (4/18), 137.9 % IBW. Weight Source: Bed Scale  Current BMI (kg/m2): 27.7  Usual Body Weight: 69.4 kg (153 lb) (2/29 (OV))  % Weight Change (Calculated): -0.8  Weight Adjustment For: No Adjustment                 BMI Categories: Overweight (BMI 25.0-29.9)    Estimated Daily Nutrient Needs:  Energy Requirements Based On: Formula  Weight Used for Energy Requirements: Current  Energy (kcal/day): 8900-2360  Weight Used for Protein Requirements: Ideal  Protein (g/day): 65-75  Method Used for Fluid Requirements: 1 ml/kcal  Fluid (ml/day): 4421-4788  ml    Nutrition Diagnosis:   Inadequate oral intake related to altered GI function as evidenced by NPO or clear liquid status due to medical condition, GI abnormality, nausea, vomiting    Nutrition Interventions:   Food and/or Nutrient Delivery: Continue NPO (ADAT)  Nutrition Education/Counseling: No recommendation at this time  Coordination of Nutrition Care: Continue to monitor while inpatient       Goals:     Goals: other (specify)  Specify Other Goals: nutrition progression    Nutrition Monitoring and Evaluation:   Behavioral-Environmental Outcomes: None Identified  Food/Nutrient Intake Outcomes: Other (Comment) (nutrition progression)  Physical Signs/Symptoms Outcomes: Biochemical Data, GI Status, Fluid Status or Edema, Nutrition Focused Physical Findings, Skin, Weight    Discharge Planning:    Too soon to determine     SVETLANA FRANKS, MPH, RD, LD  Contact: x [phone]

## 2024-04-18 NOTE — DISCHARGE SUMMARY
[facility]   Discharge summary   Patient ID:  Erna Stoner  [id number]  63 y.o.  1960    Admit date: 4/16/2024    Discharge date and time: 4/18/2024    Admission Diagnoses:   Patient Active Problem List   Diagnosis    Hematemesis    Gastric varices       Discharge Diagnoses: Gastric varicies    Consults: GI and general surgery    Procedures: EGD x 2    Hospital Course: The patient is a 63 y.o. female of Elia Daniel MD     63-year-old female with with a history of sarcoidosis with liver involvement/esophageal varices, diabetes, gastric ulcers and hypothyroidism presents to the ED with complaints of hematemesis with abdominal pain and is admitted to telemetry unit      Hematemesis in patient with known history of sarcoid liver disease with history of varices status post coiling at [facility]  EGD per general surgery-bleeding is from an eroded coil  Monitor H&H-7.4/22.5-transfuse if hgb drops below 7  IV hydration  Clear liquid diet  General surgery consulted-patient has a known history of gastric ulcers  Antiemetics  Hold anticoagulation  Pain control  Case discussed with Dr. Montilla after EGD was performed, secondary to patient's known history and establishment at [facility]-patient will be transferred there by general surgery team versus to [facility] secondary to risk of decompensation with her history.     Diabetes Mellitus  -Monitor labs  -ISS glucose control  -Hypoglycemia protocol initiated     4/18/2024  GI to perform EGD - Gastric varix with eroding coil and clip in place, gastritis  Continue PPI/BID  Rocephin  Transfer to [facility]      Recent Labs     04/16/24 1937 04/17/24 0639 04/17/24  1805 04/18/24  0609 04/18/24  0736   WBC 5.5  --   --   --   --    HGB 9.3*   < > 7.3* 6.6* 7.0*   HCT 28.4*   < > 22.6* 20.8* 21.8*     --   --   --   --     < > = values in this interval not displayed.       Recent Labs     04/16/24 1937 04/17/24 0639 04/18/24  0609     141 141   K 4.5 3.8 3.5    109* 110*   CO2 25 23 24   BUN 29* 22 10   CREATININE 0.5 0.5 0.6   CALCIUM 9.0 8.0* 7.5*       XR CHEST (2 VW)    Result Date: 4/16/2024  EXAMINATION: TWO XRAY VIEWS OF THE CHEST 4/16/2024 8:54 pm COMPARISON: 09/18/2023. HISTORY: ORDERING SYSTEM PROVIDED HISTORY: hematemesis TECHNOLOGIST PROVIDED HISTORY: Reason for exam:->hematemesis FINDINGS: The lungs are without acute focal process.  There is no effusion or pneumothorax. The cardiomediastinal silhouette is without acute process. The osseous structures are without acute process.     No acute process.       Discharge Exam:    HEENT: NCAT,  PERRLA, No JVD  Heart:  RRR, no murmurs, gallops, or rubs.  Lungs:  CTA bilaterally, no wheeze, rales or rhonchi  Abd: bowel sounds present, nontender, nondistended, no masses  Extrem:  No clubbing, cyanosis, or edema    Disposition: [facility]     Patient Condition at Discharge: Stable    Patient Instructions:      Medication List        ASK your doctor about these medications      * albuterol sulfate  (90 Base) MCG/ACT inhaler  Commonly known as: PROVENTIL;VENTOLIN;PROAIR     * albuterol (2.5 MG/3ML) 0.083% nebulizer solution  Commonly known as: PROVENTIL     ammonium lactate 12 % lotion  Commonly known as: LAC-HYDRIN  Ask about: Which instructions should I use?     Centrum Silver 50+Women Tabs     ferrous sulfate 325 (65 Fe) MG tablet  Commonly known as: IRON 325     folic acid 1 MG tablet  Commonly known as: FOLVITE     Fosamax 70 MG tablet  Generic drug: alendronate     levothyroxine 100 MCG tablet  Commonly known as: SYNTHROID     Methotrexate 25 MG/ML Sosy     ondansetron 4 MG tablet  Commonly known as: ZOFRAN     Ozempic (1 MG/DOSE) 2 MG/1.5ML Sopn  Generic drug: Semaglutide (1 MG/DOSE)     pantoprazole 40 MG tablet  Commonly known as: PROTONIX     ProFe 391.3 (180 Fe) MG Caps  Generic drug: Polysaccharide Iron Complex     sucralfate 1 GM tablet  Commonly

## 2024-04-19 ENCOUNTER — APPOINTMENT (OUTPATIENT)
Dept: CT IMAGING | Age: 64
DRG: 260 | End: 2024-04-19
Attending: STUDENT IN AN ORGANIZED HEALTH CARE EDUCATION/TRAINING PROGRAM
Payer: COMMERCIAL

## 2024-04-19 ENCOUNTER — APPOINTMENT (OUTPATIENT)
Age: 64
DRG: 260 | End: 2024-04-19
Attending: STUDENT IN AN ORGANIZED HEALTH CARE EDUCATION/TRAINING PROGRAM
Payer: COMMERCIAL

## 2024-04-19 LAB
ALBUMIN SERPL-MCNC: 3.1 G/DL (ref 3.5–5.2)
ALP SERPL-CCNC: 69 U/L (ref 35–104)
ALT SERPL-CCNC: 23 U/L (ref 0–32)
ANION GAP SERPL CALCULATED.3IONS-SCNC: 12 MMOL/L (ref 7–16)
AST SERPL-CCNC: 35 U/L (ref 0–31)
BILIRUB SERPL-MCNC: 1 MG/DL (ref 0–1.2)
BUN SERPL-MCNC: 7 MG/DL (ref 6–23)
CALCIUM SERPL-MCNC: 7.8 MG/DL (ref 8.6–10.2)
CHLORIDE SERPL-SCNC: 109 MMOL/L (ref 98–107)
CO2 SERPL-SCNC: 20 MMOL/L (ref 22–29)
CREAT SERPL-MCNC: 0.6 MG/DL (ref 0.5–1)
ECHO AO ASC DIAM: 2.6 CM
ECHO AO ASCENDING AORTA INDEX: 1.53 CM/M2
ECHO AV AREA PEAK VELOCITY: 2.3 CM2
ECHO AV AREA VTI: 2.4 CM2
ECHO AV AREA/BSA PEAK VELOCITY: 1.4 CM2/M2
ECHO AV AREA/BSA VTI: 1.4 CM2/M2
ECHO AV CUSP MM: 1.6 CM
ECHO AV MEAN GRADIENT: 9 MMHG
ECHO AV MEAN VELOCITY: 1.4 M/S
ECHO AV PEAK GRADIENT: 19 MMHG
ECHO AV PEAK VELOCITY: 2.2 M/S
ECHO AV VELOCITY RATIO: 0.73
ECHO AV VTI: 40.1 CM
ECHO BSA: 1.73 M2
ECHO EST RA PRESSURE: 3 MMHG
ECHO LA DIAMETER INDEX: 2.24 CM/M2
ECHO LA DIAMETER: 3.8 CM
ECHO LA VOL A-L A2C: 38 ML (ref 22–52)
ECHO LA VOL A-L A4C: 45 ML (ref 22–52)
ECHO LA VOL MOD A2C: 37 ML (ref 22–52)
ECHO LA VOL MOD A4C: 40 ML (ref 22–52)
ECHO LA VOLUME AREA LENGTH: 43 ML
ECHO LA VOLUME INDEX A-L A2C: 22 ML/M2 (ref 16–34)
ECHO LA VOLUME INDEX A-L A4C: 26 ML/M2 (ref 16–34)
ECHO LA VOLUME INDEX AREA LENGTH: 25 ML/M2 (ref 16–34)
ECHO LA VOLUME INDEX MOD A2C: 22 ML/M2 (ref 16–34)
ECHO LA VOLUME INDEX MOD A4C: 24 ML/M2 (ref 16–34)
ECHO LV FRACTIONAL SHORTENING: 38 % (ref 28–44)
ECHO LV INTERNAL DIMENSION DIASTOLE INDEX: 2.65 CM/M2
ECHO LV INTERNAL DIMENSION DIASTOLIC: 4.5 CM (ref 3.9–5.3)
ECHO LV INTERNAL DIMENSION SYSTOLIC INDEX: 1.65 CM/M2
ECHO LV INTERNAL DIMENSION SYSTOLIC: 2.8 CM
ECHO LV IVSD: 0.8 CM (ref 0.6–0.9)
ECHO LV IVSS: 1 CM
ECHO LV MASS 2D: 123.1 G (ref 67–162)
ECHO LV MASS INDEX 2D: 72.4 G/M2 (ref 43–95)
ECHO LV POSTERIOR WALL DIASTOLIC: 0.9 CM (ref 0.6–0.9)
ECHO LV POSTERIOR WALL SYSTOLIC: 1.2 CM
ECHO LV RELATIVE WALL THICKNESS RATIO: 0.4
ECHO LVOT AREA: 3.1 CM2
ECHO LVOT AV VTI INDEX: 0.79
ECHO LVOT DIAM: 2 CM
ECHO LVOT MEAN GRADIENT: 5 MMHG
ECHO LVOT PEAK GRADIENT: 11 MMHG
ECHO LVOT PEAK VELOCITY: 1.6 M/S
ECHO LVOT STROKE VOLUME INDEX: 58.4 ML/M2
ECHO LVOT SV: 99.2 ML
ECHO LVOT VTI: 31.6 CM
ECHO MV "A" WAVE DURATION: 110.7 MSEC
ECHO MV A VELOCITY: 1.32 M/S
ECHO MV AREA PHT: 3.6 CM2
ECHO MV AREA VTI: 2.7 CM2
ECHO MV E DECELERATION TIME (DT): 193.9 MS
ECHO MV E VELOCITY: 1.46 M/S
ECHO MV E/A RATIO: 1.11
ECHO MV LVOT VTI INDEX: 1.17
ECHO MV MAX VELOCITY: 1.4 M/S
ECHO MV MEAN GRADIENT: 4 MMHG
ECHO MV MEAN VELOCITY: 1 M/S
ECHO MV PEAK GRADIENT: 8 MMHG
ECHO MV PRESSURE HALF TIME (PHT): 60.3 MS
ECHO MV VTI: 37 CM
ECHO PULMONARY ARTERY END DIASTOLIC PRESSURE: 4 MMHG
ECHO PV MAX VELOCITY: 1.4 M/S
ECHO PV MEAN GRADIENT: 5 MMHG
ECHO PV MEAN VELOCITY: 1.1 M/S
ECHO PV PEAK GRADIENT: 8 MMHG
ECHO PV REGURGITANT MAX VELOCITY: 1 M/S
ECHO PV VTI: 30.8 CM
ECHO PVEIN A DURATION: 96.9 MS
ECHO PVEIN A VELOCITY: 0.4 M/S
ECHO PVEIN PEAK D VELOCITY: 0.5 M/S
ECHO PVEIN PEAK S VELOCITY: 0.6 M/S
ECHO PVEIN S/D RATIO: 1.2
ECHO RIGHT VENTRICULAR SYSTOLIC PRESSURE (RVSP): 28 MMHG
ECHO RV INTERNAL DIMENSION: 3.6 CM
ECHO TV REGURGITANT MAX VELOCITY: 2.48 M/S
ECHO TV REGURGITANT PEAK GRADIENT: 25 MMHG
GFR SERPL CREATININE-BSD FRML MDRD: >90 ML/MIN/1.73M2
GLUCOSE BLD-MCNC: 155 MG/DL (ref 74–99)
GLUCOSE BLD-MCNC: 166 MG/DL (ref 74–99)
GLUCOSE BLD-MCNC: 168 MG/DL (ref 74–99)
GLUCOSE BLD-MCNC: 216 MG/DL (ref 74–99)
GLUCOSE SERPL-MCNC: 151 MG/DL (ref 74–99)
HCT VFR BLD AUTO: 23.1 % (ref 34–48)
HCT VFR BLD AUTO: 23.2 % (ref 34–48)
HCT VFR BLD AUTO: 24 % (ref 34–48)
HCT VFR BLD AUTO: 24.4 % (ref 34–48)
HGB BLD-MCNC: 7.6 G/DL (ref 11.5–15.5)
HGB BLD-MCNC: 7.7 G/DL (ref 11.5–15.5)
HGB BLD-MCNC: 7.9 G/DL (ref 11.5–15.5)
HGB BLD-MCNC: 8.1 G/DL (ref 11.5–15.5)
POTASSIUM SERPL-SCNC: 3.7 MMOL/L (ref 3.5–5)
PROT SERPL-MCNC: 4.4 G/DL (ref 6.4–8.3)
SODIUM SERPL-SCNC: 141 MMOL/L (ref 132–146)

## 2024-04-19 PROCEDURE — 93306 TTE W/DOPPLER COMPLETE: CPT | Performed by: INTERNAL MEDICINE

## 2024-04-19 PROCEDURE — 85014 HEMATOCRIT: CPT

## 2024-04-19 PROCEDURE — 6370000000 HC RX 637 (ALT 250 FOR IP): Performed by: FAMILY MEDICINE

## 2024-04-19 PROCEDURE — C9113 INJ PANTOPRAZOLE SODIUM, VIA: HCPCS | Performed by: FAMILY MEDICINE

## 2024-04-19 PROCEDURE — 36415 COLL VENOUS BLD VENIPUNCTURE: CPT

## 2024-04-19 PROCEDURE — 2580000003 HC RX 258: Performed by: FAMILY MEDICINE

## 2024-04-19 PROCEDURE — 99254 IP/OBS CNSLTJ NEW/EST MOD 60: CPT | Performed by: NURSE PRACTITIONER

## 2024-04-19 PROCEDURE — 82962 GLUCOSE BLOOD TEST: CPT

## 2024-04-19 PROCEDURE — A4216 STERILE WATER/SALINE, 10 ML: HCPCS | Performed by: FAMILY MEDICINE

## 2024-04-19 PROCEDURE — 93306 TTE W/DOPPLER COMPLETE: CPT

## 2024-04-19 PROCEDURE — 80053 COMPREHEN METABOLIC PANEL: CPT

## 2024-04-19 PROCEDURE — 6360000004 HC RX CONTRAST MEDICATION: Performed by: RADIOLOGY

## 2024-04-19 PROCEDURE — 6370000000 HC RX 637 (ALT 250 FOR IP): Performed by: INTERNAL MEDICINE

## 2024-04-19 PROCEDURE — 6360000002 HC RX W HCPCS: Performed by: NURSE PRACTITIONER

## 2024-04-19 PROCEDURE — 2140000000 HC CCU INTERMEDIATE R&B

## 2024-04-19 PROCEDURE — 85018 HEMOGLOBIN: CPT

## 2024-04-19 PROCEDURE — 6360000002 HC RX W HCPCS: Performed by: FAMILY MEDICINE

## 2024-04-19 PROCEDURE — 74175 CTA ABDOMEN W/CONTRAST: CPT

## 2024-04-19 PROCEDURE — 2580000003 HC RX 258: Performed by: NURSE PRACTITIONER

## 2024-04-19 RX ORDER — HYDROCODONE BITARTRATE AND ACETAMINOPHEN 5; 325 MG/1; MG/1
1 TABLET ORAL EVERY 6 HOURS PRN
Status: COMPLETED | OUTPATIENT
Start: 2024-04-19 | End: 2024-04-20

## 2024-04-19 RX ORDER — 0.9 % SODIUM CHLORIDE 0.9 %
1000 INTRAVENOUS SOLUTION INTRAVENOUS ONCE
Status: COMPLETED | OUTPATIENT
Start: 2024-04-19 | End: 2024-04-19

## 2024-04-19 RX ADMIN — DEXTROSE AND SODIUM CHLORIDE: 5; 450 INJECTION, SOLUTION INTRAVENOUS at 19:54

## 2024-04-19 RX ADMIN — IOPAMIDOL 75 ML: 755 INJECTION, SOLUTION INTRAVENOUS at 07:09

## 2024-04-19 RX ADMIN — SODIUM CHLORIDE, PRESERVATIVE FREE 40 MG: 5 INJECTION INTRAVENOUS at 08:09

## 2024-04-19 RX ADMIN — SODIUM CHLORIDE 1000 ML: 9 INJECTION, SOLUTION INTRAVENOUS at 03:12

## 2024-04-19 RX ADMIN — OCTREOTIDE ACETATE 50 MCG/HR: 500 INJECTION, SOLUTION INTRAVENOUS; SUBCUTANEOUS at 20:01

## 2024-04-19 RX ADMIN — HYDROCODONE BITARTRATE AND ACETAMINOPHEN 1 TABLET: 5; 325 TABLET ORAL at 19:55

## 2024-04-19 RX ADMIN — HYDROCODONE BITARTRATE AND ACETAMINOPHEN 1 TABLET: 5; 325 TABLET ORAL at 10:30

## 2024-04-19 RX ADMIN — SODIUM CHLORIDE, PRESERVATIVE FREE 40 MG: 5 INJECTION INTRAVENOUS at 19:55

## 2024-04-19 RX ADMIN — OCTREOTIDE ACETATE 50 MCG/HR: 500 INJECTION, SOLUTION INTRAVENOUS; SUBCUTANEOUS at 09:55

## 2024-04-19 RX ADMIN — SODIUM CHLORIDE, PRESERVATIVE FREE 10 ML: 5 INJECTION INTRAVENOUS at 19:56

## 2024-04-19 RX ADMIN — ACETAMINOPHEN 650 MG: 325 TABLET ORAL at 08:09

## 2024-04-19 RX ADMIN — LEVOTHYROXINE SODIUM 100 MCG: 0.1 TABLET ORAL at 08:10

## 2024-04-19 ASSESSMENT — PAIN SCALES - GENERAL
PAINLEVEL_OUTOF10: 0
PAINLEVEL_OUTOF10: 7
PAINLEVEL_OUTOF10: 8
PAINLEVEL_OUTOF10: 0
PAINLEVEL_OUTOF10: 4

## 2024-04-19 ASSESSMENT — PAIN DESCRIPTION - LOCATION
LOCATION: ABDOMEN
LOCATION: HEAD

## 2024-04-19 ASSESSMENT — PAIN - FUNCTIONAL ASSESSMENT: PAIN_FUNCTIONAL_ASSESSMENT: ACTIVITIES ARE NOT PREVENTED

## 2024-04-19 ASSESSMENT — PAIN DESCRIPTION - DESCRIPTORS
DESCRIPTORS: STABBING;THROBBING;DISCOMFORT
DESCRIPTORS: THROBBING

## 2024-04-19 ASSESSMENT — PAIN DESCRIPTION - ORIENTATION: ORIENTATION: MID

## 2024-04-19 NOTE — FLOWSHEET NOTE
Patient arrived to unit as a transfer from [place] with the following belongings:      04/18/24 7327   Belongings   Dental Appliances None   Vision - Corrective Lenses Eyeglasses;At home   Hearing Aid None   Clothing Shirt;Pants;Footwear;Undergarments;At bedside   Jewelry None   Body Piercings Removed N/A   Electronic Devices Cell Phone;At bedside   Weapons (Notify Protective Services/Security) None   Other Valuables [place];At bedside   Home Medications None   Valuables Given To Patient   Provide Name(s) of Who Valuable(s) Were Given To n/a     All belongings remain with patient at the bedside.

## 2024-04-19 NOTE — ACP (ADVANCE CARE PLANNING)
Advance Care Planning   The patient has the following advanced directives on file:  Advance Directives       Power of  Living Will ACP-Advance Directive ACP-Power of     Not on File Filed on 07/09/13 Filed Not on File            CONTACTS:    Primary Decision Maker: Leslie Ruelas - Brother/Sister - [phone]    Secondary Decision Maker: Amanuel Stoner - Child - [phone]    The Patient has the following current code status:    Code Status: Full Code

## 2024-04-19 NOTE — CARE COORDINATION
Transition of care: Transfer from [facility] for work up for TIPS procedure. GI following. Echo ordered. IVFs at 75ml/hr. IV sandostatin infusion. Hgb 7.7 and other labs noted. Met with pt in room earlier today. Pt said she lives alone in a 1 story home. Independent with ADLs and drives. No DME. Plan is to return home when medially ready. Pt said her sister, brother and son will be available to assist pt after discharge if needed. Denies any needs for home at present. PCP is Dr KHALIDA Daniel and pharmacy is [facility]. Cm/sw will follow.     Case Management Assessment  Initial Evaluation    Date/Time of Evaluation: 4/19/2024 12:52 PM  Assessment Completed by: Doris Cruz RN    If patient is discharged prior to next notation, then this note serves as note for discharge by case management.    Patient Name: Erna Stoner                   YOB: 1960  Diagnosis: Gastric varices                   Date / Time: 4/18/2024  9:05 PM    Patient Admission Status: Inpatient   Readmission Risk (Low < 19, Mod (19-27), High > 27): Readmission Risk Score: 19.2    Current PCP: Elia Daniel MD  PCP verified by CM? Yes    Chart Reviewed: Yes      History Provided by: Patient  Patient Orientation: Alert and Oriented    Patient Cognition: Alert    Hospitalization in the last 30 days (Readmission):  Yes    If yes, Readmission Assessment in  Navigator will be completed.    Advance Directives:      Code Status: Full Code       Discharge Planning:    Patient lives with: Alone Type of Home: House  Primary Care Giver: Self  Patient Support Systems include: Family Members     ADLS  Prior functional level: Independent in ADLs/IADLs    Family can provide assistance at DC: Yes  Would you like Case Management to discuss the discharge plan with any other family members/significant others, and if so, who? No  Plans to Return to Present Housing: Yes      Financial    Payor: [employer] / Plan: [employer] [place] MEDICAID /

## 2024-04-19 NOTE — H&P
[facility]  History and Physical      CHIEF COMPLAINT:    No chief complaint on file.       Patient of Elia Daniel MD presents with:  <principal problem not specified>    History of Present Illness:   Patient is a 63-year-old female with a past medical history of asthma, back pain, diabetes, hypothyroidism, and gastric ulcers, sarcoidosis with liver involvement and esophageal varices for which she follows at [facility] with a hepatologist who initially presented to [facility] on  for chief complaint of hematemesis.  She was seen and evaluated by GI services and underwent an EGD yesterday with recommendation to transfer to [facility] for likely need for TIPS procedure.  Patient is admitted to intermediate telemetry unit at [facility] for further workup and treatment.  On evaluation she is resting comfortably no acute distress.  She was transferred from [facility] yesterday and is to undergo a TIPS procedure this afternoon.  She denies any further hematemesis nausea vomiting lightheadedness dizziness or chest pain.  REVIEW OF SYSTEMS:  Pertinent negatives are above in HPI.  10 point ROS otherwise negative.      Past Medical History:   Diagnosis Date    Asthma     stable, is mild    Back pain     Diabetes mellitus (HCC)     Hypothyroidism     Mass of nasal sinus     for OR 20     Thyroid disease          Past Surgical History:   Procedure Laterality Date    CARPAL TUNNEL RELEASE      bilateral     SECTION      CHOLECYSTECTOMY      COLONOSCOPY  2012    HYSTERECTOMY (CERVIX STATUS UNKNOWN)  2003    LIVER BIOPSY  2017    NOSE SURGERY N/A 2020    EXCISIONAL BIOPSY OF RIGHT NARES (PATHOLOGY NOTIFIED) performed by Bandar Solis Jr., MD at [facility] OR    OVARY REMOVAL      TONSILLECTOMY      UPPER GASTROINTESTINAL ENDOSCOPY  2012    UPPER GASTROINTESTINAL ENDOSCOPY  2024    ESOPHAGOGASTRODUODENOSCOPY CONTROL HEMORRHAGE performed

## 2024-04-19 NOTE — PLAN OF CARE
Problem: Safety - Adult  Goal: Free from fall injury  Outcome: Progressing     Problem: Chronic Conditions and Co-morbidities  Goal: Patient's chronic conditions and co-morbidity symptoms are monitored and maintained or improved  Outcome: Progressing     Problem: Discharge Planning  Goal: Discharge to home or other facility with appropriate resources  Outcome: Progressing     Problem: ABCDS Injury Assessment  Goal: Absence of physical injury  Outcome: Progressing     Problem: Pain  Goal: Verbalizes/displays adequate comfort level or baseline comfort level  Outcome: Progressing

## 2024-04-20 LAB
ALBUMIN SERPL-MCNC: 2.8 G/DL (ref 3.5–5.2)
ALP SERPL-CCNC: 62 U/L (ref 35–104)
ALT SERPL-CCNC: 16 U/L (ref 0–32)
ANION GAP SERPL CALCULATED.3IONS-SCNC: 12 MMOL/L (ref 7–16)
AST SERPL-CCNC: 26 U/L (ref 0–31)
BILIRUB SERPL-MCNC: 0.9 MG/DL (ref 0–1.2)
BUN SERPL-MCNC: 4 MG/DL (ref 6–23)
CALCIUM SERPL-MCNC: 7.5 MG/DL (ref 8.6–10.2)
CHLORIDE SERPL-SCNC: 109 MMOL/L (ref 98–107)
CO2 SERPL-SCNC: 22 MMOL/L (ref 22–29)
CREAT SERPL-MCNC: 0.6 MG/DL (ref 0.5–1)
ESTIMATED AVERAGE GLUCOSE: 80 MG/DL
GFR SERPL CREATININE-BSD FRML MDRD: >90 ML/MIN/1.73M2
GLUCOSE BLD-MCNC: 159 MG/DL (ref 74–99)
GLUCOSE BLD-MCNC: 169 MG/DL (ref 74–99)
GLUCOSE BLD-MCNC: 186 MG/DL (ref 74–99)
GLUCOSE BLD-MCNC: 196 MG/DL (ref 74–99)
GLUCOSE SERPL-MCNC: 149 MG/DL (ref 74–99)
HBA1C MFR BLD: 4.4 %
HCT VFR BLD AUTO: 22.8 % (ref 34–48)
HCT VFR BLD AUTO: 22.9 % (ref 34–48)
HCT VFR BLD AUTO: 23.2 % (ref 34–48)
HCT VFR BLD AUTO: 24.7 % (ref 34–48)
HGB BLD-MCNC: 7.5 G/DL (ref 11.5–15.5)
HGB BLD-MCNC: 7.5 G/DL (ref 11.5–15.5)
HGB BLD-MCNC: 7.6 G/DL (ref 11.5–15.5)
HGB BLD-MCNC: 7.9 G/DL (ref 11.5–15.5)
POTASSIUM SERPL-SCNC: 3.6 MMOL/L (ref 3.5–5)
PROT SERPL-MCNC: 4.3 G/DL (ref 6.4–8.3)
SODIUM SERPL-SCNC: 143 MMOL/L (ref 132–146)

## 2024-04-20 PROCEDURE — 2580000003 HC RX 258: Performed by: NURSE PRACTITIONER

## 2024-04-20 PROCEDURE — C9113 INJ PANTOPRAZOLE SODIUM, VIA: HCPCS | Performed by: FAMILY MEDICINE

## 2024-04-20 PROCEDURE — 2140000000 HC CCU INTERMEDIATE R&B

## 2024-04-20 PROCEDURE — 80053 COMPREHEN METABOLIC PANEL: CPT

## 2024-04-20 PROCEDURE — 6370000000 HC RX 637 (ALT 250 FOR IP): Performed by: INTERNAL MEDICINE

## 2024-04-20 PROCEDURE — 2580000003 HC RX 258: Performed by: FAMILY MEDICINE

## 2024-04-20 PROCEDURE — 85014 HEMATOCRIT: CPT

## 2024-04-20 PROCEDURE — 36415 COLL VENOUS BLD VENIPUNCTURE: CPT

## 2024-04-20 PROCEDURE — 82962 GLUCOSE BLOOD TEST: CPT

## 2024-04-20 PROCEDURE — 85018 HEMOGLOBIN: CPT

## 2024-04-20 PROCEDURE — 6370000000 HC RX 637 (ALT 250 FOR IP): Performed by: FAMILY MEDICINE

## 2024-04-20 PROCEDURE — A4216 STERILE WATER/SALINE, 10 ML: HCPCS | Performed by: FAMILY MEDICINE

## 2024-04-20 PROCEDURE — 6360000002 HC RX W HCPCS: Performed by: NURSE PRACTITIONER

## 2024-04-20 PROCEDURE — 6360000002 HC RX W HCPCS: Performed by: FAMILY MEDICINE

## 2024-04-20 RX ADMIN — Medication 2000 UNITS: at 08:57

## 2024-04-20 RX ADMIN — ZINC SULFATE 220 MG (50 MG) CAPSULE 50 MG: CAPSULE at 08:57

## 2024-04-20 RX ADMIN — SODIUM CHLORIDE, PRESERVATIVE FREE 40 MG: 5 INJECTION INTRAVENOUS at 09:02

## 2024-04-20 RX ADMIN — SODIUM CHLORIDE, PRESERVATIVE FREE 40 MG: 5 INJECTION INTRAVENOUS at 21:27

## 2024-04-20 RX ADMIN — SODIUM CHLORIDE, PRESERVATIVE FREE 10 ML: 5 INJECTION INTRAVENOUS at 21:27

## 2024-04-20 RX ADMIN — DEXTROSE AND SODIUM CHLORIDE: 5; 450 INJECTION, SOLUTION INTRAVENOUS at 10:05

## 2024-04-20 RX ADMIN — OCTREOTIDE ACETATE 50 MCG/HR: 500 INJECTION, SOLUTION INTRAVENOUS; SUBCUTANEOUS at 16:36

## 2024-04-20 RX ADMIN — OCTREOTIDE ACETATE 50 MCG/HR: 500 INJECTION, SOLUTION INTRAVENOUS; SUBCUTANEOUS at 06:04

## 2024-04-20 RX ADMIN — FOLIC ACID 1 MG: 1 TABLET ORAL at 08:57

## 2024-04-20 RX ADMIN — LEVOTHYROXINE SODIUM 100 MCG: 0.1 TABLET ORAL at 08:58

## 2024-04-20 RX ADMIN — HYDROCODONE BITARTRATE AND ACETAMINOPHEN 1 TABLET: 5; 325 TABLET ORAL at 10:02

## 2024-04-20 RX ADMIN — FERROUS SULFATE TAB 325 MG (65 MG ELEMENTAL FE) 325 MG: 325 (65 FE) TAB at 08:58

## 2024-04-20 ASSESSMENT — PAIN DESCRIPTION - ORIENTATION
ORIENTATION: MID
ORIENTATION: MID

## 2024-04-20 ASSESSMENT — PAIN DESCRIPTION - LOCATION: LOCATION: ABDOMEN

## 2024-04-20 ASSESSMENT — PAIN DESCRIPTION - DESCRIPTORS: DESCRIPTORS: THROBBING

## 2024-04-20 ASSESSMENT — PAIN SCALES - GENERAL
PAINLEVEL_OUTOF10: 3
PAINLEVEL_OUTOF10: 6
PAINLEVEL_OUTOF10: 0

## 2024-04-20 NOTE — PATIENT CARE CONFERENCE
P Quality Flow/Interdisciplinary Rounds Progress Note        Quality Flow Rounds held on April 20, 2024    Disciplines Attending:  Bedside Nurse and Nursing Unit Leadership    Erna Stoner was admitted on 4/18/2024  9:05 PM    Anticipated Discharge Date:       Disposition:    Jose M Score:  Jose M Scale Score: 19    Readmission Risk              Risk of Unplanned Readmission:  14           Discussed patient goal for the day, patient clinical progression, and barriers to discharge.  The following Goal(s) of the Day/Commitment(s) have been identified:  Labs - Report Results      Shiela Santo RN  April 20, 2024

## 2024-04-20 NOTE — PLAN OF CARE
Problem: Safety - Adult  Goal: Free from fall injury  4/19/2024 2003 by Loretta Alves RN  Outcome: Progressing  4/19/2024 1823 by Misty Byrd RN  Outcome: Progressing     Problem: Chronic Conditions and Co-morbidities  Goal: Patient's chronic conditions and co-morbidity symptoms are monitored and maintained or improved  4/19/2024 2003 by Loretta Alves RN  Outcome: Progressing  4/19/2024 1823 by Misty Byrd RN  Outcome: Progressing     Problem: Discharge Planning  Goal: Discharge to home or other facility with appropriate resources  4/19/2024 2003 by Loretta Alves RN  Outcome: Progressing  4/19/2024 1823 by Misty Byrd RN  Outcome: Progressing     Problem: ABCDS Injury Assessment  Goal: Absence of physical injury  Outcome: Progressing     Problem: Pain  Goal: Verbalizes/displays adequate comfort level or baseline comfort level  4/19/2024 2003 by Loretta Alves RN  Outcome: Progressing  4/19/2024 1823 by Misty Byrd RN  Outcome: Progressing     Problem: Nutrition Deficit:  Goal: Optimize nutritional status  Outcome: Progressing

## 2024-04-21 LAB
ABO + RH BLD: NORMAL
ANION GAP SERPL CALCULATED.3IONS-SCNC: 7 MMOL/L (ref 7–16)
ARM BAND NUMBER: NORMAL
BLOOD BANK SAMPLE EXPIRATION: NORMAL
BLOOD GROUP ANTIBODIES SERPL: NEGATIVE
BUN SERPL-MCNC: 3 MG/DL (ref 6–23)
CALCIUM SERPL-MCNC: 8.1 MG/DL (ref 8.6–10.2)
CHLORIDE SERPL-SCNC: 107 MMOL/L (ref 98–107)
CO2 SERPL-SCNC: 26 MMOL/L (ref 22–29)
CREAT SERPL-MCNC: 0.6 MG/DL (ref 0.5–1)
ERYTHROCYTE [DISTWIDTH] IN BLOOD BY AUTOMATED COUNT: 15.6 % (ref 11.5–15)
GFR SERPL CREATININE-BSD FRML MDRD: >90 ML/MIN/1.73M2
GLUCOSE BLD-MCNC: 167 MG/DL (ref 74–99)
GLUCOSE BLD-MCNC: 205 MG/DL (ref 74–99)
GLUCOSE BLD-MCNC: 207 MG/DL (ref 74–99)
GLUCOSE BLD-MCNC: 228 MG/DL (ref 74–99)
GLUCOSE SERPL-MCNC: 142 MG/DL (ref 74–99)
HCT VFR BLD AUTO: 21.7 % (ref 34–48)
HCT VFR BLD AUTO: 21.9 % (ref 34–48)
HCT VFR BLD AUTO: 22.9 % (ref 34–48)
HCT VFR BLD AUTO: 23.4 % (ref 34–48)
HCT VFR BLD AUTO: 25.9 % (ref 34–48)
HGB BLD-MCNC: 7 G/DL (ref 11.5–15.5)
HGB BLD-MCNC: 7 G/DL (ref 11.5–15.5)
HGB BLD-MCNC: 7.4 G/DL (ref 11.5–15.5)
HGB BLD-MCNC: 7.5 G/DL (ref 11.5–15.5)
HGB BLD-MCNC: 8.4 G/DL (ref 11.5–15.5)
MCH RBC QN AUTO: 29.1 PG (ref 26–35)
MCHC RBC AUTO-ENTMCNC: 32.4 G/DL (ref 32–34.5)
MCV RBC AUTO: 89.6 FL (ref 80–99.9)
PLATELET # BLD AUTO: 120 K/UL (ref 130–450)
PMV BLD AUTO: 10 FL (ref 7–12)
POTASSIUM SERPL-SCNC: 3.6 MMOL/L (ref 3.5–5)
RBC # BLD AUTO: 2.89 M/UL (ref 3.5–5.5)
SODIUM SERPL-SCNC: 140 MMOL/L (ref 132–146)
WBC OTHER # BLD: 1.5 K/UL (ref 4.5–11.5)

## 2024-04-21 PROCEDURE — 6360000002 HC RX W HCPCS: Performed by: NURSE PRACTITIONER

## 2024-04-21 PROCEDURE — 6370000000 HC RX 637 (ALT 250 FOR IP): Performed by: FAMILY MEDICINE

## 2024-04-21 PROCEDURE — 85014 HEMATOCRIT: CPT

## 2024-04-21 PROCEDURE — 86850 RBC ANTIBODY SCREEN: CPT

## 2024-04-21 PROCEDURE — 85018 HEMOGLOBIN: CPT

## 2024-04-21 PROCEDURE — 80048 BASIC METABOLIC PNL TOTAL CA: CPT

## 2024-04-21 PROCEDURE — 2140000000 HC CCU INTERMEDIATE R&B

## 2024-04-21 PROCEDURE — 85027 COMPLETE CBC AUTOMATED: CPT

## 2024-04-21 PROCEDURE — 6360000002 HC RX W HCPCS: Performed by: FAMILY MEDICINE

## 2024-04-21 PROCEDURE — 2580000003 HC RX 258: Performed by: NURSE PRACTITIONER

## 2024-04-21 PROCEDURE — C9113 INJ PANTOPRAZOLE SODIUM, VIA: HCPCS | Performed by: FAMILY MEDICINE

## 2024-04-21 PROCEDURE — A4216 STERILE WATER/SALINE, 10 ML: HCPCS | Performed by: FAMILY MEDICINE

## 2024-04-21 PROCEDURE — 86900 BLOOD TYPING SEROLOGIC ABO: CPT

## 2024-04-21 PROCEDURE — 2580000003 HC RX 258: Performed by: INTERNAL MEDICINE

## 2024-04-21 PROCEDURE — 36415 COLL VENOUS BLD VENIPUNCTURE: CPT

## 2024-04-21 PROCEDURE — 2580000003 HC RX 258: Performed by: FAMILY MEDICINE

## 2024-04-21 PROCEDURE — 86901 BLOOD TYPING SEROLOGIC RH(D): CPT

## 2024-04-21 PROCEDURE — 82962 GLUCOSE BLOOD TEST: CPT

## 2024-04-21 RX ORDER — 0.9 % SODIUM CHLORIDE 0.9 %
500 INTRAVENOUS SOLUTION INTRAVENOUS ONCE
Status: COMPLETED | OUTPATIENT
Start: 2024-04-21 | End: 2024-04-21

## 2024-04-21 RX ADMIN — SODIUM CHLORIDE 500 ML: 9 INJECTION, SOLUTION INTRAVENOUS at 11:39

## 2024-04-21 RX ADMIN — SODIUM CHLORIDE 500 ML: 9 INJECTION, SOLUTION INTRAVENOUS at 14:03

## 2024-04-21 RX ADMIN — INSULIN LISPRO 1 UNITS: 100 INJECTION, SOLUTION INTRAVENOUS; SUBCUTANEOUS at 17:21

## 2024-04-21 RX ADMIN — DEXTROSE AND SODIUM CHLORIDE: 5; 450 INJECTION, SOLUTION INTRAVENOUS at 11:19

## 2024-04-21 RX ADMIN — SODIUM CHLORIDE, PRESERVATIVE FREE 10 ML: 5 INJECTION INTRAVENOUS at 21:38

## 2024-04-21 RX ADMIN — LEVOTHYROXINE SODIUM 100 MCG: 0.1 TABLET ORAL at 08:35

## 2024-04-21 RX ADMIN — SODIUM CHLORIDE, PRESERVATIVE FREE 10 ML: 5 INJECTION INTRAVENOUS at 08:37

## 2024-04-21 RX ADMIN — SODIUM CHLORIDE, PRESERVATIVE FREE 40 MG: 5 INJECTION INTRAVENOUS at 21:38

## 2024-04-21 RX ADMIN — OCTREOTIDE ACETATE 50 MCG/HR: 500 INJECTION, SOLUTION INTRAVENOUS; SUBCUTANEOUS at 13:21

## 2024-04-21 RX ADMIN — FOLIC ACID 1 MG: 1 TABLET ORAL at 08:34

## 2024-04-21 RX ADMIN — OCTREOTIDE ACETATE 50 MCG/HR: 500 INJECTION, SOLUTION INTRAVENOUS; SUBCUTANEOUS at 02:49

## 2024-04-21 RX ADMIN — INSULIN LISPRO 1 UNITS: 100 INJECTION, SOLUTION INTRAVENOUS; SUBCUTANEOUS at 12:27

## 2024-04-21 RX ADMIN — Medication 2000 UNITS: at 08:35

## 2024-04-21 RX ADMIN — SODIUM CHLORIDE, PRESERVATIVE FREE 40 MG: 5 INJECTION INTRAVENOUS at 08:34

## 2024-04-21 RX ADMIN — ZINC SULFATE 220 MG (50 MG) CAPSULE 50 MG: CAPSULE at 08:34

## 2024-04-21 RX ADMIN — ACETAMINOPHEN 650 MG: 325 TABLET ORAL at 08:35

## 2024-04-21 RX ADMIN — FERROUS SULFATE TAB 325 MG (65 MG ELEMENTAL FE) 325 MG: 325 (65 FE) TAB at 08:35

## 2024-04-21 ASSESSMENT — PAIN DESCRIPTION - DESCRIPTORS: DESCRIPTORS: ACHING;DISCOMFORT;CRAMPING

## 2024-04-21 ASSESSMENT — PAIN SCALES - GENERAL
PAINLEVEL_OUTOF10: 0
PAINLEVEL_OUTOF10: 3

## 2024-04-21 ASSESSMENT — PAIN DESCRIPTION - ORIENTATION: ORIENTATION: RIGHT;LEFT

## 2024-04-21 ASSESSMENT — PAIN - FUNCTIONAL ASSESSMENT: PAIN_FUNCTIONAL_ASSESSMENT: ACTIVITIES ARE NOT PREVENTED

## 2024-04-21 ASSESSMENT — PAIN DESCRIPTION - LOCATION: LOCATION: ABDOMEN

## 2024-04-21 NOTE — PLAN OF CARE
Problem: Safety - Adult  Goal: Free from fall injury  4/21/2024 1948 by Camille Young, RN  Outcome: Progressing     Problem: Chronic Conditions and Co-morbidities  Goal: Patient's chronic conditions and co-morbidity symptoms are monitored and maintained or improved  4/21/2024 1948 by Camille Young, RN  Outcome: Progressing     Problem: Discharge Planning  Goal: Discharge to home or other facility with appropriate resources  4/21/2024 1948 by Camille Young, RN  Outcome: Progressing     Problem: Pain  Goal: Verbalizes/displays adequate comfort level or baseline comfort level  4/21/2024 1948 by Camille Young, RN  Outcome: Progressing     Problem: Nutrition Deficit:  Goal: Optimize nutritional status  4/21/2024 1948 by Camille Young, RN  Outcome: Progressing

## 2024-04-21 NOTE — PLAN OF CARE
Problem: Safety - Adult  Goal: Free from fall injury  Outcome: Progressing     Problem: Chronic Conditions and Co-morbidities  Goal: Patient's chronic conditions and co-morbidity symptoms are monitored and maintained or improved  Outcome: Progressing     Problem: Discharge Planning  Goal: Discharge to home or other facility with appropriate resources  Outcome: Progressing     Problem: Pain  Goal: Verbalizes/displays adequate comfort level or baseline comfort level  Outcome: Progressing     Problem: Nutrition Deficit:  Goal: Optimize nutritional status  Outcome: Progressing

## 2024-04-21 NOTE — PATIENT CARE CONFERENCE
P Quality Flow/Interdisciplinary Rounds Progress Note        Quality Flow Rounds held on April 21, 2024    Disciplines Attending:  Bedside Nurse and Nursing Unit Leadership    Erna Stoner was admitted on 4/18/2024  9:05 PM    Anticipated Discharge Date:       Disposition:    Jose M Score:  Jose M Scale Score: 20    Readmission Risk              Risk of Unplanned Readmission:  14           Discussed patient goal for the day, patient clinical progression, and barriers to discharge.  The following Goal(s) of the Day/Commitment(s) have been identified:  Labs - Report Results      Shiela Santo RN  April 21, 2024

## 2024-04-21 NOTE — PLAN OF CARE
Problem: Safety - Adult  Goal: Free from fall injury  4/21/2024 1018 by Mary Wilson RN  Outcome: Progressing     Problem: Chronic Conditions and Co-morbidities  Goal: Patient's chronic conditions and co-morbidity symptoms are monitored and maintained or improved  4/21/2024 1018 by Mary Wilson RN  Outcome: Progressing     Problem: Discharge Planning  Goal: Discharge to home or other facility with appropriate resources  4/21/2024 1018 by Mary Wilson RN  Outcome: Progressing     Problem: ABCDS Injury Assessment  Goal: Absence of physical injury  Outcome: Progressing     Problem: Pain  Goal: Verbalizes/displays adequate comfort level or baseline comfort level  4/21/2024 1018 by Mary Wilson RN  Outcome: Progressing     Problem: Nutrition Deficit:  Goal: Optimize nutritional status  4/21/2024 1018 by Mary Wilson RN  Outcome: Progressing

## 2024-04-22 ENCOUNTER — ANESTHESIA EVENT (OUTPATIENT)
Dept: INTERVENTIONAL RADIOLOGY/VASCULAR | Age: 64
End: 2024-04-22
Payer: COMMERCIAL

## 2024-04-22 ENCOUNTER — ANESTHESIA (OUTPATIENT)
Dept: INTERVENTIONAL RADIOLOGY/VASCULAR | Age: 64
End: 2024-04-22
Payer: COMMERCIAL

## 2024-04-22 LAB
ABO + RH BLD: NORMAL
ALBUMIN SERPL-MCNC: 3.1 G/DL (ref 3.5–5.2)
ALP SERPL-CCNC: 69 U/L (ref 35–104)
ALT SERPL-CCNC: 19 U/L (ref 0–32)
ANION GAP SERPL CALCULATED.3IONS-SCNC: 11 MMOL/L (ref 7–16)
ARM BAND NUMBER: NORMAL
AST SERPL-CCNC: 30 U/L (ref 0–31)
BASOPHILS # BLD: 0.01 K/UL (ref 0–0.2)
BASOPHILS NFR BLD: 1 % (ref 0–2)
BILIRUB SERPL-MCNC: 0.7 MG/DL (ref 0–1.2)
BLOOD BANK SAMPLE EXPIRATION: NORMAL
BLOOD GROUP ANTIBODIES SERPL: NEGATIVE
BUN SERPL-MCNC: 3 MG/DL (ref 6–23)
CALCIUM SERPL-MCNC: 8 MG/DL (ref 8.6–10.2)
CHLORIDE SERPL-SCNC: 111 MMOL/L (ref 98–107)
CO2 SERPL-SCNC: 22 MMOL/L (ref 22–29)
CREAT SERPL-MCNC: 0.6 MG/DL (ref 0.5–1)
EOSINOPHIL # BLD: 0.04 K/UL (ref 0.05–0.5)
EOSINOPHILS RELATIVE PERCENT: 4 % (ref 0–6)
ERYTHROCYTE [DISTWIDTH] IN BLOOD BY AUTOMATED COUNT: 15.3 % (ref 11.5–15)
GFR SERPL CREATININE-BSD FRML MDRD: >90 ML/MIN/1.73M2
GLUCOSE BLD-MCNC: 151 MG/DL (ref 74–99)
GLUCOSE BLD-MCNC: 177 MG/DL (ref 74–99)
GLUCOSE BLD-MCNC: 183 MG/DL (ref 74–99)
GLUCOSE BLD-MCNC: 214 MG/DL (ref 74–99)
GLUCOSE SERPL-MCNC: 169 MG/DL (ref 74–99)
HCT VFR BLD AUTO: 22.2 % (ref 34–48)
HGB BLD-MCNC: 7.2 G/DL (ref 11.5–15.5)
INR PPP: 1.4
LYMPHOCYTES NFR BLD: 0.19 K/UL (ref 1.5–4)
LYMPHOCYTES RELATIVE PERCENT: 20 % (ref 20–42)
MCH RBC QN AUTO: 29.1 PG (ref 26–35)
MCHC RBC AUTO-ENTMCNC: 32.4 G/DL (ref 32–34.5)
MCV RBC AUTO: 89.9 FL (ref 80–99.9)
MONOCYTES NFR BLD: 0.06 K/UL (ref 0.1–0.95)
MONOCYTES NFR BLD: 6 % (ref 2–12)
NEUTROPHILS NFR BLD: 70 % (ref 43–80)
NEUTS SEG NFR BLD: 0.7 K/UL (ref 1.8–7.3)
NUCLEATED RED BLOOD CELLS: 1 PER 100 WBC
PARTIAL THROMBOPLASTIN TIME: 32.7 SEC (ref 24.5–35.1)
PLATELET # BLD AUTO: 87 K/UL (ref 130–450)
PLATELET CONFIRMATION: NORMAL
PMV BLD AUTO: 10.3 FL (ref 7–12)
POTASSIUM SERPL-SCNC: 3.5 MMOL/L (ref 3.5–5)
PROT SERPL-MCNC: 4.3 G/DL (ref 6.4–8.3)
PROTHROMBIN TIME: 14.8 SEC (ref 9.3–12.4)
RBC # BLD AUTO: 2.47 M/UL (ref 3.5–5.5)
RBC # BLD: ABNORMAL 10*6/UL
SODIUM SERPL-SCNC: 144 MMOL/L (ref 132–146)
SURGICAL PATHOLOGY REPORT: NORMAL
WBC OTHER # BLD: 1 K/UL (ref 4.5–11.5)

## 2024-04-22 PROCEDURE — 6370000000 HC RX 637 (ALT 250 FOR IP): Performed by: FAMILY MEDICINE

## 2024-04-22 PROCEDURE — 86901 BLOOD TYPING SEROLOGIC RH(D): CPT

## 2024-04-22 PROCEDURE — A4216 STERILE WATER/SALINE, 10 ML: HCPCS | Performed by: FAMILY MEDICINE

## 2024-04-22 PROCEDURE — 86900 BLOOD TYPING SEROLOGIC ABO: CPT

## 2024-04-22 PROCEDURE — 86850 RBC ANTIBODY SCREEN: CPT

## 2024-04-22 PROCEDURE — 6370000000 HC RX 637 (ALT 250 FOR IP): Performed by: STUDENT IN AN ORGANIZED HEALTH CARE EDUCATION/TRAINING PROGRAM

## 2024-04-22 PROCEDURE — 2580000003 HC RX 258: Performed by: FAMILY MEDICINE

## 2024-04-22 PROCEDURE — 2580000003 HC RX 258: Performed by: NURSE PRACTITIONER

## 2024-04-22 PROCEDURE — 82962 GLUCOSE BLOOD TEST: CPT

## 2024-04-22 PROCEDURE — 2140000000 HC CCU INTERMEDIATE R&B

## 2024-04-22 PROCEDURE — 6360000002 HC RX W HCPCS: Performed by: NURSE PRACTITIONER

## 2024-04-22 PROCEDURE — 36415 COLL VENOUS BLD VENIPUNCTURE: CPT

## 2024-04-22 PROCEDURE — 85730 THROMBOPLASTIN TIME PARTIAL: CPT

## 2024-04-22 PROCEDURE — C9113 INJ PANTOPRAZOLE SODIUM, VIA: HCPCS | Performed by: FAMILY MEDICINE

## 2024-04-22 PROCEDURE — 99232 SBSQ HOSP IP/OBS MODERATE 35: CPT | Performed by: STUDENT IN AN ORGANIZED HEALTH CARE EDUCATION/TRAINING PROGRAM

## 2024-04-22 PROCEDURE — 85610 PROTHROMBIN TIME: CPT

## 2024-04-22 PROCEDURE — 6360000002 HC RX W HCPCS: Performed by: FAMILY MEDICINE

## 2024-04-22 PROCEDURE — 85025 COMPLETE CBC W/AUTO DIFF WBC: CPT

## 2024-04-22 PROCEDURE — 80053 COMPREHEN METABOLIC PANEL: CPT

## 2024-04-22 RX ORDER — LACTULOSE 10 G/15ML
20 SOLUTION ORAL 2 TIMES DAILY
Status: DISCONTINUED | OUTPATIENT
Start: 2024-04-22 | End: 2024-04-23

## 2024-04-22 RX ADMIN — INSULIN LISPRO 1 UNITS: 100 INJECTION, SOLUTION INTRAVENOUS; SUBCUTANEOUS at 17:09

## 2024-04-22 RX ADMIN — SODIUM CHLORIDE, PRESERVATIVE FREE 10 ML: 5 INJECTION INTRAVENOUS at 20:19

## 2024-04-22 RX ADMIN — OCTREOTIDE ACETATE 50 MCG/HR: 500 INJECTION, SOLUTION INTRAVENOUS; SUBCUTANEOUS at 03:05

## 2024-04-22 RX ADMIN — OCTREOTIDE ACETATE 50 MCG/HR: 500 INJECTION, SOLUTION INTRAVENOUS; SUBCUTANEOUS at 23:30

## 2024-04-22 RX ADMIN — SODIUM CHLORIDE, PRESERVATIVE FREE 40 MG: 5 INJECTION INTRAVENOUS at 20:19

## 2024-04-22 RX ADMIN — DEXTROSE AND SODIUM CHLORIDE: 5; 450 INJECTION, SOLUTION INTRAVENOUS at 03:06

## 2024-04-22 RX ADMIN — Medication 2000 UNITS: at 09:26

## 2024-04-22 RX ADMIN — LACTULOSE 20 G: 20 SOLUTION ORAL at 20:23

## 2024-04-22 RX ADMIN — SODIUM CHLORIDE, PRESERVATIVE FREE 40 MG: 5 INJECTION INTRAVENOUS at 09:25

## 2024-04-22 RX ADMIN — OCTREOTIDE ACETATE 50 MCG/HR: 500 INJECTION, SOLUTION INTRAVENOUS; SUBCUTANEOUS at 12:01

## 2024-04-22 RX ADMIN — ZINC SULFATE 220 MG (50 MG) CAPSULE 50 MG: CAPSULE at 09:26

## 2024-04-22 RX ADMIN — METFORMIN HYDROCHLORIDE 1000 MG: 500 TABLET, EXTENDED RELEASE ORAL at 17:10

## 2024-04-22 RX ADMIN — LEVOTHYROXINE SODIUM 100 MCG: 0.1 TABLET ORAL at 09:26

## 2024-04-22 RX ADMIN — FOLIC ACID 1 MG: 1 TABLET ORAL at 09:26

## 2024-04-22 RX ADMIN — FERROUS SULFATE TAB 325 MG (65 MG ELEMENTAL FE) 325 MG: 325 (65 FE) TAB at 09:26

## 2024-04-22 RX ADMIN — SODIUM CHLORIDE, PRESERVATIVE FREE 10 ML: 5 INJECTION INTRAVENOUS at 09:25

## 2024-04-22 NOTE — PLAN OF CARE
Problem: Safety - Adult  Goal: Free from fall injury  Outcome: Progressing     Problem: Chronic Conditions and Co-morbidities  Goal: Patient's chronic conditions and co-morbidity symptoms are monitored and maintained or improved  Outcome: Progressing     Problem: Discharge Planning  Goal: Discharge to home or other facility with appropriate resources  Outcome: Progressing     Problem: ABCDS Injury Assessment  Goal: Absence of physical injury  Outcome: Progressing

## 2024-04-22 NOTE — CARE COORDINATION
Transition of care update: NPO for TIPS procedure today. Iv sandostatin infusion, IVFs at 75ml/hr, iv protonix 40mg q 12 hrs.  Hgb 7.2 and other labs noted. Met with pt in room. Her plan remains to home when medically ready. Pt states she has family members who will assist her as needed at home. PT/OT ordered and will see after TIPS procedure when appropriate. Cm/sw will follow for discharge needs.

## 2024-04-22 NOTE — PLAN OF CARE
Problem: Safety - Adult  Goal: Free from fall injury  4/22/2024 1950 by Jennifer Zhang RN  Outcome: Progressing  4/22/2024 1120 by Ashley Garcia RN  Outcome: Progressing     Problem: Chronic Conditions and Co-morbidities  Goal: Patient's chronic conditions and co-morbidity symptoms are monitored and maintained or improved  4/22/2024 1950 by Jennifer Zhang RN  Outcome: Progressing  4/22/2024 1120 by Ashley Garcia RN  Outcome: Progressing     Problem: Discharge Planning  Goal: Discharge to home or other facility with appropriate resources  4/22/2024 1950 by Jennifer Zhang, RN  Outcome: Progressing  4/22/2024 1120 by Ashley Garcia RN  Outcome: Progressing     Problem: ABCDS Injury Assessment  Goal: Absence of physical injury  4/22/2024 1950 by Jennifer Zhang, RN  Outcome: Progressing  4/22/2024 1120 by Ashley Garcia RN  Outcome: Progressing

## 2024-04-23 ENCOUNTER — HOSPITAL ENCOUNTER (OUTPATIENT)
Dept: SPEECH THERAPY | Age: 64
Setting detail: THERAPIES SERIES
Discharge: HOME | End: 2024-04-23
Payer: COMMERCIAL

## 2024-04-23 ENCOUNTER — HOSPITAL ENCOUNTER (OUTPATIENT)
Dept: OCCUPATIONAL THERAPY | Age: 64
Setting detail: THERAPIES SERIES
Discharge: HOME | End: 2024-04-23
Payer: COMMERCIAL

## 2024-04-23 ENCOUNTER — APPOINTMENT (OUTPATIENT)
Dept: INTERVENTIONAL RADIOLOGY/VASCULAR | Age: 64
DRG: 260 | End: 2024-04-23
Attending: INTERNAL MEDICINE
Payer: COMMERCIAL

## 2024-04-23 PROBLEM — Z95.828 S/P TIPS (TRANSJUGULAR INTRAHEPATIC PORTOSYSTEMIC SHUNT): Status: ACTIVE | Noted: 2024-04-23

## 2024-04-23 LAB
ALBUMIN SERPL-MCNC: 2.9 G/DL (ref 3.5–5.2)
ALP SERPL-CCNC: 69 U/L (ref 35–104)
ALT SERPL-CCNC: 18 U/L (ref 0–32)
ANION GAP SERPL CALCULATED.3IONS-SCNC: 7 MMOL/L (ref 7–16)
AST SERPL-CCNC: 31 U/L (ref 0–31)
BASOPHILS # BLD: 0.02 K/UL (ref 0–0.2)
BASOPHILS NFR BLD: 2 % (ref 0–2)
BILIRUB SERPL-MCNC: 0.6 MG/DL (ref 0–1.2)
BUN SERPL-MCNC: 3 MG/DL (ref 6–23)
CALCIUM SERPL-MCNC: 7.8 MG/DL (ref 8.6–10.2)
CHLORIDE SERPL-SCNC: 110 MMOL/L (ref 98–107)
CO2 SERPL-SCNC: 26 MMOL/L (ref 22–29)
CREAT SERPL-MCNC: 0.6 MG/DL (ref 0.5–1)
EOSINOPHIL # BLD: 0.06 K/UL (ref 0.05–0.5)
EOSINOPHILS RELATIVE PERCENT: 4 % (ref 0–6)
ERYTHROCYTE [DISTWIDTH] IN BLOOD BY AUTOMATED COUNT: 15.2 % (ref 11.5–15)
ERYTHROCYTE [DISTWIDTH] IN BLOOD BY AUTOMATED COUNT: 15.7 % (ref 11.5–15)
GFR SERPL CREATININE-BSD FRML MDRD: >90 ML/MIN/1.73M2
GLUCOSE BLD-MCNC: 200 MG/DL (ref 74–99)
GLUCOSE BLD-MCNC: 210 MG/DL (ref 74–99)
GLUCOSE SERPL-MCNC: 166 MG/DL (ref 74–99)
HCT VFR BLD AUTO: 22.3 % (ref 34–48)
HCT VFR BLD AUTO: 28.4 % (ref 34–48)
HGB BLD-MCNC: 7.1 G/DL (ref 11.5–15.5)
HGB BLD-MCNC: 9.3 G/DL (ref 11.5–15.5)
INR PPP: 1.4
LYMPHOCYTES NFR BLD: 0.34 K/UL (ref 1.5–4)
LYMPHOCYTES RELATIVE PERCENT: 26 % (ref 20–42)
MCH RBC QN AUTO: 28.7 PG (ref 26–35)
MCH RBC QN AUTO: 29 PG (ref 26–35)
MCHC RBC AUTO-ENTMCNC: 31.8 G/DL (ref 32–34.5)
MCHC RBC AUTO-ENTMCNC: 32.7 G/DL (ref 32–34.5)
MCV RBC AUTO: 88.5 FL (ref 80–99.9)
MCV RBC AUTO: 90.3 FL (ref 80–99.9)
MONOCYTES NFR BLD: 0.27 K/UL (ref 0.1–0.95)
MONOCYTES NFR BLD: 21 % (ref 2–12)
NEUTROPHILS NFR BLD: 47 % (ref 43–80)
NEUTS SEG NFR BLD: 0.61 K/UL (ref 1.8–7.3)
PLATELET # BLD AUTO: 146 K/UL (ref 130–450)
PLATELET, FLUORESCENCE: 101 K/UL (ref 130–450)
PMV BLD AUTO: 10.2 FL (ref 7–12)
PMV BLD AUTO: 10.5 FL (ref 7–12)
POTASSIUM SERPL-SCNC: 3.7 MMOL/L (ref 3.5–5)
PROT SERPL-MCNC: 4.3 G/DL (ref 6.4–8.3)
PROTHROMBIN TIME: 15.1 SEC (ref 9.3–12.4)
RBC # BLD AUTO: 2.47 M/UL (ref 3.5–5.5)
RBC # BLD AUTO: 3.21 M/UL (ref 3.5–5.5)
RBC # BLD: ABNORMAL 10*6/UL
RBC # BLD: ABNORMAL 10*6/UL
SODIUM SERPL-SCNC: 143 MMOL/L (ref 132–146)
WBC OTHER # BLD: 1.3 K/UL (ref 4.5–11.5)
WBC OTHER # BLD: 5.7 K/UL (ref 4.5–11.5)

## 2024-04-23 PROCEDURE — 2500000003 HC RX 250 WO HCPCS: Performed by: RADIOLOGY

## 2024-04-23 PROCEDURE — 02HV33Z INSERTION OF INFUSION DEVICE INTO SUPERIOR VENA CAVA, PERCUTANEOUS APPROACH: ICD-10-PCS | Performed by: INTERNAL MEDICINE

## 2024-04-23 PROCEDURE — 2580000003 HC RX 258: Performed by: NURSE PRACTITIONER

## 2024-04-23 PROCEDURE — 80053 COMPREHEN METABOLIC PANEL: CPT

## 2024-04-23 PROCEDURE — 0DJ08ZZ INSPECTION OF UPPER INTESTINAL TRACT, VIA NATURAL OR ARTIFICIAL OPENING ENDOSCOPIC: ICD-10-PCS | Performed by: INTERNAL MEDICINE

## 2024-04-23 PROCEDURE — 2500000003 HC RX 250 WO HCPCS

## 2024-04-23 PROCEDURE — A4216 STERILE WATER/SALINE, 10 ML: HCPCS | Performed by: FAMILY MEDICINE

## 2024-04-23 PROCEDURE — 85610 PROTHROMBIN TIME: CPT

## 2024-04-23 PROCEDURE — 6370000000 HC RX 637 (ALT 250 FOR IP): Performed by: STUDENT IN AN ORGANIZED HEALTH CARE EDUCATION/TRAINING PROGRAM

## 2024-04-23 PROCEDURE — 2580000003 HC RX 258: Performed by: FAMILY MEDICINE

## 2024-04-23 PROCEDURE — 85025 COMPLETE CBC W/AUTO DIFF WBC: CPT

## 2024-04-23 PROCEDURE — 2580000003 HC RX 258

## 2024-04-23 PROCEDURE — 6370000000 HC RX 637 (ALT 250 FOR IP): Performed by: FAMILY MEDICINE

## 2024-04-23 PROCEDURE — 82962 GLUCOSE BLOOD TEST: CPT

## 2024-04-23 PROCEDURE — 37182 INSERT HEPATIC SHUNT (TIPS): CPT

## 2024-04-23 PROCEDURE — 99291 CRITICAL CARE FIRST HOUR: CPT

## 2024-04-23 PROCEDURE — 6360000002 HC RX W HCPCS: Performed by: RADIOLOGY

## 2024-04-23 PROCEDURE — 3E05317 INTRODUCTION OF OTHER THROMBOLYTIC INTO PERIPHERAL ARTERY, PERCUTANEOUS APPROACH: ICD-10-PCS | Performed by: INTERNAL MEDICINE

## 2024-04-23 PROCEDURE — 36415 COLL VENOUS BLD VENIPUNCTURE: CPT

## 2024-04-23 PROCEDURE — 37241 VASC EMBOLIZE/OCCLUDE VENOUS: CPT

## 2024-04-23 PROCEDURE — 6360000002 HC RX W HCPCS: Performed by: NURSE PRACTITIONER

## 2024-04-23 PROCEDURE — 6360000002 HC RX W HCPCS

## 2024-04-23 PROCEDURE — 85027 COMPLETE CBC AUTOMATED: CPT

## 2024-04-23 PROCEDURE — C9113 INJ PANTOPRAZOLE SODIUM, VIA: HCPCS | Performed by: FAMILY MEDICINE

## 2024-04-23 PROCEDURE — 2709999900 IR EMBOLIZATION VENOUS

## 2024-04-23 PROCEDURE — 36299 UNLISTED PX VASCULAR NJX: CPT

## 2024-04-23 PROCEDURE — 6360000004 HC RX CONTRAST MEDICATION: Performed by: RADIOLOGY

## 2024-04-23 PROCEDURE — 99231 SBSQ HOSP IP/OBS SF/LOW 25: CPT | Performed by: NURSE PRACTITIONER

## 2024-04-23 PROCEDURE — 6360000002 HC RX W HCPCS: Performed by: FAMILY MEDICINE

## 2024-04-23 PROCEDURE — 06183J4 BYPASS PORTAL VEIN TO HEPATIC VEIN WITH SYNTHETIC SUBSTITUTE, PERCUTANEOUS APPROACH: ICD-10-PCS | Performed by: INTERNAL MEDICINE

## 2024-04-23 PROCEDURE — 2000000000 HC ICU R&B

## 2024-04-23 PROCEDURE — 06LY3DZ OCCLUSION OF LOWER VEIN WITH INTRALUMINAL DEVICE, PERCUTANEOUS APPROACH: ICD-10-PCS | Performed by: INTERNAL MEDICINE

## 2024-04-23 PROCEDURE — B51T1ZZ FLUOROSCOPY OF PORTAL AND SPLANCHNIC VEINS USING LOW OSMOLAR CONTRAST: ICD-10-PCS | Performed by: INTERNAL MEDICINE

## 2024-04-23 RX ORDER — PROPOFOL 10 MG/ML
INJECTION, EMULSION INTRAVENOUS PRN
Status: DISCONTINUED | OUTPATIENT
Start: 2024-04-23 | End: 2024-04-23 | Stop reason: SDUPTHER

## 2024-04-23 RX ORDER — SODIUM CHLORIDE 9 MG/ML
INJECTION, SOLUTION INTRAVENOUS CONTINUOUS PRN
Status: DISCONTINUED | OUTPATIENT
Start: 2024-04-23 | End: 2024-04-23 | Stop reason: SDUPTHER

## 2024-04-23 RX ORDER — HEPARIN SODIUM 1000 [USP'U]/ML
INJECTION, SOLUTION INTRAVENOUS; SUBCUTANEOUS PRN
Status: COMPLETED | OUTPATIENT
Start: 2024-04-23 | End: 2024-04-23

## 2024-04-23 RX ORDER — ENEMA 19; 7 G/133ML; G/133ML
1 ENEMA RECTAL
Status: ACTIVE | OUTPATIENT
Start: 2024-04-23 | End: 2024-04-24

## 2024-04-23 RX ORDER — DEXAMETHASONE SODIUM PHOSPHATE 4 MG/ML
INJECTION, SOLUTION INTRA-ARTICULAR; INTRALESIONAL; INTRAMUSCULAR; INTRAVENOUS; SOFT TISSUE PRN
Status: DISCONTINUED | OUTPATIENT
Start: 2024-04-23 | End: 2024-04-23 | Stop reason: SDUPTHER

## 2024-04-23 RX ORDER — ONDANSETRON 2 MG/ML
INJECTION INTRAMUSCULAR; INTRAVENOUS PRN
Status: DISCONTINUED | OUTPATIENT
Start: 2024-04-23 | End: 2024-04-23 | Stop reason: SDUPTHER

## 2024-04-23 RX ORDER — ETOMIDATE 2 MG/ML
INJECTION INTRAVENOUS PRN
Status: DISCONTINUED | OUTPATIENT
Start: 2024-04-23 | End: 2024-04-23 | Stop reason: SDUPTHER

## 2024-04-23 RX ORDER — HEPARIN SODIUM 1000 [USP'U]/ML
4000 INJECTION, SOLUTION INTRAVENOUS; SUBCUTANEOUS PRN
Status: DISCONTINUED | OUTPATIENT
Start: 2024-04-23 | End: 2024-04-29 | Stop reason: HOSPADM

## 2024-04-23 RX ORDER — ROCURONIUM BROMIDE 10 MG/ML
INJECTION, SOLUTION INTRAVENOUS PRN
Status: DISCONTINUED | OUTPATIENT
Start: 2024-04-23 | End: 2024-04-23 | Stop reason: SDUPTHER

## 2024-04-23 RX ORDER — LIDOCAINE HYDROCHLORIDE 20 MG/ML
INJECTION, SOLUTION INFILTRATION; PERINEURAL PRN
Status: COMPLETED | OUTPATIENT
Start: 2024-04-23 | End: 2024-04-23

## 2024-04-23 RX ORDER — HEPARIN SODIUM 1000 [USP'U]/ML
2000 INJECTION, SOLUTION INTRAVENOUS; SUBCUTANEOUS PRN
Status: DISCONTINUED | OUTPATIENT
Start: 2024-04-23 | End: 2024-04-29 | Stop reason: HOSPADM

## 2024-04-23 RX ORDER — LACTULOSE 10 G/15ML
20 SOLUTION ORAL 3 TIMES DAILY
Status: DISCONTINUED | OUTPATIENT
Start: 2024-04-23 | End: 2024-04-29 | Stop reason: HOSPADM

## 2024-04-23 RX ORDER — HEPARIN SODIUM 10000 [USP'U]/100ML
5-30 INJECTION, SOLUTION INTRAVENOUS CONTINUOUS
Status: DISCONTINUED | OUTPATIENT
Start: 2024-04-23 | End: 2024-04-29 | Stop reason: HOSPADM

## 2024-04-23 RX ORDER — HEPARIN SODIUM 10000 [USP'U]/ML
INJECTION, SOLUTION INTRAVENOUS; SUBCUTANEOUS PRN
Status: COMPLETED | OUTPATIENT
Start: 2024-04-23 | End: 2024-04-23

## 2024-04-23 RX ORDER — FENTANYL CITRATE 50 UG/ML
INJECTION, SOLUTION INTRAMUSCULAR; INTRAVENOUS PRN
Status: DISCONTINUED | OUTPATIENT
Start: 2024-04-23 | End: 2024-04-23 | Stop reason: SDUPTHER

## 2024-04-23 RX ADMIN — FOLIC ACID 1 MG: 1 TABLET ORAL at 08:41

## 2024-04-23 RX ADMIN — ROCURONIUM BROMIDE 10 MG: 10 INJECTION, SOLUTION INTRAVENOUS at 16:01

## 2024-04-23 RX ADMIN — LIDOCAINE HYDROCHLORIDE 8 ML: 20 INJECTION, SOLUTION INFILTRATION; PERINEURAL at 14:29

## 2024-04-23 RX ADMIN — LACTULOSE 20 G: 10 SOLUTION ORAL at 10:09

## 2024-04-23 RX ADMIN — SODIUM CHLORIDE: 9 INJECTION, SOLUTION INTRAVENOUS at 13:57

## 2024-04-23 RX ADMIN — SODIUM CHLORIDE, PRESERVATIVE FREE 10 ML: 5 INJECTION INTRAVENOUS at 19:30

## 2024-04-23 RX ADMIN — Medication 5000 UNITS: at 18:52

## 2024-04-23 RX ADMIN — FENTANYL CITRATE 25 MCG: 50 INJECTION, SOLUTION INTRAMUSCULAR; INTRAVENOUS at 16:54

## 2024-04-23 RX ADMIN — FERROUS SULFATE TAB 325 MG (65 MG ELEMENTAL FE) 325 MG: 325 (65 FE) TAB at 08:41

## 2024-04-23 RX ADMIN — PROPOFOL 100 MG: 10 INJECTION, EMULSION INTRAVENOUS at 14:08

## 2024-04-23 RX ADMIN — DEXAMETHASONE SODIUM PHOSPHATE 10 MG: 4 INJECTION, SOLUTION INTRAMUSCULAR; INTRAVENOUS at 14:21

## 2024-04-23 RX ADMIN — HEPARIN SODIUM 1000 UNITS: 1000 INJECTION, SOLUTION INTRAVENOUS; SUBCUTANEOUS at 18:26

## 2024-04-23 RX ADMIN — FENTANYL CITRATE 100 MCG: 50 INJECTION, SOLUTION INTRAMUSCULAR; INTRAVENOUS at 14:08

## 2024-04-23 RX ADMIN — ALTEPLASE 2 MG: 2.2 INJECTION, POWDER, LYOPHILIZED, FOR SOLUTION INTRAVENOUS at 18:07

## 2024-04-23 RX ADMIN — FENTANYL CITRATE 25 MCG: 50 INJECTION, SOLUTION INTRAMUSCULAR; INTRAVENOUS at 18:19

## 2024-04-23 RX ADMIN — LACTULOSE 20 G: 10 SOLUTION ORAL at 08:41

## 2024-04-23 RX ADMIN — SODIUM CHLORIDE, PRESERVATIVE FREE 40 MG: 5 INJECTION INTRAVENOUS at 19:30

## 2024-04-23 RX ADMIN — SODIUM CHLORIDE, PRESERVATIVE FREE 40 MG: 5 INJECTION INTRAVENOUS at 08:41

## 2024-04-23 RX ADMIN — ROCURONIUM BROMIDE 50 MG: 10 INJECTION, SOLUTION INTRAVENOUS at 14:08

## 2024-04-23 RX ADMIN — LEVOTHYROXINE SODIUM 100 MCG: 0.1 TABLET ORAL at 08:41

## 2024-04-23 RX ADMIN — Medication 2000 UNITS: at 08:41

## 2024-04-23 RX ADMIN — SUGAMMADEX 200 MG: 100 INJECTION, SOLUTION INTRAVENOUS at 18:49

## 2024-04-23 RX ADMIN — ROCURONIUM BROMIDE 20 MG: 10 INJECTION, SOLUTION INTRAVENOUS at 15:02

## 2024-04-23 RX ADMIN — ONDANSETRON HYDROCHLORIDE 4 MG: 2 SOLUTION INTRAMUSCULAR; INTRAVENOUS at 18:44

## 2024-04-23 RX ADMIN — HEPARIN SODIUM 2000 UNITS: 1000 INJECTION, SOLUTION INTRAVENOUS; SUBCUTANEOUS at 18:07

## 2024-04-23 RX ADMIN — ROCURONIUM BROMIDE 10 MG: 10 INJECTION, SOLUTION INTRAVENOUS at 18:15

## 2024-04-23 RX ADMIN — ZINC SULFATE 220 MG (50 MG) CAPSULE 50 MG: CAPSULE at 08:41

## 2024-04-23 RX ADMIN — ETOMIDATE 12 MG: 2 INJECTION, SOLUTION INTRAVENOUS at 14:08

## 2024-04-23 RX ADMIN — LACTULOSE 20 G: 10 SOLUTION ORAL at 19:30

## 2024-04-23 RX ADMIN — HEPARIN SODIUM 12 UNITS/KG/HR: 10000 INJECTION, SOLUTION INTRAVENOUS at 19:29

## 2024-04-23 RX ADMIN — IOPAMIDOL 200 ML: 755 INJECTION, SOLUTION INTRAVENOUS at 19:02

## 2024-04-23 RX ADMIN — ROCURONIUM BROMIDE 20 MG: 10 INJECTION, SOLUTION INTRAVENOUS at 17:36

## 2024-04-23 RX ADMIN — ROCURONIUM BROMIDE 20 MG: 10 INJECTION, SOLUTION INTRAVENOUS at 16:42

## 2024-04-23 RX ADMIN — OCTREOTIDE ACETATE 50 MCG/HR: 500 INJECTION, SOLUTION INTRAVENOUS; SUBCUTANEOUS at 22:07

## 2024-04-23 RX ADMIN — Medication 5000 UNITS: at 18:38

## 2024-04-23 RX ADMIN — OCTREOTIDE ACETATE 50 MCG/HR: 500 INJECTION, SOLUTION INTRAVENOUS; SUBCUTANEOUS at 10:07

## 2024-04-23 RX ADMIN — LIDOCAINE HYDROCHLORIDE 40 MG: 20 INJECTION, SOLUTION INFILTRATION; PERINEURAL at 14:08

## 2024-04-23 RX ADMIN — FENTANYL CITRATE 25 MCG: 50 INJECTION, SOLUTION INTRAMUSCULAR; INTRAVENOUS at 15:03

## 2024-04-23 RX ADMIN — ONDANSETRON 4 MG: 2 INJECTION INTRAMUSCULAR; INTRAVENOUS at 11:48

## 2024-04-23 RX ADMIN — FENTANYL CITRATE 25 MCG: 50 INJECTION, SOLUTION INTRAMUSCULAR; INTRAVENOUS at 16:12

## 2024-04-23 ASSESSMENT — LIFESTYLE VARIABLES: SMOKING_STATUS: 0

## 2024-04-23 NOTE — PROGRESS NOTES
Occupational Therapy      Phone: [phone] Fax: [phone]     Occupational Therapy   Cancellation Note     Patient Name:  Erna Stoner  : 1960  Date:  2024  MRN: [id number]    For today's appointment patient:   [x]  Cancelled   []  Rescheduled appointment   []  No-show     Reason given by patient:   []  Patient ill   []  Conflicting appointment   []  No transportation   []  Conflict with work   []  No reason given   [x]  Other:    Comments: Pt was admitted into hospital    Electronically signed by: Jelena VAZQUEZ, [id number]

## 2024-04-23 NOTE — ANESTHESIA PROCEDURE NOTES
Arterial Line:    An arterial line was placed using ultrasound guidance, in the OR for the following indication(s): .    A 20 gauge (size), 4.45 cm (length), Arrow (type) catheter was placed, into the secured by .4/23/2024 6:16 PM4/23/2024 6:37 PM

## 2024-04-23 NOTE — ANESTHESIA PRE PROCEDURE
mouth every morning    Kim Cotter MD   zinc gluconate 100 MG tablet Take 1 tablet by mouth every morning    Kim Cotter MD   Semaglutide, 1 MG/DOSE, (OZEMPIC, 1 MG/DOSE,) 2 MG/1.5ML SOPN Inject 0.5 mg into the skin once a week *WEDNESDAYS*    Kim Cotter MD   alendronate (FOSAMAX) 70 MG tablet Take 1 tablet by mouth once a week *SATURDAYS*    Kim Cotter MD   ferrous sulfate 325 (65 Fe) MG tablet Take 1 tablet by mouth every morning    Kim Cotter MD   levothyroxine (SYNTHROID) 100 MCG tablet Take 1 tablet by mouth every morning (before breakfast)    Kim Cotter MD       Current medications:    Current Facility-Administered Medications   Medication Dose Route Frequency Provider Last Rate Last Admin    lactulose (CHRONULAC) 10 GM/15ML solution 20 g  20 g Oral TID Augie Montes De Oca MD   20 g at 04/23/24 1009    sodium phosphate (FLEET) rectal enema 1 enema  1 enema Rectal Once PRN Miguel Angel Matute APRN - CNP        lidocaine 2 % injection    PRN JENNIFER Pack MD   8 mL at 04/23/24 1429    acetaminophen (TYLENOL) tablet 650 mg  650 mg Oral Q6H PRN Rena Louise APRN - CNP   650 mg at 04/21/24 0835    Or    acetaminophen (TYLENOL) suppository 650 mg  650 mg Rectal Q6H PRN Rena Louise APRN - CNP        albuterol (PROVENTIL) (2.5 MG/3ML) 0.083% nebulizer solution 2.5 mg  2.5 mg Nebulization Q4H PRN Rena Louise APRN - CNP        dextrose 10 % infusion   IntraVENous Continuous PRN Rena Louise APRN - CNP        dextrose bolus 10% 125 mL  125 mL IntraVENous PRN Rean Louise APRN - CNP        Or    dextrose bolus 10% 250 mL  250 mL IntraVENous PRN Rena Louise APRN - CNP        ferrous sulfate (IRON 325) tablet 325 mg  325 mg Oral Daily with breakfast Rena Louise APRN - CNP   325 mg at 04/23/24 0841    folic acid (FOLVITE) tablet 1 mg  1 mg Oral Daily Rena Louise APRN - CNP   1 mg at 04/23/24 0841    glucagon

## 2024-04-23 NOTE — ANESTHESIA POSTPROCEDURE EVALUATION
Department of Anesthesiology  Postprocedure Note    Patient: Erna Stoner  MRN: [id number]  YOB: 1960  Date of evaluation: 4/23/2024    Procedure Summary       Date: 04/23/24 Room / Location: [facility] Special Procedures; [facility] Radiology    Anesthesia Start: 1357 Anesthesia Stop: 1915    Procedures:       IR EMBOLIZATION VENOUS      IR TIPS INSERTION Diagnosis:       (recurrent bleeding from gastric varices)      (recurrent bleeding from gastric varices)    Scheduled Providers: Radiologist, Cortney General Responsible Provider: Octavio Browning DO    Anesthesia Type: MAC ASA Status: 3            Anesthesia Type: No value filed.    Mary Jo Phase I: Mary Jo Score: 10    Mary Jo Phase II:      Anesthesia Post Evaluation    Patient location during evaluation: ICU  Patient participation: complete - patient participated  Level of consciousness: awake and alert  Airway patency: patent  Nausea & Vomiting: no nausea and no vomiting  Cardiovascular status: blood pressure returned to baseline  Respiratory status: acceptable  Hydration status: euvolemic  Multimodal analgesia pain management approach  Pain management: adequate    No notable events documented.

## 2024-04-23 NOTE — OR NURSING
Safely transferred from bed to procedure table. No complications or adverse events during transfer. Telemetry and VS monitoring initiated. CRNA assumes primary role of patient sedation administration and comfort level monitoring throughout procedure. Intravenous access is secure and patent. All necessary procedural and emergency equipment is present.

## 2024-04-23 NOTE — PROGRESS NOTES
Speech-Language Pathology  Cancellation/No Show Note      For today's appointment patient:    [x]  Cancelled                  []  Rescheduled appointment    []  No show       []  Therapist cancelled             Reason given by patient:  []  No reason given  []  Conflicting appointment  []  No transportation  []  Conflict with work  []  Illness  []  Inclement weather   []  Insurance related issues  [x]  Other           Comments: In the hospital    Continue as per established POC    Jennifer Jacques MS, CCC-SLP  4/23/2024

## 2024-04-23 NOTE — PROCEDURES
Patient arrived to Radiology department for TIPS. Allergies reviewed with patient. Vital signs taken.  I Procedural instructions given, questions answered, understanding expressed and consent signed.

## 2024-04-23 NOTE — PLAN OF CARE
Problem: Safety - Adult  Goal: Free from fall injury  4/23/2024 0730 by Jennifer Zhang RN  Outcome: Progressing  4/22/2024 1950 by Jennifer Zhang RN  Outcome: Progressing     Problem: Chronic Conditions and Co-morbidities  Goal: Patient's chronic conditions and co-morbidity symptoms are monitored and maintained or improved  4/23/2024 0730 by Jennifer Zhang RN  Outcome: Progressing  Flowsheets (Taken 4/22/2024 2020)  Care Plan - Patient's Chronic Conditions and Co-Morbidity Symptoms are Monitored and Maintained or Improved: Monitor and assess patient's chronic conditions and comorbid symptoms for stability, deterioration, or improvement  4/22/2024 1950 by Jennifer Zhang RN  Outcome: Progressing     Problem: Discharge Planning  Goal: Discharge to home or other facility with appropriate resources  4/23/2024 0730 by Jennifer Zhang RN  Outcome: Progressing  Flowsheets (Taken 4/22/2024 2020)  Discharge to home or other facility with appropriate resources: Identify barriers to discharge with patient and caregiver  4/22/2024 1950 by Jennifer Zhang RN  Outcome: Progressing     Problem: ABCDS Injury Assessment  Goal: Absence of physical injury  4/23/2024 0730 by Jennifer Zhang, RN  Outcome: Progressing  4/22/2024 1950 by Jennifer Zhang RN  Outcome: Progressing     Problem: Pain  Goal: Verbalizes/displays adequate comfort level or baseline comfort level  Outcome: Progressing  Flowsheets  Taken 4/22/2024 2330  Verbalizes/displays adequate comfort level or baseline comfort level: Encourage patient to monitor pain and request assistance  Taken 4/22/2024 1950  Verbalizes/displays adequate comfort level or baseline comfort level: Encourage patient to monitor pain and request assistance     Problem: Nutrition Deficit:  Goal: Optimize nutritional status  Outcome: Progressing

## 2024-04-23 NOTE — OR NURSING
Sterile, woven gauze and clear transparent applied to left neck.    The dressing application occurred in a well-lit room with strict adherence to aseptic technique. Procedure site revealed no signs of hematoma or complications.    The next dressing change and wound assessment should occur in 24 hours. No submersion in water for 72 hours post procedure.

## 2024-04-23 NOTE — CONSULTS
[facility]   Gastroenterology, Hepatology, &  Advanced Endoscopy    Consult       ASSESSMENT AND PLAN:    EGD today with risk stratification and temporization of variceal bleeding.  She is going to be transferred to [facility].  I will be working her up for TIPS placement with [facility] and TTE. If no contra-indications, will pursue TIPS placement with IR.         HISTORY OF PRESENT ILLNESS:      Ms. Erna Stoner is a 63y/F w/ history of sarcoidosis with hepatic involvement leading to cirrhosis who follows at the [facility] with course c/b gastric variceal bleeding s/p endoscopic coil placement in [place] with refractory bleeding s/p EGD with Dr. Montilla s/p clip placement at the level of the coil.     MELD 10    Past Medical History:        Diagnosis Date    Asthma     stable, is mild    Back pain     Diabetes mellitus (HCC)     Hypothyroidism     Mass of nasal sinus     for OR 20     Thyroid disease      Past Surgical History:        Procedure Laterality Date    CARPAL TUNNEL RELEASE      bilateral     SECTION      CHOLECYSTECTOMY      COLONOSCOPY  2012    HYSTERECTOMY (CERVIX STATUS UNKNOWN)  2003    LIVER BIOPSY  2017    NOSE SURGERY N/A 2020    EXCISIONAL BIOPSY OF RIGHT NARES (PATHOLOGY NOTIFIED) performed by Bandar Solis Jr., MD at [facility] OR    OVARY REMOVAL      TONSILLECTOMY      UPPER GASTROINTESTINAL ENDOSCOPY  2012    UPPER GASTROINTESTINAL ENDOSCOPY  2024    ESOPHAGOGASTRODUODENOSCOPY CONTROL HEMORRHAGE performed by Noé Montilla MD at [facility] ENDOSCOPY    UPPER GASTROINTESTINAL ENDOSCOPY  2024    ESOPHAGOGASTRODUODENOSCOPY SUBMUCOSAL INJECTION performed by Augie Monets De Oca MD at [facility] ENDOSCOPY     Social History:    TOBACCO:   reports that she has never smoked. She has never used smokeless tobacco.  ETOH:   reports current alcohol use.  DRUGS:   reports no history of drug use.  Family History:       Problem Relation Age of 
Partner Violence: Not on file   Housing Stability: Low Risk  (4/21/2024)    Housing Stability Vital Sign     Unable to Pay for Housing in the Last Year: No     Number of Places Lived in the Last Year: 1     Unstable Housing in the Last Year: No       Current Medications:     Current Facility-Administered Medications:     lactulose (CHRONULAC) 10 GM/15ML solution 20 g, 20 g, Oral, TID, Augie Montes De Oca MD, 20 g at 04/23/24 1930    sodium phosphate (FLEET) rectal enema 1 enema, 1 enema, Rectal, Once PRN, Miguel Angel Matute APRN - CNP    heparin (porcine) injection 4,000 Units, 4,000 Units, IntraVENous, PRN, JENNIFER Pack MD    heparin (porcine) injection 2,000 Units, 2,000 Units, IntraVENous, PRN, JENNIFER Pack MD    heparin 25,000 units in dextrose 5% 250 mL (premix) infusion, 5-30 Units/kg/hr, IntraVENous, Continuous, JENNIFER Pack MD, Last Rate: 8.2 mL/hr at 04/23/24 1929, 12 Units/kg/hr at 04/23/24 1929    acetaminophen (TYLENOL) tablet 650 mg, 650 mg, Oral, Q6H PRN, 650 mg at 04/21/24 0835 **OR** acetaminophen (TYLENOL) suppository 650 mg, 650 mg, Rectal, Q6H PRN, Rena Louise APRN - CNP    albuterol (PROVENTIL) (2.5 MG/3ML) 0.083% nebulizer solution 2.5 mg, 2.5 mg, Nebulization, Q4H PRN, Rena Louise APRN - CNP    dextrose 10 % infusion, , IntraVENous, Continuous PRN, Rena Louise APRN - CNP    dextrose bolus 10% 125 mL, 125 mL, IntraVENous, PRN **OR** dextrose bolus 10% 250 mL, 250 mL, IntraVENous, PRN, Rena Louise APRN - CNP    ferrous sulfate (IRON 325) tablet 325 mg, 325 mg, Oral, Daily with breakfast, Rena Louise APRN - CNP, 325 mg at 04/23/24 0841    folic acid (FOLVITE) tablet 1 mg, 1 mg, Oral, Daily, Rena Louise APRN - CNP, 1 mg at 04/23/24 0841    glucagon injection 1 mg, 1 mg, SubCUTAneous, PRN, Rena Louise APRN - CNP    insulin lispro (HUMALOG) injection vial 0-4 Units, 0-4 Units, SubCUTAneous, TID WC, Rena Louise APRN - CNP, 1 Units at 
intact, non-focal exam     Greater than or equal to 60 minutes was spent providing face-to-face patient care, including:  and coordinating care, reviewing the chart, labs, and diagnostics, as well as medical decision making. Greater than 50% of this time was spent instructing and counseling the patient face to face regarding findings and recommendations.    Thank you for including us in the care of this patient. Please do not hesitate to contact us with any additional questions or concerns.      Discussed with Dr. Montes De Oca.  Electronically signed by SHANE Jaramillo CNP on 4/19/2024 at 8:17 AM

## 2024-04-23 NOTE — OR NURSING
Safely transferred from procedure table to bed. No complications or adverse events during transfer.

## 2024-04-23 NOTE — PATIENT CARE CONFERENCE
[facility] Quality Flow/Interdisciplinary Rounds Progress Note        Quality Flow Rounds held on April 23, 2024    Disciplines Attending:  Bedside Nurse, , , and Nursing Unit Leadership    Erna Stoner was admitted on 4/18/2024  9:05 PM    Anticipated Discharge Date:       Disposition:    Jose M Score:  Jose M Scale Score: 21    Readmission Risk              Risk of Unplanned Readmission:  15           Discussed patient goal for the day, patient clinical progression, and barriers to discharge.  The following Goal(s) of the Day/Commitment(s) have been identified:  downgrade/discharge planning       Cathy Marie RN  April 23, 2024

## 2024-04-24 ENCOUNTER — APPOINTMENT (OUTPATIENT)
Dept: ULTRASOUND IMAGING | Age: 64
DRG: 260 | End: 2024-04-24
Attending: INTERNAL MEDICINE
Payer: COMMERCIAL

## 2024-04-24 ENCOUNTER — APPOINTMENT (OUTPATIENT)
Dept: GENERAL RADIOLOGY | Age: 64
DRG: 260 | End: 2024-04-24
Attending: INTERNAL MEDICINE
Payer: COMMERCIAL

## 2024-04-24 LAB
ALBUMIN SERPL-MCNC: 3.1 G/DL (ref 3.5–5.2)
ALP SERPL-CCNC: 103 U/L (ref 35–104)
ALT SERPL-CCNC: 144 U/L (ref 0–32)
ANION GAP SERPL CALCULATED.3IONS-SCNC: 9 MMOL/L (ref 7–16)
AST SERPL-CCNC: 275 U/L (ref 0–31)
BILIRUB SERPL-MCNC: 1.1 MG/DL (ref 0–1.2)
BUN SERPL-MCNC: 6 MG/DL (ref 6–23)
CALCIUM SERPL-MCNC: 8 MG/DL (ref 8.6–10.2)
CHLORIDE SERPL-SCNC: 106 MMOL/L (ref 98–107)
CO2 SERPL-SCNC: 24 MMOL/L (ref 22–29)
CREAT SERPL-MCNC: 0.6 MG/DL (ref 0.5–1)
ERYTHROCYTE [DISTWIDTH] IN BLOOD BY AUTOMATED COUNT: 15.6 % (ref 11.5–15)
GFR SERPL CREATININE-BSD FRML MDRD: >90 ML/MIN/1.73M2
GLUCOSE BLD-MCNC: 186 MG/DL (ref 74–99)
GLUCOSE BLD-MCNC: 211 MG/DL (ref 74–99)
GLUCOSE BLD-MCNC: 243 MG/DL (ref 74–99)
GLUCOSE BLD-MCNC: 244 MG/DL (ref 74–99)
GLUCOSE SERPL-MCNC: 213 MG/DL (ref 74–99)
HCT VFR BLD AUTO: 26.2 % (ref 34–48)
HGB BLD-MCNC: 8.3 G/DL (ref 11.5–15.5)
MAGNESIUM SERPL-MCNC: 1.8 MG/DL (ref 1.6–2.6)
MCH RBC QN AUTO: 27.9 PG (ref 26–35)
MCHC RBC AUTO-ENTMCNC: 31.7 G/DL (ref 32–34.5)
MCV RBC AUTO: 87.9 FL (ref 80–99.9)
PARTIAL THROMBOPLASTIN TIME: 100.8 SEC (ref 24.5–35.1)
PARTIAL THROMBOPLASTIN TIME: 53.5 SEC (ref 24.5–35.1)
PARTIAL THROMBOPLASTIN TIME: 67.6 SEC (ref 24.5–35.1)
PHOSPHATE SERPL-MCNC: 4 MG/DL (ref 2.5–4.5)
PLATELET # BLD AUTO: 121 K/UL (ref 130–450)
PMV BLD AUTO: 10.4 FL (ref 7–12)
POTASSIUM SERPL-SCNC: 4.4 MMOL/L (ref 3.5–5)
PROT SERPL-MCNC: 4.8 G/DL (ref 6.4–8.3)
RBC # BLD AUTO: 2.98 M/UL (ref 3.5–5.5)
SODIUM SERPL-SCNC: 139 MMOL/L (ref 132–146)
WBC OTHER # BLD: 3.3 K/UL (ref 4.5–11.5)

## 2024-04-24 PROCEDURE — 82962 GLUCOSE BLOOD TEST: CPT

## 2024-04-24 PROCEDURE — 6370000000 HC RX 637 (ALT 250 FOR IP): Performed by: STUDENT IN AN ORGANIZED HEALTH CARE EDUCATION/TRAINING PROGRAM

## 2024-04-24 PROCEDURE — 84100 ASSAY OF PHOSPHORUS: CPT

## 2024-04-24 PROCEDURE — 97530 THERAPEUTIC ACTIVITIES: CPT

## 2024-04-24 PROCEDURE — 6360000002 HC RX W HCPCS: Performed by: RADIOLOGY

## 2024-04-24 PROCEDURE — C9113 INJ PANTOPRAZOLE SODIUM, VIA: HCPCS | Performed by: FAMILY MEDICINE

## 2024-04-24 PROCEDURE — 97535 SELF CARE MNGMENT TRAINING: CPT

## 2024-04-24 PROCEDURE — 71045 X-RAY EXAM CHEST 1 VIEW: CPT

## 2024-04-24 PROCEDURE — 6360000002 HC RX W HCPCS: Performed by: FAMILY MEDICINE

## 2024-04-24 PROCEDURE — 85730 THROMBOPLASTIN TIME PARTIAL: CPT

## 2024-04-24 PROCEDURE — 93975 VASCULAR STUDY: CPT

## 2024-04-24 PROCEDURE — 85027 COMPLETE CBC AUTOMATED: CPT

## 2024-04-24 PROCEDURE — 97162 PT EVAL MOD COMPLEX 30 MIN: CPT

## 2024-04-24 PROCEDURE — 6360000002 HC RX W HCPCS: Performed by: NURSE PRACTITIONER

## 2024-04-24 PROCEDURE — 80053 COMPREHEN METABOLIC PANEL: CPT

## 2024-04-24 PROCEDURE — 2580000003 HC RX 258: Performed by: FAMILY MEDICINE

## 2024-04-24 PROCEDURE — 76705 ECHO EXAM OF ABDOMEN: CPT

## 2024-04-24 PROCEDURE — 83735 ASSAY OF MAGNESIUM: CPT

## 2024-04-24 PROCEDURE — 2060000000 HC ICU INTERMEDIATE R&B

## 2024-04-24 PROCEDURE — 6370000000 HC RX 637 (ALT 250 FOR IP): Performed by: FAMILY MEDICINE

## 2024-04-24 PROCEDURE — 99291 CRITICAL CARE FIRST HOUR: CPT | Performed by: INTERNAL MEDICINE

## 2024-04-24 PROCEDURE — A4216 STERILE WATER/SALINE, 10 ML: HCPCS | Performed by: FAMILY MEDICINE

## 2024-04-24 PROCEDURE — 2580000003 HC RX 258: Performed by: NURSE PRACTITIONER

## 2024-04-24 RX ADMIN — FERROUS SULFATE TAB 325 MG (65 MG ELEMENTAL FE) 325 MG: 325 (65 FE) TAB at 08:09

## 2024-04-24 RX ADMIN — HEPARIN SODIUM 10 UNITS/KG/HR: 10000 INJECTION, SOLUTION INTRAVENOUS at 15:28

## 2024-04-24 RX ADMIN — SODIUM CHLORIDE, PRESERVATIVE FREE 40 MG: 5 INJECTION INTRAVENOUS at 20:04

## 2024-04-24 RX ADMIN — LACTULOSE 20 G: 10 SOLUTION ORAL at 13:40

## 2024-04-24 RX ADMIN — LACTULOSE 20 G: 10 SOLUTION ORAL at 08:09

## 2024-04-24 RX ADMIN — SODIUM CHLORIDE, PRESERVATIVE FREE 10 ML: 5 INJECTION INTRAVENOUS at 20:00

## 2024-04-24 RX ADMIN — FOLIC ACID 1 MG: 1 TABLET ORAL at 08:15

## 2024-04-24 RX ADMIN — INSULIN LISPRO 1 UNITS: 100 INJECTION, SOLUTION INTRAVENOUS; SUBCUTANEOUS at 12:01

## 2024-04-24 RX ADMIN — LEVOTHYROXINE SODIUM 100 MCG: 0.1 TABLET ORAL at 08:09

## 2024-04-24 RX ADMIN — Medication 2000 UNITS: at 08:09

## 2024-04-24 RX ADMIN — ZINC SULFATE 220 MG (50 MG) CAPSULE 50 MG: CAPSULE at 09:44

## 2024-04-24 RX ADMIN — LACTULOSE 20 G: 10 SOLUTION ORAL at 20:04

## 2024-04-24 RX ADMIN — SODIUM CHLORIDE, PRESERVATIVE FREE 40 MG: 5 INJECTION INTRAVENOUS at 08:09

## 2024-04-24 RX ADMIN — INSULIN LISPRO 1 UNITS: 100 INJECTION, SOLUTION INTRAVENOUS; SUBCUTANEOUS at 08:18

## 2024-04-24 RX ADMIN — OCTREOTIDE ACETATE 50 MCG/HR: 500 INJECTION, SOLUTION INTRAVENOUS; SUBCUTANEOUS at 18:49

## 2024-04-24 RX ADMIN — OCTREOTIDE ACETATE 50 MCG/HR: 500 INJECTION, SOLUTION INTRAVENOUS; SUBCUTANEOUS at 09:00

## 2024-04-24 ASSESSMENT — PAIN SCALES - GENERAL: PAINLEVEL_OUTOF10: 0

## 2024-04-24 NOTE — PLAN OF CARE
Problem: Safety - Adult  Goal: Free from fall injury  4/24/2024 0942 by Conor Red RN  Outcome: Progressing  4/24/2024 0433 by Deborah Leroy RN  Outcome: Progressing     Problem: Chronic Conditions and Co-morbidities  Goal: Patient's chronic conditions and co-morbidity symptoms are monitored and maintained or improved  4/24/2024 0942 by Conor Red RN  Outcome: Progressing  4/24/2024 0433 by Deborah Leroy RN  Outcome: Progressing     Problem: Discharge Planning  Goal: Discharge to home or other facility with appropriate resources  4/24/2024 0942 by Conor Red RN  Outcome: Progressing  4/24/2024 0433 by Deborah Leroy RN  Outcome: Progressing     Problem: ABCDS Injury Assessment  Goal: Absence of physical injury  4/24/2024 0942 by Conor Red RN  Outcome: Progressing  4/24/2024 0433 by Deborah Leroy RN  Outcome: Progressing     Problem: Pain  Goal: Verbalizes/displays adequate comfort level or baseline comfort level  4/24/2024 0942 by Conor Red RN  Outcome: Progressing  4/24/2024 0433 by Deborah Leroy RN  Outcome: Progressing     Problem: Nutrition Deficit:  Goal: Optimize nutritional status  4/24/2024 0942 by Conor Red RN  Outcome: Progressing  4/24/2024 0433 by Deborah Leroy RN  Outcome: Progressing

## 2024-04-24 NOTE — PLAN OF CARE
Problem: Safety - Adult  Goal: Free from fall injury  Outcome: Progressing     Problem: Chronic Conditions and Co-morbidities  Goal: Patient's chronic conditions and co-morbidity symptoms are monitored and maintained or improved  Outcome: Progressing     Problem: Discharge Planning  Goal: Discharge to home or other facility with appropriate resources  Outcome: Progressing     Problem: ABCDS Injury Assessment  Goal: Absence of physical injury  Outcome: Progressing     Problem: Pain  Goal: Verbalizes/displays adequate comfort level or baseline comfort level  Outcome: Progressing     Problem: Nutrition Deficit:  Goal: Optimize nutritional status  Outcome: Progressing

## 2024-04-24 NOTE — CARE COORDINATION
Care Coordination: LOS 6 day, transfer from 67 post Tips procedure.4/23. Heparin, Sandostatin, ivf @75, Room air.  Plan prior to procedure is home with family members to assist, will benefit from therapy evals when medically appropriate. Will follow    Electronically signed by Lily Cox RN on 4/24/2024 at 10:51 AM

## 2024-04-25 PROBLEM — E44.0 MODERATE PROTEIN-CALORIE MALNUTRITION (HCC): Status: ACTIVE | Noted: 2024-04-25

## 2024-04-25 LAB
ALBUMIN SERPL-MCNC: 2.9 G/DL (ref 3.5–5.2)
ALP SERPL-CCNC: 115 U/L (ref 35–104)
ALT SERPL-CCNC: 115 U/L (ref 0–32)
ANION GAP SERPL CALCULATED.3IONS-SCNC: 8 MMOL/L (ref 7–16)
AST SERPL-CCNC: 142 U/L (ref 0–31)
BILIRUB SERPL-MCNC: 1.3 MG/DL (ref 0–1.2)
BUN SERPL-MCNC: 7 MG/DL (ref 6–23)
CALCIUM SERPL-MCNC: 7.7 MG/DL (ref 8.6–10.2)
CHLORIDE SERPL-SCNC: 105 MMOL/L (ref 98–107)
CO2 SERPL-SCNC: 24 MMOL/L (ref 22–29)
CREAT SERPL-MCNC: 0.5 MG/DL (ref 0.5–1)
ERYTHROCYTE [DISTWIDTH] IN BLOOD BY AUTOMATED COUNT: 15.7 % (ref 11.5–15)
GFR SERPL CREATININE-BSD FRML MDRD: >90 ML/MIN/1.73M2
GLUCOSE BLD-MCNC: 192 MG/DL (ref 74–99)
GLUCOSE BLD-MCNC: 204 MG/DL (ref 74–99)
GLUCOSE BLD-MCNC: 251 MG/DL (ref 74–99)
GLUCOSE BLD-MCNC: 318 MG/DL (ref 74–99)
GLUCOSE SERPL-MCNC: 179 MG/DL (ref 74–99)
HCT VFR BLD AUTO: 25.6 % (ref 34–48)
HGB BLD-MCNC: 8.1 G/DL (ref 11.5–15.5)
INR PPP: 1.5
MAGNESIUM SERPL-MCNC: 1.7 MG/DL (ref 1.6–2.6)
MCH RBC QN AUTO: 27.7 PG (ref 26–35)
MCHC RBC AUTO-ENTMCNC: 31.6 G/DL (ref 32–34.5)
MCV RBC AUTO: 87.7 FL (ref 80–99.9)
PARTIAL THROMBOPLASTIN TIME: 101.9 SEC (ref 24.5–35.1)
PARTIAL THROMBOPLASTIN TIME: 43.1 SEC (ref 24.5–35.1)
PARTIAL THROMBOPLASTIN TIME: 44.9 SEC (ref 24.5–35.1)
PARTIAL THROMBOPLASTIN TIME: 68.5 SEC (ref 24.5–35.1)
PHOSPHATE SERPL-MCNC: 2.3 MG/DL (ref 2.5–4.5)
PLATELET # BLD AUTO: 106 K/UL (ref 130–450)
PMV BLD AUTO: 11.1 FL (ref 7–12)
POTASSIUM SERPL-SCNC: 4.1 MMOL/L (ref 3.5–5)
PROT SERPL-MCNC: 4.4 G/DL (ref 6.4–8.3)
PROTHROMBIN TIME: 16.6 SEC (ref 9.3–12.4)
RBC # BLD AUTO: 2.92 M/UL (ref 3.5–5.5)
SODIUM SERPL-SCNC: 137 MMOL/L (ref 132–146)
WBC OTHER # BLD: 4.1 K/UL (ref 4.5–11.5)

## 2024-04-25 PROCEDURE — 82962 GLUCOSE BLOOD TEST: CPT

## 2024-04-25 PROCEDURE — 2580000003 HC RX 258: Performed by: NURSE PRACTITIONER

## 2024-04-25 PROCEDURE — 6370000000 HC RX 637 (ALT 250 FOR IP): Performed by: STUDENT IN AN ORGANIZED HEALTH CARE EDUCATION/TRAINING PROGRAM

## 2024-04-25 PROCEDURE — 1200000000 HC SEMI PRIVATE

## 2024-04-25 PROCEDURE — 80053 COMPREHEN METABOLIC PANEL: CPT

## 2024-04-25 PROCEDURE — 97530 THERAPEUTIC ACTIVITIES: CPT

## 2024-04-25 PROCEDURE — 83735 ASSAY OF MAGNESIUM: CPT

## 2024-04-25 PROCEDURE — C9113 INJ PANTOPRAZOLE SODIUM, VIA: HCPCS | Performed by: FAMILY MEDICINE

## 2024-04-25 PROCEDURE — 6370000000 HC RX 637 (ALT 250 FOR IP)

## 2024-04-25 PROCEDURE — 85027 COMPLETE CBC AUTOMATED: CPT

## 2024-04-25 PROCEDURE — 2580000003 HC RX 258: Performed by: FAMILY MEDICINE

## 2024-04-25 PROCEDURE — 85730 THROMBOPLASTIN TIME PARTIAL: CPT

## 2024-04-25 PROCEDURE — 97535 SELF CARE MNGMENT TRAINING: CPT

## 2024-04-25 PROCEDURE — 84100 ASSAY OF PHOSPHORUS: CPT

## 2024-04-25 PROCEDURE — 99232 SBSQ HOSP IP/OBS MODERATE 35: CPT | Performed by: NURSE PRACTITIONER

## 2024-04-25 PROCEDURE — 6360000002 HC RX W HCPCS: Performed by: NURSE PRACTITIONER

## 2024-04-25 PROCEDURE — 99291 CRITICAL CARE FIRST HOUR: CPT | Performed by: INTERNAL MEDICINE

## 2024-04-25 PROCEDURE — 6360000002 HC RX W HCPCS: Performed by: RADIOLOGY

## 2024-04-25 PROCEDURE — 85610 PROTHROMBIN TIME: CPT

## 2024-04-25 PROCEDURE — 6370000000 HC RX 637 (ALT 250 FOR IP): Performed by: FAMILY MEDICINE

## 2024-04-25 PROCEDURE — 6360000002 HC RX W HCPCS: Performed by: FAMILY MEDICINE

## 2024-04-25 PROCEDURE — A4216 STERILE WATER/SALINE, 10 ML: HCPCS | Performed by: FAMILY MEDICINE

## 2024-04-25 RX ORDER — TIZANIDINE 4 MG/1
4 TABLET ORAL EVERY 6 HOURS PRN
Status: DISCONTINUED | OUTPATIENT
Start: 2024-04-25 | End: 2024-04-29 | Stop reason: HOSPADM

## 2024-04-25 RX ADMIN — TIZANIDINE 4 MG: 4 TABLET ORAL at 22:35

## 2024-04-25 RX ADMIN — SODIUM CHLORIDE, PRESERVATIVE FREE 10 ML: 5 INJECTION INTRAVENOUS at 22:10

## 2024-04-25 RX ADMIN — Medication 2000 UNITS: at 08:20

## 2024-04-25 RX ADMIN — HEPARIN SODIUM 2000 UNITS: 1000 INJECTION INTRAVENOUS; SUBCUTANEOUS at 02:07

## 2024-04-25 RX ADMIN — ZINC SULFATE 220 MG (50 MG) CAPSULE 50 MG: CAPSULE at 08:20

## 2024-04-25 RX ADMIN — INSULIN LISPRO 3 UNITS: 100 INJECTION, SOLUTION INTRAVENOUS; SUBCUTANEOUS at 12:10

## 2024-04-25 RX ADMIN — SODIUM CHLORIDE, PRESERVATIVE FREE 10 ML: 5 INJECTION INTRAVENOUS at 08:21

## 2024-04-25 RX ADMIN — Medication 500 MG: at 08:20

## 2024-04-25 RX ADMIN — OCTREOTIDE ACETATE 50 MCG/HR: 500 INJECTION, SOLUTION INTRAVENOUS; SUBCUTANEOUS at 04:14

## 2024-04-25 RX ADMIN — LACTULOSE 20 G: 10 SOLUTION ORAL at 14:40

## 2024-04-25 RX ADMIN — INSULIN LISPRO 1 UNITS: 100 INJECTION, SOLUTION INTRAVENOUS; SUBCUTANEOUS at 18:37

## 2024-04-25 RX ADMIN — SODIUM CHLORIDE, PRESERVATIVE FREE 40 MG: 5 INJECTION INTRAVENOUS at 08:20

## 2024-04-25 RX ADMIN — FOLIC ACID 1 MG: 1 TABLET ORAL at 08:20

## 2024-04-25 RX ADMIN — SODIUM CHLORIDE, PRESERVATIVE FREE 40 MG: 5 INJECTION INTRAVENOUS at 22:09

## 2024-04-25 RX ADMIN — LACTULOSE 20 G: 10 SOLUTION ORAL at 08:20

## 2024-04-25 RX ADMIN — LACTULOSE 20 G: 10 SOLUTION ORAL at 22:09

## 2024-04-25 RX ADMIN — FERROUS SULFATE TAB 325 MG (65 MG ELEMENTAL FE) 325 MG: 325 (65 FE) TAB at 08:20

## 2024-04-25 RX ADMIN — LEVOTHYROXINE SODIUM 100 MCG: 0.1 TABLET ORAL at 08:20

## 2024-04-25 ASSESSMENT — PAIN DESCRIPTION - DESCRIPTORS: DESCRIPTORS: SPASM

## 2024-04-25 ASSESSMENT — PAIN SCALES - GENERAL
PAINLEVEL_OUTOF10: 0
PAINLEVEL_OUTOF10: 0
PAINLEVEL_OUTOF10: 9

## 2024-04-25 ASSESSMENT — PAIN DESCRIPTION - LOCATION: LOCATION: BACK

## 2024-04-25 ASSESSMENT — PAIN DESCRIPTION - ORIENTATION: ORIENTATION: MID

## 2024-04-25 NOTE — PLAN OF CARE
Problem: Safety - Adult  Goal: Free from fall injury  Outcome: Progressing  Flowsheets (Taken 4/25/2024 0800)  Free From Fall Injury: Instruct family/caregiver on patient safety     Problem: Chronic Conditions and Co-morbidities  Goal: Patient's chronic conditions and co-morbidity symptoms are monitored and maintained or improved  Outcome: Progressing     Problem: Discharge Planning  Goal: Discharge to home or other facility with appropriate resources  Outcome: Progressing     Problem: Pain  Goal: Verbalizes/displays adequate comfort level or baseline comfort level  Outcome: Progressing

## 2024-04-26 LAB
ALBUMIN SERPL-MCNC: 2.8 G/DL (ref 3.5–5.2)
ALP SERPL-CCNC: 130 U/L (ref 35–104)
ALT SERPL-CCNC: 84 U/L (ref 0–32)
ANION GAP SERPL CALCULATED.3IONS-SCNC: 10 MMOL/L (ref 7–16)
AST SERPL-CCNC: 72 U/L (ref 0–31)
BILIRUB SERPL-MCNC: 1.4 MG/DL (ref 0–1.2)
BUN SERPL-MCNC: 6 MG/DL (ref 6–23)
CALCIUM SERPL-MCNC: 7.8 MG/DL (ref 8.6–10.2)
CHLORIDE SERPL-SCNC: 110 MMOL/L (ref 98–107)
CO2 SERPL-SCNC: 21 MMOL/L (ref 22–29)
CREAT SERPL-MCNC: 0.5 MG/DL (ref 0.5–1)
ERYTHROCYTE [DISTWIDTH] IN BLOOD BY AUTOMATED COUNT: 15.9 % (ref 11.5–15)
GFR SERPL CREATININE-BSD FRML MDRD: >90 ML/MIN/1.73M2
GLUCOSE BLD-MCNC: 135 MG/DL (ref 74–99)
GLUCOSE BLD-MCNC: 205 MG/DL (ref 74–99)
GLUCOSE BLD-MCNC: 246 MG/DL (ref 74–99)
GLUCOSE SERPL-MCNC: 255 MG/DL (ref 74–99)
HCT VFR BLD AUTO: 24.4 % (ref 34–48)
HGB BLD-MCNC: 7.6 G/DL (ref 11.5–15.5)
INR PPP: 1.5
MAGNESIUM SERPL-MCNC: 1.7 MG/DL (ref 1.6–2.6)
MCH RBC QN AUTO: 27.5 PG (ref 26–35)
MCHC RBC AUTO-ENTMCNC: 31.1 G/DL (ref 32–34.5)
MCV RBC AUTO: 88.4 FL (ref 80–99.9)
PARTIAL THROMBOPLASTIN TIME: 43.3 SEC (ref 24.5–35.1)
PARTIAL THROMBOPLASTIN TIME: 45.9 SEC (ref 24.5–35.1)
PARTIAL THROMBOPLASTIN TIME: 52.9 SEC (ref 24.5–35.1)
PARTIAL THROMBOPLASTIN TIME: 65.7 SEC (ref 24.5–35.1)
PHOSPHATE SERPL-MCNC: 2.3 MG/DL (ref 2.5–4.5)
PLATELET # BLD AUTO: 113 K/UL (ref 130–450)
PMV BLD AUTO: 10.5 FL (ref 7–12)
POTASSIUM SERPL-SCNC: 3.9 MMOL/L (ref 3.5–5)
PROT SERPL-MCNC: 4.2 G/DL (ref 6.4–8.3)
PROTHROMBIN TIME: 16.6 SEC (ref 9.3–12.4)
RBC # BLD AUTO: 2.76 M/UL (ref 3.5–5.5)
SODIUM SERPL-SCNC: 141 MMOL/L (ref 132–146)
WBC OTHER # BLD: 5.6 K/UL (ref 4.5–11.5)

## 2024-04-26 PROCEDURE — 6360000002 HC RX W HCPCS: Performed by: RADIOLOGY

## 2024-04-26 PROCEDURE — 85027 COMPLETE CBC AUTOMATED: CPT

## 2024-04-26 PROCEDURE — 83735 ASSAY OF MAGNESIUM: CPT

## 2024-04-26 PROCEDURE — 2580000003 HC RX 258: Performed by: FAMILY MEDICINE

## 2024-04-26 PROCEDURE — 85730 THROMBOPLASTIN TIME PARTIAL: CPT

## 2024-04-26 PROCEDURE — 6370000000 HC RX 637 (ALT 250 FOR IP): Performed by: INTERNAL MEDICINE

## 2024-04-26 PROCEDURE — 6370000000 HC RX 637 (ALT 250 FOR IP): Performed by: STUDENT IN AN ORGANIZED HEALTH CARE EDUCATION/TRAINING PROGRAM

## 2024-04-26 PROCEDURE — 85610 PROTHROMBIN TIME: CPT

## 2024-04-26 PROCEDURE — 80053 COMPREHEN METABOLIC PANEL: CPT

## 2024-04-26 PROCEDURE — 2580000003 HC RX 258: Performed by: INTERNAL MEDICINE

## 2024-04-26 PROCEDURE — 36415 COLL VENOUS BLD VENIPUNCTURE: CPT

## 2024-04-26 PROCEDURE — A4216 STERILE WATER/SALINE, 10 ML: HCPCS | Performed by: FAMILY MEDICINE

## 2024-04-26 PROCEDURE — C9113 INJ PANTOPRAZOLE SODIUM, VIA: HCPCS | Performed by: FAMILY MEDICINE

## 2024-04-26 PROCEDURE — 82962 GLUCOSE BLOOD TEST: CPT

## 2024-04-26 PROCEDURE — 99232 SBSQ HOSP IP/OBS MODERATE 35: CPT | Performed by: NURSE PRACTITIONER

## 2024-04-26 PROCEDURE — 84100 ASSAY OF PHOSPHORUS: CPT

## 2024-04-26 PROCEDURE — 6360000002 HC RX W HCPCS: Performed by: FAMILY MEDICINE

## 2024-04-26 PROCEDURE — 1200000000 HC SEMI PRIVATE

## 2024-04-26 PROCEDURE — 97530 THERAPEUTIC ACTIVITIES: CPT

## 2024-04-26 PROCEDURE — 6370000000 HC RX 637 (ALT 250 FOR IP): Performed by: FAMILY MEDICINE

## 2024-04-26 RX ORDER — 0.9 % SODIUM CHLORIDE 0.9 %
1000 INTRAVENOUS SOLUTION INTRAVENOUS ONCE
Status: COMPLETED | OUTPATIENT
Start: 2024-04-26 | End: 2024-04-26

## 2024-04-26 RX ORDER — MIDODRINE HYDROCHLORIDE 5 MG/1
5 TABLET ORAL
Status: DISCONTINUED | OUTPATIENT
Start: 2024-04-26 | End: 2024-04-29 | Stop reason: HOSPADM

## 2024-04-26 RX ORDER — MIDODRINE HYDROCHLORIDE 5 MG/1
10 TABLET ORAL ONCE
Status: COMPLETED | OUTPATIENT
Start: 2024-04-26 | End: 2024-04-26

## 2024-04-26 RX ORDER — SODIUM CHLORIDE 9 MG/ML
INJECTION, SOLUTION INTRAVENOUS CONTINUOUS
Status: ACTIVE | OUTPATIENT
Start: 2024-04-26 | End: 2024-04-26

## 2024-04-26 RX ADMIN — MIDODRINE HYDROCHLORIDE 10 MG: 5 TABLET ORAL at 00:40

## 2024-04-26 RX ADMIN — LACTULOSE 20 G: 10 SOLUTION ORAL at 20:36

## 2024-04-26 RX ADMIN — SODIUM CHLORIDE, PRESERVATIVE FREE 40 MG: 5 INJECTION INTRAVENOUS at 20:36

## 2024-04-26 RX ADMIN — SODIUM CHLORIDE, PRESERVATIVE FREE 10 ML: 5 INJECTION INTRAVENOUS at 20:37

## 2024-04-26 RX ADMIN — INSULIN LISPRO 1 UNITS: 100 INJECTION, SOLUTION INTRAVENOUS; SUBCUTANEOUS at 12:46

## 2024-04-26 RX ADMIN — LACTULOSE 20 G: 10 SOLUTION ORAL at 12:41

## 2024-04-26 RX ADMIN — ACETAMINOPHEN 650 MG: 325 TABLET ORAL at 09:01

## 2024-04-26 RX ADMIN — FERROUS SULFATE TAB 325 MG (65 MG ELEMENTAL FE) 325 MG: 325 (65 FE) TAB at 08:44

## 2024-04-26 RX ADMIN — FOLIC ACID 1 MG: 1 TABLET ORAL at 08:43

## 2024-04-26 RX ADMIN — HEPARIN SODIUM 12 UNITS/KG/HR: 10000 INJECTION, SOLUTION INTRAVENOUS at 00:45

## 2024-04-26 RX ADMIN — MIDODRINE HYDROCHLORIDE 5 MG: 5 TABLET ORAL at 17:17

## 2024-04-26 RX ADMIN — Medication 2000 UNITS: at 08:43

## 2024-04-26 RX ADMIN — LEVOTHYROXINE SODIUM 100 MCG: 0.1 TABLET ORAL at 08:43

## 2024-04-26 RX ADMIN — INSULIN LISPRO 1 UNITS: 100 INJECTION, SOLUTION INTRAVENOUS; SUBCUTANEOUS at 09:03

## 2024-04-26 RX ADMIN — SODIUM CHLORIDE, PRESERVATIVE FREE 10 ML: 5 INJECTION INTRAVENOUS at 08:44

## 2024-04-26 RX ADMIN — ZINC SULFATE 220 MG (50 MG) CAPSULE 50 MG: CAPSULE at 08:44

## 2024-04-26 RX ADMIN — SODIUM CHLORIDE: 9 INJECTION, SOLUTION INTRAVENOUS at 12:46

## 2024-04-26 RX ADMIN — LACTULOSE 20 G: 10 SOLUTION ORAL at 08:43

## 2024-04-26 RX ADMIN — MIDODRINE HYDROCHLORIDE 5 MG: 5 TABLET ORAL at 08:44

## 2024-04-26 RX ADMIN — SODIUM CHLORIDE 1000 ML: 9 INJECTION, SOLUTION INTRAVENOUS at 00:39

## 2024-04-26 RX ADMIN — MIDODRINE HYDROCHLORIDE 5 MG: 5 TABLET ORAL at 12:40

## 2024-04-26 RX ADMIN — SODIUM CHLORIDE, PRESERVATIVE FREE 40 MG: 5 INJECTION INTRAVENOUS at 08:44

## 2024-04-26 RX ADMIN — HEPARIN SODIUM 2000 UNITS: 1000 INJECTION INTRAVENOUS; SUBCUTANEOUS at 00:47

## 2024-04-26 ASSESSMENT — PAIN SCALES - GENERAL: PAINLEVEL_OUTOF10: 7

## 2024-04-26 NOTE — CARE COORDINATION
Social Work/Case Management Transition of Care Planning (Maggy Watts -[phone]):  Per report and chart review, patient is s/p TIPS on 4/23.  She remains on a heparin drip.  GI is following.  Blood pressure noted to be low today.  Awaiting improvement in bowel function with the goal of 2-3 Bms/day.  PT/OT scores noted to be 20/18.  Patient ambulated 400' with no assistive device SBA.  Discharge plan is to home with no needs.  Family will transport.  CM/SW will follow. Maggy Watts, JAMES  4/26/2024

## 2024-04-26 NOTE — PLAN OF CARE
Problem: Safety - Adult  Goal: Free from fall injury  4/26/2024 1317 by Lara Pierre RN  Outcome: Progressing  4/26/2024 0204 by Glendy Tobar RN  Outcome: Progressing     Problem: Chronic Conditions and Co-morbidities  Goal: Patient's chronic conditions and co-morbidity symptoms are monitored and maintained or improved  4/26/2024 1317 by Lara Pierre RN  Outcome: Progressing  4/26/2024 0204 by Glendy Tobar RN  Outcome: Progressing     Problem: Discharge Planning  Goal: Discharge to home or other facility with appropriate resources  4/26/2024 1317 by Lara Pierre RN  Outcome: Progressing  4/26/2024 0204 by Glendy Tobar RN  Outcome: Progressing     Problem: ABCDS Injury Assessment  Goal: Absence of physical injury  4/26/2024 1317 by Lara Pierre RN  Outcome: Progressing  4/26/2024 0204 by Glendy Tobar RN  Outcome: Progressing     Problem: Pain  Goal: Verbalizes/displays adequate comfort level or baseline comfort level  4/26/2024 1317 by Lara Pierre RN  Outcome: Progressing  4/26/2024 0204 by Glendy Tobar RN  Outcome: Progressing     Problem: Nutrition Deficit:  Goal: Optimize nutritional status  4/26/2024 1317 by Lara Pierre RN  Outcome: Progressing  4/26/2024 0204 by Glendy Tobar RN  Outcome: Progressing     Problem: Skin/Tissue Integrity  Goal: Absence of new skin breakdown  Description: 1.  Monitor for areas of redness and/or skin breakdown  2.  Assess vascular access sites hourly  3.  Every 4-6 hours minimum:  Change oxygen saturation probe site  4.  Every 4-6 hours:  If on nasal continuous positive airway pressure, respiratory therapy assess nares and determine need for appliance change or resting period.  Outcome: Progressing

## 2024-04-26 NOTE — PLAN OF CARE
Problem: Safety - Adult  Goal: Free from fall injury  4/26/2024 0204 by Glendy Tobar RN  Outcome: Progressing  4/25/2024 1904 by Malka Bernal RN  Outcome: Progressing  Flowsheets (Taken 4/25/2024 0800)  Free From Fall Injury: Instruct family/caregiver on patient safety     Problem: Chronic Conditions and Co-morbidities  Goal: Patient's chronic conditions and co-morbidity symptoms are monitored and maintained or improved  4/26/2024 0204 by Glendy Tobar RN  Outcome: Progressing  4/25/2024 1904 by Malka Bernal RN  Outcome: Progressing     Problem: Discharge Planning  Goal: Discharge to home or other facility with appropriate resources  4/26/2024 0204 by Glendy Tobar RN  Outcome: Progressing  4/25/2024 1904 by Malka Bernal RN  Outcome: Progressing     Problem: ABCDS Injury Assessment  Goal: Absence of physical injury  Outcome: Progressing     Problem: Pain  Goal: Verbalizes/displays adequate comfort level or baseline comfort level  4/26/2024 0204 by Glendy Tobar RN  Outcome: Progressing  4/25/2024 1904 by Malka Bernal RN  Outcome: Progressing     Problem: Nutrition Deficit:  Goal: Optimize nutritional status  Outcome: Progressing

## 2024-04-27 LAB
ALBUMIN SERPL-MCNC: 2.5 G/DL (ref 3.5–5.2)
ALP SERPL-CCNC: 132 U/L (ref 35–104)
ALT SERPL-CCNC: 57 U/L (ref 0–32)
ANION GAP SERPL CALCULATED.3IONS-SCNC: 7 MMOL/L (ref 7–16)
AST SERPL-CCNC: 40 U/L (ref 0–31)
BILIRUB SERPL-MCNC: 1.3 MG/DL (ref 0–1.2)
BUN SERPL-MCNC: 4 MG/DL (ref 6–23)
CALCIUM SERPL-MCNC: 7.6 MG/DL (ref 8.6–10.2)
CHLORIDE SERPL-SCNC: 110 MMOL/L (ref 98–107)
CO2 SERPL-SCNC: 23 MMOL/L (ref 22–29)
CREAT SERPL-MCNC: 0.5 MG/DL (ref 0.5–1)
GFR SERPL CREATININE-BSD FRML MDRD: >90 ML/MIN/1.73M2
GLUCOSE BLD-MCNC: 110 MG/DL (ref 74–99)
GLUCOSE BLD-MCNC: 162 MG/DL (ref 74–99)
GLUCOSE BLD-MCNC: 169 MG/DL (ref 74–99)
GLUCOSE BLD-MCNC: 174 MG/DL (ref 74–99)
GLUCOSE SERPL-MCNC: 111 MG/DL (ref 74–99)
INR PPP: 1.4
PARTIAL THROMBOPLASTIN TIME: 48.5 SEC (ref 24.5–35.1)
PARTIAL THROMBOPLASTIN TIME: 59.5 SEC (ref 24.5–35.1)
PARTIAL THROMBOPLASTIN TIME: 70.6 SEC (ref 24.5–35.1)
POTASSIUM SERPL-SCNC: 3.6 MMOL/L (ref 3.5–5)
PROT SERPL-MCNC: 4.3 G/DL (ref 6.4–8.3)
PROTHROMBIN TIME: 15.4 SEC (ref 9.3–12.4)
SODIUM SERPL-SCNC: 140 MMOL/L (ref 132–146)

## 2024-04-27 PROCEDURE — 82962 GLUCOSE BLOOD TEST: CPT

## 2024-04-27 PROCEDURE — 94640 AIRWAY INHALATION TREATMENT: CPT

## 2024-04-27 PROCEDURE — 2580000003 HC RX 258: Performed by: FAMILY MEDICINE

## 2024-04-27 PROCEDURE — 6360000002 HC RX W HCPCS: Performed by: FAMILY MEDICINE

## 2024-04-27 PROCEDURE — 36415 COLL VENOUS BLD VENIPUNCTURE: CPT

## 2024-04-27 PROCEDURE — 6370000000 HC RX 637 (ALT 250 FOR IP): Performed by: STUDENT IN AN ORGANIZED HEALTH CARE EDUCATION/TRAINING PROGRAM

## 2024-04-27 PROCEDURE — C9113 INJ PANTOPRAZOLE SODIUM, VIA: HCPCS | Performed by: FAMILY MEDICINE

## 2024-04-27 PROCEDURE — 6360000002 HC RX W HCPCS: Performed by: RADIOLOGY

## 2024-04-27 PROCEDURE — 6370000000 HC RX 637 (ALT 250 FOR IP): Performed by: FAMILY MEDICINE

## 2024-04-27 PROCEDURE — 85610 PROTHROMBIN TIME: CPT

## 2024-04-27 PROCEDURE — 1200000000 HC SEMI PRIVATE

## 2024-04-27 PROCEDURE — A4216 STERILE WATER/SALINE, 10 ML: HCPCS | Performed by: FAMILY MEDICINE

## 2024-04-27 PROCEDURE — 80053 COMPREHEN METABOLIC PANEL: CPT

## 2024-04-27 PROCEDURE — 85730 THROMBOPLASTIN TIME PARTIAL: CPT

## 2024-04-27 RX ORDER — LANOLIN ALCOHOL/MO/W.PET/CERES
3 CREAM (GRAM) TOPICAL ONCE
Status: COMPLETED | OUTPATIENT
Start: 2024-04-27 | End: 2024-04-28

## 2024-04-27 RX ADMIN — MIDODRINE HYDROCHLORIDE 5 MG: 5 TABLET ORAL at 09:27

## 2024-04-27 RX ADMIN — MIDODRINE HYDROCHLORIDE 5 MG: 5 TABLET ORAL at 12:49

## 2024-04-27 RX ADMIN — SODIUM CHLORIDE, PRESERVATIVE FREE 10 ML: 5 INJECTION INTRAVENOUS at 09:28

## 2024-04-27 RX ADMIN — LACTULOSE 20 G: 10 SOLUTION ORAL at 21:38

## 2024-04-27 RX ADMIN — ALBUTEROL SULFATE 2.5 MG: 2.5 SOLUTION RESPIRATORY (INHALATION) at 21:55

## 2024-04-27 RX ADMIN — LEVOTHYROXINE SODIUM 100 MCG: 0.1 TABLET ORAL at 09:27

## 2024-04-27 RX ADMIN — HEPARIN SODIUM 2000 UNITS: 1000 INJECTION INTRAVENOUS; SUBCUTANEOUS at 06:48

## 2024-04-27 RX ADMIN — FOLIC ACID 1 MG: 1 TABLET ORAL at 09:27

## 2024-04-27 RX ADMIN — SODIUM CHLORIDE, PRESERVATIVE FREE 10 ML: 5 INJECTION INTRAVENOUS at 21:38

## 2024-04-27 RX ADMIN — LACTULOSE 20 G: 10 SOLUTION ORAL at 09:27

## 2024-04-27 RX ADMIN — FERROUS SULFATE TAB 325 MG (65 MG ELEMENTAL FE) 325 MG: 325 (65 FE) TAB at 09:27

## 2024-04-27 RX ADMIN — ZINC SULFATE 220 MG (50 MG) CAPSULE 50 MG: CAPSULE at 09:27

## 2024-04-27 RX ADMIN — SODIUM CHLORIDE, PRESERVATIVE FREE 40 MG: 5 INJECTION INTRAVENOUS at 21:38

## 2024-04-27 RX ADMIN — LACTULOSE 20 G: 10 SOLUTION ORAL at 12:49

## 2024-04-27 RX ADMIN — ACETAMINOPHEN 650 MG: 325 TABLET ORAL at 06:38

## 2024-04-27 RX ADMIN — MIDODRINE HYDROCHLORIDE 5 MG: 5 TABLET ORAL at 17:54

## 2024-04-27 RX ADMIN — HEPARIN SODIUM 14 UNITS/KG/HR: 10000 INJECTION, SOLUTION INTRAVENOUS at 06:48

## 2024-04-27 RX ADMIN — SODIUM CHLORIDE, PRESERVATIVE FREE 40 MG: 5 INJECTION INTRAVENOUS at 06:38

## 2024-04-27 RX ADMIN — Medication 2000 UNITS: at 09:27

## 2024-04-27 ASSESSMENT — PAIN SCALES - GENERAL
PAINLEVEL_OUTOF10: 5
PAINLEVEL_OUTOF10: 5
PAINLEVEL_OUTOF10: 6

## 2024-04-27 ASSESSMENT — PAIN DESCRIPTION - ORIENTATION: ORIENTATION: LOWER;MID

## 2024-04-27 ASSESSMENT — PAIN - FUNCTIONAL ASSESSMENT: PAIN_FUNCTIONAL_ASSESSMENT: ACTIVITIES ARE NOT PREVENTED

## 2024-04-27 ASSESSMENT — PAIN DESCRIPTION - LOCATION
LOCATION: BACK;HEAD
LOCATION: HEAD

## 2024-04-27 ASSESSMENT — PAIN DESCRIPTION - DESCRIPTORS: DESCRIPTORS: STABBING;DISCOMFORT

## 2024-04-27 NOTE — PLAN OF CARE
Problem: Safety - Adult  Goal: Free from fall injury  4/27/2024 1004 by Lara Pierre RN  Outcome: Progressing  4/27/2024 0519 by Glendy Tobar RN  Outcome: Progressing     Problem: Chronic Conditions and Co-morbidities  Goal: Patient's chronic conditions and co-morbidity symptoms are monitored and maintained or improved  4/27/2024 1004 by Lara Pierre RN  Outcome: Progressing  4/27/2024 0519 by Glendy Tobar RN  Outcome: Progressing     Problem: Discharge Planning  Goal: Discharge to home or other facility with appropriate resources  4/27/2024 1004 by Lara Pierre RN  Outcome: Progressing  4/27/2024 0519 by Glendy Tobar RN  Outcome: Progressing     Problem: ABCDS Injury Assessment  Goal: Absence of physical injury  4/27/2024 1004 by Lara Pierre RN  Outcome: Progressing  4/27/2024 0519 by Glendy Tobar RN  Outcome: Progressing     Problem: Pain  Goal: Verbalizes/displays adequate comfort level or baseline comfort level  4/27/2024 1004 by Lara Pierre RN  Outcome: Progressing  4/27/2024 0519 by Glendy Tobar RN  Outcome: Progressing     Problem: Nutrition Deficit:  Goal: Optimize nutritional status  4/27/2024 1004 by Lara Pierre RN  Outcome: Progressing  4/27/2024 0519 by Glendy Tobar RN  Outcome: Progressing     Problem: Skin/Tissue Integrity  Goal: Absence of new skin breakdown  Description: 1.  Monitor for areas of redness and/or skin breakdown  2.  Assess vascular access sites hourly  3.  Every 4-6 hours minimum:  Change oxygen saturation probe site  4.  Every 4-6 hours:  If on nasal continuous positive airway pressure, respiratory therapy assess nares and determine need for appliance change or resting period.  4/27/2024 1004 by Lara Pierre RN  Outcome: Progressing  4/27/2024 0519 by Glendy Tobar RN  Outcome: Progressing

## 2024-04-27 NOTE — PLAN OF CARE

## 2024-04-28 LAB
ALBUMIN SERPL-MCNC: 2.7 G/DL (ref 3.5–5.2)
ALP SERPL-CCNC: 141 U/L (ref 35–104)
ALT SERPL-CCNC: 46 U/L (ref 0–32)
ANION GAP SERPL CALCULATED.3IONS-SCNC: 11 MMOL/L (ref 7–16)
AST SERPL-CCNC: 31 U/L (ref 0–31)
BILIRUB SERPL-MCNC: 1.3 MG/DL (ref 0–1.2)
BUN SERPL-MCNC: 3 MG/DL (ref 6–23)
CALCIUM SERPL-MCNC: 7.9 MG/DL (ref 8.6–10.2)
CHLORIDE SERPL-SCNC: 109 MMOL/L (ref 98–107)
CO2 SERPL-SCNC: 23 MMOL/L (ref 22–29)
CREAT SERPL-MCNC: 0.5 MG/DL (ref 0.5–1)
GFR SERPL CREATININE-BSD FRML MDRD: >90 ML/MIN/1.73M2
GLUCOSE BLD-MCNC: 113 MG/DL (ref 74–99)
GLUCOSE BLD-MCNC: 128 MG/DL (ref 74–99)
GLUCOSE BLD-MCNC: 157 MG/DL (ref 74–99)
GLUCOSE BLD-MCNC: 177 MG/DL (ref 74–99)
GLUCOSE SERPL-MCNC: 121 MG/DL (ref 74–99)
INR PPP: 1.4
PARTIAL THROMBOPLASTIN TIME: 66.2 SEC (ref 24.5–35.1)
POTASSIUM SERPL-SCNC: 3.7 MMOL/L (ref 3.5–5)
PROT SERPL-MCNC: 4.5 G/DL (ref 6.4–8.3)
PROTHROMBIN TIME: 14.9 SEC (ref 9.3–12.4)
SODIUM SERPL-SCNC: 143 MMOL/L (ref 132–146)
TROPONIN I SERPL HS-MCNC: 6 NG/L (ref 0–9)

## 2024-04-28 PROCEDURE — 6370000000 HC RX 637 (ALT 250 FOR IP): Performed by: STUDENT IN AN ORGANIZED HEALTH CARE EDUCATION/TRAINING PROGRAM

## 2024-04-28 PROCEDURE — C9113 INJ PANTOPRAZOLE SODIUM, VIA: HCPCS | Performed by: FAMILY MEDICINE

## 2024-04-28 PROCEDURE — 82140 ASSAY OF AMMONIA: CPT

## 2024-04-28 PROCEDURE — 36415 COLL VENOUS BLD VENIPUNCTURE: CPT

## 2024-04-28 PROCEDURE — 6360000002 HC RX W HCPCS: Performed by: RADIOLOGY

## 2024-04-28 PROCEDURE — 2580000003 HC RX 258: Performed by: FAMILY MEDICINE

## 2024-04-28 PROCEDURE — 82962 GLUCOSE BLOOD TEST: CPT

## 2024-04-28 PROCEDURE — 6370000000 HC RX 637 (ALT 250 FOR IP): Performed by: FAMILY MEDICINE

## 2024-04-28 PROCEDURE — 93005 ELECTROCARDIOGRAM TRACING: CPT | Performed by: FAMILY MEDICINE

## 2024-04-28 PROCEDURE — 6370000000 HC RX 637 (ALT 250 FOR IP): Performed by: NURSE PRACTITIONER

## 2024-04-28 PROCEDURE — 80053 COMPREHEN METABOLIC PANEL: CPT

## 2024-04-28 PROCEDURE — 84484 ASSAY OF TROPONIN QUANT: CPT

## 2024-04-28 PROCEDURE — 85610 PROTHROMBIN TIME: CPT

## 2024-04-28 PROCEDURE — 6360000002 HC RX W HCPCS: Performed by: FAMILY MEDICINE

## 2024-04-28 PROCEDURE — 85730 THROMBOPLASTIN TIME PARTIAL: CPT

## 2024-04-28 PROCEDURE — A4216 STERILE WATER/SALINE, 10 ML: HCPCS | Performed by: FAMILY MEDICINE

## 2024-04-28 PROCEDURE — 1200000000 HC SEMI PRIVATE

## 2024-04-28 RX ORDER — PROCHLORPERAZINE EDISYLATE 5 MG/ML
10 INJECTION INTRAMUSCULAR; INTRAVENOUS EVERY 6 HOURS PRN
Status: DISCONTINUED | OUTPATIENT
Start: 2024-04-28 | End: 2024-04-29 | Stop reason: HOSPADM

## 2024-04-28 RX ORDER — LANOLIN ALCOHOL/MO/W.PET/CERES
3 CREAM (GRAM) TOPICAL NIGHTLY PRN
Status: DISCONTINUED | OUTPATIENT
Start: 2024-04-28 | End: 2024-04-29 | Stop reason: HOSPADM

## 2024-04-28 RX ADMIN — LACTULOSE 20 G: 10 SOLUTION ORAL at 07:43

## 2024-04-28 RX ADMIN — ACETAMINOPHEN 650 MG: 325 TABLET ORAL at 22:02

## 2024-04-28 RX ADMIN — MIDODRINE HYDROCHLORIDE 5 MG: 5 TABLET ORAL at 11:31

## 2024-04-28 RX ADMIN — Medication 3 MG: at 01:11

## 2024-04-28 RX ADMIN — SODIUM CHLORIDE, PRESERVATIVE FREE 40 MG: 5 INJECTION INTRAVENOUS at 21:34

## 2024-04-28 RX ADMIN — ZINC SULFATE 220 MG (50 MG) CAPSULE 50 MG: CAPSULE at 07:44

## 2024-04-28 RX ADMIN — LEVOTHYROXINE SODIUM 100 MCG: 0.1 TABLET ORAL at 07:43

## 2024-04-28 RX ADMIN — LACTULOSE 20 G: 10 SOLUTION ORAL at 14:18

## 2024-04-28 RX ADMIN — HEPARIN SODIUM 14.02 UNITS/KG/HR: 10000 INJECTION, SOLUTION INTRAVENOUS at 09:17

## 2024-04-28 RX ADMIN — SODIUM CHLORIDE, PRESERVATIVE FREE 40 MG: 5 INJECTION INTRAVENOUS at 07:44

## 2024-04-28 RX ADMIN — LACTULOSE 20 G: 10 SOLUTION ORAL at 21:41

## 2024-04-28 RX ADMIN — ACETAMINOPHEN 650 MG: 325 TABLET ORAL at 01:11

## 2024-04-28 RX ADMIN — Medication 2000 UNITS: at 07:44

## 2024-04-28 RX ADMIN — MIDODRINE HYDROCHLORIDE 5 MG: 5 TABLET ORAL at 07:44

## 2024-04-28 RX ADMIN — Medication 3 MG: at 22:02

## 2024-04-28 RX ADMIN — FERROUS SULFATE TAB 325 MG (65 MG ELEMENTAL FE) 325 MG: 325 (65 FE) TAB at 07:44

## 2024-04-28 RX ADMIN — FOLIC ACID 1 MG: 1 TABLET ORAL at 07:50

## 2024-04-28 RX ADMIN — SODIUM CHLORIDE, PRESERVATIVE FREE 10 ML: 5 INJECTION INTRAVENOUS at 07:43

## 2024-04-28 RX ADMIN — ACETAMINOPHEN 650 MG: 325 TABLET ORAL at 07:57

## 2024-04-28 RX ADMIN — MIDODRINE HYDROCHLORIDE 5 MG: 5 TABLET ORAL at 16:42

## 2024-04-28 RX ADMIN — ONDANSETRON 4 MG: 2 INJECTION INTRAMUSCULAR; INTRAVENOUS at 09:07

## 2024-04-28 RX ADMIN — PROCHLORPERAZINE EDISYLATE 10 MG: 5 INJECTION INTRAMUSCULAR; INTRAVENOUS at 12:03

## 2024-04-28 RX ADMIN — SODIUM CHLORIDE, PRESERVATIVE FREE 10 ML: 5 INJECTION INTRAVENOUS at 21:41

## 2024-04-28 ASSESSMENT — PAIN SCALES - GENERAL: PAINLEVEL_OUTOF10: 7

## 2024-04-28 ASSESSMENT — PAIN DESCRIPTION - DESCRIPTORS: DESCRIPTORS: ACHING

## 2024-04-28 ASSESSMENT — PAIN DESCRIPTION - LOCATION: LOCATION: BACK

## 2024-04-28 ASSESSMENT — PAIN DESCRIPTION - ORIENTATION: ORIENTATION: RIGHT;LEFT

## 2024-04-28 NOTE — PLAN OF CARE
Problem: Safety - Adult  Goal: Free from fall injury  4/28/2024 1025 by aMgaly Marcos RN  Outcome: Progressing     Problem: Chronic Conditions and Co-morbidities  Goal: Patient's chronic conditions and co-morbidity symptoms are monitored and maintained or improved  4/28/2024 1025 by Magaly Marcos RN  Outcome: Progressing     Problem: Discharge Planning  Goal: Discharge to home or other facility with appropriate resources  4/28/2024 1025 by Magaly Marcos RN  Outcome: Progressing     Problem: ABCDS Injury Assessment  Goal: Absence of physical injury  4/28/2024 1025 by Magaly Marcos RN  Outcome: Progressing     Problem: Pain  Goal: Verbalizes/displays adequate comfort level or baseline comfort level  4/28/2024 1025 by Magaly Marcos RN  Outcome: Progressing     Problem: Pain  Goal: Verbalizes/displays adequate comfort level or baseline comfort level  4/28/2024 1025 by Magaly Marcos RN  Outcome: Progressing     Problem: Nutrition Deficit:  Goal: Optimize nutritional status  Outcome: Progressing     Problem: Skin/Tissue Integrity  Goal: Absence of new skin breakdown  Description: 1.  Monitor for areas of redness and/or skin breakdown  2.  Assess vascular access sites hourly  3.  Every 4-6 hours minimum:  Change oxygen saturation probe site  4.  Every 4-6 hours:  If on nasal continuous positive airway pressure, respiratory therapy assess nares and determine need for appliance change or resting period.  Outcome: Progressing

## 2024-04-29 VITALS
SYSTOLIC BLOOD PRESSURE: 96 MMHG | TEMPERATURE: 97.1 F | HEIGHT: 62 IN | DIASTOLIC BLOOD PRESSURE: 48 MMHG | OXYGEN SATURATION: 97 % | RESPIRATION RATE: 18 BRPM | HEART RATE: 83 BPM | WEIGHT: 151 LBS | BODY MASS INDEX: 27.79 KG/M2

## 2024-04-29 LAB
ALBUMIN SERPL-MCNC: 2.6 G/DL (ref 3.5–5.2)
ALP SERPL-CCNC: 153 U/L (ref 35–104)
ALT SERPL-CCNC: 45 U/L (ref 0–32)
AMMONIA PLAS-SCNC: 109 UMOL/L (ref 11–51)
ANION GAP SERPL CALCULATED.3IONS-SCNC: 9 MMOL/L (ref 7–16)
AST SERPL-CCNC: 48 U/L (ref 0–31)
BASOPHILS # BLD: 0.02 K/UL (ref 0–0.2)
BASOPHILS NFR BLD: 1 % (ref 0–2)
BILIRUB SERPL-MCNC: 1.2 MG/DL (ref 0–1.2)
BUN SERPL-MCNC: 4 MG/DL (ref 6–23)
CALCIUM SERPL-MCNC: 8 MG/DL (ref 8.6–10.2)
CHLORIDE SERPL-SCNC: 112 MMOL/L (ref 98–107)
CO2 SERPL-SCNC: 21 MMOL/L (ref 22–29)
CREAT SERPL-MCNC: 0.5 MG/DL (ref 0.5–1)
EOSINOPHIL # BLD: 0.05 K/UL (ref 0.05–0.5)
EOSINOPHILS RELATIVE PERCENT: 2 % (ref 0–6)
ERYTHROCYTE [DISTWIDTH] IN BLOOD BY AUTOMATED COUNT: 16.9 % (ref 11.5–15)
GFR SERPL CREATININE-BSD FRML MDRD: >90 ML/MIN/1.73M2
GLUCOSE BLD-MCNC: 119 MG/DL (ref 74–99)
GLUCOSE BLD-MCNC: 124 MG/DL (ref 74–99)
GLUCOSE BLD-MCNC: 149 MG/DL (ref 74–99)
GLUCOSE BLD-MCNC: 197 MG/DL (ref 74–99)
GLUCOSE SERPL-MCNC: 118 MG/DL (ref 74–99)
HCT VFR BLD AUTO: 23.2 % (ref 34–48)
HGB BLD-MCNC: 7.3 G/DL (ref 11.5–15.5)
LYMPHOCYTES NFR BLD: 0.4 K/UL (ref 1.5–4)
LYMPHOCYTES RELATIVE PERCENT: 14 % (ref 20–42)
MCH RBC QN AUTO: 27.3 PG (ref 26–35)
MCHC RBC AUTO-ENTMCNC: 31.5 G/DL (ref 32–34.5)
MCV RBC AUTO: 86.9 FL (ref 80–99.9)
METAMYELOCYTES ABSOLUTE COUNT: 0.05 K/UL (ref 0–0.12)
METAMYELOCYTES: 2 % (ref 0–1)
MONOCYTES NFR BLD: 0.07 K/UL (ref 0.1–0.95)
MONOCYTES NFR BLD: 3 % (ref 2–12)
MYELOCYTES ABSOLUTE COUNT: 0.02 K/UL
MYELOCYTES: 1 %
NEUTROPHILS NFR BLD: 78 % (ref 43–80)
NEUTS SEG NFR BLD: 2.18 K/UL (ref 1.8–7.3)
PARTIAL THROMBOPLASTIN TIME: 68.2 SEC (ref 24.5–35.1)
PLATELET # BLD AUTO: 104 K/UL (ref 130–450)
PMV BLD AUTO: 11 FL (ref 7–12)
POTASSIUM SERPL-SCNC: 3.7 MMOL/L (ref 3.5–5)
PROT SERPL-MCNC: 4.5 G/DL (ref 6.4–8.3)
RBC # BLD AUTO: 2.67 M/UL (ref 3.5–5.5)
RBC # BLD: ABNORMAL 10*6/UL
SODIUM SERPL-SCNC: 142 MMOL/L (ref 132–146)
WBC OTHER # BLD: 2.8 K/UL (ref 4.5–11.5)

## 2024-04-29 PROCEDURE — C9113 INJ PANTOPRAZOLE SODIUM, VIA: HCPCS | Performed by: FAMILY MEDICINE

## 2024-04-29 PROCEDURE — 85025 COMPLETE CBC W/AUTO DIFF WBC: CPT

## 2024-04-29 PROCEDURE — 6360000002 HC RX W HCPCS: Performed by: RADIOLOGY

## 2024-04-29 PROCEDURE — 82962 GLUCOSE BLOOD TEST: CPT

## 2024-04-29 PROCEDURE — 85730 THROMBOPLASTIN TIME PARTIAL: CPT

## 2024-04-29 PROCEDURE — 6370000000 HC RX 637 (ALT 250 FOR IP): Performed by: STUDENT IN AN ORGANIZED HEALTH CARE EDUCATION/TRAINING PROGRAM

## 2024-04-29 PROCEDURE — 6360000002 HC RX W HCPCS: Performed by: FAMILY MEDICINE

## 2024-04-29 PROCEDURE — 6370000000 HC RX 637 (ALT 250 FOR IP): Performed by: FAMILY MEDICINE

## 2024-04-29 PROCEDURE — A4216 STERILE WATER/SALINE, 10 ML: HCPCS | Performed by: FAMILY MEDICINE

## 2024-04-29 PROCEDURE — 2580000003 HC RX 258: Performed by: FAMILY MEDICINE

## 2024-04-29 PROCEDURE — 80053 COMPREHEN METABOLIC PANEL: CPT

## 2024-04-29 PROCEDURE — 36415 COLL VENOUS BLD VENIPUNCTURE: CPT

## 2024-04-29 RX ORDER — LACTULOSE 10 G/15ML
20 SOLUTION ORAL 3 TIMES DAILY
Qty: 120 ML | Refills: 1 | Status: SHIPPED | OUTPATIENT
Start: 2024-04-29

## 2024-04-29 RX ORDER — ZINC SULFATE 50(220)MG
50 CAPSULE ORAL DAILY
Qty: 30 CAPSULE | Refills: 3 | COMMUNITY
Start: 2024-04-30

## 2024-04-29 RX ORDER — TIZANIDINE 4 MG/1
4 TABLET ORAL EVERY 6 HOURS PRN
Qty: 30 TABLET | Refills: 0 | Status: SHIPPED | OUTPATIENT
Start: 2024-04-29

## 2024-04-29 RX ORDER — MIDODRINE HYDROCHLORIDE 5 MG/1
5 TABLET ORAL
Qty: 90 TABLET | Refills: 3 | Status: SHIPPED | OUTPATIENT
Start: 2024-04-29

## 2024-04-29 RX ADMIN — MIDODRINE HYDROCHLORIDE 5 MG: 5 TABLET ORAL at 08:52

## 2024-04-29 RX ADMIN — ZINC SULFATE 220 MG (50 MG) CAPSULE 50 MG: CAPSULE at 08:51

## 2024-04-29 RX ADMIN — MIDODRINE HYDROCHLORIDE 5 MG: 5 TABLET ORAL at 12:07

## 2024-04-29 RX ADMIN — SODIUM CHLORIDE, PRESERVATIVE FREE 10 ML: 5 INJECTION INTRAVENOUS at 08:52

## 2024-04-29 RX ADMIN — SODIUM CHLORIDE, PRESERVATIVE FREE 40 MG: 5 INJECTION INTRAVENOUS at 08:51

## 2024-04-29 RX ADMIN — LACTULOSE 20 G: 10 SOLUTION ORAL at 13:38

## 2024-04-29 RX ADMIN — LEVOTHYROXINE SODIUM 100 MCG: 0.1 TABLET ORAL at 08:52

## 2024-04-29 RX ADMIN — MIDODRINE HYDROCHLORIDE 5 MG: 5 TABLET ORAL at 16:29

## 2024-04-29 RX ADMIN — HEPARIN SODIUM 14.02 UNITS/KG/HR: 10000 INJECTION, SOLUTION INTRAVENOUS at 12:11

## 2024-04-29 RX ADMIN — FOLIC ACID 1 MG: 1 TABLET ORAL at 08:52

## 2024-04-29 RX ADMIN — FERROUS SULFATE TAB 325 MG (65 MG ELEMENTAL FE) 325 MG: 325 (65 FE) TAB at 08:52

## 2024-04-29 RX ADMIN — Medication 2000 UNITS: at 08:51

## 2024-04-29 RX ADMIN — LACTULOSE 20 G: 10 SOLUTION ORAL at 08:51

## 2024-04-29 NOTE — PLAN OF CARE
Problem: Safety - Adult  Goal: Free from fall injury  4/29/2024 1020 by Nikky Bellamy RN  Outcome: Progressing  4/29/2024 0150 by Jovita Garcia RN  Outcome: Progressing     Problem: Chronic Conditions and Co-morbidities  Goal: Patient's chronic conditions and co-morbidity symptoms are monitored and maintained or improved  4/29/2024 1020 by Nikky Bellamy RN  Outcome: Progressing  4/29/2024 0150 by Jovita aGrcia RN  Outcome: Progressing     Problem: Discharge Planning  Goal: Discharge to home or other facility with appropriate resources  4/29/2024 1020 by Nikky Bellamy RN  Outcome: Progressing  4/29/2024 0150 by Jovita Garcia RN  Outcome: Progressing     Problem: ABCDS Injury Assessment  Goal: Absence of physical injury  4/29/2024 1020 by Nikky Bellamy RN  Outcome: Progressing  4/29/2024 0150 by Jovita Garcia RN  Outcome: Progressing     Problem: Pain  Goal: Verbalizes/displays adequate comfort level or baseline comfort level  Outcome: Progressing     Problem: Nutrition Deficit:  Goal: Optimize nutritional status  Outcome: Progressing     Problem: Skin/Tissue Integrity  Goal: Absence of new skin breakdown  Description: 1.  Monitor for areas of redness and/or skin breakdown  2.  Assess vascular access sites hourly  3.  Every 4-6 hours minimum:  Change oxygen saturation probe site  4.  Every 4-6 hours:  If on nasal continuous positive airway pressure, respiratory therapy assess nares and determine need for appliance change or resting period.  Outcome: Progressing

## 2024-04-29 NOTE — PROGRESS NOTES
[facility]   Progress note      Subjective:    The patient is awake and alert.    Denies nausea vomiting lightheadedness  Overall resting comfortably no acute distress  Objective:    BP 99/60   Pulse 77   Temp 97.6 °F (36.4 °C) (Temporal)   Resp 18   Ht 1.575 m (5' 2.01\")   Wt 68.5 kg (151 lb)   SpO2 97%   BMI 27.61 kg/m²     In: 7997.9 [P.O.:1000; I.V.:6997.9]  Out: 4450   In: 7997.9   Out: 4450 [Urine:4450]    General appearance: NAD, conversant  HEENT: AT/NC, MMM  Neck: FROM, supple  Lungs: Clear to auscultation  CV: RRR, no MRGs  Vasc: Radial pulses 2+  Abdomen: Soft, non-tender; no masses or HSM  Extremities: No peripheral edema or digital cyanosis  Skin: no rash, lesions or ulcers  Psych: Alert and oriented to person, place and time  Neuro: Alert and interactive     Recent Labs     04/21/24  0731 04/21/24  1235 04/21/24  1846 04/21/24  2329 04/22/24  0536   WBC 1.5*  --   --   --  1.0*   HGB 8.4*   < > 7.4* 7.0* 7.2*   HCT 25.9*   < > 23.4* 21.9* 22.2*   *  --   --   --  87*    < > = values in this interval not displayed.       Recent Labs     04/20/24  0556 04/21/24  0731 04/22/24  0536    140 144   K 3.6 3.6 3.5   * 107 111*   CO2 22 26 22   BUN 4* 3* 3*   CREATININE 0.6 0.6 0.6   CALCIUM 7.5* 8.1* 8.0*       Assessment:    Principal Problem:    Gastric varices  Resolved Problems:    * No resolved hospital problems. *      Plan:    Patient is a 63-year-old female admitted to [facility] for  TIPS procedure workup  -Monitor labs.  -H&H 7.6/23.1 which is down from yesterday of 8.3/25.5  -Continue to trend H&H  -GI following  -s/p EGD 4/18 at [facility] with esophageal coil eroding through the wall noted  -Echocardiogram with EF of 65%, no other acute findings  -GI planning for TIPS procedure following complete workup  -NPO until after TIPS procedure is completed  -Continue Sandostatin for hematemesis/varices  -Continue D5 and half-normal at 75  -Completion 
    [facility]   Progress note      Subjective:    The patient is awake and alert.  Lying in bed complaining of dizziness.  No acute evetns overnight       Objective:    BP (!) 80/46   Pulse 70   Temp 97.8 °F (36.6 °C) (Temporal)   Resp 16   Ht 1.575 m (5' 2.01\")   Wt 68.5 kg (151 lb)   SpO2 94%   BMI 27.61 kg/m²     In: 1991.5 [P.O.:600; I.V.:391.5]  Out: 550   In: 1991.5   Out: 550 [Urine:550]    General appearance: NAD, conversant  HEENT: AT/NC, MMM  Neck: FROM, suppledressing over right IJ  Lungs: Clear to auscultation-  CV: RRR, no MRGs  Vasc: Radial pulses 2+  Abdomen: Soft, non-tender; no masses or HSM  Extremities: No peripheral edema or digital cyanosis  Skin: no rash, lesions or ulcers  Psych: Alert and oriented to person, place and time  Neuro: Alert and interactive     Recent Labs     04/24/24  0422 04/25/24 0401 04/26/24  0436   WBC 3.3* 4.1* 5.6   HGB 8.3* 8.1* 7.6*   HCT 26.2* 25.6* 24.4*   * 106* 113*       Recent Labs     04/24/24  0408 04/25/24  0401 04/26/24  0436    137 141   K 4.4 4.1 3.9    105 110*   CO2 24 24 21*   BUN 6 7 6   CREATININE 0.6 0.5 0.5   CALCIUM 8.0* 7.7* 7.8*       Assessment:    Principal Problem:    Gastric varices  Active Problems:    S/P TIPS (transjugular intrahepatic portosystemic shunt)    Moderate protein-calorie malnutrition (HCC)  Resolved Problems:    * No resolved hospital problems. *      Plan:    Patient is a 63-year-old female admitted to [facility] for  TIPS procedure workup  -Monitor labs.  -H&H 7.6/23.1 which is down from yesterday of 8.3/25.5  -Continue to trend H&H  -GI following  -s/p EGD 4/18 at [facility] with esophageal coil eroding through the wall noted  -Echocardiogram with EF of 65%, no other acute findings  -GI planning for TIPS procedure following complete workup  -NPO until after TIPS procedure is completed  -Continue Sandostatin for hematemesis/varices  -Continue D5 and half-normal at 
    [facility]   Progress note      Subjective:    The patient is awake and alert.  Lying in bed admitting that she feels better.  Denies any chest pain and shortness of breath.   No acute evetns overnight       Objective:    BP (!) 102/58   Pulse 88   Temp 97.6 °F (36.4 °C) (Temporal)   Resp 16   Ht 1.575 m (5' 2.01\")   Wt 68.5 kg (151 lb)   SpO2 96%   BMI 27.61 kg/m²     In: 360 [P.O.:360]  Out: -   In: 360   Out: -     General appearance: NAD, conversant  HEENT: AT/NC, MMM  Neck: FROM, suppledressing over right IJ  Lungs: Clear to auscultation-  CV: RRR, no MRGs  Vasc: Radial pulses 2+  Abdomen: Soft, non-tender; no masses or HSM  Extremities: No peripheral edema or digital cyanosis  Skin: no rash, lesions or ulcers  Psych: Alert and oriented to person, place and time  Neuro: Alert and interactive     Recent Labs     04/25/24  0401 04/26/24  0436   WBC 4.1* 5.6   HGB 8.1* 7.6*   HCT 25.6* 24.4*   * 113*       Recent Labs     04/25/24  0401 04/26/24  0436 04/27/24  0548    141 140   K 4.1 3.9 3.6    110* 110*   CO2 24 21* 23   BUN 7 6 4*   CREATININE 0.5 0.5 0.5   CALCIUM 7.7* 7.8* 7.6*       Assessment:    Principal Problem:    Gastric varices  Active Problems:    S/P TIPS (transjugular intrahepatic portosystemic shunt)    Moderate protein-calorie malnutrition (HCC)  Resolved Problems:    * No resolved hospital problems. *      Plan:    Patient is a 63-year-[facility]  -Monitor labs.  -H&H 7.6/23.1 which is down from yesterday of 8.3/25.5  -Continue to trend H&H  -GI following  -s/p EGD 4/18 at [facility] with esophageal coil eroding through the wall noted  -Echocardiogram with EF of 65%, no other acute findings  -GI planning for TIPS procedure following complete workup  -NPO until after TIPS procedure is completed  -Continue Sandostatin for hematemesis/varices  -Continue D5 and half-normal at 75  -Completion of 
    [facility]   Progress note      Subjective:    The patient is awake and alert.    No acute events overnight.    Denies chest pain, angina, SOB     Objective:    BP (!) 92/55   Pulse 86   Temp 97.8 °F (36.6 °C) (Temporal)   Resp 18   Ht 1.575 m (5' 2.01\")   Wt 68.5 kg (151 lb)   SpO2 96%   BMI 27.61 kg/m²     In: 300 [P.O.:300]  Out: 3800   In: 300   Out: 3800 [Urine:3800]    General appearance: NAD, conversant  HEENT: AT/NC, MMM  Neck: FROM, supple  Lungs: Clear to auscultation  CV: RRR, no MRGs  Vasc: Radial pulses 2+  Abdomen: Soft, non-tender; no masses or HSM  Extremities: No peripheral edema or digital cyanosis  Skin: no rash, lesions or ulcers  Psych: Alert and oriented to person, place and time  Neuro: Alert and interactive     Recent Labs     04/19/24  2253 04/20/24  0556 04/20/24  1101   HGB 7.9* 7.5* 7.9*   HCT 24.0* 23.2* 24.7*       Recent Labs     04/18/24  0609 04/19/24  0604 04/20/24  0556    141 143   K 3.5 3.7 3.6   * 109* 109*   CO2 24 20* 22   BUN 10 7 4*   CREATININE 0.6 0.6 0.6   CALCIUM 7.5* 7.8* 7.5*       Assessment:    Principal Problem:    Gastric varices  Resolved Problems:    * No resolved hospital problems. *      Plan:    Patient is a 63-year-old female admitted to [facility] for  TIPS procedure workup  -Monitor labs.  -H&H 7.6/23.1 which is down from yesterday of 8.3/25.5  -Continue to trend H&H  -GI following  -s/p EGD 4/18 at [facility] with esophageal coil eroding through the wall noted  -Echocardiogram with EF of 65%, no other acute findings  -GI planning for TIPS procedure following complete workup  -NPO until after TIPS procedure is completed  -Continue Sandostatin for hematemesis/varices  -Continue D5 and half-normal at 75  -Completion of CTA triphasic liver  -Continue to monitor on telemetry, patient is unfortunately at risk for decompensation given her tenuous status  -Transfer to ICU setting if hematemesis ensues  -Case was 
    [facility]   Progress note      Subjective:    The patient is awake and alert.  Lying in bed admitting that she is nauseated and having slight chest pain heartburn.  Denies shortness of breath.   No acute evetns overnight       Objective:    BP 93/61   Pulse 84   Temp 97.3 °F (36.3 °C) (Temporal)   Resp 17   Ht 1.575 m (5' 2.01\")   Wt 68.5 kg (151 lb)   SpO2 97%   BMI 27.61 kg/m²     No intake/output data recorded.  No intake/output data recorded.    General appearance: NAD, conversant  HEENT: AT/NC, MMM  Neck: FROM, suppledressing over right IJ  Lungs: Clear to auscultation-  CV: RRR, no MRGs  Vasc: Radial pulses 2+  Abdomen: Soft, non-tender; no masses or HSM  Extremities: No peripheral edema or digital cyanosis  Skin: no rash, lesions or ulcers  Psych: Alert and oriented to person, place and time  Neuro: Alert and interactive     Recent Labs     04/26/24  0436   WBC 5.6   HGB 7.6*   HCT 24.4*   *       Recent Labs     04/26/24  0436 04/27/24  0548 04/28/24  0602    140 143   K 3.9 3.6 3.7   * 110* 109*   CO2 21* 23 23   BUN 6 4* 3*   CREATININE 0.5 0.5 0.5   CALCIUM 7.8* 7.6* 7.9*       Assessment:    Principal Problem:    Gastric varices  Active Problems:    S/P TIPS (transjugular intrahepatic portosystemic shunt)    Moderate protein-calorie malnutrition (HCC)  Resolved Problems:    * No resolved hospital problems. *      Plan:    Patient is a 63-year-old female admitted to [facility] for  TIPS procedure workup  -Monitor labs.  -H&H 7.6/23.1 which is down from yesterday of 8.3/25.5  -Continue to trend H&H  -GI following  -s/p EGD 4/18 at [facility] with esophageal coil eroding through the wall noted  -Echocardiogram with EF of 65%, no other acute findings  -GI planning for TIPS procedure following complete workup  -NPO until after TIPS procedure is completed  -Continue Sandostatin for hematemesis/varices  -Continue D5 and half-normal at 75  -Completion of 
    [facility]   Progress note      Subjective:    The patient is awake and alert.    No acute events overnight.    Denies chest pain, angina, SOB     Objective:    BP (!) 78/0 Comment: doppler  Pulse 81   Temp 97.1 °F (36.2 °C) (Temporal)   Resp 20   Ht 1.575 m (5' 2.01\")   Wt 68.5 kg (151 lb)   SpO2 96%   BMI 27.61 kg/m²     In: 9192.5 [P.O.:300; I.V.:8892.5]  Out: 4950   In: 9192.5   Out: 4950 [Urine:4950]    General appearance: NAD, conversant  HEENT: AT/NC, MMM  Neck: FROM, supple  Lungs: Clear to auscultation  CV: RRR, no MRGs  Vasc: Radial pulses 2+  Abdomen: Soft, non-tender; no masses or HSM  Extremities: No peripheral edema or digital cyanosis  Skin: no rash, lesions or ulcers  Psych: Alert and oriented to person, place and time  Neuro: Alert and interactive     Recent Labs     04/20/24  2314 04/21/24  0731 04/21/24  1235   WBC  --  1.5*  --    HGB 7.5* 8.4* 7.5*   HCT 22.8* 25.9* 22.9*   PLT  --  120*  --        Recent Labs     04/19/24  0604 04/20/24  0556 04/21/24  0731    143 140   K 3.7 3.6 3.6   * 109* 107   CO2 20* 22 26   BUN 7 4* 3*   CREATININE 0.6 0.6 0.6   CALCIUM 7.8* 7.5* 8.1*       Assessment:    Principal Problem:    Gastric varices  Resolved Problems:    * No resolved hospital problems. *      Plan:    Patient is a 63-year-old female admitted to [facility] for  TIPS procedure workup  -Monitor labs.  -H&H 7.6/23.1 which is down from yesterday of 8.3/25.5  -Continue to trend H&H  -GI following  -s/p EGD 4/18 at [facility] with esophageal coil eroding through the wall noted  -Echocardiogram with EF of 65%, no other acute findings  -GI planning for TIPS procedure following complete workup  -NPO until after TIPS procedure is completed  -Continue Sandostatin for hematemesis/varices  -Continue D5 and half-normal at 75  -Completion of CTA triphasic liver  -Continue to monitor on telemetry, patient is unfortunately at risk for decompensation given her 
    [facility]   Progress note      Subjective:    The patient is awake and alert.    Seen in ICU today she feels well after procedure last night  No acute complaints  No further hematemesis nausea vomiting  Objective:    /64   Pulse 79   Temp 97.4 °F (36.3 °C) (Temporal)   Resp 19   Ht 1.575 m (5' 2.01\")   Wt 68.5 kg (151 lb)   SpO2 100%   BMI 27.61 kg/m²     In: 920 [P.O.:120; I.V.:800]  Out: 2120   In: 920   Out: 2120 [Urine:2100]    General appearance: NAD, conversant  HEENT: AT/NC, MMM  Neck: FROM, suppledressing over right IJ  Lungs: Clear to auscultation-  CV: RRR, no MRGs  Vasc: Radial pulses 2+  Abdomen: Soft, non-tender; no masses or HSM  Extremities: No peripheral edema or digital cyanosis  Skin: no rash, lesions or ulcers  Psych: Alert and oriented to person, place and time  Neuro: Alert and interactive     Recent Labs     04/22/24  0536 04/23/24  0542 04/23/24 1957 04/24/24  0422   WBC 1.0* 1.3* 5.7 3.3*   HGB 7.2* 7.1* 9.3* 8.3*   HCT 22.2* 22.3* 28.4* 26.2*   PLT 87*  --  146 121*       Recent Labs     04/22/24  0536 04/23/24  0542 04/24/24  0408    143 139   K 3.5 3.7 4.4   * 110* 106   CO2 22 26 24   BUN 3* 3* 6   CREATININE 0.6 0.6 0.6   CALCIUM 8.0* 7.8* 8.0*       Assessment:    Principal Problem:    Gastric varices  Active Problems:    S/P TIPS (transjugular intrahepatic portosystemic shunt)  Resolved Problems:    * No resolved hospital problems. *      Plan:    Patient is a 63-year-old female admitted to [facility] for  TIPS procedure workup  -Monitor labs.  -H&H 7.6/23.1 which is down from yesterday of 8.3/25.5  -Continue to trend H&H  -GI following  -s/p EGD 4/18 at [facility] with esophageal coil eroding through the wall noted  -Echocardiogram with EF of 65%, no other acute findings  -GI planning for TIPS procedure following complete workup  -NPO until after TIPS procedure is completed  -Continue Sandostatin for 
    [facility]   Progress note      Subjective:    The patient is awake and alert.    No acute evetns overnight   Looks well, feels well, waiting for transfer out of ICU    Objective:    BP (!) 89/46   Pulse 75   Temp 97 °F (36.1 °C) (Temporal)   Resp 20   Ht 1.575 m (5' 2.01\")   Wt 68.5 kg (151 lb)   SpO2 94%   BMI 27.61 kg/m²     In: 5055 [P.O.:1700; I.V.:3355]  Out: -   In: 5055   Out: -     General appearance: NAD, conversant  HEENT: AT/NC, MMM  Neck: FROM, suppledressing over right IJ  Lungs: Clear to auscultation-  CV: RRR, no MRGs  Vasc: Radial pulses 2+  Abdomen: Soft, non-tender; no masses or HSM  Extremities: No peripheral edema or digital cyanosis  Skin: no rash, lesions or ulcers  Psych: Alert and oriented to person, place and time  Neuro: Alert and interactive     Recent Labs     04/23/24  1957 04/24/24  0422 04/25/24  0401   WBC 5.7 3.3* 4.1*   HGB 9.3* 8.3* 8.1*   HCT 28.4* 26.2* 25.6*    121* 106*       Recent Labs     04/23/24  0542 04/24/24  0408 04/25/24  0401    139 137   K 3.7 4.4 4.1   * 106 105   CO2 26 24 24   BUN 3* 6 7   CREATININE 0.6 0.6 0.5   CALCIUM 7.8* 8.0* 7.7*       Assessment:    Principal Problem:    Gastric varices  Active Problems:    S/P TIPS (transjugular intrahepatic portosystemic shunt)  Resolved Problems:    * No resolved hospital problems. *      Plan:    Patient is a 63-year-old female admitted to [facility] for  TIPS procedure workup  -Monitor labs.  -H&H 7.6/23.1 which is down from yesterday of 8.3/25.5  -Continue to trend H&H  -GI following  -s/p EGD 4/18 at [facility] with esophageal coil eroding through the wall noted  -Echocardiogram with EF of 65%, no other acute findings  -GI planning for TIPS procedure following complete workup  -NPO until after TIPS procedure is completed  -Continue Sandostatin for hematemesis/varices  -Continue D5 and half-normal at 75  -Completion of CTA triphasic liver  -Continue to monitor 
    [facility]   Progress note      Subjective:    The patient is awake and alert.  Walking in room getting ready to go down for procedure.  Denies nausea vomiting lightheadedness  Overall resting comfortably no acute distress  Objective:    BP (!) 89/42   Pulse 85   Temp 97.5 °F (36.4 °C) (Skin)   Resp 18   Ht 1.575 m (5' 2.01\")   Wt 68.5 kg (151 lb)   SpO2 96%   BMI 27.61 kg/m²     In: 120 [P.O.:120]  Out: 3400   In: 120   Out: 3400 [Urine:3400]    General appearance: NAD, conversant  HEENT: AT/NC, MMM  Neck: FROM, supple  Lungs: Clear to auscultation  CV: RRR, no MRGs  Vasc: Radial pulses 2+  Abdomen: Soft, non-tender; no masses or HSM  Extremities: No peripheral edema or digital cyanosis  Skin: no rash, lesions or ulcers  Psych: Alert and oriented to person, place and time  Neuro: Alert and interactive     Recent Labs     04/21/24  0731 04/21/24  1235 04/21/24  2329 04/22/24  0536 04/23/24  0542   WBC 1.5*  --   --  1.0* 1.3*   HGB 8.4*   < > 7.0* 7.2* 7.1*   HCT 25.9*   < > 21.9* 22.2* 22.3*   *  --   --  87*  --     < > = values in this interval not displayed.       Recent Labs     04/21/24  0731 04/22/24  0536 04/23/24  0542    144 143   K 3.6 3.5 3.7    111* 110*   CO2 26 22 26   BUN 3* 3* 3*   CREATININE 0.6 0.6 0.6   CALCIUM 8.1* 8.0* 7.8*       Assessment:    Principal Problem:    Gastric varices  Resolved Problems:    * No resolved hospital problems. *      Plan:    Patient is a 63-year-old female admitted to [facility] for  TIPS procedure workup  -Monitor labs.  -H&H 7.6/23.1 which is down from yesterday of 8.3/25.5  -Continue to trend H&H  -GI following  -s/p EGD 4/18 at [facility] with esophageal coil eroding through the wall noted  -Echocardiogram with EF of 65%, no other acute findings  -GI planning for TIPS procedure following complete workup  -NPO until after TIPS procedure is completed  -Continue Sandostatin for hematemesis/varices  -Continue D5 
  [facility] Quality Flow/Interdisciplinary Rounds Progress Note        Quality Flow Rounds held on April 22, 2024    Disciplines Attending:  Bedside Nurse, , , and Nursing Unit Leadership    Erna Stoner was admitted on 4/18/2024  9:05 PM    Anticipated Discharge Date:       Disposition:    Jose M Score:  Jose M Scale Score: 20    Readmission Risk              Risk of Unplanned Readmission:  14           Discussed patient goal for the day, patient clinical progression, and barriers to discharge.  The following Goal(s) of the Day/Commitment(s) have been identified:  Diagnostics - Report Results and Labs - Report Results      Ashley Garcia RN  April 22, 2024        
  OCCUPATIONAL THERAPY TREATMENT SESSION  [facility]  [address] [place], [place]       Date:2024                                                               Patient Name: Erna Stoner  MRN: [id number]  : 1960  Room: [address] [place] [address]    Evaluating OT: Kitty Kebede, OTD,  OTR/L; [id number]    Referring Provider: Kimberly Gallo MD    Specific Provider Orders/Date: OT eval and treat (24)       Diagnosis: Gastric varices [I86.4]     Reason for admission: Pt admitted with gastric varices, abdominal pain.    Surgery/Procedures: TIPS, IR embolization       Pertinent Medical History:    Past Medical History:   Diagnosis Date    Asthma     stable, is mild    Back pain     Diabetes mellitus (HCC)     Hypothyroidism     Mass of nasal sinus     for OR 20     Thyroid disease         *Precautions:  Fall Risk, monitor BP    Assessment of current deficits   [x] Functional mobility  [x]ADLs  [x] Strength               []Cognition   [x] Functional transfers   [x] IADLs         [x] Safety Awareness   [x]Endurance   [] Fine Coordination        [] ROM     [] Vision/perception   []Sensation    []Gross Motor Coordination [x] Balance   [] Delirium                  []Motor Control     [] Communication    OT PLAN OF CARE   OT POC based on physician orders, patient diagnosis and results of clinical assessment.       Frequency/Duration: 1-3 days/wk for 1-2 weeks PRN    Specific OT Treatment Interventions to include:   * Instruction/training on adapted ADL techniques and AE recommendations to increase functional independence within precautions       * Training on energy conservation strategies, correct breathing pattern and techniques to improve independence/tolerance for self-care routine  * Functional transfer/mobility training/DME recommendations for increased independence, safety, and fall prevention  * Patient/Family education to increase follow through 
..4 Eyes Skin Assessment     NAME:  Erna Stoner  YOB: 1960  MEDICAL RECORD NUMBER:  [id number]    The patient is being assessed for  Transfer to New Unit    I agree that at least one RN has performed a thorough Head to Toe Skin Assessment on the patient. ALL assessment sites listed below have been assessed.      Areas assessed by both nurses:    Head, Face, Ears, Shoulders, Back, Chest, Arms, Elbows, Hands, Sacrum. Buttock, Coccyx, Ischium, Legs. Feet and Heels, and Under Medical Devices         Does the Patient have a Wound? No noted wound(s)       Jose M Prevention initiated by RN: Yes  Wound Care Orders initiated by RN: No    Pressure Injury (Stage 3,4, Unstageable, DTI, NWPT, and Complex wounds) if present, place Wound referral order by RN under : No    New Ostomies, if present place, Ostomy referral order under : No     Nurse 1 eSignature: Electronically signed by Deborah Leroy RN on 4/23/24 at 8:33 PM EDT    **SHARE this note so that the co-signing nurse can place an eSignature**    Nurse 2 eSignature: Electronically signed by Luisa Perry RN on 4/23/24 at 11:58 PM EDT    
4 Eyes Skin Assessment     NAME:  Erna Stoner  YOB: 1960  MEDICAL RECORD NUMBER:  [id number]    The patient is being assessed for  Admission    I agree that at least one RN has performed a thorough Head to Toe Skin Assessment on the patient. ALL assessment sites listed below have been assessed.      Areas assessed by both nurses:    Head, Face, Ears, Shoulders, Back, Chest, Arms, Elbows, Hands, Sacrum. Buttock, Coccyx, Ischium, Legs. Feet and Heels, and Under Medical Devices         Does the Patient have a Wound? No noted wound(s)       Jose M Prevention initiated by RN: No  Wound Care Orders initiated by RN: No    Pressure Injury (Stage 3,4, Unstageable, DTI, NWPT, and Complex wounds) if present, place Wound referral order by RN under : No    New Ostomies, if present place, Ostomy referral order under : No     Nurse 1 eSignature: Electronically signed by Loretta Alves RN on 4/19/24 at 1:51 AM EDT    **SHARE this note so that the co-signing nurse can place an eSignature**    Nurse 2 eSignature: Electronically signed by Matt Dover RN on 4/19/24 at 4:32 AM EDT  
4 Eyes Skin Assessment     NAME:  Erna Stoner  YOB: 1960  MEDICAL RECORD NUMBER:  [id number]    The patient is being assessed for  Transfer to New Unit    I agree that at least one RN has performed a thorough Head to Toe Skin Assessment on the patient. ALL assessment sites listed below have been assessed.      Areas assessed by both nurses:    Head, Face, Ears, Shoulders, Back, Chest, Arms, Elbows, Hands, Sacrum. Buttock, Coccyx, Ischium, Legs. Feet and Heels, Under Medical Devices , and Other ***     Skin is unremarkable besides some discoloration on lower back.  Does the Patient have a Wound? No noted wound(s)       Jose M Prevention initiated by RN: No  Wound Care Orders initiated by RN: No    Pressure Injury (Stage 3,4, Unstageable, DTI, NWPT, and Complex wounds) if present, place Wound referral order by RN under : No    New Ostomies, if present place, Ostomy referral order under : No     Nurse 1 eSignature: Electronically signed by Glendy Tobar RN on 4/26/24 at 12:08 AM EDT    **SHARE this note so that the co-signing nurse can place an eSignature**    Nurse 2 eSignature: {Esignature:[id number]}  
APTT last draw at 0548 48.5 Hep is infusing at 9.6 via off going shift.   
Aptt this morning was 66.2 No change in rate  Next Aptt in am  
Attempted to call Dr. Singletary for acceptance to ICU post-procedure, left voicemail.  
[facility]   Gastroenterology, Hepatology, &  Advanced Endoscopy      ASSESSMENT AND PLAN:    Pt transferred from [place] for work up for TIPS procedure.  - CTA Triphasic Liver completed and reviewed, noted below  - TTE , noted below  -MELD 3.0  11  -monitor for gross bleed, pt currently denies  -Monitor CBC  -Transfuse to keep Hgb >7  - Will plan for TIPS placement after TTE completed.   - TIPS placement with IR, to be performed today.  -Dr. Montes De Oca discussed case with Dr. Pack.       No BM since admission, lactulose started BID, no results yet.  -If no BM by 12 noon today, then give  enema.    -- Continue PPI BID, Octreotide, and Ceftriaxone.  - Hgb 7.2    EGD  4/18/24  Normal esophagus.  Gastric varix with eroding coil and clip in place.   Sclerotherapy performed.   Mild gastritis.  Normal duodenum.         EGD 4/17/24, Noé Montilla MD   Suture also appeared near the coil. Upon pulling on the suture the coil was moved as well and bright bleeding noted. Controlled with hemoclip x 1 without any problems.     TTE 4/19/24    Left Ventricle: Normal left ventricular systolic function with EF of 65%. Left ventricle size is normal. Normal wall thickness. Normal wall motion. Normal diastolic function.    Right Ventricle: Normal systolic function, TAPSE 2.8cm.    Aortic Valve: No significant stenosis.    Mitral Valve: Trace regurgitation.    Tricuspid Valve: Trace regurgitation, RVSP 28mmHg.    Pericardium: No pericardial effusion.    No prior study to compare.      HISTORY OF PRESENT ILLNESS:      Erna Stoner is a 63 y.o. female who presented to [place] with complaints of hematemesis 3 days prior to admission,associated with nausea, emesis and generalized abdominal pain onset past couple days.  Patient reported she was seen as an outpatient at PCP office and advised to present to ED for further evaluation.  He does report that she takes Celebrex and was recently changed to Celebrex for due to concern for ulcers.  
Called lab at 0330am about stat lab draw I was advised they would be up for morning labs and they would be up to the floor 2 hrs later just arriving on the floor after calling again to find out why they have not been up.   
Comprehensive Nutrition Assessment    Type and Reason for Visit:  Initial, Positive Nutrition Screen    Nutrition Recommendations/Plan:   Continue NPO. Will monitor for nutrition progression and provide nutrition recs as appropriate.      Malnutrition Assessment:  Malnutrition Status:  At risk for malnutrition (Comment) (04/19/24 1024)    Context:  Chronic Illness     Findings of the 6 clinical characteristics of malnutrition:  Energy Intake:  Mild decrease in energy intake (Comment)  Weight Loss:  Unable to assess (2/2 poor wt hx on file to assess/trend)     Body Fat Loss:  No significant body fat loss     Muscle Mass Loss:  Mild muscle mass loss Temples (temporalis)  Fluid Accumulation:  No significant fluid accumulation     Strength:  Not Performed    Nutrition Assessment:    Pt. admitted as transfer from [facility] for further eval/work up for TIPS procedure. Initially admitted d/t nausea and abdominal pain w/ hematamesis. Hx of DM, sarcoid liver disease with history of varices s/p endoscopic coil placement at [facility] w/ course c/b refractory bleeding s/p clip placement at the level of the coil. Pt. currently NPO. Will monitor for nutrition progression and provide nutrition recs as appropriate.    Nutrition Related Findings:    A&Ox4, soft/tender/rounded abd, +abd pain, no edema, -I/O (1L), reduced appetite, Wound Type: None       Current Nutrition Intake & Therapies:    Average Meal Intake: NPO  Average Supplements Intake: NPO  Diet NPO Exceptions are: Sips of Water with Meds    Anthropometric Measures:  Height: 157.5 cm (5' 2.01\")  Ideal Body Weight (IBW): 110 lbs (50 kg)    Admission Body Weight: 68.5 kg (151 lb 0.2 oz) (4/18 Standing scale first measured)  Current Body Weight: 68.5 kg (151 lb 0.2 oz) (4/18 Standing scale), 137.3 % IBW. Weight Source: Standing Scale  Current BMI (kg/m2): 27.6  Usual Body Weight: 69.4 kg (153 lb) (2/29 (OV))  % Weight Change (Calculated): -1.3  Weight Adjustment For: No Adjustment   
Comprehensive Nutrition Assessment    Type and Reason for Visit:  Reassess    Nutrition Recommendations/Plan:   Continue current diet  Start glucerna BID     Malnutrition Assessment:  Malnutrition Status:  Moderate malnutrition (04/25/24 1401)    Context:  Acute Illness     Findings of the 6 clinical characteristics of malnutrition:  Energy Intake:  75% or less of estimated energy requirements for 7 or more days  Weight Loss:  No significant weight loss     Body Fat Loss:  No significant body fat loss     Muscle Mass Loss:  Mild muscle mass loss Temples (temporalis)  Fluid Accumulation:  No significant fluid accumulation     Strength:  Not Performed    Nutrition Assessment:    Pt remains at risk now s/p TIPS procedure 2/2 gastric varices. Initially admitted to [facility] s/p EGD w/ noted eroded gastric variceal coil & variceal clip x1, sclerotherapy performed 4/17. PMHx DM, sarcoidosis w/ liver involvement/cirrhosis (follows w/ [facility]). PO diet advanced w/ fair intake noted. Pt meets criteria for moderate malnutrition. WIll add ONS and monitor.    Nutrition Related Findings:    pt alert, active BS, abd tenderness/loss of appetite, no edema, +I/Os 7.3L, hypophosphatemia, elevated LFTs, bili 1.3 Wound Type: None       Current Nutrition Intake & Therapies:    Average Meal Intake: 26-50% (average per doc flow)  Average Supplements Intake: NPO  ADULT DIET; Easy to Chew; 4 carb choices (60 gm/meal); No red dye    Anthropometric Measures:  Height: 157.5 cm (5' 2.01\")  Ideal Body Weight (IBW): 110 lbs (50 kg)    Admission Body Weight: 68.5 kg (151 lb 0.2 oz) (4/18 Standing scale first measured)  Current Body Weight: 68.5 kg (151 lb) (4/18 standing scale), 137.3 % IBW. Weight Source: Standing Scale  Current BMI (kg/m2): 27.6  Usual Body Weight: 70.3 kg (155 lb) (1/2024 actual @ [facility] OV)  % Weight Change (Calculated): -2.6  Weight Adjustment For: No Adjustment                 BMI Categories: Overweight (BMI 25.0-29.9)    Estimated 
Dr. Falguni barr served regarding if pt is going to have procedure today     Ok for full liquid diet per Dr. Falguni Byrd RN    
Dr. Gallo aware of TIPS procedure, entering transfer to ICU order for post-procedure. Dr. Singletary accepting patient to MICU post-procedure.  
Dr. Singletary confirmed acceptance to MICU post- TIPS procedure.  
Enema x1 given per order.Jennifer Zhang RN    
Jory Redd notified via telephone of patient's  manual blood pressure of 82/40.     Per CHANDNI Redd to give patient a 1L  NS bolus and to recheck pressure.     Repeat  manual blood pressure checked for 100/44.   
Jory eRdd notified via telephone of patient's arrival to unit as transfer from [facility]. Ivania notified that patient had Sandostatin and D5 +.45 NS gtts running while at [facility]. Per CHANDNI Redd ok to reorder.   
Labs - APTT 70.6 no bolus no change in heparin rate per order. Patient updated she has no questions orf concerns at this time.   
[facility]   Gastroenterology, Hepatology, &  Advanced Endoscopy      ASSESSMENT AND PLAN:    - Pt was admitted to ICU after TIPS procedure for observation   - transferred out of ICU now 8417a  - doing well today  - had back pain last evening given Zanaflex which was effective but has made her \"groggy.\"  -Feels like she would be ready for discharge in am tomorrow.  - -Ok to monitor today for discharge tomorrow.  - Closely monitor LFT trend  -Monitor signs for H.E.  -goal for 2-3 BMS qd  -if no BM by tomorrow am, then increase Lactulose to q 2 hours x 3, then resume previous dosing of TID.  - Follow with  as OP.       Pt transferred from [place] for work up for TIPS procedure.  - CTA Triphasic Liver completed and reviewed, noted below  - TTE , noted below  -MELD 3.0  11  -monitor for gross bleed, pt currently denies  -Monitor CBC  -Transfuse to keep Hgb >7   8.3 -> 7.6  - Will plan for TIPS placement after TTE completed.   - TIPS placement with IR, performed  -Dr. Montes De Oca discussed case with Dr. Pack.       No BM since admission, lactulose started TID, 1 BM since TIPS.  - goal for 2-3 BM's per day.    -- Continue PPI BID, Octreotide, and Ceftriaxone.  - Hgb 7.2 -> 8.1    EGD  4/18/24  Normal esophagus.  Gastric varix with eroding coil and clip in place.   Sclerotherapy performed.   Mild gastritis.  Normal duodenum.         EGD 4/17/24, Noé Montilla MD   Suture also appeared near the coil. Upon pulling on the suture the coil was moved as well and bright bleeding noted. Controlled with hemoclip x 1 without any problems.     TTE 4/19/24    Left Ventricle: Normal left ventricular systolic function with EF of 65%. Left ventricle size is normal. Normal wall thickness. Normal wall motion. Normal diastolic function.    Right Ventricle: Normal systolic function, TAPSE 2.8cm.    Aortic Valve: No significant stenosis.    Mitral Valve: Trace regurgitation.    Tricuspid Valve: Trace regurgitation, RVSP 28mmHg.    
Message left for attending to get diet order. TIPS will not be performed today  
[facility]  Gastroenterology, Hepatology &  Advanced Endoscopy     Progress Note    SUBJECTIVE:      Patient remains stable with no suggestion of continued bleeding. CTA Triphasic Liver and TTE without contraindication to TIPS. She has no history of encephalopathy. MELD score remains compensated.     OBJECTIVE      Physical    VITALS:  BP (!) 102/58   Pulse 88   Temp 97.6 °F (36.4 °C) (Temporal)   Resp 16   Ht 1.575 m (5' 2.01\")   Wt 68.5 kg (151 lb)   SpO2 96%   BMI 27.61 kg/m²   Physical Exam:  General: Overall well-appearing, NAD  HEENT: PERRLA, EOMI, Anicteric sclera, MMM, no rhinorrhea  Cards: RRR, no LE edema  Resp: Breathing comfortably on room air, good air movement, no use of accessory muscles, no audible wheezing  Abdomen: soft, NT, ND.  Extremities: Moves all extremities, no effusions or bruising.  Skin: No rashes or jaundice  Neuro: A&O x 3, CN grossly intact, non-focal exam       ASSESSMENT AND PLAN      63y/F presents with hematemesis from known gastric varices s/p clip placement from eroded coil and sclerotherapy injection now getting evaluated for TIPS placement.     PLAN:  - NPO after MN.  - Will discuss case personally with Dr. Pack of IR now that pre-TIPS evaluation complete and without contraindication.  - Begin lactulose titrated to 2-3 BM daily.  - CBC, CMP, and INR daily.   - PPI BID. Complete Octreotide x 72hrs and Ceftriaxone x 5 days.     We will follow.    Thank you for including us in the care of this patient. Please do not hesitate to contact us with any additional questions or concerns.    Augie Montes De Oca MD  Gastroenterology/Hepatology  Advanced Endoscopy    
Notified Dr. MontesD e Oca of patient's BP   
Nurse to Nurse called to joe on 0269. All questions answered    Misty Byrd RN    
Nurse to nurse called to 84.  
PULMONARY  CRITICAL CARE   SERVICE DAILY PROGRESS  NOTE     4/25/2024   Hospital  LOS: 7 days      Admit date- 4/18/2024       Initially admitted for -   Admitted to ICU after TIPSS procedure for observation        Subjective:      Remains the same , waiting for floor bed   S/p TIPSS on 04/23 Pm   HD stable , noted to have Portal vein thrombosis and started on heparin gtt     Review of Systems        General- No headaches , dizzinesss, syncope   Pulmonary - No chest pain , hemoptysis   Cardiovascular- No chest pain,   GI- No abd pain ,No difficulty swallowing, diarrhea , no vomiting   Musculoskeletal- No extremity weakness, no edema , no cyanosis   Genitourinary- No burning urination, no dysuria, no incontinence  Neuro- NO syncope , AMS  Or weakness , No tingling , no numbness.   Skin- No rashes , no cyanosis.   Psychiatric/Behavioral: Negative for confusion, hallucinations and sleep disturbance. The patient is not nervous/anxious.                      Allergies:No Known Allergies  Home Meds:  Prior to Admission medications    Medication Sig Start Date End Date Taking? Authorizing Provider   ammonium lactate (LAC-HYDRIN) 12 % lotion Apply topically 2 times daily as needed for Dry Skin  Patient not taking: Reported on 4/18/2024    Kim Cotter MD   Methotrexate 25 MG/ML SOSY Inject 6 mLs into the skin once a week *WEDNESDAYS*    Kim Cotter MD   Multiple Vitamins-Minerals (CENTRUM SILVER 50+WOMEN) TABS Take 1 tablet by mouth every morning    Kim Cotter MD   pantoprazole (PROTONIX) 40 MG tablet Take 1 tablet by mouth 2 times daily 4/15/24   Kim Cotter MD   albuterol (PROVENTIL) (2.5 MG/3ML) 0.083% nebulizer solution Take 3 mLs by nebulization every 4 hours as needed for Wheezing or Shortness of Breath 3/15/24   Kim Cotter MD   sucralfate (CARAFATE) 1 GM tablet Take 1 tablet by mouth 4 times daily (before meals and nightly)    Kim Cotter MD PROFE 391.3 
Perfectserved Dr. Singletary for acceptance to ICU post-TIPS procedure.  
Physical Therapy    Physical Therapy Daily Treatment Note      Name: Erna Stoner  : 1960  MRN: [id number]      Date of Service: 2024    Evaluating PT:  Dion Salmeron, PT, DPT  [id number]     Room #:  8417/8417-A  Diagnosis:  Gastric varices [I86.4]  PMHx/PSHx:   has a past medical history of Asthma, Back pain, Diabetes mellitus (HCC), Hypothyroidism, Mass of nasal sinus, and Thyroid disease.   Procedure/Surgery:  TIPS, IR embolization    Precautions:  Falls  Equipment Needs:  none     SUBJECTIVE:    Pt lives alone in a 1 story home with 4 stairs and 1 rail to enter.  Bedroom and bathroom are on the 1st level.  Pt ambulated with no AD, independent, drives PTA.  Pt has laundry in the basement.      OBJECTIVE:   Initial Evaluation  Date: 24  Treatment  24 Short Term/ Long Term   Goals   AM-PAC 6 Clicks     Was pt agreeable to Eval/treatment? Yes  Yes     Does pt have pain? No reported pain  No reported pain     Bed Mobility  Rolling: SBA  Supine to sit: SBA  Sit to supine: NT  Scooting: SBA Rolling: NT  Supine to sit: Min A  Sit to supine: Min A  Scooting: Min A Rolling: Independent  Supine to sit: Independent  Sit to supine: Independent  Scooting: Independent   Transfers Sit to stand: SBA  Stand to sit: SBA  Stand pivot: SBA Sit to stand: Min A  Stand to sit: Min A  Stand pivot: Min A Sit to stand: Independent  Stand to sit: Independent  Stand pivot: Independent   Ambulation    400 feet with no AD SBA  15 feet with IV pole Min A >500 feet with no AD Independent   Stair negotiation: ascended and descended  NT NT >8 steps with 1 rail Modified Independent     ROM BUE:  Per OT eval   BLE:  WFL     Strength BUE:  Per OT eval   BLE:  WFL     Balance Sitting EOB:  SBA  Dynamic Standing:  SBA Sitting EOB:  SBA  Dynamic Standing:  Min A Sitting EOB:  Independent  Dynamic Standing:  Independent     Pt is A & O x 4  Sensation:  Pt denies numbness and tingling to extremities  Edema:  
Physical Therapy    Physical Therapy Daily Treatment Note      Name: Erna Stoner  : 1960  MRN: [id number]      Date of Service: 2024    Evaluating PT:  Dion Salmeron, PT, DPT  [id number]     Room #:  4407/4407-A  Diagnosis:  Gastric varices [I86.4]  PMHx/PSHx:   has a past medical history of Asthma, Back pain, Diabetes mellitus (HCC), Hypothyroidism, Mass of nasal sinus, and Thyroid disease.   Procedure/Surgery:  TIPS, IR embolization    Precautions:  Falls, O2  Equipment Needs:  none     SUBJECTIVE:    Pt lives alone in a 1 story home with 4 stairs and 1 rail to enter.  Bedroom and bathroom are on the 1st level.  Pt ambulated with no AD, independent, drives PTA.  Pt has laundry in the basement.      OBJECTIVE:   Initial Evaluation  Date: 24  Treatment  24 Short Term/ Long Term   Goals   AM-PAC 6 Clicks     Was pt agreeable to Eval/treatment? Yes  Yes     Does pt have pain? No reported pain  No reported pain     Bed Mobility  Rolling: SBA  Supine to sit: SBA  Sit to supine: NT  Scooting: SBA Rolling: Independent  Supine to sit: Independent  Sit to supine: NT  Scooting: Independent Rolling: Independent  Supine to sit: Independent  Sit to supine: Independent  Scooting: Independent   Transfers Sit to stand: SBA  Stand to sit: SBA  Stand pivot: SBA Sit to stand: SBA  Stand to sit: SBA  Stand pivot: SBA Sit to stand: Independent  Stand to sit: Independent  Stand pivot: Independent   Ambulation    400 feet with no AD SBA  400 feet with no AD SBA >500 feet with no AD Independent   Stair negotiation: ascended and descended  NT NT >8 steps with 1 rail Modified Independent     ROM BUE:  Per OT eval   BLE:  WFL     Strength BUE:  Per OT eval   BLE:  WFL     Balance Sitting EOB:  SBA  Dynamic Standing:  SBA Sitting EOB:  Independent  Dynamic Standing:  SBA Sitting EOB:  Independent  Dynamic Standing:  Independent     Pt is A & O x 4  RASS:  0  CAM-ICU:  -  Sensation:  Pt denies numbness 
Physical Therapy    Physical Therapy Initial Assessment     Name: Erna Stoner  : 1960  MRN: [id number]      Date of Service: 2024    Evaluating PT:  Dion Salmeron, PT, DPT  [id number]     Room #:  4407/4407-A  Diagnosis:  Gastric varices [I86.4]  PMHx/PSHx:   has a past medical history of Asthma, Back pain, Diabetes mellitus (HCC), Hypothyroidism, Mass of nasal sinus, and Thyroid disease.   Procedure/Surgery:  TIPS, IR embolization    Precautions:  Falls, O2  Equipment Needs:  none     SUBJECTIVE:    Pt lives alone in a 1 story home with 4 stairs and 1 rail to enter.  Bedroom and bathroom are on the 1st level.  Pt ambulated with no AD, independent, drives PTA.  Pt has laundry in the basement.      OBJECTIVE:   Initial Evaluation  Date: 24  Treatment Short Term/ Long Term   Goals   AM-PAC 6 Clicks      Was pt agreeable to Eval/treatment? Yes      Does pt have pain? No reported pain      Bed Mobility  Rolling: SBA  Supine to sit: SBA  Sit to supine: NT  Scooting: SBA  Rolling: Independent  Supine to sit: Independent  Sit to supine: Independent  Scooting: Independent   Transfers Sit to stand: SBA  Stand to sit: SBA  Stand pivot: SBA  Sit to stand: Independent  Stand to sit: Independent  Stand pivot: Independent   Ambulation    400 feet with no AD SBA  >500 feet with no AD Independent   Stair negotiation: ascended and descended  NT  >8 steps with 1 rail Modified Independent     ROM BUE:  Per OT eval   BLE:  WFL     Strength BUE:  Per OT eval   BLE:  WFL     Balance Sitting EOB:  SBA  Dynamic Standing:  SBA  Sitting EOB:  Independent  Dynamic Standing:  Independent     Pt is A & O x 4  RASS:  0  CAM-ICU:  -  Sensation:  Pt denies numbness and tingling to extremities  Edema:  Unremarkable    Vitals:  Blood Pressure at rest 120/73  mmHg  Blood Pressure post session 125/65 mmHg   Heart Rate at rest 79 bpm  Heart Rate post session 94 bpm    SPO2 at rest 99% on 2 L  SPO2 post session 96% on RA 
Physical Therapy  Patient received and chart reviewed for PT evaluation. Pt for TIPS today and transferring to MICU post procedure. Will evaluate at later time.   Will follow up as appropriate.     Tawanna Pablo, PT, DPT  [id number]      
Progress Note  Date:2024       Room:[address] [place] [address]  Patient Name:Erna Stoner     YOB: 1960     Age:63 y.o.        Subjective    Subjective:  Symptoms:  Stable.    Diet:  She reports  nausea.       Review of Systems   Gastrointestinal:  Positive for nausea.     Objective         Vitals Last 24 Hours:  TEMPERATURE:  Temp  Av.3 °F (36.3 °C)  Min: 97 °F (36.1 °C)  Max: 97.6 °F (36.4 °C)  RESPIRATIONS RANGE: Resp  Av  Min: 16  Max: 18  PULSE OXIMETRY RANGE: SpO2  Av %  Min: 97 %  Max: 97 %  PULSE RANGE: Pulse  Av.8  Min: 74  Max: 84  BLOOD PRESSURE RANGE: Systolic (24hrs), Av , Min:82 , Max:102   ; Diastolic (24hrs), Av, Min:33, Max:61    I/O (24Hr):  No intake or output data in the 24 hours ending 24  Objective  Labs/Imaging/Diagnostics    Labs:  CBC:  Recent Labs     24   WBC 5.6   RBC 2.76*   HGB 7.6*   HCT 24.4*   MCV 88.4   RDW 15.9*   *     CHEMISTRIES:  Recent Labs     24  04324  0548 24  0602    140 143   K 3.9 3.6 3.7   * 110* 109*   CO2 21* 23 23   BUN 6 4* 3*   CREATININE 0.5 0.5 0.5   GLUCOSE 255* 111* 121*   PHOS 2.3*  --   --    MG 1.7  --   --      PT/INR:  Recent Labs     24  0436 24  0548 24  0602   PROTIME 16.6* 15.4* 14.9*   INR 1.5 1.4 1.4     APTT:  Recent Labs     24  1314 24  1856 24  0602   APTT 70.6* 59.5* 66.2*     LIVER PROFILE:  Recent Labs     24  0436 24  0548 24  0602   AST 72* 40* 31   ALT 84* 57* 46*   BILITOT 1.4* 1.3* 1.3*   ALKPHOS 130* 132* 141*       Imaging Last 24 Hours:  No results found.  Assessment//Plan           Hospital Problems             Last Modified POA    * (Principal) Gastric varices 2024 Yes    S/P TIPS (transjugular intrahepatic portosystemic shunt) 2024 Yes    Moderate protein-calorie malnutrition (HCC) 2024 Yes     Assessment & Plan the patient is status post a TIPS shunt placement.  She 
Pt Aptt 59.9   that is 2 consecutive WNL Aptt. No change In rate  14 units hours  Next Aptt in AM.  
Pt morning APTT 68.2  No change . Next APTT in am  
Pt was uncomfortable this evening, melatonin given as ordered when she woke up along with tylenol. Back rub with lotion to help ease her sore back.  
Pt will be moved for TIPS to tomorrow 4/23 d/t pt needing anesthesia for procedure. Procedure will be around noon. Please make pt NPO after midnight and draw new labs including INR in am. Pt can have sips of water with meds.   
RN notified Dr. Gallo of patients BP of 86/48 via phone messaging service.   
RN notified Dr. Gallo of pt stat H&H result of hbg 7.0.  
RN notified Dr. Montes De Oca of pt Hgb of 7.0. and of patient wishing to speak to him.   
RN notified shaeradha winston via perfect serve regarding patient BP of 78 systolic using doppler.     RN received verbal orders for 5a 00cc bolus of NS over 60 mins via phone from Dr. Gallo.     Mary Wilson RN    
RN spoke to Yana Muñoz via phone service about patient systolic bp of 64. Orders for a bolus of NS 500ml over 60 minutes was received verbally.   
Report called to [phone]. Belongings bagged with name sticker and ready to transport with pt to IR   
Spoke with RN regarding TIPS procedure. Patient is able to sign consent, is NPO. Will call when able to send for patient.  
Waiting on Aptt results from this morning labs.   
session    Cognition: A&O: 4/4; Follows 2 step commands   Memory: G   Comprehension: G   Problem solving: G   Judgement/safety: G               Communication skills: WFL           Vision: WFL per pt report               Glasses: reading                                                   Hearing: WFL     RASS: 0  CAM-ICU: (NT) Delirium    UE Assessment:   Hand Dominance: Right [x]  Left []     ROM Strength STM goal: PRN   RUE  WFL 5/5  : WFL   Increase overall strength for participation in ADLs.     LUE WFL 5/5  : WFL   Increase overall strength for participation in ADLs.       Sensation: No c/o numbness/tingling in extremities.  Tone: WNL   Edema: Unremarkable     Functional Assessment:  AM-PAC Daily Activity Raw Score: 18/24   Initial Eval Status  Date: 4/24/24 Treatment Status  Date:  STGs = LTGs  Time frame: 7-14 days   Feeding Set-up                      Ind       Grooming SBA  Standing at sink                      Ind        UB dressing/bathing Min A                      Ind       LB dressing/bathing Min A                      Ind        Toileting NT                      Ind     Bed Mobility  Supine to sit:   SBA    Sit to supine:   SBA                        Ind     Functional Transfers Sit to stand:   SBA    Stand to sit:   SBA    Stand Pivot:   SBA                        Ind     Functional Mobility SBA with no AD  For household distances                      Ind        Balance Sitting:     Static: Ind    Dynamic:SBA  Standing: SBA  Sitting:     Static:  Ind    Dynamic: Ind  Standing: Ind                   Endurance/Activity Tolerance   Good- tolerance with moderate activity.                         WFL  For full ADL/IADL tasks   Visual/  Perceptual   WFL                       Vitals:   HR at rest: 79 bpm HR at end of session: 94 bpm   SpO2 at rest: 99% SpO2 at end of session 96%   BP at rest: 120/73 mmHg BP at end of session 125/65 mmHg       Treatment: OT treatment provided this date includes:     
                   Vitals:   HR at rest: 94 bpm HR at end of session: 93 bpm   SpO2 at rest: 97% SpO2 at end of session 95%   BP at rest: 112/60 mmHg BP at end of session 107/67 mmHg       Treatment: OT treatment provided this date includes:     Instruction/training on safety and adapted techniques for completion of ADLs: to increase independence and safety in self-care.   Instruction/training on safe bed mobility/functional mobility/transfer techniques: with focus on safety, body mechanics, and precautions   Proper Positioning/Alignment: for optimal healing, skin integrity, to prevent breakdown, decrease edema, reduce risk of contracture, and encourage functional positioning for interaction with environment.  Skilled Monitoring of Vitals: to include BP, spO2, and HR throughout session to maximize safety.  Sitting/Standing Balance/Tolerance: to increase balance and activity tolerance during ADLs and facilitate proper posture and positioning.  Therapeutic activity: to challenge dynamic sitting/standing balance and endurance to promote safety during ADL tasks and functional transfers and mobility.  Delirium Prevention:  Environmental and sensory modifications assessed and implemented to decrease ICU acquired delirium and to improve overall orientation, mentation and pt interaction with family/staff.    Line management and environmental modifications made prior to and end of session to ensure patient safety and to increase efficiency of session.  Skilled monitoring of HR, O2 saturation, blood pressure and patient's response to activity performed throughout session.    Comments: Pt case discussed in rounds, OK from RN to see patient. Evaluation completed with PT collaboration d/t decreased functional status of patient and extensive line management. Upon arrival, patient in chair position in bed. Pt pleasant and agreeable to participate in therapy session.  Therapist facilitated ADL tasks & bed mobility to address safety 
  Lymph nodes:   Cervical, supraclavicular,nodes normal     Neurologic:   CNII-XII intact, normal strength, sensation            CBC:   Recent Labs     04/22/24  0536 04/23/24  0542 04/23/24 1957 04/24/24  0422   WBC 1.0* 1.3* 5.7 3.3*   HGB 7.2* 7.1* 9.3* 8.3*   HCT 22.2* 22.3* 28.4* 26.2*   PLT 87*  --  146 121*     BMP:    Recent Labs     04/22/24  0536 04/23/24  0542 04/24/24  0408    143 139   K 3.5 3.7 4.4   * 110* 106   CO2 22 26 24   BUN 3* 3* 6   CREATININE 0.6 0.6 0.6   GLUCOSE 169* 166* 213*   CALCIUM 8.0* 7.8* 8.0*   MG  --   --  1.8   PHOS  --   --  4.0     HEPATIC:   Recent Labs     04/22/24  0536 04/23/24  0542 04/24/24  0408   AST 30 31 275*   ALT 19 18 144*   BILITOT 0.7 0.6 1.1   ALKPHOS 69 69 103       INR:   Recent Labs     04/22/24  0536 04/23/24  0542   INR 1.4 1.4     Radiology and available imaging -   Personally reviewed    ASSESSMENT AND PLAN:  Portal hypertension went for TIPSS procedure on 04/23 . Has had variceal bleed in the past   Systemic sarcoidosis , no cardiac involvement , biopsy on 09/22 , follows normally at [facility]     Portal vein thrombosis on heparin gtt   PPI BID   Resume PO diet.   Gi following       Prophylaxis:  Stress ulcer: [x] PPI Agent [] H2RA [] Sucralfate [] Other:   VTE: [] Enoxaparin  [] SC Heparin  [] SCD, heparin gtt       Critical care time spent excluding separately billable procedures is 32 minutes.  Electronically signed by Law Gonzalez MD on 4/24/2024 at 9:57 AM         
Rena Louise APRN - CNP, 100 mcg at 04/24/24 0809    octreotide (SANDOSTATIN) 500 mcg in sodium chloride 0.9 % 100 mL infusion, 50 mcg/hr, IntraVENous, Continuous, Jory Redd APRN - CNP, Last Rate: 10 mL/hr at 04/24/24 0900, 50 mcg/hr at 04/24/24 0900    dextrose 5 % and 0.45 % sodium chloride infusion, , IntraVENous, Continuous, Jory Redd APRN - CNP, Last Rate: 75 mL/hr at 04/22/24 0306, New Bag at 04/22/24 0306     Allergies:  Patient has no known allergies.    ROS:  Aside from what was mentioned in the past medical history and history of present illness, essentially unremarkable, all others are negative.      Recent Labs     04/22/24  0536 04/23/24  0542 04/24/24  0408   INR 1.4 1.4  --    ALT 19 18 144*   AST 30 31 275*   ALKPHOS 69 69 103   BILITOT 0.7 0.6 1.1       Lab Results   Component Value Date    WBC 3.3 (L) 04/24/2024    HGB 8.3 (L) 04/24/2024    HCT 26.2 (L) 04/24/2024     (L) 04/24/2024     04/24/2024    K 4.4 04/24/2024     04/24/2024    CREATININE 0.6 04/24/2024    BUN 6 04/24/2024    CO2 24 04/24/2024    GLUCOSE 213 (H) 04/24/2024    INR 1.4 04/23/2024    APTT 100.8 (H) 04/24/2024    LABA1C 4.4 04/18/2024         PHYSICAL EXAM:  /64   Pulse 82   Temp 97.5 °F (36.4 °C) (Temporal)   Resp 18   Ht 1.575 m (5' 2.01\")   Wt 68.5 kg (151 lb)   SpO2 100%   BMI 27.61 kg/m²   Physical Exam:  General: Overall well-appearing, NAD  HEENT: PERRLA, EOMI, Anicteric sclera, MMM, no rhinorrhea  Cards: RRR, no LE edema  Resp: Breathing comfortably on room air, good air movement, no use of accessory muscles, no audible wheezing  Abdomen: soft, NT  Extremities: Moves all extremities, no effusions or bruising.  Skin: No rashes or jaundice  Neuro: A&O x 3, CN grossly intact, non-focal exam     Greater than or equal to 35 minutes was spent providing face-to-face patient care, including:  and coordinating care, reviewing the chart, labs, and diagnostics, as well 
coordinating care, reviewing the chart, labs, and diagnostics, as well as medical decision making. Greater than 50% of this time was spent instructing and counseling the patient face to face regarding findings and recommendations.    Thank you for including us in the care of this patient. Please do not hesitate to contact us with any additional questions or concerns.      Discussed with Dr. Montes De Oca.  Electronically signed by SHANE Jaramillo CNP on 4/25/2024 at 9:56 AM

## 2024-04-29 NOTE — CARE COORDINATION
CM update note.  Discharge plan is home with no needs when medically ready.  Her family will provide transport home on day of discharge.  Pt is s/p TIPS on 4/23.  She was placed on Heparin gtt following due to formation of thrombus.  Radiology recommending  medication such as Plavix or eliquis.  Pt given Eliquis card in case this is what is ordered at discharge.  Pt is hoping to go home today.  CM/SW to follow.  Tacho Lambert RN [phone].

## 2024-04-29 NOTE — DISCHARGE SUMMARY
[facility]   Discharge summary   Patient ID:  Erna Stoner  [id number]  63 y.o.  1960    Admit date: 4/18/2024    Discharge date and time: 4/29/2024    Admission Diagnoses:   Patient Active Problem List   Diagnosis    Hematemesis    Gastric varices    S/P TIPS (transjugular intrahepatic portosystemic shunt)    Moderate protein-calorie malnutrition (HCC)       Discharge Diagnoses: Hematemesis, portal vein thrombosis    Consults: ICU team, GI service, interventional radiology, general surgery    Procedures: TIPS procedure, EGD    Hospital Course:   Patient is a 63-year-old female admitted to [facility] for  TIPS procedure workup  -Monitor labs.  -H&H 7.6/23.1 which is down from yesterday of 8.3/25.5  -Continue to trend H&H  -GI following  -s/p EGD 4/18 at [facility] with esophageal coil eroding through the wall noted  -Echocardiogram with EF of 65%, no other acute findings  -GI planning for TIPS procedure following complete workup  -NPO until after TIPS procedure is completed  -Continue Sandostatin for hematemesis/varices  -Continue D5 and half-normal at 75  -Completion of CTA triphasic liver  -Continue to monitor on telemetry, patient is unfortunately at risk for decompensation given her tenuous status  -Transfer to ICU setting if hematemesis ensues  -Case was discussed with Dr. Noé Montilla at [facility]     4/20/2024  No acute events or issues overnight  Blood pressure remains low  Awaiting to procedure on Monday  Acute issues overnight  Continue to monitor closely     4/21/2024  Blood pressure quite low today-quired 1 L normal saline bolus  If persistent hypotension in spite of fluid boluses, may have to transition to ICU to observe more close sleep  No evidence of hematemesis, melena or hematochezia-no bowel movement  She is asymptomatic with low blood pressures  TIPS is still planned tomorrow  If abdominal pain or nausea, repeat CT abdomen  Recheck stat H&H

## 2024-04-29 NOTE — DISCHARGE INSTRUCTIONS
Per interventional radiology:     #1 Ultrasound in 3 months  #2  Continue anticoagulation for 6-8 weeks   #3 CT scan of the abdomen and pelvis in 3 months to assess for residual abdominal varices and planning for additional embolization of the track as necessary at that time.  4# Evaluate Ammonia level as outpatient    Internal medicine instructions:  Follow-up with PCP within 1 week to recheck ammonia level  You are now on Eliquis for anticoagulation  Contact interventional radiology if any questions regarding above ultrasounds and CT scans-this should be arranged by interventional radiology team  If any further vomiting up of blood, return to the emergency department immediately

## 2024-04-30 ENCOUNTER — HOSPITAL ENCOUNTER (OUTPATIENT)
Dept: OCCUPATIONAL THERAPY | Age: 64
Setting detail: THERAPIES SERIES
End: 2024-04-30
Payer: COMMERCIAL

## 2024-04-30 ENCOUNTER — HOSPITAL ENCOUNTER (OUTPATIENT)
Dept: PHYSICAL THERAPY | Age: 64
Setting detail: THERAPIES SERIES
End: 2024-04-30
Payer: COMMERCIAL

## 2024-04-30 LAB
EKG ATRIAL RATE: 82 BPM
EKG P AXIS: 38 DEGREES
EKG P-R INTERVAL: 136 MS
EKG Q-T INTERVAL: 386 MS
EKG QRS DURATION: 84 MS
EKG QTC CALCULATION (BAZETT): 450 MS
EKG R AXIS: 39 DEGREES
EKG T AXIS: 18 DEGREES
EKG VENTRICULAR RATE: 82 BPM

## 2024-04-30 PROCEDURE — 93010 ELECTROCARDIOGRAM REPORT: CPT | Performed by: INTERNAL MEDICINE

## 2024-05-07 ENCOUNTER — HOSPITAL ENCOUNTER (OUTPATIENT)
Dept: SPEECH THERAPY | Age: 64
Setting detail: THERAPIES SERIES
Discharge: HOME | End: 2024-05-07

## 2024-05-07 NOTE — PROGRESS NOTES
Speech-Language Pathology  Cancellation/No Show Note      For today's appointment patient:    [x]  Cancelled                  []  Rescheduled appointment    []  No show       []  Therapist cancelled             Reason given by patient:  []  No reason given  []  Conflicting appointment  []  No transportation  []  Conflict with work  []  Illness  []  Inclement weather   []  Insurance related issues  [x]  Other           Comments: Cancelled for May- got out of the hospital.    Continue as per established POC    Jennifer Jacques MS, CCC-SLP  5/7/2024

## 2024-05-08 ENCOUNTER — OFFICE VISIT (OUTPATIENT)
Dept: GASTROENTEROLOGY | Age: 64
End: 2024-05-08
Payer: COMMERCIAL

## 2024-05-08 VITALS
WEIGHT: 149 LBS | SYSTOLIC BLOOD PRESSURE: 120 MMHG | HEART RATE: 93 BPM | HEIGHT: 62 IN | BODY MASS INDEX: 27.42 KG/M2 | OXYGEN SATURATION: 98 % | TEMPERATURE: 97.6 F | DIASTOLIC BLOOD PRESSURE: 60 MMHG | RESPIRATION RATE: 16 BRPM

## 2024-05-08 DIAGNOSIS — I86.4 GASTRIC VARICES: ICD-10-CM

## 2024-05-08 DIAGNOSIS — Z95.828 S/P TIPS (TRANSJUGULAR INTRAHEPATIC PORTOSYSTEMIC SHUNT): ICD-10-CM

## 2024-05-08 DIAGNOSIS — K74.69 OTHER CIRRHOSIS OF LIVER (HCC): Primary | ICD-10-CM

## 2024-05-08 PROCEDURE — G8427 DOCREV CUR MEDS BY ELIG CLIN: HCPCS | Performed by: STUDENT IN AN ORGANIZED HEALTH CARE EDUCATION/TRAINING PROGRAM

## 2024-05-08 PROCEDURE — 99213 OFFICE O/P EST LOW 20 MIN: CPT | Performed by: STUDENT IN AN ORGANIZED HEALTH CARE EDUCATION/TRAINING PROGRAM

## 2024-05-08 PROCEDURE — G8419 CALC BMI OUT NRM PARAM NOF/U: HCPCS | Performed by: STUDENT IN AN ORGANIZED HEALTH CARE EDUCATION/TRAINING PROGRAM

## 2024-05-08 PROCEDURE — 1036F TOBACCO NON-USER: CPT | Performed by: STUDENT IN AN ORGANIZED HEALTH CARE EDUCATION/TRAINING PROGRAM

## 2024-05-08 PROCEDURE — 1111F DSCHRG MED/CURRENT MED MERGE: CPT | Performed by: STUDENT IN AN ORGANIZED HEALTH CARE EDUCATION/TRAINING PROGRAM

## 2024-05-08 PROCEDURE — 3017F COLORECTAL CA SCREEN DOC REV: CPT | Performed by: STUDENT IN AN ORGANIZED HEALTH CARE EDUCATION/TRAINING PROGRAM

## 2024-05-08 RX ORDER — LACTULOSE 10 G/15ML
20 SOLUTION ORAL 3 TIMES DAILY
Qty: 120 ML | Refills: 11 | Status: SHIPPED | OUTPATIENT
Start: 2024-05-08

## 2024-05-12 ENCOUNTER — APPOINTMENT (OUTPATIENT)
Dept: GENERAL RADIOLOGY | Age: 64
DRG: 283 | End: 2024-05-12
Payer: COMMERCIAL

## 2024-05-12 ENCOUNTER — HOSPITAL ENCOUNTER (INPATIENT)
Age: 64
LOS: 3 days | Discharge: HOME | DRG: 283 | End: 2024-05-18
Attending: EMERGENCY MEDICINE | Admitting: INTERNAL MEDICINE
Payer: COMMERCIAL

## 2024-05-12 DIAGNOSIS — R07.9 CHEST PAIN, UNSPECIFIED TYPE: Primary | ICD-10-CM

## 2024-05-12 DIAGNOSIS — D64.9 CHRONIC ANEMIA: ICD-10-CM

## 2024-05-12 LAB
ALBUMIN SERPL-MCNC: 3.2 G/DL (ref 3.5–5.2)
ALP SERPL-CCNC: 332 U/L (ref 35–104)
ALT SERPL-CCNC: 28 U/L (ref 0–32)
ANION GAP SERPL CALCULATED.3IONS-SCNC: 11 MMOL/L (ref 7–16)
AST SERPL-CCNC: 72 U/L (ref 0–31)
BASOPHILS # BLD: 0.05 K/UL (ref 0–0.2)
BASOPHILS NFR BLD: 2 % (ref 0–2)
BILIRUB SERPL-MCNC: 1.5 MG/DL (ref 0–1.2)
BNP SERPL-MCNC: 44 PG/ML (ref 0–125)
BUN SERPL-MCNC: 8 MG/DL (ref 6–23)
CALCIUM SERPL-MCNC: 8.9 MG/DL (ref 8.6–10.2)
CHLORIDE SERPL-SCNC: 106 MMOL/L (ref 98–107)
CO2 SERPL-SCNC: 26 MMOL/L (ref 22–29)
CREAT SERPL-MCNC: 0.6 MG/DL (ref 0.5–1)
EOSINOPHIL # BLD: 0.07 K/UL (ref 0.05–0.5)
EOSINOPHILS RELATIVE PERCENT: 3 % (ref 0–6)
ERYTHROCYTE [DISTWIDTH] IN BLOOD BY AUTOMATED COUNT: 16.4 % (ref 11.5–15)
GFR, ESTIMATED: >90 ML/MIN/1.73M2
GLUCOSE SERPL-MCNC: 108 MG/DL (ref 74–99)
HCT VFR BLD AUTO: 27.1 % (ref 34–48)
HGB BLD-MCNC: 8.2 G/DL (ref 11.5–15.5)
LYMPHOCYTES NFR BLD: 0.54 K/UL (ref 1.5–4)
LYMPHOCYTES RELATIVE PERCENT: 20 % (ref 20–42)
MCH RBC QN AUTO: 25.9 PG (ref 26–35)
MCHC RBC AUTO-ENTMCNC: 30.3 G/DL (ref 32–34.5)
MCV RBC AUTO: 85.5 FL (ref 80–99.9)
METAMYELOCYTES ABSOLUTE COUNT: 0.07 K/UL (ref 0–0.12)
METAMYELOCYTES: 3 % (ref 0–1)
MONOCYTES NFR BLD: 0.31 K/UL (ref 0.1–0.95)
MONOCYTES NFR BLD: 11 % (ref 2–12)
NEUTROPHILS NFR BLD: 62 % (ref 43–80)
NEUTS SEG NFR BLD: 1.67 K/UL (ref 1.8–7.3)
PLATELET # BLD AUTO: 189 K/UL (ref 130–450)
PMV BLD AUTO: 10.1 FL (ref 7–12)
POTASSIUM SERPL-SCNC: 3.7 MMOL/L (ref 3.5–5)
PROT SERPL-MCNC: 6.2 G/DL (ref 6.4–8.3)
RBC # BLD AUTO: 3.17 M/UL (ref 3.5–5.5)
RBC # BLD: ABNORMAL 10*6/UL
SODIUM SERPL-SCNC: 143 MMOL/L (ref 132–146)
TROPONIN I SERPL HS-MCNC: 9 NG/L (ref 0–9)
TROPONIN I SERPL HS-MCNC: 9 NG/L (ref 0–9)
WBC OTHER # BLD: 2.7 K/UL (ref 4.5–11.5)

## 2024-05-12 PROCEDURE — 99285 EMERGENCY DEPT VISIT HI MDM: CPT

## 2024-05-12 PROCEDURE — G0378 HOSPITAL OBSERVATION PER HR: HCPCS

## 2024-05-12 PROCEDURE — 6370000000 HC RX 637 (ALT 250 FOR IP): Performed by: STUDENT IN AN ORGANIZED HEALTH CARE EDUCATION/TRAINING PROGRAM

## 2024-05-12 PROCEDURE — 96374 THER/PROPH/DIAG INJ IV PUSH: CPT

## 2024-05-12 PROCEDURE — 96361 HYDRATE IV INFUSION ADD-ON: CPT

## 2024-05-12 PROCEDURE — 6370000000 HC RX 637 (ALT 250 FOR IP): Performed by: NURSE PRACTITIONER

## 2024-05-12 PROCEDURE — 96375 TX/PRO/DX INJ NEW DRUG ADDON: CPT

## 2024-05-12 PROCEDURE — 80053 COMPREHEN METABOLIC PANEL: CPT

## 2024-05-12 PROCEDURE — 83880 ASSAY OF NATRIURETIC PEPTIDE: CPT

## 2024-05-12 PROCEDURE — 85025 COMPLETE CBC W/AUTO DIFF WBC: CPT

## 2024-05-12 PROCEDURE — 6360000002 HC RX W HCPCS: Performed by: STUDENT IN AN ORGANIZED HEALTH CARE EDUCATION/TRAINING PROGRAM

## 2024-05-12 PROCEDURE — 84484 ASSAY OF TROPONIN QUANT: CPT

## 2024-05-12 PROCEDURE — 2580000003 HC RX 258: Performed by: NURSE PRACTITIONER

## 2024-05-12 PROCEDURE — 71045 X-RAY EXAM CHEST 1 VIEW: CPT

## 2024-05-12 PROCEDURE — 93005 ELECTROCARDIOGRAM TRACING: CPT | Performed by: STUDENT IN AN ORGANIZED HEALTH CARE EDUCATION/TRAINING PROGRAM

## 2024-05-12 PROCEDURE — 2580000003 HC RX 258: Performed by: STUDENT IN AN ORGANIZED HEALTH CARE EDUCATION/TRAINING PROGRAM

## 2024-05-12 RX ORDER — FENTANYL CITRATE 50 UG/ML
25 INJECTION, SOLUTION INTRAMUSCULAR; INTRAVENOUS ONCE
Status: COMPLETED | OUTPATIENT
Start: 2024-05-12 | End: 2024-05-12

## 2024-05-12 RX ORDER — POTASSIUM CHLORIDE 20 MEQ/1
40 TABLET, EXTENDED RELEASE ORAL PRN
Status: DISCONTINUED | OUTPATIENT
Start: 2024-05-12 | End: 2024-05-13

## 2024-05-12 RX ORDER — LACTULOSE 10 G/15ML
20 SOLUTION ORAL 3 TIMES DAILY
Status: DISCONTINUED | OUTPATIENT
Start: 2024-05-12 | End: 2024-05-18 | Stop reason: HOSPADM

## 2024-05-12 RX ORDER — ALENDRONATE SODIUM 70 MG/1
70 TABLET ORAL WEEKLY
Status: DISCONTINUED | OUTPATIENT
Start: 2024-05-12 | End: 2024-05-12 | Stop reason: RX

## 2024-05-12 RX ORDER — MAGNESIUM SULFATE IN WATER 40 MG/ML
2000 INJECTION, SOLUTION INTRAVENOUS PRN
Status: DISCONTINUED | OUTPATIENT
Start: 2024-05-12 | End: 2024-05-13

## 2024-05-12 RX ORDER — FOLIC ACID 1 MG/1
1 TABLET ORAL EVERY MORNING
Status: DISCONTINUED | OUTPATIENT
Start: 2024-05-12 | End: 2024-05-18 | Stop reason: HOSPADM

## 2024-05-12 RX ORDER — 0.9 % SODIUM CHLORIDE 0.9 %
1000 INTRAVENOUS SOLUTION INTRAVENOUS ONCE
Status: COMPLETED | OUTPATIENT
Start: 2024-05-12 | End: 2024-05-12

## 2024-05-12 RX ORDER — ACETAMINOPHEN 650 MG/1
650 SUPPOSITORY RECTAL EVERY 6 HOURS PRN
Status: DISCONTINUED | OUTPATIENT
Start: 2024-05-12 | End: 2024-05-18 | Stop reason: HOSPADM

## 2024-05-12 RX ORDER — SODIUM CHLORIDE 0.9 % (FLUSH) 0.9 %
5-40 SYRINGE (ML) INJECTION EVERY 12 HOURS SCHEDULED
Status: DISCONTINUED | OUTPATIENT
Start: 2024-05-12 | End: 2024-05-18 | Stop reason: HOSPADM

## 2024-05-12 RX ORDER — ASPIRIN 81 MG/1
81 TABLET, CHEWABLE ORAL DAILY
Status: DISCONTINUED | OUTPATIENT
Start: 2024-05-13 | End: 2024-05-15

## 2024-05-12 RX ORDER — SODIUM CHLORIDE 0.9 % (FLUSH) 0.9 %
10 SYRINGE (ML) INJECTION PRN
Status: DISCONTINUED | OUTPATIENT
Start: 2024-05-12 | End: 2024-05-18 | Stop reason: HOSPADM

## 2024-05-12 RX ORDER — PANTOPRAZOLE SODIUM 40 MG/1
40 TABLET, DELAYED RELEASE ORAL
Status: DISCONTINUED | OUTPATIENT
Start: 2024-05-12 | End: 2024-05-18 | Stop reason: HOSPADM

## 2024-05-12 RX ORDER — MIDODRINE HYDROCHLORIDE 5 MG/1
5 TABLET ORAL
Status: DISCONTINUED | OUTPATIENT
Start: 2024-05-12 | End: 2024-05-18 | Stop reason: HOSPADM

## 2024-05-12 RX ORDER — ONDANSETRON 2 MG/ML
4 INJECTION INTRAMUSCULAR; INTRAVENOUS EVERY 6 HOURS PRN
Status: DISCONTINUED | OUTPATIENT
Start: 2024-05-12 | End: 2024-05-18 | Stop reason: HOSPADM

## 2024-05-12 RX ORDER — ONDANSETRON 2 MG/ML
4 INJECTION INTRAMUSCULAR; INTRAVENOUS ONCE
Status: COMPLETED | OUTPATIENT
Start: 2024-05-12 | End: 2024-05-12

## 2024-05-12 RX ORDER — ACETAMINOPHEN 325 MG/1
650 TABLET ORAL EVERY 6 HOURS PRN
Status: DISCONTINUED | OUTPATIENT
Start: 2024-05-12 | End: 2024-05-18 | Stop reason: HOSPADM

## 2024-05-12 RX ORDER — SODIUM CHLORIDE 9 MG/ML
INJECTION, SOLUTION INTRAVENOUS PRN
Status: DISCONTINUED | OUTPATIENT
Start: 2024-05-12 | End: 2024-05-18 | Stop reason: HOSPADM

## 2024-05-12 RX ORDER — FERROUS SULFATE 325(65) MG
325 TABLET ORAL EVERY MORNING
Status: DISCONTINUED | OUTPATIENT
Start: 2024-05-12 | End: 2024-05-18 | Stop reason: HOSPADM

## 2024-05-12 RX ORDER — ONDANSETRON 4 MG/1
4 TABLET, ORALLY DISINTEGRATING ORAL EVERY 8 HOURS PRN
Status: DISCONTINUED | OUTPATIENT
Start: 2024-05-12 | End: 2024-05-18 | Stop reason: HOSPADM

## 2024-05-12 RX ORDER — POTASSIUM CHLORIDE 7.45 MG/ML
10 INJECTION INTRAVENOUS PRN
Status: DISCONTINUED | OUTPATIENT
Start: 2024-05-12 | End: 2024-05-13

## 2024-05-12 RX ORDER — SUCRALFATE 1 G/1
1 TABLET ORAL
Status: DISCONTINUED | OUTPATIENT
Start: 2024-05-12 | End: 2024-05-18 | Stop reason: HOSPADM

## 2024-05-12 RX ORDER — ASPIRIN 81 MG/1
324 TABLET, CHEWABLE ORAL ONCE
Status: COMPLETED | OUTPATIENT
Start: 2024-05-12 | End: 2024-05-12

## 2024-05-12 RX ORDER — LEVOTHYROXINE SODIUM 0.1 MG/1
100 TABLET ORAL
Status: DISCONTINUED | OUTPATIENT
Start: 2024-05-13 | End: 2024-05-18 | Stop reason: HOSPADM

## 2024-05-12 RX ORDER — DULAGLUTIDE 0.75 MG/.5ML
0.75 INJECTION, SOLUTION SUBCUTANEOUS WEEKLY
COMMUNITY

## 2024-05-12 RX ADMIN — SUCRALFATE 1 G: 1 TABLET ORAL at 20:45

## 2024-05-12 RX ADMIN — APIXABAN 5 MG: 5 TABLET, FILM COATED ORAL at 20:45

## 2024-05-12 RX ADMIN — FERROUS SULFATE TAB 325 MG (65 MG ELEMENTAL FE) 325 MG: 325 (65 FE) TAB at 13:22

## 2024-05-12 RX ADMIN — SUCRALFATE 1 G: 1 TABLET ORAL at 13:21

## 2024-05-12 RX ADMIN — PANTOPRAZOLE SODIUM 40 MG: 40 TABLET, DELAYED RELEASE ORAL at 16:50

## 2024-05-12 RX ADMIN — APIXABAN 5 MG: 5 TABLET, FILM COATED ORAL at 13:22

## 2024-05-12 RX ADMIN — ASPIRIN 324 MG: 81 TABLET, CHEWABLE ORAL at 07:07

## 2024-05-12 RX ADMIN — LACTULOSE 20 G: 20 SOLUTION ORAL at 20:45

## 2024-05-12 RX ADMIN — ONDANSETRON 4 MG: 2 INJECTION INTRAMUSCULAR; INTRAVENOUS at 07:06

## 2024-05-12 RX ADMIN — MIDODRINE HYDROCHLORIDE 5 MG: 5 TABLET ORAL at 16:50

## 2024-05-12 RX ADMIN — FOLIC ACID 1 MG: 1 TABLET ORAL at 13:22

## 2024-05-12 RX ADMIN — FENTANYL CITRATE 25 MCG: 50 INJECTION INTRAMUSCULAR; INTRAVENOUS at 07:45

## 2024-05-12 RX ADMIN — LACTULOSE 20 G: 20 SOLUTION ORAL at 16:51

## 2024-05-12 RX ADMIN — SODIUM CHLORIDE 1000 ML: 9 INJECTION, SOLUTION INTRAVENOUS at 07:58

## 2024-05-12 RX ADMIN — SUCRALFATE 1 G: 1 TABLET ORAL at 16:51

## 2024-05-12 RX ADMIN — SODIUM CHLORIDE, PRESERVATIVE FREE 10 ML: 5 INJECTION INTRAVENOUS at 20:45

## 2024-05-12 RX ADMIN — MIDODRINE HYDROCHLORIDE 5 MG: 5 TABLET ORAL at 13:22

## 2024-05-12 ASSESSMENT — LIFESTYLE VARIABLES
HOW OFTEN DO YOU HAVE A DRINK CONTAINING ALCOHOL: NEVER
HOW MANY STANDARD DRINKS CONTAINING ALCOHOL DO YOU HAVE ON A TYPICAL DAY: PATIENT DOES NOT DRINK

## 2024-05-12 ASSESSMENT — PAIN DESCRIPTION - LOCATION: LOCATION: CHEST

## 2024-05-12 ASSESSMENT — PAIN SCALES - GENERAL
PAINLEVEL_OUTOF10: 0
PAINLEVEL_OUTOF10: 7
PAINLEVEL_OUTOF10: 0

## 2024-05-12 NOTE — PROGRESS NOTES
Erna Stoner was ordered Synjardy which is a nonformulary medication.  This medication will need to be supplied by the patient as the pharmacy does not carry this non-formulary medication.    If the medication has not been administered by 1400 on the following day from the time the order was placed, a pharmacist will follow-up with the nurse of the patient to assess the capability of the patient to bring in the medication.    If it is determined that the patient cannot supply the medication and it is not available to be dispensed from the pharmacy, the provider will be notified.    Yehuda Mock [facility], 5/12/2024 12:16 PM

## 2024-05-12 NOTE — PROGRESS NOTES
Called [phone] and s/w Derik.  Informed him that patient will not be having nuclear stress today as Hgb 8.2 and Dr. Spaulding would like at 9.0 or above.  Told staff that patient may eat but keep NPO after MN for possible stress tomorrow.    Clair Matthews RN, BSN

## 2024-05-12 NOTE — PROGRESS NOTES
Patient was admitted to the unit with the following belongings: Eyeglasses,shirt,pants,undergarments, sandals, , phone, keys, and jacket.

## 2024-05-12 NOTE — PROGRESS NOTES
4 Eyes Skin Assessment     NAME:  Erna Stoner  YOB: 1960  MEDICAL RECORD NUMBER:  [id number]    The patient is being assessed for  Admission    I agree that at least one RN has performed a thorough Head to Toe Skin Assessment on the patient. ALL assessment sites listed below have been assessed.      Areas assessed by both nurses:    Head, Face, Ears, Shoulders, Back, Chest, Arms, Elbows, Hands, Sacrum. Buttock, Coccyx, Ischium, Legs. Feet and Heels, and Under Medical Devices         Does the Patient have a Wound? No noted wound(s)       Jose M Prevention initiated by RN: No  Wound Care Orders initiated by RN: No    Pressure Injury (Stage 3,4, Unstageable, DTI, NWPT, and Complex wounds) if present, place Wound referral order by RN under : No    New Ostomies, if present place, Ostomy referral order under : No     Nurse 1 eSignature: Electronically signed by Carola Pinto RN on 5/12/24 at 4:58 PM EDT    **SHARE this note so that the co-signing nurse can place an eSignature**    Nurse 2 eSignature: Electronically signed by Allison Alvarado RN on 5/12/24 at 5:01 PM EDT

## 2024-05-12 NOTE — PROGRESS NOTES
Pharmacy Note    This patient was ordered Fosamax. Per the Pharmacy & Therapeutics Committee, this medication is non-formulary and not stocked by pharmacy for the reason indicated below. The medication can be reordered at discharge.     Medications in which risks outweigh benefits during hospitalization:           -  oral bisphosphonates         -  raloxifene (Evista)        -  SGLT2 inhibitors (ordered in the hospital for an indication other than heart failure or chronic kidney disease)    Medications that lack necessity during an acute hospital stay:        -  nasal antihistamines        -  nasal ipratropium 0.03% and 0.06%        -  nasal miacalcin        -  acyclovir topical cream/ointment orders for herpes labialis (cold sores)      Yehuda Mock [facility], 5/12/2024 12:12 PM

## 2024-05-12 NOTE — ED PROVIDER NOTES
ATTENDING PROVIDER ATTESTATION:     Erna Stoner presented to the emergency department for evaluation of Chest Pain (Patient complaining of chest pain for the past few days.  Patient states that the pain radiates to her back.  Rates pain 7/10.  )   and was initially evaluated by the Medical Resident. See Original ED Note for H&P and ED course above.     I have reviewed and discussed the case, including pertinent history (medical, surgical, family and social) and exam findings with the Medical Resident assigned to Erna Stoner.  I have personally performed and/or participated in the history, exam, medical decision making, and procedures and agree with all pertinent clinical information and any additional changes or corrections are noted below in my assessment and plan. I have discussed this patient in detail with the resident, and provided the instruction and education,       I have reviewed my findings and recommendations with the assigned Medical Resident, Ernasanthosh Avilaell and members of family present at the time of disposition.      I have performed a history and physical examination of this patient and directly supervised the resident caring for this patient              [facility]  EMERGENCY DEPARTMENT ENCOUNTER        Pt Name: Erna Stoner  MRN: [id number]  Birthdate 1960  Date of evaluation: 5/12/2024  Provider: Lawrence House MD  PCP: Elia Daniel MD  Note Started: 6:56 AM EDT 5/12/24    CHIEF COMPLAINT       Chief Complaint   Patient presents with    Chest Pain     Patient complaining of chest pain for the past few days.  Patient states that the pain radiates to her back.  Rates pain 7/10.         HISTORY OF PRESENT ILLNESS: 1 or more Elements     Limitations to history : None    Erna Stoner is a 63 y.o. female who presents for chest pain. Intermittent chest pain for the last few days. Pressure type pain radiating to the neck/back. Not sharp or stabbing, not ripping or taring. No other  evidence of hematemesis, melena or hematochezia-no bowel movement  She is asymptomatic with low blood pressures  TIPS is still planned tomorrow  If abdominal pain or nausea, repeat CT abdomen  Recheck stat H&H     4/22/2024  TIPS procedure has been postponed till tomorrow now due to patient requiring anesthesia  H&H 7.2 today  Hypotensive yesterday, mildly symptomatic-responded well to IV fluids 1 L normal saline  Hemoglobin had dropped from 8.4 at 7:30 AM to 7. when it was rechecked in the afternoon due to hypotension  Continue supportive care until TIPSS procedure after which she will be moved to ICU setting to monitor  No other acute complaints     4/23/2024  Patient for TIPS   Patient will transfer to the ICU unit  Discussion held with patient at bedside prior to surgical procedure today  Vital signs stable  No evidence of bleeds     4/24/2024  Being monitored in ICU setting post TIPS procedure yesterday which she tolerated well  Due to finding of blood clots, she is currently on a heparin drip which she is tolerating well  If H&H remains stable, okay for out of ICU from medicine standpoint  Current H&H 8.3/26 down from 9/28 last night  Hemodynamically stable  Advance diet at discretion of GI service     4/25/34  Awaiting transfer out of icu   No a cute events   Heparin drip still on board-DC at discretion of GI/ICU team  If hgb remains stable, should be able to dc next 24 to 48 hrs if ok w GI   H&H have remained stable since TIPS procedure     4/26/2024  Transferred out of ICU  Continues to be hypotensive  Midodrine 5 mg tid  Received fluid bolus   H&H - 7.6/24.4  Patient is symptomatic with hypotension  Had Zanaflex yesterday evening and has been groggy since then  LFT improving  Has not had bowel movement yet  Rck am labs.  BP checks q 4 hours.  IV hydration     4/27/2024  Patient continues on heparin drip - defer to GI on anticoagulation for portal vein thrombus  Sbp improving slowly with the addition of

## 2024-05-12 NOTE — ED PROVIDER NOTES
Left ventricle size is normal. Normal wall thickness. Normal wall motion. Normal diastolic function.    Right Ventricle: Normal systolic function, TAPSE 2.8cm.    Aortic Valve: No significant stenosis.    Mitral Valve: Trace regurgitation.    Tricuspid Valve: Trace regurgitation, RVSP 28mmHg.    Pericardium: No pericardial effusion.    No prior study to compare.    Chronic conditions: diabetes, thyroid disease, asthma and hypothyroidism    CONSULTS: IM      Disposition:   Admission    Consultation with IM who accepted the patient for admission.  The patient will be admitted for further treatment and evaluation for   1. Chest pain, unspecified type    2. Chronic anemia          Re-Evaluations/Consultations:             ED Course as of 05/12/24 1141   Sun May 12, 2024   0656 EKG:  This EKG is signed and interpreted by me.    Rate: 90  Rhythm: Sinus  Interpretation: NSR with ventricular rate of 90 bpm.  KS interval normal. QTc mildly prolonged at 481 ms.  No STEMI  Comparison: stable as compared to patient's most recent EKG   [PP]   0718 I agree with the resident's interpretation of the EKG that is documented in the chart   [CD]   0747 CMP(!):    Sodium 143   Potassium 3.7   Chloride 106   CARBON DIOXIDE 26   Anion Gap 11   Glucose 108(!)   BUN,BUNPL 8   Creatinine 0.6   Est, Glom Filt Rate >90   Calcium 8.9   Total Protein 6.2(!)   Albumin 3.2(!)   Total Bilirubin 1.5(!)   Alkaline Phosphatase 332(!)   ALT 28   AST 72(!) [PP]   0747 WBC(!): 2.7 [PP]   0747 Hemoglobin Quant(!): 8.2 [PP]   0747 Hematocrit(!): 27.1 [PP]   0747 Platelet Count: 189 [PP]   0747 Troponin, High Sensitivity: 9 [PP]   0747 Pro-BNP: 44 [PP]   0753 Heart Score: 4 [PP]   0838 Patient was accepted for admission by Shreya under Dr. Gallo. [PP]      ED Course User Index  [CD] Lawrence House MD  [PP] Katie Najera,          This patient's ED course included: History, physical examination, reevaluation prior to disposition    This patient has

## 2024-05-13 ENCOUNTER — APPOINTMENT (OUTPATIENT)
Age: 64
DRG: 283 | End: 2024-05-13
Payer: COMMERCIAL

## 2024-05-13 ENCOUNTER — HOSPITAL ENCOUNTER (OUTPATIENT)
Age: 64
Discharge: HOME | End: 2024-05-13

## 2024-05-13 ENCOUNTER — APPOINTMENT (OUTPATIENT)
Dept: NUCLEAR MEDICINE | Age: 64
DRG: 283 | End: 2024-05-13
Payer: COMMERCIAL

## 2024-05-13 LAB
ANION GAP SERPL CALCULATED.3IONS-SCNC: 12 MMOL/L (ref 7–16)
BUN SERPL-MCNC: 6 MG/DL (ref 6–23)
CALCIUM SERPL-MCNC: 8.7 MG/DL (ref 8.6–10.2)
CHLORIDE SERPL-SCNC: 110 MMOL/L (ref 98–107)
CHOLEST SERPL-MCNC: 115 MG/DL
CO2 SERPL-SCNC: 22 MMOL/L (ref 22–29)
CREAT SERPL-MCNC: 0.6 MG/DL (ref 0.5–1)
ECHO BSA: 1.06 M2
ERYTHROCYTE [DISTWIDTH] IN BLOOD BY AUTOMATED COUNT: 16.2 % (ref 11.5–15)
GFR, ESTIMATED: >90 ML/MIN/1.73M2
GLUCOSE BLD-MCNC: 118 MG/DL (ref 74–99)
GLUCOSE BLD-MCNC: 136 MG/DL (ref 74–99)
GLUCOSE BLD-MCNC: 93 MG/DL (ref 74–99)
GLUCOSE SERPL-MCNC: 95 MG/DL (ref 74–99)
HCT VFR BLD AUTO: 26.4 % (ref 34–48)
HDLC SERPL-MCNC: 34 MG/DL
HGB BLD-MCNC: 8.1 G/DL (ref 11.5–15.5)
LDLC SERPL CALC-MCNC: 61 MG/DL
MCH RBC QN AUTO: 25.5 PG (ref 26–35)
MCHC RBC AUTO-ENTMCNC: 30.7 G/DL (ref 32–34.5)
MCV RBC AUTO: 83 FL (ref 80–99.9)
PLATELET # BLD AUTO: 187 K/UL (ref 130–450)
PMV BLD AUTO: 10.7 FL (ref 7–12)
POTASSIUM SERPL-SCNC: 3.9 MMOL/L (ref 3.5–5)
RBC # BLD AUTO: 3.18 M/UL (ref 3.5–5.5)
SODIUM SERPL-SCNC: 144 MMOL/L (ref 132–146)
STRESS BASELINE DIAS BP: 56 MMHG
STRESS BASELINE HR: 79 BPM
STRESS BASELINE SYS BP: 100 MMHG
STRESS ESTIMATED WORKLOAD: 1 METS
STRESS PEAK DIAS BP: 56 MMHG
STRESS PEAK SYS BP: 100 MMHG
STRESS PERCENT HR ACHIEVED: 76 %
STRESS POST PEAK HR: 121 BPM
STRESS RATE PRESSURE PRODUCT: NORMAL BPM*MMHG
STRESS TARGET HR: 159 BPM
TRIGL SERPL-MCNC: 99 MG/DL
VLDLC SERPL CALC-MCNC: 20 MG/DL
WBC OTHER # BLD: 2.3 K/UL (ref 4.5–11.5)

## 2024-05-13 PROCEDURE — 6360000002 HC RX W HCPCS: Performed by: INTERNAL MEDICINE

## 2024-05-13 PROCEDURE — 80048 BASIC METABOLIC PNL TOTAL CA: CPT

## 2024-05-13 PROCEDURE — 93017 CV STRESS TEST TRACING ONLY: CPT

## 2024-05-13 PROCEDURE — 78452 HT MUSCLE IMAGE SPECT MULT: CPT | Performed by: INTERNAL MEDICINE

## 2024-05-13 PROCEDURE — 80061 LIPID PANEL: CPT

## 2024-05-13 PROCEDURE — 86900 BLOOD TYPING SEROLOGIC ABO: CPT

## 2024-05-13 PROCEDURE — G0378 HOSPITAL OBSERVATION PER HR: HCPCS

## 2024-05-13 PROCEDURE — 85027 COMPLETE CBC AUTOMATED: CPT

## 2024-05-13 PROCEDURE — A9500 TC99M SESTAMIBI: HCPCS | Performed by: RADIOLOGY

## 2024-05-13 PROCEDURE — 93016 CV STRESS TEST SUPVJ ONLY: CPT | Performed by: INTERNAL MEDICINE

## 2024-05-13 PROCEDURE — 36415 COLL VENOUS BLD VENIPUNCTURE: CPT

## 2024-05-13 PROCEDURE — 6370000000 HC RX 637 (ALT 250 FOR IP): Performed by: NURSE PRACTITIONER

## 2024-05-13 PROCEDURE — 86901 BLOOD TYPING SEROLOGIC RH(D): CPT

## 2024-05-13 PROCEDURE — 86923 COMPATIBILITY TEST ELECTRIC: CPT

## 2024-05-13 PROCEDURE — 93018 CV STRESS TEST I&R ONLY: CPT | Performed by: INTERNAL MEDICINE

## 2024-05-13 PROCEDURE — 82962 GLUCOSE BLOOD TEST: CPT

## 2024-05-13 PROCEDURE — 78452 HT MUSCLE IMAGE SPECT MULT: CPT

## 2024-05-13 PROCEDURE — 86850 RBC ANTIBODY SCREEN: CPT

## 2024-05-13 PROCEDURE — 2580000003 HC RX 258: Performed by: NURSE PRACTITIONER

## 2024-05-13 PROCEDURE — 83036 HEMOGLOBIN GLYCOSYLATED A1C: CPT

## 2024-05-13 PROCEDURE — 3430000000 HC RX DIAGNOSTIC RADIOPHARMACEUTICAL: Performed by: RADIOLOGY

## 2024-05-13 RX ORDER — TETRAKIS(2-METHOXYISOBUTYLISOCYANIDE)COPPER(I) TETRAFLUOROBORATE 1 MG/ML
10.8 INJECTION, POWDER, LYOPHILIZED, FOR SOLUTION INTRAVENOUS
Status: COMPLETED | OUTPATIENT
Start: 2024-05-13 | End: 2024-05-13

## 2024-05-13 RX ORDER — SODIUM CHLORIDE 9 MG/ML
INJECTION, SOLUTION INTRAVENOUS PRN
Status: DISCONTINUED | OUTPATIENT
Start: 2024-05-13 | End: 2024-05-18 | Stop reason: HOSPADM

## 2024-05-13 RX ORDER — DEXTROSE MONOHYDRATE 100 MG/ML
INJECTION, SOLUTION INTRAVENOUS CONTINUOUS PRN
Status: DISCONTINUED | OUTPATIENT
Start: 2024-05-13 | End: 2024-05-18 | Stop reason: HOSPADM

## 2024-05-13 RX ORDER — GLUCAGON 1 MG/ML
1 KIT INJECTION PRN
Status: DISCONTINUED | OUTPATIENT
Start: 2024-05-13 | End: 2024-05-18 | Stop reason: HOSPADM

## 2024-05-13 RX ORDER — TETRAKIS(2-METHOXYISOBUTYLISOCYANIDE)COPPER(I) TETRAFLUOROBORATE 1 MG/ML
30 INJECTION, POWDER, LYOPHILIZED, FOR SOLUTION INTRAVENOUS
Status: COMPLETED | OUTPATIENT
Start: 2024-05-13 | End: 2024-05-13

## 2024-05-13 RX ORDER — REGADENOSON 0.08 MG/ML
0.4 INJECTION, SOLUTION INTRAVENOUS
Status: COMPLETED | OUTPATIENT
Start: 2024-05-13 | End: 2024-05-13

## 2024-05-13 RX ORDER — INSULIN LISPRO 100 [IU]/ML
0-4 INJECTION, SOLUTION INTRAVENOUS; SUBCUTANEOUS NIGHTLY
Status: DISCONTINUED | OUTPATIENT
Start: 2024-05-13 | End: 2024-05-18 | Stop reason: HOSPADM

## 2024-05-13 RX ORDER — INSULIN LISPRO 100 [IU]/ML
0-4 INJECTION, SOLUTION INTRAVENOUS; SUBCUTANEOUS
Status: DISCONTINUED | OUTPATIENT
Start: 2024-05-13 | End: 2024-05-18 | Stop reason: HOSPADM

## 2024-05-13 RX ADMIN — Medication 32 MILLICURIE: at 14:40

## 2024-05-13 RX ADMIN — FOLIC ACID 1 MG: 1 TABLET ORAL at 08:52

## 2024-05-13 RX ADMIN — SUCRALFATE 1 G: 1 TABLET ORAL at 16:51

## 2024-05-13 RX ADMIN — LEVOTHYROXINE SODIUM 100 MCG: 0.1 TABLET ORAL at 08:52

## 2024-05-13 RX ADMIN — MIDODRINE HYDROCHLORIDE 5 MG: 5 TABLET ORAL at 11:17

## 2024-05-13 RX ADMIN — APIXABAN 5 MG: 5 TABLET, FILM COATED ORAL at 21:21

## 2024-05-13 RX ADMIN — SUCRALFATE 1 G: 1 TABLET ORAL at 08:53

## 2024-05-13 RX ADMIN — SODIUM CHLORIDE, PRESERVATIVE FREE 10 ML: 5 INJECTION INTRAVENOUS at 21:21

## 2024-05-13 RX ADMIN — APIXABAN 5 MG: 5 TABLET, FILM COATED ORAL at 08:54

## 2024-05-13 RX ADMIN — Medication 10.8 MILLICURIE: at 12:43

## 2024-05-13 RX ADMIN — MIDODRINE HYDROCHLORIDE 5 MG: 5 TABLET ORAL at 08:53

## 2024-05-13 RX ADMIN — ASPIRIN 81 MG: 81 TABLET, CHEWABLE ORAL at 08:52

## 2024-05-13 RX ADMIN — SUCRALFATE 1 G: 1 TABLET ORAL at 11:17

## 2024-05-13 RX ADMIN — PANTOPRAZOLE SODIUM 40 MG: 40 TABLET, DELAYED RELEASE ORAL at 16:51

## 2024-05-13 RX ADMIN — PANTOPRAZOLE SODIUM 40 MG: 40 TABLET, DELAYED RELEASE ORAL at 08:53

## 2024-05-13 RX ADMIN — SODIUM CHLORIDE, PRESERVATIVE FREE 10 ML: 5 INJECTION INTRAVENOUS at 08:54

## 2024-05-13 RX ADMIN — FERROUS SULFATE TAB 325 MG (65 MG ELEMENTAL FE) 325 MG: 325 (65 FE) TAB at 08:53

## 2024-05-13 RX ADMIN — LACTULOSE 20 G: 20 SOLUTION ORAL at 21:21

## 2024-05-13 RX ADMIN — ACETAMINOPHEN 650 MG: 325 TABLET ORAL at 22:42

## 2024-05-13 RX ADMIN — MIDODRINE HYDROCHLORIDE 5 MG: 5 TABLET ORAL at 16:51

## 2024-05-13 RX ADMIN — REGADENOSON 0.4 MG: 0.08 INJECTION, SOLUTION INTRAVENOUS at 14:38

## 2024-05-13 RX ADMIN — SUCRALFATE 1 G: 1 TABLET ORAL at 21:21

## 2024-05-13 ASSESSMENT — PAIN DESCRIPTION - LOCATION
LOCATION: HEAD
LOCATION: BACK;NECK

## 2024-05-13 ASSESSMENT — PAIN SCALES - GENERAL
PAINLEVEL_OUTOF10: 7
PAINLEVEL_OUTOF10: 7
PAINLEVEL_OUTOF10: 0

## 2024-05-13 ASSESSMENT — PAIN DESCRIPTION - DESCRIPTORS
DESCRIPTORS: ACHING;STABBING
DESCRIPTORS: ACHING

## 2024-05-13 NOTE — H&P
Hospital Medicine History & Physical      PCP: Elia Daniel MD    Date of Admission: 2024    Date of Service: .MAY 13 , 2024    Chief Complaint:   CHEST PAIN      History Of Present Illness:     63 y.o. female presented with CHEST PAIN, ONSET 2 WEEKS AGO, THROBBING IN CHARACTER, LOCATED ACW, NO RADIATING, CONTINUOUS, POS NV,  POS SOB AND DIAPHORESIS     Past Medical History:          Diagnosis Date    Asthma     stable, is mild    Back pain     Diabetes mellitus (HCC)     Hypothyroidism     Mass of nasal sinus     for OR 20     Thyroid disease        Past Surgical History:          Procedure Laterality Date    CARPAL TUNNEL RELEASE      bilateral     SECTION      CHOLECYSTECTOMY      COLONOSCOPY  2012    HYSTERECTOMY (CERVIX STATUS UNKNOWN)      IR EMBOLIZATION VENOUS  2024    IR EMBOLIZATION VENOUS 2024 JENNIFER Pack MD [facility]    IR TIPS INSERTION  2024    IR TIPS INSERTION 2024 JENNIFER Pack MD [facility]    LIVER BIOPSY  2017    NOSE SURGERY N/A 2020    EXCISIONAL BIOPSY OF RIGHT NARES (PATHOLOGY NOTIFIED) performed by Bandar Solis Jr., MD at [facility] OR    OVARY REMOVAL      TONSILLECTOMY      UPPER GASTROINTESTINAL ENDOSCOPY  2012    UPPER GASTROINTESTINAL ENDOSCOPY  2024    ESOPHAGOGASTRODUODENOSCOPY CONTROL HEMORRHAGE performed by Noé Montilla MD at [facility] ENDOSCOPY    UPPER GASTROINTESTINAL ENDOSCOPY  2024    ESOPHAGOGASTRODUODENOSCOPY SUBMUCOSAL INJECTION performed by Augie Montes De Oca MD at [facility] ENDOSCOPY       Medications Prior to Admission:      Prior to Admission medications    Medication Sig Start Date End Date Taking? Authorizing Provider   dulaglutide (TRULICITY) 0.75 MG/0.5ML SOPN SC injection Inject 0.5 mLs into the skin once a week   Yes Provider,  (SYNTHROID) 100 MCG tablet Take 1 tablet by mouth every morning (before breakfast)    Provider, MD Kim       Allergies:  Patient has no known allergies.    Social History:      The patient currently lives  ALONE     TOBACCO:   reports that she has never smoked. She has never used smokeless tobacco.  ETOH:   reports current alcohol use.      Family History:       Reviewed in detail and negative for DM, CAD, Cancer, CVA. Positive as follows:        Problem Relation Age of Onset    Diabetes Mother     High Blood Pressure Mother     Heart Disease Mother 70        stent    Heart Disease Father 70        CAD    Breast Cancer Maternal Aunt 80 - 89    Breast Cancer Maternal Aunt 80 - 89       REVIEW OF SYSTEMS:   Pertinent positives as noted in the HPI. All other systems reviewed and negative.    PHYSICAL EXAM:    BP (!) 115/58   Pulse 74   Temp 97.6 °F (36.4 °C) (Temporal)   Resp 18   Ht 0.62 m (2' 0.41\") Comment: 5'2\"  Wt 65.3 kg (144 lb)   SpO2 95%   .93 kg/m²     General appearance:  No apparent distress, appears stated age.  HEENT:  Normal cephalic, atraumatic without obvious deformity. Pupils equal, round, and reactive to light.  Extra ocular muscles intact. Conjunctivae/corneas clear.  Neck: Supple, with full range of motion. No jugular venous distention. Trachea midline.  Respiratory:  Normal respiratory effort. Clear to auscultation,   Cardiovascular:  RRR  Abdomen: Soft, non-tender, non-distended with normal bowel sounds.  Musculoskeletal:  No clubbing, cyanosis or edema bilaterally.    Skin: smooth and dry   Neurologic:   Cranial nerves: II-XII intact,   Psychiatric:  Alert and oriented x 3        Labs:     Recent Labs     05/12/24  0658 05/13/24  0647   WBC 2.7* 2.3*   HGB 8.2* 8.1*   HCT 27.1* 26.4*    187     Recent Labs     05/12/24  0658 05/13/24  0647    144   K 3.7 3.9    110*   CO2 26 22   BUN 8 6   CREATININE 0.6 0.6   CALCIUM 8.9 8.7     Recent Labs

## 2024-05-13 NOTE — CARE COORDINATION
Pt admitted for chest pain, stress test ordered. Cardiology on board. Results pending for stress test. Pt was on Eliquis prior to admission. Will follow for any discharge needs.Carly Chavez, MSW, LSW

## 2024-05-14 LAB
ANION GAP SERPL CALCULATED.3IONS-SCNC: 16 MMOL/L (ref 7–16)
BUN SERPL-MCNC: 7 MG/DL (ref 6–23)
CALCIUM SERPL-MCNC: 8.9 MG/DL (ref 8.6–10.2)
CHLORIDE SERPL-SCNC: 111 MMOL/L (ref 98–107)
CO2 SERPL-SCNC: 20 MMOL/L (ref 22–29)
CREAT SERPL-MCNC: 0.6 MG/DL (ref 0.5–1)
EKG ATRIAL RATE: 90 BPM
EKG P AXIS: 74 DEGREES
EKG P-R INTERVAL: 148 MS
EKG Q-T INTERVAL: 394 MS
EKG QRS DURATION: 80 MS
EKG QTC CALCULATION (BAZETT): 481 MS
EKG R AXIS: 79 DEGREES
EKG T AXIS: 56 DEGREES
EKG VENTRICULAR RATE: 90 BPM
ERYTHROCYTE [DISTWIDTH] IN BLOOD BY AUTOMATED COUNT: 16 % (ref 11.5–15)
GFR, ESTIMATED: >90 ML/MIN/1.73M2
GLUCOSE BLD-MCNC: 101 MG/DL (ref 74–99)
GLUCOSE BLD-MCNC: 116 MG/DL (ref 74–99)
GLUCOSE BLD-MCNC: 119 MG/DL (ref 74–99)
GLUCOSE BLD-MCNC: 97 MG/DL (ref 74–99)
GLUCOSE SERPL-MCNC: 122 MG/DL (ref 74–99)
HCT VFR BLD AUTO: 27.4 % (ref 34–48)
HGB BLD-MCNC: 8.3 G/DL (ref 11.5–15.5)
MCH RBC QN AUTO: 25.7 PG (ref 26–35)
MCHC RBC AUTO-ENTMCNC: 30.3 G/DL (ref 32–34.5)
MCV RBC AUTO: 84.8 FL (ref 80–99.9)
PLATELET # BLD AUTO: 177 K/UL (ref 130–450)
PMV BLD AUTO: 10.1 FL (ref 7–12)
POTASSIUM SERPL-SCNC: 3.6 MMOL/L (ref 3.5–5)
RBC # BLD AUTO: 3.23 M/UL (ref 3.5–5.5)
SODIUM SERPL-SCNC: 147 MMOL/L (ref 132–146)
WBC OTHER # BLD: 2.3 K/UL (ref 4.5–11.5)

## 2024-05-14 PROCEDURE — 85027 COMPLETE CBC AUTOMATED: CPT

## 2024-05-14 PROCEDURE — 2580000003 HC RX 258: Performed by: NURSE PRACTITIONER

## 2024-05-14 PROCEDURE — 96375 TX/PRO/DX INJ NEW DRUG ADDON: CPT

## 2024-05-14 PROCEDURE — 93010 ELECTROCARDIOGRAM REPORT: CPT | Performed by: INTERNAL MEDICINE

## 2024-05-14 PROCEDURE — G0378 HOSPITAL OBSERVATION PER HR: HCPCS

## 2024-05-14 PROCEDURE — 82962 GLUCOSE BLOOD TEST: CPT

## 2024-05-14 PROCEDURE — APPSS180 APP SPLIT SHARED TIME > 60 MINUTES: Performed by: NURSE PRACTITIONER

## 2024-05-14 PROCEDURE — 6370000000 HC RX 637 (ALT 250 FOR IP): Performed by: NURSE PRACTITIONER

## 2024-05-14 PROCEDURE — 96376 TX/PRO/DX INJ SAME DRUG ADON: CPT

## 2024-05-14 PROCEDURE — 6360000002 HC RX W HCPCS: Performed by: NURSE PRACTITIONER

## 2024-05-14 PROCEDURE — 36415 COLL VENOUS BLD VENIPUNCTURE: CPT

## 2024-05-14 PROCEDURE — 6360000002 HC RX W HCPCS

## 2024-05-14 PROCEDURE — 80048 BASIC METABOLIC PNL TOTAL CA: CPT

## 2024-05-14 PROCEDURE — 99223 1ST HOSP IP/OBS HIGH 75: CPT | Performed by: INTERNAL MEDICINE

## 2024-05-14 RX ORDER — TRAMADOL HYDROCHLORIDE 50 MG/1
25 TABLET ORAL ONCE
Status: COMPLETED | OUTPATIENT
Start: 2024-05-14 | End: 2024-05-14

## 2024-05-14 RX ORDER — KETOROLAC TROMETHAMINE 30 MG/ML
30 INJECTION, SOLUTION INTRAMUSCULAR; INTRAVENOUS ONCE
Status: COMPLETED | OUTPATIENT
Start: 2024-05-14 | End: 2024-05-14

## 2024-05-14 RX ADMIN — FERROUS SULFATE TAB 325 MG (65 MG ELEMENTAL FE) 325 MG: 325 (65 FE) TAB at 09:03

## 2024-05-14 RX ADMIN — PANTOPRAZOLE SODIUM 40 MG: 40 TABLET, DELAYED RELEASE ORAL at 15:39

## 2024-05-14 RX ADMIN — LACTULOSE 20 G: 20 SOLUTION ORAL at 09:04

## 2024-05-14 RX ADMIN — MIDODRINE HYDROCHLORIDE 5 MG: 5 TABLET ORAL at 09:03

## 2024-05-14 RX ADMIN — SUCRALFATE 1 G: 1 TABLET ORAL at 23:12

## 2024-05-14 RX ADMIN — SODIUM CHLORIDE, PRESERVATIVE FREE 10 ML: 5 INJECTION INTRAVENOUS at 09:04

## 2024-05-14 RX ADMIN — KETOROLAC TROMETHAMINE 30 MG: 30 INJECTION, SOLUTION INTRAMUSCULAR at 00:11

## 2024-05-14 RX ADMIN — SUCRALFATE 1 G: 1 TABLET ORAL at 16:42

## 2024-05-14 RX ADMIN — SUCRALFATE 1 G: 1 TABLET ORAL at 06:02

## 2024-05-14 RX ADMIN — MIDODRINE HYDROCHLORIDE 5 MG: 5 TABLET ORAL at 16:42

## 2024-05-14 RX ADMIN — SUCRALFATE 1 G: 1 TABLET ORAL at 11:31

## 2024-05-14 RX ADMIN — PANTOPRAZOLE SODIUM 40 MG: 40 TABLET, DELAYED RELEASE ORAL at 06:02

## 2024-05-14 RX ADMIN — APIXABAN 5 MG: 5 TABLET, FILM COATED ORAL at 23:13

## 2024-05-14 RX ADMIN — FOLIC ACID 1 MG: 1 TABLET ORAL at 09:03

## 2024-05-14 RX ADMIN — ASPIRIN 81 MG: 81 TABLET, CHEWABLE ORAL at 09:04

## 2024-05-14 RX ADMIN — ONDANSETRON 4 MG: 2 INJECTION INTRAMUSCULAR; INTRAVENOUS at 21:48

## 2024-05-14 RX ADMIN — ONDANSETRON 4 MG: 2 INJECTION INTRAMUSCULAR; INTRAVENOUS at 11:34

## 2024-05-14 RX ADMIN — TRAMADOL HYDROCHLORIDE 25 MG: 50 TABLET ORAL at 23:13

## 2024-05-14 RX ADMIN — MIDODRINE HYDROCHLORIDE 5 MG: 5 TABLET ORAL at 11:31

## 2024-05-14 RX ADMIN — LEVOTHYROXINE SODIUM 100 MCG: 0.1 TABLET ORAL at 06:02

## 2024-05-14 RX ADMIN — APIXABAN 5 MG: 5 TABLET, FILM COATED ORAL at 09:03

## 2024-05-14 RX ADMIN — SODIUM CHLORIDE, PRESERVATIVE FREE 10 ML: 5 INJECTION INTRAVENOUS at 21:48

## 2024-05-14 ASSESSMENT — PAIN DESCRIPTION - FREQUENCY: FREQUENCY: CONTINUOUS

## 2024-05-14 ASSESSMENT — PAIN SCALES - GENERAL
PAINLEVEL_OUTOF10: 8
PAINLEVEL_OUTOF10: 8

## 2024-05-14 ASSESSMENT — PAIN DESCRIPTION - LOCATION
LOCATION: BACK
LOCATION: BACK;HEAD

## 2024-05-14 ASSESSMENT — PAIN DESCRIPTION - ORIENTATION: ORIENTATION: LOWER

## 2024-05-14 ASSESSMENT — PAIN DESCRIPTION - DESCRIPTORS
DESCRIPTORS: ACHING
DESCRIPTORS: ACHING

## 2024-05-14 ASSESSMENT — PAIN DESCRIPTION - PAIN TYPE: TYPE: CHRONIC PAIN

## 2024-05-14 ASSESSMENT — PAIN - FUNCTIONAL ASSESSMENT: PAIN_FUNCTIONAL_ASSESSMENT: ACTIVITIES ARE NOT PREVENTED

## 2024-05-14 NOTE — CONSULTS
Inpatient Cardiology Consultation      Reason for Consult: Abnormal Stress Test    Consulting Physician: Dr. Spaulding    Requesting Physician: SHANE Fritz     Date of Consultation: 5/14/2024    HISTORY OF PRESENT ILLNESS:   Ms. Stoner is a 63-year-old  female who is previously not known to [facility] in [place], [place].  Her medical history as stated below.  Ms. Stoner presented to [facility] ED on 05/12/2024 with complaints of chest pain.  She states that prior to presentation after she underwent TIPS on 04/23/2024, she has been having intermittent midsternal chest tightness, stabbing, aching that last several hours in duration.  She denies change in her chest discomfort with eating, deep inspiration, or exertion.  She states at times with exertion she has dyspnea.  She states that she has been having good oral intake.  States that since the TIPS procedure she has had increased fatigue.  She denies nausea, vomiting, diarrhea.  She also denies black, bloody, tarry stools.  Upon arrival to the ED her VS oral temperature 98.2-91-18-99% RA-100/59.  EKG SR.  WBC 2.7.  H&H 8.2/27.1.  .  K3.7.  BUN/SCR 8/0.6.  proBNP 44.  Troponin 6, 9, 9.  CXR unremarkable.  She received  mg, fentanyl 25 mcg, Zofran 4 mg, NS 1 L bolus.  She was admitted to a telemetry monitored unit.  She underwent a Lexiscan MPS on 05/13/2024 that revealed an EF of 72% with a small reversible anterolateral perfusion abnormality with a normal size LV chamber and normal LV systolic function.  Low risk of cardiac events suggested.  Subsequently, Cardiology was consulted for management of a normal stress test.      Please note: past medical records were reviewed per electronic medical record (EMR) - see detailed reports under Past Medical/ Surgical History.   Past Medical and Surgical History:    TTE 04/19/2024 (Dr. Handley): Left Ventricle: Normal left ventricular systolic function with EF of 65%.  review of systems, and data.  I reviewed available records.  All of the assessments and recommendations are personally from me.  I personally counseled and educated the patient and coordinated care with other healthcare professionals as needed.  I communicated the above and results to patient's family/caregiver if asked or needed.  All of the above cardiac medical decisions are personally from me.  Please see my additional contributions to the history, physical exam, assessment, and recommendations below.      History of chief complaint:  She states that ever since her TIPS procedure she has had intermittent episodes of chest tightness which then turned into a stabbing discomfort.  These last for hours and occur at rest.  They are associated with nausea.  She also has dyspnea on exertion.    Review of systems:     Heart: as above   Lungs: as above   Eyes: denies changes in vision or discharge.    Ears: denies changes in hearing or pain.   Nose: denies epistaxis or masses   Throat: denies sore throat or trouble swallowing.    Neuro: denies numbness, tingling, tremors.   Skin: denies rashes or itching.   : denies hematuria, dysuria   GI: denies vomiting, diarrhea   Psych: denies mood changed, anxiety, depression.       Physical exam:  /68   Pulse 91   Temp 99.4 °F (37.4 °C) (Temporal)   Resp 16   Ht 1.575 m (5' 2\")   Wt 67.2 kg (148 lb 3.2 oz)   SpO2 96%   BMI 27.11 kg/m²   Constitutional: A&O x3, communicates well, no acute distress.  Eyes: extraocular muscles intact, PERRL.  Normal lids & conjunctiva.  No icterus.   ENT: clear, no bleeding.  No external masses.  Lips normal formation.  Neck: supple, full ROM, no JVD, no bruits, no lymphadenopathy.  No masses.  trachea midline.  Heart: regular rate & rhythm, normal S1 & S2, no abnormal murmurs.  No heave.  Lungs: CTA.  No accessory muscles.  Abd: soft, non-tender.  Normal bowel sounds.   Neuro: Full ROM X 4, EOMI, no tremors.  EXT: No bilateral lower

## 2024-05-14 NOTE — PROGRESS NOTES
Cardiology consult placed via [employer] [facility] to [facility].  Maisha MENJIVAR taking messages

## 2024-05-14 NOTE — PLAN OF CARE
Problem: Discharge Planning  Goal: Discharge to home or other facility with appropriate resources  5/14/2024 1229 by Ashley Wolf RN  Outcome: Progressing  5/14/2024 0324 by Jerilyn Sheldon RN  Outcome: Progressing     Problem: Safety - Adult  Goal: Free from fall injury  5/14/2024 1229 by Ashley Wolf RN  Outcome: Progressing  5/14/2024 0324 by Jerilyn Sheldon RN  Outcome: Progressing     Problem: ABCDS Injury Assessment  Goal: Absence of physical injury  5/14/2024 1229 by Ashley Wolf RN  Outcome: Progressing  5/14/2024 0324 by Jerilyn Sheldon RN  Outcome: Progressing     Problem: Pain  Goal: Verbalizes/displays adequate comfort level or baseline comfort level  5/14/2024 1229 by Ashley Wolf RN  Outcome: Progressing  5/14/2024 0324 by Jerilyn Sheldon RN  Outcome: Progressing

## 2024-05-14 NOTE — PROGRESS NOTES
Comprehensive Nutrition Assessment    Type and Reason for Visit:  Initial, Positive Nutrition Screen    Nutrition Recommendations/Plan:   Continue current diet  Modify ONS to Glucerna BID     Malnutrition Assessment:  Malnutrition Status:  Moderate malnutrition (05/14/24 1538)    Context:  Acute Illness     Findings of the 6 clinical characteristics of malnutrition:  Energy Intake:  75% or less of estimated energy requirements for 7 or more days  Weight Loss:  No significant weight loss     Body Fat Loss:  No significant body fat loss     Muscle Mass Loss:  Mild muscle mass loss    Fluid Accumulation:  No significant fluid accumulation     Strength:  Not Performed    Nutrition Assessment:    Pt admit 2/2 chest pain s/p abnormal stress test. Pt recently admitted s/p TIPS procedure 2/2 gastric varices. PMHx DM, sarcoidosis leading to cirrhosis (follows w/ [facility]), moderate malnutrition. Meets criteria for moderate malnutrition. Will modify current ONS and monitor.    Nutrition Related Findings:    pt alert, active BS, no edema, fluids WDL, hypernatremia Wound Type: None       Current Nutrition Intake & Therapies:    Average Meal Intake: 26-50%     ADULT DIET; Regular  ADULT ORAL NUTRITION SUPPLEMENT; Breakfast, Lunch, Dinner; Standard High Calorie/High Protein Oral Supplement    Anthropometric Measures:  Height: 157.5 cm (5' 2\")  Ideal Body Weight (IBW): 110 lbs (50 kg)    Admission Body Weight: 65.8 kg (145 lb) (5/12 actual)  Current Body Weight: 67.1 kg (148 lb) (5/14 bed scale), 134.5 % IBW.    Current BMI (kg/m2): 27.1  Usual Body Weight: 68.5 kg (151 lb) (4/18/24 bed scale, per EMR)  % Weight Change (Calculated): -2                    BMI Categories: Overweight (BMI 25.0-29.9)    Estimated Daily Nutrient Needs:  Energy Requirements Based On: Formula  Weight Used for Energy Requirements: Current  Energy (kcal/day): MSJ x 1.2 SF = 8557-2657  Weight Used for Protein Requirements: Ideal  Protein (g/day): 65-75

## 2024-05-14 NOTE — PROGRESS NOTES
[facility]                                Progress note    Subjective:    The patient is awake and alert.    Resting in bed  States yesterday evening had another episode of chest/back pain that brought her into the emergency room  Denies any chest pain or shortness of breath currently    Objective:    /68   Pulse 91   Temp 99.4 °F (37.4 °C) (Temporal)   Resp 16   Ht 1.575 m (5' 2\")   Wt 67.2 kg (148 lb 3.2 oz)   SpO2 96%   BMI 27.11 kg/m²     In: 720 [P.O.:720]  Out: 0   In: 720   Out: 0     General appearance: NAD, conversant, mildly jaundiced  HEENT: AT/NC, MMM  Neck: FROM, supple  Lungs: Clear to auscultation  CV: RRR, no MRGs  Vasc: Radial pulses 2+  Abdomen: Soft, non-tender; no masses or HSM  Extremities: No peripheral edema or digital cyanosis  Skin: no rash, lesions or ulcers  Psych: Alert and oriented to person, place and time  Neuro: Alert and interactive     Recent Labs     05/12/24  0658 05/13/24  0647 05/14/24  0620   WBC 2.7* 2.3* 2.3*   HGB 8.2* 8.1* 8.3*   HCT 27.1* 26.4* 27.4*    187 177       Recent Labs     05/12/24  0658 05/13/24  0647 05/14/24  0620    144 147*   K 3.7 3.9 3.6    110* 111*   CO2 26 22 20*   BUN 8 6 7   CREATININE 0.6 0.6 0.6   CALCIUM 8.9 8.7 8.9       Assessment:    Principal Problem:    Chest pain  Active Problems:    Moderate protein-calorie malnutrition (HCC)  Resolved Problems:    * No resolved hospital problems. *      Plan:  Patient is a 63-year-old female admitted to [facility] for  Chest pain  -Abnormal stress test  -Cardiology consulted  -Await further input from cardiology  -Continue home Eliquis for thrombus noted at the shunt from her TIPS procedure per IR  -Labs stable  -Vital signs stable      Code status: Full  Consultants: Cardiology    DVT Prophylaxis Eliquis  PT/OT  Discharge planning       I have spent a total time of 30 minutes of this patient encounter reviewing chart, labs, coordinating care with  Warm

## 2024-05-15 LAB
ANION GAP SERPL CALCULATED.3IONS-SCNC: 13 MMOL/L (ref 7–16)
ANION GAP SERPL CALCULATED.3IONS-SCNC: 15 MMOL/L (ref 7–16)
BUN SERPL-MCNC: 7 MG/DL (ref 6–23)
BUN SERPL-MCNC: 7 MG/DL (ref 6–23)
CALCIUM SERPL-MCNC: 8.7 MG/DL (ref 8.6–10.2)
CALCIUM SERPL-MCNC: 9.1 MG/DL (ref 8.6–10.2)
CHLORIDE SERPL-SCNC: 108 MMOL/L (ref 98–107)
CHLORIDE SERPL-SCNC: 109 MMOL/L (ref 98–107)
CO2 SERPL-SCNC: 19 MMOL/L (ref 22–29)
CO2 SERPL-SCNC: 20 MMOL/L (ref 22–29)
CREAT SERPL-MCNC: 0.5 MG/DL (ref 0.5–1)
CREAT SERPL-MCNC: 0.5 MG/DL (ref 0.5–1)
ERYTHROCYTE [DISTWIDTH] IN BLOOD BY AUTOMATED COUNT: 15.9 % (ref 11.5–15)
ESTIMATED AVERAGE GLUCOSE: 74 MG/DL
GFR, ESTIMATED: >90 ML/MIN/1.73M2
GFR, ESTIMATED: >90 ML/MIN/1.73M2
GLUCOSE BLD-MCNC: 104 MG/DL (ref 74–99)
GLUCOSE BLD-MCNC: 158 MG/DL (ref 74–99)
GLUCOSE BLD-MCNC: 86 MG/DL (ref 74–99)
GLUCOSE BLD-MCNC: 99 MG/DL (ref 74–99)
GLUCOSE SERPL-MCNC: 103 MG/DL (ref 74–99)
GLUCOSE SERPL-MCNC: 95 MG/DL (ref 74–99)
HBA1C MFR BLD: 4.2 %
HCT VFR BLD AUTO: 29.5 % (ref 34–48)
HGB BLD-MCNC: 9 G/DL (ref 11.5–15.5)
MAGNESIUM SERPL-MCNC: 2 MG/DL (ref 1.6–2.6)
MCH RBC QN AUTO: 25.4 PG (ref 26–35)
MCHC RBC AUTO-ENTMCNC: 30.5 G/DL (ref 32–34.5)
MCV RBC AUTO: 83.3 FL (ref 80–99.9)
PLATELET # BLD AUTO: 205 K/UL (ref 130–450)
PMV BLD AUTO: 10.4 FL (ref 7–12)
POTASSIUM SERPL-SCNC: 3.9 MMOL/L (ref 3.5–5)
POTASSIUM SERPL-SCNC: 4 MMOL/L (ref 3.5–5)
RBC # BLD AUTO: 3.54 M/UL (ref 3.5–5.5)
SODIUM SERPL-SCNC: 142 MMOL/L (ref 132–146)
SODIUM SERPL-SCNC: 142 MMOL/L (ref 132–146)
WBC OTHER # BLD: 4.1 K/UL (ref 4.5–11.5)

## 2024-05-15 PROCEDURE — 85027 COMPLETE CBC AUTOMATED: CPT

## 2024-05-15 PROCEDURE — 83735 ASSAY OF MAGNESIUM: CPT

## 2024-05-15 PROCEDURE — 6370000000 HC RX 637 (ALT 250 FOR IP): Performed by: NURSE PRACTITIONER

## 2024-05-15 PROCEDURE — 2580000003 HC RX 258: Performed by: NURSE PRACTITIONER

## 2024-05-15 PROCEDURE — 2060000000 HC ICU INTERMEDIATE R&B

## 2024-05-15 PROCEDURE — 82962 GLUCOSE BLOOD TEST: CPT

## 2024-05-15 PROCEDURE — G0378 HOSPITAL OBSERVATION PER HR: HCPCS

## 2024-05-15 PROCEDURE — 6360000002 HC RX W HCPCS: Performed by: NURSE PRACTITIONER

## 2024-05-15 PROCEDURE — 80048 BASIC METABOLIC PNL TOTAL CA: CPT

## 2024-05-15 PROCEDURE — 99232 SBSQ HOSP IP/OBS MODERATE 35: CPT | Performed by: INTERNAL MEDICINE

## 2024-05-15 PROCEDURE — 36415 COLL VENOUS BLD VENIPUNCTURE: CPT

## 2024-05-15 RX ORDER — ASPIRIN 81 MG/1
81 TABLET ORAL DAILY
Status: DISCONTINUED | OUTPATIENT
Start: 2024-05-16 | End: 2024-05-18 | Stop reason: HOSPADM

## 2024-05-15 RX ORDER — ASPIRIN 81 MG/1
81 TABLET ORAL DAILY
Status: DISCONTINUED | OUTPATIENT
Start: 2024-05-15 | End: 2024-05-15

## 2024-05-15 RX ADMIN — MIDODRINE HYDROCHLORIDE 5 MG: 5 TABLET ORAL at 11:57

## 2024-05-15 RX ADMIN — ONDANSETRON 4 MG: 2 INJECTION INTRAMUSCULAR; INTRAVENOUS at 17:56

## 2024-05-15 RX ADMIN — SODIUM CHLORIDE, PRESERVATIVE FREE 10 ML: 5 INJECTION INTRAVENOUS at 22:02

## 2024-05-15 RX ADMIN — LACTULOSE 20 G: 20 SOLUTION ORAL at 22:01

## 2024-05-15 RX ADMIN — FOLIC ACID 1 MG: 1 TABLET ORAL at 09:06

## 2024-05-15 RX ADMIN — PANTOPRAZOLE SODIUM 40 MG: 40 TABLET, DELAYED RELEASE ORAL at 05:59

## 2024-05-15 RX ADMIN — LEVOTHYROXINE SODIUM 100 MCG: 0.1 TABLET ORAL at 05:59

## 2024-05-15 RX ADMIN — APIXABAN 5 MG: 5 TABLET, FILM COATED ORAL at 09:06

## 2024-05-15 RX ADMIN — MIDODRINE HYDROCHLORIDE 5 MG: 5 TABLET ORAL at 09:06

## 2024-05-15 RX ADMIN — ASPIRIN 81 MG: 81 TABLET, CHEWABLE ORAL at 09:05

## 2024-05-15 RX ADMIN — SUCRALFATE 1 G: 1 TABLET ORAL at 05:59

## 2024-05-15 RX ADMIN — FERROUS SULFATE TAB 325 MG (65 MG ELEMENTAL FE) 325 MG: 325 (65 FE) TAB at 09:06

## 2024-05-15 RX ADMIN — SUCRALFATE 1 G: 1 TABLET ORAL at 22:01

## 2024-05-15 RX ADMIN — SODIUM CHLORIDE, PRESERVATIVE FREE 10 ML: 5 INJECTION INTRAVENOUS at 09:07

## 2024-05-15 RX ADMIN — PANTOPRAZOLE SODIUM 40 MG: 40 TABLET, DELAYED RELEASE ORAL at 16:38

## 2024-05-15 RX ADMIN — SUCRALFATE 1 G: 1 TABLET ORAL at 11:57

## 2024-05-15 RX ADMIN — APIXABAN 5 MG: 5 TABLET, FILM COATED ORAL at 22:01

## 2024-05-15 RX ADMIN — SUCRALFATE 1 G: 1 TABLET ORAL at 16:38

## 2024-05-15 RX ADMIN — MIDODRINE HYDROCHLORIDE 5 MG: 5 TABLET ORAL at 16:38

## 2024-05-15 ASSESSMENT — PAIN DESCRIPTION - DESCRIPTORS
DESCRIPTORS: ACHING
DESCRIPTORS: ACHING

## 2024-05-15 ASSESSMENT — PAIN SCALES - GENERAL
PAINLEVEL_OUTOF10: 5
PAINLEVEL_OUTOF10: 5

## 2024-05-15 ASSESSMENT — PAIN DESCRIPTION - FREQUENCY
FREQUENCY: CONTINUOUS
FREQUENCY: CONTINUOUS

## 2024-05-15 ASSESSMENT — PAIN DESCRIPTION - PAIN TYPE
TYPE: CHRONIC PAIN
TYPE: CHRONIC PAIN

## 2024-05-15 ASSESSMENT — PAIN - FUNCTIONAL ASSESSMENT
PAIN_FUNCTIONAL_ASSESSMENT: ACTIVITIES ARE NOT PREVENTED
PAIN_FUNCTIONAL_ASSESSMENT: ACTIVITIES ARE NOT PREVENTED

## 2024-05-15 ASSESSMENT — PAIN DESCRIPTION - LOCATION
LOCATION: BACK
LOCATION: BACK

## 2024-05-15 ASSESSMENT — PAIN DESCRIPTION - ORIENTATION
ORIENTATION: LOWER
ORIENTATION: LOWER

## 2024-05-15 NOTE — PROGRESS NOTES
[facility]                                Progress note    Subjective:    The patient is awake and alert.    Family at bedside  Resting comfortably in bed    Objective:    BP (!) 114/56   Pulse 90   Temp 97.8 °F (36.6 °C) (Temporal)   Resp 18   Ht 1.575 m (5' 2\")   Wt 67.2 kg (148 lb 3.2 oz)   SpO2 94%   BMI 27.11 kg/m²     In: 240 [P.O.:240]  Out: -   In: 240   Out: -     General appearance: NAD, conversant, mildly jaundiced  HEENT: AT/NC, MMM  Neck: FROM, supple  Lungs: Clear to auscultation  CV: RRR, no MRGs  Vasc: Radial pulses 2+  Abdomen: Soft, non-tender; no masses or HSM  Extremities: No peripheral edema or digital cyanosis  Skin: no rash, lesions or ulcers  Psych: Alert and oriented to person, place and time  Neuro: Alert and interactive     Recent Labs     05/13/24  0647 05/14/24  0620 05/15/24  0608   WBC 2.3* 2.3* 4.1*   HGB 8.1* 8.3* 9.0*   HCT 26.4* 27.4* 29.5*    177 205         Recent Labs     05/14/24  0620 05/15/24  0608 05/15/24  0831   * 142 142   K 3.6 4.0 3.9   * 108* 109*   CO2 20* 19* 20*   BUN 7 7 7   CREATININE 0.6 0.5 0.5   CALCIUM 8.9 9.1 8.7         Assessment:    Principal Problem:    Chest pain  Active Problems:    Moderate protein-calorie malnutrition (HCC)  Resolved Problems:    * No resolved hospital problems. *      Plan:  Patient is a 63-year-old female admitted to [facility] for  Chest pain  -Abnormal stress test  -Cardiology consulted  -Await further input from cardiology  -Continue home Eliquis for thrombus noted at the shunt from her TIPS procedure per IR  -Labs stable  -Vital signs stable    5/15/24:  -Not candidate for CT coronary that was originally ordered  -Await further input from cardiology regarding other plans  -Labs stable  -Vital signs stable  -Discharge planning pending completion of cardiology workup.      Code status: Full  Consultants: Cardiology    DVT Prophylaxis Eliquis  PT/OT  Discharge planning       I have

## 2024-05-15 NOTE — PROCEDURES
8040 spoke to pts rn regarding ordered coronary cta.  Instructed to keep pt npo, verify rac piv is diffusics, if not, pt must have #20 diffusics.  Call DR Mckeon, notify pf pts history, vs, rhythm, and taking ,midodrine.  To call this department madie pts HR is consistently below 60 to proceed with test.

## 2024-05-15 NOTE — CARE COORDINATION
Reviewed chart, pt had abnormal stress, pt unable to get coronary CT, not a candidate, await cardiology plans. Will follow for discharge needs.Carly Chavez, MSW, LSW

## 2024-05-15 NOTE — PROGRESS NOTES
PROGRESS NOTE     CARDIOLOGY    Chief complaint: Seen today for follow up, management & recommendations for chest pain, abnormal stress test    She denies chest pain or shortness of breath today.  She was ambulating from the bathroom back to her bed.  She was comfortable and in no distress    Wt Readings from Last 3 Encounters:   05/14/24 67.2 kg (148 lb 3.2 oz)   05/08/24 67.6 kg (149 lb)   04/18/24 68.5 kg (151 lb)     Temp Readings from Last 3 Encounters:   05/15/24 97.9 °F (36.6 °C) (Temporal)   05/08/24 97.6 °F (36.4 °C) (Temporal)   04/29/24 97.1 °F (36.2 °C) (Temporal)     BP Readings from Last 3 Encounters:   05/15/24 95/67   05/08/24 120/60   04/29/24 (!) 96/48     Pulse Readings from Last 3 Encounters:   05/15/24 80   05/08/24 93   04/29/24 83         Intake/Output Summary (Last 24 hours) at 5/15/2024 1909  Last data filed at 5/15/2024 1835  Gross per 24 hour   Intake 120 ml   Output --   Net 120 ml       Recent Labs     05/13/24  0647 05/14/24  0620 05/15/24  0608   WBC 2.3* 2.3* 4.1*   HGB 8.1* 8.3* 9.0*   HCT 26.4* 27.4* 29.5*   MCV 83.0 84.8 83.3    177 205     Recent Labs     05/14/24  0620 05/15/24  0608 05/15/24  0831   * 142 142   K 3.6 4.0 3.9   * 108* 109*   CO2 20* 19* 20*   BUN 7 7 7   CREATININE 0.6 0.5 0.5   MG  --   --  2.0     No results for input(s): \"PROTIME\", \"INR\" in the last 72 hours.  No results for input(s): \"CKTOTAL\", \"CKMB\", \"CKMBINDEX\", \"TROPONINI\" in the last 72 hours.  No results for input(s): \"BNP\" in the last 72 hours.  Recent Labs     05/13/24  0647   CHOL 115   HDL 34*   TRIG 99     No results for input(s): \"TROPHS\" in the last 72 hours.      0.9 % sodium chloride infusion, PRN  insulin lispro (HUMALOG,ADMELOG) injection vial 0-4 Units, TID WC  insulin lispro (HUMALOG,ADMELOG) injection vial 0-4 Units, Nightly  glucose chewable tablet 16 g, PRN  dextrose bolus 10% 125 mL, PRN   Or  dextrose bolus 10% 250 mL, PRN  glucagon injection 1 mg, PRN  dextrose

## 2024-05-15 NOTE — PLAN OF CARE
Problem: Discharge Planning  Goal: Discharge to home or other facility with appropriate resources  5/15/2024 1008 by Ashley Wolf RN  Outcome: Progressing  5/15/2024 0319 by Rena Matute RN  Outcome: Progressing     Problem: Safety - Adult  Goal: Free from fall injury  5/15/2024 1008 by Ashley Wolf RN  Outcome: Progressing  5/15/2024 0319 by Rena Matute RN  Outcome: Progressing     Problem: ABCDS Injury Assessment  Goal: Absence of physical injury  5/15/2024 1008 by Ashley Wolf RN  Outcome: Progressing  5/15/2024 0319 by Rena Matute RN  Outcome: Progressing     Problem: Pain  Goal: Verbalizes/displays adequate comfort level or baseline comfort level  5/15/2024 1008 by Ashley Wolf RN  Outcome: Progressing  5/15/2024 0319 by Rena Matute RN  Outcome: Progressing     Problem: Nutrition Deficit:  Goal: Optimize nutritional status  5/15/2024 0319 by Rena Matute RN  Outcome: Progressing

## 2024-05-15 NOTE — PROGRESS NOTES
Spoke with Dr. Mckeon regarding Coronary CTA. Patient is currently taking midodrine with blood pressure of 98/55. Patient is not a candidate per Dr. Mckeon for Coronary CTA.

## 2024-05-16 LAB
AMMONIA PLAS-SCNC: 181 UMOL/L (ref 11–51)
ANION GAP SERPL CALCULATED.3IONS-SCNC: 14 MMOL/L (ref 7–16)
BUN SERPL-MCNC: 8 MG/DL (ref 6–23)
CALCIUM SERPL-MCNC: 8.8 MG/DL (ref 8.6–10.2)
CHLORIDE SERPL-SCNC: 110 MMOL/L (ref 98–107)
CO2 SERPL-SCNC: 21 MMOL/L (ref 22–29)
CREAT SERPL-MCNC: 0.6 MG/DL (ref 0.5–1)
GFR, ESTIMATED: >90 ML/MIN/1.73M2
GLUCOSE BLD-MCNC: 115 MG/DL (ref 74–99)
GLUCOSE BLD-MCNC: 123 MG/DL (ref 74–99)
GLUCOSE BLD-MCNC: 153 MG/DL (ref 74–99)
GLUCOSE BLD-MCNC: 91 MG/DL (ref 74–99)
GLUCOSE SERPL-MCNC: 85 MG/DL (ref 74–99)
MAGNESIUM SERPL-MCNC: 2.2 MG/DL (ref 1.6–2.6)
POTASSIUM SERPL-SCNC: 3.5 MMOL/L (ref 3.5–5)
SODIUM SERPL-SCNC: 145 MMOL/L (ref 132–146)

## 2024-05-16 PROCEDURE — 80048 BASIC METABOLIC PNL TOTAL CA: CPT

## 2024-05-16 PROCEDURE — 82962 GLUCOSE BLOOD TEST: CPT

## 2024-05-16 PROCEDURE — 36415 COLL VENOUS BLD VENIPUNCTURE: CPT

## 2024-05-16 PROCEDURE — 2580000003 HC RX 258: Performed by: NURSE PRACTITIONER

## 2024-05-16 PROCEDURE — 83735 ASSAY OF MAGNESIUM: CPT

## 2024-05-16 PROCEDURE — 2060000000 HC ICU INTERMEDIATE R&B

## 2024-05-16 PROCEDURE — 6370000000 HC RX 637 (ALT 250 FOR IP): Performed by: NURSE PRACTITIONER

## 2024-05-16 PROCEDURE — 6370000000 HC RX 637 (ALT 250 FOR IP): Performed by: INTERNAL MEDICINE

## 2024-05-16 PROCEDURE — 82140 ASSAY OF AMMONIA: CPT

## 2024-05-16 PROCEDURE — 99232 SBSQ HOSP IP/OBS MODERATE 35: CPT | Performed by: INTERNAL MEDICINE

## 2024-05-16 RX ORDER — ASPIRIN 81 MG/1
81 TABLET ORAL DAILY
Qty: 30 TABLET | Refills: 0 | Status: SHIPPED | OUTPATIENT
Start: 2024-05-17

## 2024-05-16 RX ORDER — TRAMADOL HYDROCHLORIDE 50 MG/1
25 TABLET ORAL ONCE
Status: COMPLETED | OUTPATIENT
Start: 2024-05-16 | End: 2024-05-16

## 2024-05-16 RX ORDER — POTASSIUM CHLORIDE 20 MEQ/1
40 TABLET, EXTENDED RELEASE ORAL ONCE
Status: COMPLETED | OUTPATIENT
Start: 2024-05-16 | End: 2024-05-16

## 2024-05-16 RX ADMIN — POTASSIUM CHLORIDE 40 MEQ: 1500 TABLET, EXTENDED RELEASE ORAL at 13:53

## 2024-05-16 RX ADMIN — PANTOPRAZOLE SODIUM 40 MG: 40 TABLET, DELAYED RELEASE ORAL at 05:48

## 2024-05-16 RX ADMIN — APIXABAN 5 MG: 5 TABLET, FILM COATED ORAL at 20:51

## 2024-05-16 RX ADMIN — FOLIC ACID 1 MG: 1 TABLET ORAL at 08:47

## 2024-05-16 RX ADMIN — MIDODRINE HYDROCHLORIDE 5 MG: 5 TABLET ORAL at 08:47

## 2024-05-16 RX ADMIN — SUCRALFATE 1 G: 1 TABLET ORAL at 16:36

## 2024-05-16 RX ADMIN — SUCRALFATE 1 G: 1 TABLET ORAL at 20:51

## 2024-05-16 RX ADMIN — LACTULOSE 20 G: 20 SOLUTION ORAL at 08:47

## 2024-05-16 RX ADMIN — FERROUS SULFATE TAB 325 MG (65 MG ELEMENTAL FE) 325 MG: 325 (65 FE) TAB at 08:47

## 2024-05-16 RX ADMIN — TRAMADOL HYDROCHLORIDE 25 MG: 50 TABLET ORAL at 05:48

## 2024-05-16 RX ADMIN — PANTOPRAZOLE SODIUM 40 MG: 40 TABLET, DELAYED RELEASE ORAL at 16:36

## 2024-05-16 RX ADMIN — LEVOTHYROXINE SODIUM 100 MCG: 0.1 TABLET ORAL at 05:48

## 2024-05-16 RX ADMIN — SUCRALFATE 1 G: 1 TABLET ORAL at 11:54

## 2024-05-16 RX ADMIN — ACETAMINOPHEN 650 MG: 325 TABLET ORAL at 03:19

## 2024-05-16 RX ADMIN — MIDODRINE HYDROCHLORIDE 5 MG: 5 TABLET ORAL at 16:36

## 2024-05-16 RX ADMIN — LACTULOSE 20 G: 20 SOLUTION ORAL at 13:53

## 2024-05-16 RX ADMIN — APIXABAN 5 MG: 5 TABLET, FILM COATED ORAL at 08:47

## 2024-05-16 RX ADMIN — ASPIRIN 81 MG: 81 TABLET, COATED ORAL at 08:47

## 2024-05-16 RX ADMIN — SODIUM CHLORIDE, PRESERVATIVE FREE 10 ML: 5 INJECTION INTRAVENOUS at 20:53

## 2024-05-16 RX ADMIN — MIDODRINE HYDROCHLORIDE 5 MG: 5 TABLET ORAL at 11:54

## 2024-05-16 RX ADMIN — SODIUM CHLORIDE, PRESERVATIVE FREE 10 ML: 5 INJECTION INTRAVENOUS at 08:48

## 2024-05-16 RX ADMIN — SUCRALFATE 1 G: 1 TABLET ORAL at 05:48

## 2024-05-16 RX ADMIN — LACTULOSE 20 G: 20 SOLUTION ORAL at 20:51

## 2024-05-16 ASSESSMENT — PAIN DESCRIPTION - LOCATION: LOCATION: BACK;CHEST

## 2024-05-16 ASSESSMENT — PAIN SCALES - GENERAL
PAINLEVEL_OUTOF10: 7
PAINLEVEL_OUTOF10: 8

## 2024-05-16 NOTE — PLAN OF CARE

## 2024-05-16 NOTE — PROGRESS NOTES
[facility]                                Progress note    Subjective:    The patient is awake and alert.    A bit more confused/off today  No complaints of chest discomfort    Objective:    BP (!) 108/57   Pulse 91   Temp 97.7 °F (36.5 °C) (Temporal)   Resp 19   Ht 1.575 m (5' 2\")   Wt 67.2 kg (148 lb 3.2 oz)   SpO2 98%   BMI 27.11 kg/m²     In: 240 [P.O.:240]  Out: 2   In: 240   Out: 2 [Urine:2]    General appearance: NAD, conversant, mildly jaundiced  HEENT: AT/NC, MMM  Neck: FROM, supple  Lungs: Clear to auscultation  CV: RRR, no MRGs  Vasc: Radial pulses 2+  Abdomen: Soft, non-tender; no masses or HSM  Extremities: No peripheral edema or digital cyanosis  Skin: no rash, lesions or ulcers  Psych: Alert and oriented to person, place and time,   Neuro: Alert and interactive, slow responses     Recent Labs     05/14/24  0620 05/15/24  0608   WBC 2.3* 4.1*   HGB 8.3* 9.0*   HCT 27.4* 29.5*    205         Recent Labs     05/15/24  0608 05/15/24  0831 05/16/24  0527    142 145   K 4.0 3.9 3.5   * 109* 110*   CO2 19* 20* 21*   BUN 7 7 8   CREATININE 0.5 0.5 0.6   CALCIUM 9.1 8.7 8.8         Assessment:    Principal Problem:    Chest pain  Active Problems:    Moderate protein-calorie malnutrition (HCC)  Resolved Problems:    * No resolved hospital problems. *      Plan:  Patient is a 63-year-old female admitted to [facility] for  Chest pain  -Abnormal stress test  -Cardiology consulted  -Await further input from cardiology  -Continue home Eliquis for thrombus noted at the shunt from her TIPS procedure per IR  -Labs stable  -Vital signs stable    5/15/24:  -Not candidate for CT coronary that was originally ordered  -Await further input from cardiology regarding other plans  -Labs stable  -Vital signs stable  -Discharge planning pending completion of cardiology workup.    5/16/24:  -Medical management per cardiology recommendations  -Confusion noted today with patient  stating that she feels extremely tired  -Stat ammonia level 181  -Nursing medication to give lactulose ordered for 1400 now and reinforce education regarding refusing medication as she has received only 6 out of 12 doses thus far  -Recheck ammonia level in a.m. and if improved with improvement in mentation patient can discharge home tomorrow      Code status: Full  Consultants: Cardiology    DVT Prophylaxis Eliquis  PT/OT  Discharge planning       I have spent a total time of 30 minutes of this patient encounter reviewing chart, labs, coordinating care with interdisciplinary teams, face to face encounter with patient, providing counseling/education to patient/family.      Yana Muñoz, SHANE - CNP,  1:15 PM  5/16/2024

## 2024-05-16 NOTE — CARE COORDINATION
Reviewed chart, discharge order noted. Met with pt, pt has transport, she just has to call. Cardio on board, medical management, no needs.HUGO Ellis LSW  .Case Management Assessment  Initial Evaluation    Date/Time of Evaluation: 5/16/2024 11:40 AM  Assessment Completed by: HUGO Ellis, JAMES    If patient is discharged prior to next notation, then this note serves as note for discharge by case management.    Patient Name: Erna Stoner                   YOB: 1960  Diagnosis: Chest pain [R07.9]  Chronic anemia [D64.9]  Chest pain, unspecified type [R07.9]                   Date / Time: 5/12/2024  6:48 AM    Patient Admission Status: Inpatient   Readmission Risk (Low < 19, Mod (19-27), High > 27): Readmission Risk Score: 21.3    Current PCP: Elia Daniel MD  PCP verified by CM? Yes    Chart Reviewed: Yes      History Provided by: Patient  Patient Orientation: Alert and Oriented    Patient Cognition: Alert    Hospitalization in the last 30 days (Readmission):  Yes    If yes, Readmission Assessment in CM Navigator will be completed.    Advance Directives:      Code Status: Full Code   Patient's Primary Decision Maker is: Legal Next of Kin    Primary Decision Maker: Leslie Ruelas - Brother/Sister - [phone]    Secondary Decision Maker: Amanuel Stoner - Child - [phone]    Discharge Planning:    Patient lives with: Family Members, Friends, Children Type of Home: House  Primary Care Giver: Self  Patient Support Systems include: Friends/Neighbors, Family Members   Current Financial resources:    Current community resources:    Current services prior to admission: None            Current DME:              Type of Home Care services:  None    ADLS  Prior functional level: Independent in ADLs/IADLs  Current functional level: Independent in ADLs/IADLs    PT AM-PAC:   /24  OT AM-PAC:   /24    Family can provide assistance at DC: Yes  Would you like Case Management to discuss

## 2024-05-17 LAB
ABO/RH: NORMAL
AMMONIA PLAS-SCNC: 127 UMOL/L (ref 11–51)
ANION GAP SERPL CALCULATED.3IONS-SCNC: 12 MMOL/L (ref 7–16)
ANTIBODY SCREEN: NEGATIVE
ARM BAND NUMBER: NORMAL
BLOOD BANK DISPENSE STATUS: NORMAL
BLOOD BANK SAMPLE EXPIRATION: NORMAL
BPU ID: NORMAL
BUN SERPL-MCNC: 7 MG/DL (ref 6–23)
CALCIUM SERPL-MCNC: 9.2 MG/DL (ref 8.6–10.2)
CHLORIDE SERPL-SCNC: 111 MMOL/L (ref 98–107)
CO2 SERPL-SCNC: 20 MMOL/L (ref 22–29)
COMPONENT: NORMAL
CREAT SERPL-MCNC: 0.5 MG/DL (ref 0.5–1)
CROSSMATCH RESULT: NORMAL
GFR, ESTIMATED: >90 ML/MIN/1.73M2
GLUCOSE BLD-MCNC: 115 MG/DL (ref 74–99)
GLUCOSE BLD-MCNC: 122 MG/DL (ref 74–99)
GLUCOSE BLD-MCNC: 136 MG/DL (ref 74–99)
GLUCOSE BLD-MCNC: 86 MG/DL (ref 74–99)
GLUCOSE SERPL-MCNC: 83 MG/DL (ref 74–99)
POTASSIUM SERPL-SCNC: 4 MMOL/L (ref 3.5–5)
SODIUM SERPL-SCNC: 143 MMOL/L (ref 132–146)
TRANSFUSION STATUS: NORMAL
UNIT DIVISION: 0

## 2024-05-17 PROCEDURE — 82140 ASSAY OF AMMONIA: CPT

## 2024-05-17 PROCEDURE — 80048 BASIC METABOLIC PNL TOTAL CA: CPT

## 2024-05-17 PROCEDURE — 6370000000 HC RX 637 (ALT 250 FOR IP): Performed by: INTERNAL MEDICINE

## 2024-05-17 PROCEDURE — 2060000000 HC ICU INTERMEDIATE R&B

## 2024-05-17 PROCEDURE — 6360000002 HC RX W HCPCS: Performed by: NURSE PRACTITIONER

## 2024-05-17 PROCEDURE — 2580000003 HC RX 258: Performed by: NURSE PRACTITIONER

## 2024-05-17 PROCEDURE — 6370000000 HC RX 637 (ALT 250 FOR IP): Performed by: NURSE PRACTITIONER

## 2024-05-17 PROCEDURE — 36415 COLL VENOUS BLD VENIPUNCTURE: CPT

## 2024-05-17 PROCEDURE — 82962 GLUCOSE BLOOD TEST: CPT

## 2024-05-17 RX ORDER — HYDROCODONE BITARTRATE AND ACETAMINOPHEN 5; 325 MG/1; MG/1
1 TABLET ORAL EVERY 6 HOURS PRN
Status: DISCONTINUED | OUTPATIENT
Start: 2024-05-17 | End: 2024-05-18

## 2024-05-17 RX ADMIN — SODIUM CHLORIDE, PRESERVATIVE FREE 10 ML: 5 INJECTION INTRAVENOUS at 20:51

## 2024-05-17 RX ADMIN — MIDODRINE HYDROCHLORIDE 5 MG: 5 TABLET ORAL at 11:30

## 2024-05-17 RX ADMIN — APIXABAN 5 MG: 5 TABLET, FILM COATED ORAL at 20:50

## 2024-05-17 RX ADMIN — SODIUM CHLORIDE, PRESERVATIVE FREE 10 ML: 5 INJECTION INTRAVENOUS at 08:55

## 2024-05-17 RX ADMIN — FOLIC ACID 1 MG: 1 TABLET ORAL at 08:56

## 2024-05-17 RX ADMIN — LEVOTHYROXINE SODIUM 100 MCG: 0.1 TABLET ORAL at 05:49

## 2024-05-17 RX ADMIN — FERROUS SULFATE TAB 325 MG (65 MG ELEMENTAL FE) 325 MG: 325 (65 FE) TAB at 08:56

## 2024-05-17 RX ADMIN — LACTULOSE 20 G: 20 SOLUTION ORAL at 20:50

## 2024-05-17 RX ADMIN — SUCRALFATE 1 G: 1 TABLET ORAL at 05:50

## 2024-05-17 RX ADMIN — MIDODRINE HYDROCHLORIDE 5 MG: 5 TABLET ORAL at 08:56

## 2024-05-17 RX ADMIN — ASPIRIN 81 MG: 81 TABLET, COATED ORAL at 08:56

## 2024-05-17 RX ADMIN — SUCRALFATE 1 G: 1 TABLET ORAL at 16:16

## 2024-05-17 RX ADMIN — SUCRALFATE 1 G: 1 TABLET ORAL at 11:30

## 2024-05-17 RX ADMIN — MIDODRINE HYDROCHLORIDE 5 MG: 5 TABLET ORAL at 16:16

## 2024-05-17 RX ADMIN — HYDROCODONE BITARTRATE AND ACETAMINOPHEN 1 TABLET: 5; 325 TABLET ORAL at 21:50

## 2024-05-17 RX ADMIN — APIXABAN 5 MG: 5 TABLET, FILM COATED ORAL at 08:56

## 2024-05-17 RX ADMIN — SUCRALFATE 1 G: 1 TABLET ORAL at 20:50

## 2024-05-17 RX ADMIN — PANTOPRAZOLE SODIUM 40 MG: 40 TABLET, DELAYED RELEASE ORAL at 05:48

## 2024-05-17 RX ADMIN — ONDANSETRON 4 MG: 2 INJECTION INTRAMUSCULAR; INTRAVENOUS at 20:50

## 2024-05-17 RX ADMIN — PANTOPRAZOLE SODIUM 40 MG: 40 TABLET, DELAYED RELEASE ORAL at 16:16

## 2024-05-17 RX ADMIN — LACTULOSE 20 G: 20 SOLUTION ORAL at 08:58

## 2024-05-17 RX ADMIN — LACTULOSE 20 G: 20 SOLUTION ORAL at 13:51

## 2024-05-17 ASSESSMENT — PAIN SCALES - GENERAL
PAINLEVEL_OUTOF10: 10
PAINLEVEL_OUTOF10: 10

## 2024-05-17 ASSESSMENT — PAIN DESCRIPTION - DESCRIPTORS
DESCRIPTORS: ACHING;SHARP;STABBING
DESCRIPTORS: ACHING;SHARP;STABBING

## 2024-05-17 ASSESSMENT — PAIN DESCRIPTION - ORIENTATION
ORIENTATION: LEFT
ORIENTATION: LEFT

## 2024-05-17 ASSESSMENT — PAIN DESCRIPTION - LOCATION
LOCATION: BACK
LOCATION: BACK

## 2024-05-17 NOTE — PLAN OF CARE
Problem: Discharge Planning  Goal: Discharge to home or other facility with appropriate resources  5/16/2024 2215 by Marisela Martinez RN  Outcome: Progressing  5/16/2024 1218 by Marisela Martinez RN  Outcome: Progressing     Problem: Safety - Adult  Goal: Free from fall injury  5/16/2024 2215 by Marisela Martinez RN  Outcome: Progressing  5/16/2024 1218 by Marisela Martinez RN  Outcome: Progressing     Problem: ABCDS Injury Assessment  Goal: Absence of physical injury  5/16/2024 2215 by Marisela Martinez RN  Outcome: Progressing  5/16/2024 1218 by Marisela Martinez RN  Outcome: Progressing     Problem: Pain  Goal: Verbalizes/displays adequate comfort level or baseline comfort level  Outcome: Progressing     Problem: Nutrition Deficit:  Goal: Optimize nutritional status  Outcome: Progressing     Problem: Chronic Conditions and Co-morbidities  Goal: Patient's chronic conditions and co-morbidity symptoms are monitored and maintained or improved  Outcome: Progressing

## 2024-05-17 NOTE — HOME CARE
[facility] received referral. Will follow after discharge. Spoke with patient son (Amanuel) and verified demo's.  CM updated when referral given that [facility] does not have availability to see the patient until Thursday 23,24 at this time.  Sigrid Hi LPN, [facility]

## 2024-05-17 NOTE — CARE COORDINATION
Reviewed chart, pt confused today. Called son Amanuel, discussed discharge planning with Amanuel. Plan is for pt to discharge home. Amanuel states that pt has family that will check on  pt. Discussed hhc, son is in agreement, referral to [facility], they are not in network. Referral to [facility], not in network.  Referral to [facility], not in network. Referral to [facility], SOC Thursday 5/23. Will need hhc orders, son to transport home.Carly Chavez, MSW, LSW

## 2024-05-17 NOTE — PROGRESS NOTES
[facility]                                Progress note    Subjective:    The patient is awake and alert.    She is answering all questions appropriately but indicates she feels somewhat confused.   Apparently has not been taking lactulose and now is having confusion, ammonia level improved today to 127  She is on the phone with her brother on my evaluation  Overall aside from being weak and tired, she is doing well  No abdominal pain or discomfort    Objective:    /64   Pulse 98   Temp 98 °F (36.7 °C) (Temporal)   Resp 17   Ht 1.575 m (5' 2\")   Wt 66.3 kg (146 lb 3.2 oz)   SpO2 98%   BMI 26.74 kg/m²     In: 300 [P.O.:300]  Out: -   In: 300   Out: -     General appearance: NAD, conversant, mildly jaundiced  HEENT: AT/NC, MMM  Neck: FROM, supple  Lungs: Clear to auscultation  CV: RRR, no MRGs  Vasc: Radial pulses 2+  Abdomen: Soft, non-tender; no masses or HSM-recent history of TIPS procedure  Extremities: No peripheral edema or digital cyanosis  Skin: no rash, lesions or ulcers  Psych: Alert and oriented to person, place and time,   Neuro: Alert and interactive, slow responses     Recent Labs     05/15/24  0608   WBC 4.1*   HGB 9.0*   HCT 29.5*            Recent Labs     05/15/24  0831 05/16/24  0527 05/17/24  0527    145 143   K 3.9 3.5 4.0   * 110* 111*   CO2 20* 21* 20*   BUN 7 8 7   CREATININE 0.5 0.6 0.5   CALCIUM 8.7 8.8 9.2         Assessment:    Principal Problem:    Chest pain  Active Problems:    Moderate protein-calorie malnutrition (HCC)  Resolved Problems:    * No resolved hospital problems. *      Plan:  Patient is a 63-year-old female admitted to [facility] for  Chest pain  -Abnormal stress test  -Cardiology consulted  -Await further input from cardiology  -Continue home Eliquis for thrombus noted at the shunt from her TIPS procedure per IR  -Labs stable  -Vital signs stable    5/15/24:  -Not candidate for CT coronary that was originally

## 2024-05-17 NOTE — PLAN OF CARE
Problem: Discharge Planning  Goal: Discharge to home or other facility with appropriate resources  5/17/2024 0939 by Ashley Wolf RN  Outcome: Progressing  5/16/2024 2215 by Marisela Martinez, RN  Outcome: Progressing     Problem: Safety - Adult  Goal: Free from fall injury  5/17/2024 0939 by Ashley Wolf RN  Outcome: Progressing  5/16/2024 2215 by Marisela Martinez, RN  Outcome: Progressing     Problem: ABCDS Injury Assessment  Goal: Absence of physical injury  5/17/2024 0939 by Ashley Wolf RN  Outcome: Progressing  5/16/2024 2215 by Marisela Martinez, RN  Outcome: Progressing     Problem: Pain  Goal: Verbalizes/displays adequate comfort level or baseline comfort level  Outcome: Progressing

## 2024-05-18 VITALS
WEIGHT: 145.3 LBS | TEMPERATURE: 97.6 F | HEART RATE: 96 BPM | BODY MASS INDEX: 26.74 KG/M2 | RESPIRATION RATE: 18 BRPM | OXYGEN SATURATION: 98 % | SYSTOLIC BLOOD PRESSURE: 143 MMHG | DIASTOLIC BLOOD PRESSURE: 70 MMHG | HEIGHT: 62 IN

## 2024-05-18 LAB
AMMONIA PLAS-SCNC: 99 UMOL/L (ref 11–51)
ANION GAP SERPL CALCULATED.3IONS-SCNC: 15 MMOL/L (ref 7–16)
BASOPHILS # BLD: 0.04 K/UL (ref 0–0.2)
BASOPHILS NFR BLD: 1 % (ref 0–2)
BUN SERPL-MCNC: 7 MG/DL (ref 6–23)
CALCIUM SERPL-MCNC: 8.9 MG/DL (ref 8.6–10.2)
CHLORIDE SERPL-SCNC: 109 MMOL/L (ref 98–107)
CO2 SERPL-SCNC: 17 MMOL/L (ref 22–29)
CREAT SERPL-MCNC: 0.5 MG/DL (ref 0.5–1)
EOSINOPHIL # BLD: 0.16 K/UL (ref 0.05–0.5)
EOSINOPHILS RELATIVE PERCENT: 6 % (ref 0–6)
ERYTHROCYTE [DISTWIDTH] IN BLOOD BY AUTOMATED COUNT: 16.4 % (ref 11.5–15)
GFR, ESTIMATED: >90 ML/MIN/1.73M2
GLUCOSE BLD-MCNC: 105 MG/DL (ref 74–99)
GLUCOSE BLD-MCNC: 139 MG/DL (ref 74–99)
GLUCOSE SERPL-MCNC: 106 MG/DL (ref 74–99)
HCT VFR BLD AUTO: 29.4 % (ref 34–48)
HGB BLD-MCNC: 8.8 G/DL (ref 11.5–15.5)
IMM GRANULOCYTES # BLD AUTO: <0.03 K/UL (ref 0–0.58)
IMM GRANULOCYTES NFR BLD: 1 % (ref 0–5)
LYMPHOCYTES NFR BLD: 0.73 K/UL (ref 1.5–4)
LYMPHOCYTES RELATIVE PERCENT: 25 % (ref 20–42)
MCH RBC QN AUTO: 24.9 PG (ref 26–35)
MCHC RBC AUTO-ENTMCNC: 29.9 G/DL (ref 32–34.5)
MCV RBC AUTO: 83.3 FL (ref 80–99.9)
MONOCYTES NFR BLD: 0.44 K/UL (ref 0.1–0.95)
MONOCYTES NFR BLD: 15 % (ref 2–12)
NEUTROPHILS NFR BLD: 52 % (ref 43–80)
NEUTS SEG NFR BLD: 1.52 K/UL (ref 1.8–7.3)
PLATELET # BLD AUTO: 162 K/UL (ref 130–450)
PMV BLD AUTO: 9.8 FL (ref 7–12)
POTASSIUM SERPL-SCNC: 3.6 MMOL/L (ref 3.5–5)
RBC # BLD AUTO: 3.53 M/UL (ref 3.5–5.5)
SODIUM SERPL-SCNC: 141 MMOL/L (ref 132–146)
WBC OTHER # BLD: 2.9 K/UL (ref 4.5–11.5)

## 2024-05-18 PROCEDURE — 6370000000 HC RX 637 (ALT 250 FOR IP): Performed by: NURSE PRACTITIONER

## 2024-05-18 PROCEDURE — 80048 BASIC METABOLIC PNL TOTAL CA: CPT

## 2024-05-18 PROCEDURE — 82140 ASSAY OF AMMONIA: CPT

## 2024-05-18 PROCEDURE — 6370000000 HC RX 637 (ALT 250 FOR IP): Performed by: INTERNAL MEDICINE

## 2024-05-18 PROCEDURE — 82962 GLUCOSE BLOOD TEST: CPT

## 2024-05-18 PROCEDURE — 2580000003 HC RX 258: Performed by: NURSE PRACTITIONER

## 2024-05-18 PROCEDURE — 85025 COMPLETE CBC W/AUTO DIFF WBC: CPT

## 2024-05-18 PROCEDURE — 36415 COLL VENOUS BLD VENIPUNCTURE: CPT

## 2024-05-18 RX ADMIN — LACTULOSE 20 G: 20 SOLUTION ORAL at 08:13

## 2024-05-18 RX ADMIN — LEVOTHYROXINE SODIUM 100 MCG: 0.1 TABLET ORAL at 05:09

## 2024-05-18 RX ADMIN — ACETAMINOPHEN 650 MG: 325 TABLET ORAL at 05:15

## 2024-05-18 RX ADMIN — SUCRALFATE 1 G: 1 TABLET ORAL at 05:09

## 2024-05-18 RX ADMIN — ASPIRIN 81 MG: 81 TABLET, COATED ORAL at 08:13

## 2024-05-18 RX ADMIN — FOLIC ACID 1 MG: 1 TABLET ORAL at 08:13

## 2024-05-18 RX ADMIN — SODIUM CHLORIDE, PRESERVATIVE FREE 10 ML: 5 INJECTION INTRAVENOUS at 08:14

## 2024-05-18 RX ADMIN — PANTOPRAZOLE SODIUM 40 MG: 40 TABLET, DELAYED RELEASE ORAL at 05:09

## 2024-05-18 RX ADMIN — FERROUS SULFATE TAB 325 MG (65 MG ELEMENTAL FE) 325 MG: 325 (65 FE) TAB at 08:13

## 2024-05-18 RX ADMIN — APIXABAN 5 MG: 5 TABLET, FILM COATED ORAL at 08:13

## 2024-05-18 ASSESSMENT — PAIN DESCRIPTION - LOCATION: LOCATION: BACK

## 2024-05-18 ASSESSMENT — PAIN SCALES - GENERAL
PAINLEVEL_OUTOF10: 10
PAINLEVEL_OUTOF10: 0

## 2024-05-18 ASSESSMENT — PAIN DESCRIPTION - DESCRIPTORS: DESCRIPTORS: SHARP;ACHING;STABBING

## 2024-05-18 NOTE — PROGRESS NOTES
CNP can to see the patient and she has no complaints and was ready to be discharged home. Spoke with patient and she states she is fine and her brother will come and pick her uo

## 2024-05-18 NOTE — PLAN OF CARE
Problem: Discharge Planning  Goal: Discharge to home or other facility with appropriate resources  5/18/2024 1527 by Vikki Cortes RN  Outcome: Adequate for Discharge  5/18/2024 0816 by Vikki Cortes RN  Outcome: Progressing  5/18/2024 0422 by Jerilyn Sheldon RN  Outcome: Progressing     Problem: Safety - Adult  Goal: Free from fall injury  5/18/2024 1527 by Vikki Cortes RN  Outcome: Adequate for Discharge  5/18/2024 0816 by Vikki Cortes RN  Outcome: Progressing  5/18/2024 0422 by Jerilyn Sheldon RN  Outcome: Progressing     Problem: ABCDS Injury Assessment  Goal: Absence of physical injury  5/18/2024 1527 by Vikki Cortes RN  Outcome: Adequate for Discharge  5/18/2024 0816 by Vikki Cortes RN  Outcome: Progressing  5/18/2024 0422 by Jerilyn Sheldon RN  Outcome: Progressing     Problem: Pain  Goal: Verbalizes/displays adequate comfort level or baseline comfort level  5/18/2024 1527 by Vikki Cortes RN  Outcome: Adequate for Discharge  5/18/2024 0422 by Jerilyn Sheldon RN  Outcome: Progressing

## 2024-05-18 NOTE — PROGRESS NOTES
Patient complained about discharge received a call from her sister, reviewed patient ability to use the bathroom with assistance, able to  her phone and talk, was able to use utensil to her mouth. Patient states that she is unable to feed herself. Patient is able to answer all questions appropriately. Call placed to to CNP to inform her of the concerns and will come to see the patient

## 2024-05-18 NOTE — PLAN OF CARE
Problem: Discharge Planning  Goal: Discharge to home or other facility with appropriate resources  5/18/2024 0816 by Vikki Cortes RN  Outcome: Progressing  5/18/2024 0422 by Jerilyn Sheldon RN  Outcome: Progressing     Problem: Safety - Adult  Goal: Free from fall injury  5/18/2024 0816 by Vikki Cortes RN  Outcome: Progressing  5/18/2024 0422 by Jerilyn Sheldon RN  Outcome: Progressing     Problem: ABCDS Injury Assessment  Goal: Absence of physical injury  5/18/2024 0816 by Vikki Cortes RN  Outcome: Progressing  5/18/2024 0422 by Jerilyn Sheldon RN  Outcome: Progressing

## 2024-05-18 NOTE — PROGRESS NOTES
Patient discharge instructions reviewed at the bedside with patient, no concerns or questions. Reviewed medications and follow up appointments. Patient deneis pain or discomfort, verbalized understanding of all instructions. Patient discharged home with all belongings, with no signs or symptoms of distress observed.

## 2024-05-20 NOTE — DISCHARGE SUMMARY
[facility]   Discharge summary   Patient ID:  Erna Stoner  [id number]  63 y.o.  1960    Admit date: 5/12/2024    Discharge date and time: 5/18/2024    Admission Diagnoses:   Patient Active Problem List   Diagnosis    Hematemesis    Gastric varices    S/P TIPS (transjugular intrahepatic portosystemic shunt)    Moderate protein-calorie malnutrition (HCC)    Chest pain       Discharge Diagnoses: Chest pain,     Consults: cardiology    Procedures: none    Hospital Course: The patient is a 63 y.o. female of Elia Daniel MD     Patient is a 63-year-old female admitted to [facility] for  Chest pain  -Abnormal stress test  -Cardiology consulted  -Await further input from cardiology  -Continue home Eliquis for thrombus noted at the shunt from her TIPS procedure per IR  -Labs stable  -Vital signs stable     5/15/24:  -Not candidate for CT coronary that was originally ordered  -Await further input from cardiology regarding other plans  -Labs stable  -Vital signs stable  -Discharge planning pending completion of cardiology workup.     5/16/24:  -Medical management per cardiology recommendations  -Confusion noted today with patient stating that she feels extremely tired  -Stat ammonia level 181  -Nursing medication to give lactulose ordered for 1400 now and reinforce education regarding refusing medication as she has received only 6 out of 12 doses thus far  -Recheck ammonia level in a.m. and if improved with improvement in mentation patient can discharge home tomorrow        5/17/24:  -Ammonia level 121, improved compared to yesterday at 181 because she took lactulose yesterday  -Continue lactulose and educate compliance so discharge can be planned  -Other labs stable  -Vital signs stable  -Plan will be discharge home when ammonia level has improved-tomorrow  -No other acute issues or complaints    5/18/2024  Ammonia level improved  Patient is medically stable for discharge home.  Patient to

## 2024-05-22 NOTE — PROGRESS NOTES
Physician Progress Note      PATIENT:               FEDE TRIPP  [facility] #:                  [id number]  :                       1960  ADMIT DATE:       2024 6:48 AM  DISCH DATE:        2024 5:45 PM  RESPONDING  PROVIDER #:        SWETHA COLEMAN          QUERY TEXT:    Pt admitted with chest pain.  Pt noted to have history of liver cirrhosis and   cardiology adjusted Aspirin to enteric coated. If possible, please document in   progress notes and discharge summary if you are evaluating and/or treating   any of the following:    The medical record reflects the following:  Risk Factors: Liver cirrhosis, age 63  Clinical Indicators: Per cardiology progress note on 5/15 \"...Not consistent   with cardiac chest pain...Very low risk abnormal stress test...I will change   the chewable aspirin to enteric-coated aspirin with food to help protect her   stomach...\"  Treatment: PO Protonix, Carafate, switched aspirin to enteric coated,   cardiology consult, NM stress test, CXR    Thank you,  Bhavna Hernandez, RN, BSN, CDIS  Clinical Documentation Integrity  [email or web address]  Options provided:  -- Chest pain due to GERD  -- Chest pain due to cirrhosis of liver  -- Chest pain due to ##Please specify cause, Please specify cause.  -- Other - I will add my own diagnosis  -- Disagree - Not applicable / Not valid  -- Disagree - Clinically unable to determine / Unknown  -- Refer to Clinical Documentation Reviewer    PROVIDER RESPONSE TEXT:    This patient has chest pain due to cirrhosis of liver.    Query created by: Bhavna Hernandez on 2024 4:25 PM      Electronically signed by:  SWETHA COLEMAN 2024 8:29 AM

## 2024-05-23 PROBLEM — K74.69 OTHER CIRRHOSIS OF LIVER (HCC): Status: ACTIVE | Noted: 2024-05-23

## 2024-05-29 ENCOUNTER — TELEPHONE (OUTPATIENT)
Dept: CARDIOLOGY CLINIC | Age: 64
End: 2024-05-29

## 2024-05-31 NOTE — PROGRESS NOTES
[facility]  Gastroenterology, Hepatology &  Advanced Endoscopy     Progress Note      SUBJECTIVE:      Ms. Erna Stoner is a 63y/F w/ cirrhosis 2/2 sarcoidosis with history of gastric varices s/p EUS guided coil placement at [facility] previously with recent admission for recurrent bleeding s/p EGD with sclerotherapy and eventual successful TIPS placement. She presents to clinic in follow-up.    Cirrhosis:  Etiology: Sarcoidosis  MELD: 10  HE: No symptoms. Now on lactulose following TIPS.  Gastric varices: She is now s/p TIPS with no evidence of recurrent bleeding.  Ascites: No history. No s/p TIPS.  HCC: No focal lesions on imaging.     She is doing well in clinic today and overall feeling well with no s/s of hepatic decompensation following TIPS.     OBJECTIVE      Physical    VITALS:  /60   Pulse 93   Temp 97.6 °F (36.4 °C) (Temporal)   Resp 16   Ht 1.575 m (5' 2\")   Wt 67.6 kg (149 lb)   SpO2 98%   BMI 27.25 kg/m²   Physical Exam:  General: Overall well-appearing, NAD  HEENT: PERRLA, EOMI, Anicteric sclera, MMM, no rhinorrhea  Cards: RRR, no LE edema  Resp: Breathing comfortably on room air, good air movement, no use of accessory muscles, no audible wheezing  Abdomen: soft, NT, ND.   Extremities: Moves all extremities, no effusions or bruising.  Skin: No rashes or jaundice  Neuro: A&O x 3, CN grossly intact, non-focal exam       ASSESSMENT AND PLAN      63y/F w/ history of cirrhosis 2/2 sarcoidosis c/b recurrent bleeding from gastric varices now s/p TIPS.    She has no s/s of hepatic decompensation following TIPS placement and is overall doing well symptomatically.     PLAN:  - I will obtain labs and US q 6 months.  - She will continue lactulose daily with titration to 1 BM daily.  - I will refer to Dr. Guerrero as she would like to find a local Rheumatologist for her sarcoidosis.     I will see the patient back in clinic in 2-3 months.    Thank you for including us in the care of this patient. Please do

## 2024-06-03 DIAGNOSIS — M81.0 SENILE OSTEOPOROSIS: Primary | ICD-10-CM

## 2024-06-03 RX ORDER — EPINEPHRINE 1 MG/ML
0.3 INJECTION, SOLUTION, CONCENTRATE INTRAVENOUS PRN
OUTPATIENT
Start: 2024-06-03

## 2024-06-03 RX ORDER — ALBUTEROL SULFATE 90 UG/1
4 AEROSOL, METERED RESPIRATORY (INHALATION) PRN
OUTPATIENT
Start: 2024-06-03

## 2024-06-03 RX ORDER — ACETAMINOPHEN 325 MG/1
650 TABLET ORAL
OUTPATIENT
Start: 2024-06-03

## 2024-06-03 RX ORDER — DIPHENHYDRAMINE HYDROCHLORIDE 50 MG/ML
50 INJECTION INTRAMUSCULAR; INTRAVENOUS
OUTPATIENT
Start: 2024-06-03

## 2024-06-03 RX ORDER — ONDANSETRON 2 MG/ML
8 INJECTION INTRAMUSCULAR; INTRAVENOUS
OUTPATIENT
Start: 2024-06-03

## 2024-06-03 RX ORDER — SODIUM CHLORIDE 9 MG/ML
INJECTION, SOLUTION INTRAVENOUS CONTINUOUS
OUTPATIENT
Start: 2024-06-03

## 2024-06-04 ENCOUNTER — HOSPITAL ENCOUNTER (OUTPATIENT)
Dept: ULTRASOUND IMAGING | Age: 64
Discharge: HOME | End: 2024-06-06
Attending: STUDENT IN AN ORGANIZED HEALTH CARE EDUCATION/TRAINING PROGRAM
Payer: COMMERCIAL

## 2024-06-04 DIAGNOSIS — K74.69 OTHER CIRRHOSIS OF LIVER (HCC): ICD-10-CM

## 2024-06-04 DIAGNOSIS — Z95.828 S/P TIPS (TRANSJUGULAR INTRAHEPATIC PORTOSYSTEMIC SHUNT): ICD-10-CM

## 2024-06-04 PROCEDURE — 76705 ECHO EXAM OF ABDOMEN: CPT

## 2024-06-04 PROCEDURE — 93975 VASCULAR STUDY: CPT

## 2024-06-06 ENCOUNTER — TELEPHONE (OUTPATIENT)
Dept: GASTROENTEROLOGY | Age: 64
End: 2024-06-06

## 2024-06-06 NOTE — TELEPHONE ENCOUNTER
Pt is calling regarding her recent radiology test results from April. Appt not until July. Electronically signed by HERMINIA KENNEY LPN on 6/6/2024 at 1:26 PM        Spoke to pt and US results from 6-4-24 is nt completed yet. Informed her that we can review at July appt and will call sooner if needed. Electronically signed by HERMINIA KENNEY LPN on 6/6/2024 at 1:55 PM

## 2024-06-24 ENCOUNTER — HOSPITAL ENCOUNTER (OUTPATIENT)
Dept: INFUSION THERAPY | Age: 64
Setting detail: INFUSION SERIES
Discharge: HOME | End: 2024-06-24
Payer: COMMERCIAL

## 2024-06-24 VITALS
RESPIRATION RATE: 16 BRPM | BODY MASS INDEX: 29.44 KG/M2 | WEIGHT: 160 LBS | HEIGHT: 62 IN | DIASTOLIC BLOOD PRESSURE: 56 MMHG | SYSTOLIC BLOOD PRESSURE: 118 MMHG | HEART RATE: 84 BPM | OXYGEN SATURATION: 100 % | TEMPERATURE: 96.5 F

## 2024-06-24 DIAGNOSIS — M81.0 SENILE OSTEOPOROSIS: Primary | ICD-10-CM

## 2024-06-24 PROCEDURE — 96372 THER/PROPH/DIAG INJ SC/IM: CPT

## 2024-06-24 PROCEDURE — 6360000002 HC RX W HCPCS: Performed by: FAMILY MEDICINE

## 2024-06-24 RX ORDER — EPINEPHRINE 1 MG/ML
0.3 INJECTION, SOLUTION, CONCENTRATE INTRAVENOUS PRN
OUTPATIENT
Start: 2024-12-23

## 2024-06-24 RX ORDER — SODIUM CHLORIDE 9 MG/ML
INJECTION, SOLUTION INTRAVENOUS CONTINUOUS
OUTPATIENT
Start: 2024-12-23

## 2024-06-24 RX ORDER — ONDANSETRON 2 MG/ML
8 INJECTION INTRAMUSCULAR; INTRAVENOUS
OUTPATIENT
Start: 2024-12-23

## 2024-06-24 RX ORDER — ACETAMINOPHEN 325 MG/1
650 TABLET ORAL
OUTPATIENT
Start: 2024-12-23

## 2024-06-24 RX ORDER — DIPHENHYDRAMINE HYDROCHLORIDE 50 MG/ML
50 INJECTION INTRAMUSCULAR; INTRAVENOUS
OUTPATIENT
Start: 2024-12-23

## 2024-06-24 RX ORDER — ALBUTEROL SULFATE 90 UG/1
4 AEROSOL, METERED RESPIRATORY (INHALATION) PRN
OUTPATIENT
Start: 2024-12-23

## 2024-06-24 RX ADMIN — DENOSUMAB 60 MG: 60 INJECTION SUBCUTANEOUS at 11:44

## 2024-06-24 NOTE — PROGRESS NOTES
Tolerated injection well.  Reviewed therapy plan, offered education material and/or discharge material, reviewed medication information and signs and symptoms  and educated on possible side effects, verbalizes good knowledge of current plan patient verbalizes understanding, and has no signs or symptoms to report at this time.   Patient discharged. Patient alert and oriented x3.   No distress noted.   Vital signs stable.   Patient denies any new or worsening pain.  Patient denies any needs.  All questions answered.  Next appointment scheduled.. Instructed on lab draw for next injection.

## 2024-06-25 DIAGNOSIS — K74.69 OTHER CIRRHOSIS OF LIVER (HCC): ICD-10-CM

## 2024-06-25 DIAGNOSIS — E03.9 HYPOTHYROIDISM, UNSPECIFIED TYPE: ICD-10-CM

## 2024-06-25 DIAGNOSIS — R94.6 ABNORMAL RESULTS OF THYROID FUNCTION STUDIES: ICD-10-CM

## 2024-07-11 ENCOUNTER — TELEPHONE (OUTPATIENT)
Age: 64
End: 2024-07-11

## 2024-07-11 ENCOUNTER — HOSPITAL ENCOUNTER (OUTPATIENT)
Age: 64
Discharge: HOME | End: 2024-07-11
Payer: COMMERCIAL

## 2024-07-11 DIAGNOSIS — K74.69 OTHER CIRRHOSIS OF LIVER (HCC): Primary | ICD-10-CM

## 2024-07-11 LAB
ALBUMIN SERPL-MCNC: 3.8 G/DL (ref 3.5–5.2)
ALP SERPL-CCNC: 184 U/L (ref 35–104)
ALT SERPL-CCNC: 25 U/L (ref 0–32)
ANION GAP SERPL CALCULATED.3IONS-SCNC: 11 MMOL/L (ref 7–16)
AST SERPL-CCNC: 25 U/L (ref 0–31)
BASOPHILS # BLD: 0.04 K/UL (ref 0–0.2)
BASOPHILS NFR BLD: 1 % (ref 0–2)
BILIRUB SERPL-MCNC: 1.1 MG/DL (ref 0–1.2)
BUN SERPL-MCNC: 8 MG/DL (ref 6–23)
CALCIUM SERPL-MCNC: 8.7 MG/DL (ref 8.6–10.2)
CHLORIDE SERPL-SCNC: 103 MMOL/L (ref 98–107)
CO2 SERPL-SCNC: 25 MMOL/L (ref 22–29)
CREAT SERPL-MCNC: 0.6 MG/DL (ref 0.5–1)
EOSINOPHIL # BLD: 0.06 K/UL (ref 0.05–0.5)
EOSINOPHILS RELATIVE PERCENT: 2 % (ref 0–6)
ERYTHROCYTE [DISTWIDTH] IN BLOOD BY AUTOMATED COUNT: 18.3 % (ref 11.5–15)
GFR, ESTIMATED: >90 ML/MIN/1.73M2
GLUCOSE SERPL-MCNC: 379 MG/DL (ref 74–99)
HCT VFR BLD AUTO: 36.5 % (ref 34–48)
HGB BLD-MCNC: 10.3 G/DL (ref 11.5–15.5)
IMM GRANULOCYTES # BLD AUTO: <0.03 K/UL (ref 0–0.58)
IMM GRANULOCYTES NFR BLD: 0 % (ref 0–5)
INR PPP: 1.3
LYMPHOCYTES NFR BLD: 0.44 K/UL (ref 1.5–4)
LYMPHOCYTES RELATIVE PERCENT: 15 % (ref 20–42)
MCH RBC QN AUTO: 21.1 PG (ref 26–35)
MCHC RBC AUTO-ENTMCNC: 28.2 G/DL (ref 32–34.5)
MCV RBC AUTO: 74.9 FL (ref 80–99.9)
MONOCYTES NFR BLD: 0.29 K/UL (ref 0.1–0.95)
MONOCYTES NFR BLD: 10 % (ref 2–12)
NEUTROPHILS NFR BLD: 73 % (ref 43–80)
NEUTS SEG NFR BLD: 2.21 K/UL (ref 1.8–7.3)
PLATELET, FLUORESCENCE: 126 K/UL (ref 130–450)
PMV BLD AUTO: 10.7 FL (ref 7–12)
POTASSIUM SERPL-SCNC: 4.2 MMOL/L (ref 3.5–5)
PROT SERPL-MCNC: 6.4 G/DL (ref 6.4–8.3)
PROTHROMBIN TIME: 14.5 SEC (ref 9.3–12.4)
RBC # BLD AUTO: 4.87 M/UL (ref 3.5–5.5)
RBC # BLD: ABNORMAL 10*6/UL
SODIUM SERPL-SCNC: 139 MMOL/L (ref 132–146)
WBC OTHER # BLD: 3 K/UL (ref 4.5–11.5)

## 2024-07-11 PROCEDURE — 36415 COLL VENOUS BLD VENIPUNCTURE: CPT

## 2024-07-11 PROCEDURE — 85610 PROTHROMBIN TIME: CPT

## 2024-07-11 PROCEDURE — 80053 COMPREHEN METABOLIC PANEL: CPT

## 2024-07-11 PROCEDURE — 85025 COMPLETE CBC W/AUTO DIFF WBC: CPT

## 2024-07-11 NOTE — TELEPHONE ENCOUNTER
Pt called and said she had dark red rectal bleeding without a BM x 2.  She is on Eliquis. Dr Montes De Oca would like for her to repeat her CBC and order was placed. Pt notified to do lab and if the bleeding persists or worsens then she needs to go to the ED. Electronically signed by HERMINIA KENNEY LPN on 7/11/2024 at 8:53 AM

## 2024-07-15 ENCOUNTER — HOSPITAL ENCOUNTER (OUTPATIENT)
Dept: OCCUPATIONAL THERAPY | Age: 64
Setting detail: THERAPIES SERIES
Discharge: HOME | End: 2024-07-15
Payer: COMMERCIAL

## 2024-07-15 ENCOUNTER — HOSPITAL ENCOUNTER (OUTPATIENT)
Dept: PHYSICAL THERAPY | Age: 64
Setting detail: THERAPIES SERIES
Discharge: HOME | End: 2024-07-15
Payer: COMMERCIAL

## 2024-07-15 DIAGNOSIS — R26.89 BALANCE DISORDER: Primary | ICD-10-CM

## 2024-07-15 PROCEDURE — 97165 OT EVAL LOW COMPLEX 30 MIN: CPT | Performed by: OCCUPATIONAL THERAPIST

## 2024-07-15 PROCEDURE — 97110 THERAPEUTIC EXERCISES: CPT | Performed by: OCCUPATIONAL THERAPIST

## 2024-07-15 PROCEDURE — 97161 PT EVAL LOW COMPLEX 20 MIN: CPT | Performed by: PHYSICAL THERAPIST

## 2024-07-15 PROCEDURE — 97530 THERAPEUTIC ACTIVITIES: CPT | Performed by: OCCUPATIONAL THERAPIST

## 2024-07-15 NOTE — THERAPY EVALUATION
[facility] [place]  Phone: [phone] Fax: [phone]     Date:  7/15/2024   Patient: Erna Stoner  : 1960  MRN: [id number]  Referring Provider: Elia Daniel MD  [address] [place] [address]     Medical Diagnosis:     Coordination issues  Balance issues     SUBJECTIVE:     History: Patient reports decreased functional mobility over the past 6 months. She had TIPS procedure for liver issues in February and is now reporting to OP therapy. She reports issues c coordination, balance issues, and large movements. She reports that she does not use AD but will use buggy for balance in stores.  She has LBP also caused by medication changes and is requesting MHP c tx.    Previous PT: yes - helped    Related impairments: mobility, transfers, ADL's, and safety    Chief complaint: pain, morning pain / stiffness, pain that worsens as day wears on , edema, decreased motion, decreased mobility, weakness, inability / limited ability to use leg, difficulty walking, unable to run / difficulty running , difficulty with stairs, limited ability to lift/carry/handle material, limited ability to complete home/outdoor chores/tasks, inability to participate in exercise regimen / fitness program    Behavior: condition is waxing / waning    Pain: waxing and waning  Current: 5/10         Symptom Type/Quality: sharp, aching  Location:: R midline, some on L    Imaging results: No results found.    Past Medical History:  Past Medical History:   Diagnosis Date    Asthma     stable, is mild    Back pain     Diabetes mellitus (HCC)     Hypothyroidism     Mass of nasal sinus     for OR 1120     Thyroid disease      Past Surgical History:   Procedure Laterality Date    CARPAL TUNNEL RELEASE      bilateral     SECTION  1992    CHOLECYSTECTOMY  1998    COLONOSCOPY  2012    HYSTERECTOMY (CERVIX STATUS UNKNOWN)      IR EMBOLIZATION VENOUS  2024    IR

## 2024-07-15 NOTE — THERAPY EVALUATION
OCCUPATIONAL THERAPY INITIAL EVALUATION    [facility]  [facility]  [address] [address]  Dept: [phone]  Loc: [phone]   [facility] OT Fax: [phone]    Date:  7/15/2024  Initial Evaluation Date: 07/15/2024   Evaluating Therapist: Luz Mcgraw OT    Patient Name:  Erna Stoner    :  1960    Restrictions/Precautions:  none, low fall risk  Diagnosis:  R27.8 (ICD-10-CM) - Other lack of coordination        Date of Surgery/Injury: Ongoing since September    Insurance/Certification information:  [employer] Auth required  Plan of care signed (Y/N): N  Visit# / total visits: 1 / 10-    Referring Practitioner:  Elia Daniel MD   Specific Practitioner Orders: Eval and treat    Assessment of current deficits   [x] Functional mobility  [x] ADLs  [] Strength   [] Cognition   [] Functional transfers   [x] IADLs  [x] Safety Awareness  [x] Endurance   [x] Fine Motor Coordination  [x] Balance  [x] Vision/perception  [] Sensation    [x] Gross Motor Coordination [] ROM  [x] Pain   [] Edema    [] Scar Adhesion/Skin Integrity     OT PLAN OF CARE   OT POC based on physician orders, patient diagnosis and results of clinical assessment    Frequency/Duration: 1-2x / week for 10-12 visits.   Certification period From: 07/15/2024  To: 11/15/2024    Specific OT Treatment to include:   [x] Instruction in HEP                   Modalities:  [x] Therapeutic Exercise        [x] Ultrasound               [x] Electrical Stimulation/Attended  [x] PROM/Stretching                    [x] Fluidotherapy          [x]  Paraffin                   [x] AAROM  [x] AROM                 [x] Iontophoresis:   [] Tendon Glides                                               [] Neuromuscular Re-Ed            [x] ADL/IADL re-training    [x] Therapeutic Activity       [x] Pain Management with/without modalities PRN                 [] Manual Therapy                      [x] Splinting

## 2024-07-17 ENCOUNTER — OFFICE VISIT (OUTPATIENT)
Dept: CARDIOLOGY CLINIC | Age: 64
End: 2024-07-17
Payer: COMMERCIAL

## 2024-07-17 ENCOUNTER — TELEPHONE (OUTPATIENT)
Dept: CARDIOLOGY CLINIC | Age: 64
End: 2024-07-17

## 2024-07-17 VITALS
WEIGHT: 165.4 LBS | OXYGEN SATURATION: 96 % | RESPIRATION RATE: 20 BRPM | SYSTOLIC BLOOD PRESSURE: 106 MMHG | HEIGHT: 62 IN | DIASTOLIC BLOOD PRESSURE: 64 MMHG | BODY MASS INDEX: 30.44 KG/M2 | HEART RATE: 79 BPM

## 2024-07-17 DIAGNOSIS — R07.89 OTHER CHEST PAIN: ICD-10-CM

## 2024-07-17 PROCEDURE — G8427 DOCREV CUR MEDS BY ELIG CLIN: HCPCS | Performed by: INTERNAL MEDICINE

## 2024-07-17 PROCEDURE — 93000 ELECTROCARDIOGRAM COMPLETE: CPT | Performed by: INTERNAL MEDICINE

## 2024-07-17 PROCEDURE — G8417 CALC BMI ABV UP PARAM F/U: HCPCS | Performed by: INTERNAL MEDICINE

## 2024-07-17 PROCEDURE — 99214 OFFICE O/P EST MOD 30 MIN: CPT | Performed by: INTERNAL MEDICINE

## 2024-07-17 PROCEDURE — 3017F COLORECTAL CA SCREEN DOC REV: CPT | Performed by: INTERNAL MEDICINE

## 2024-07-17 PROCEDURE — 1036F TOBACCO NON-USER: CPT | Performed by: INTERNAL MEDICINE

## 2024-07-17 RX ORDER — PREDNISONE 10 MG/1
TABLET ORAL
COMMUNITY
Start: 2024-07-09

## 2024-07-17 NOTE — PROGRESS NOTES
Erna tSoner  1960  Date of Service: 7/17/2024    Patient Active Problem List    Diagnosis Date Noted    Senile osteoporosis 06/03/2024     Overview Note:     Diagnosis added to problem list by Radha Sommer [facility]   based on transcribed order from Dr. Jewell        Other cirrhosis of liver (HCC) 05/23/2024    Chest pain 05/12/2024    Moderate protein-calorie malnutrition (HCC) 04/25/2024    S/P TIPS (transjugular intrahepatic portosystemic shunt) 04/23/2024    Gastric varices 04/18/2024    Hematemesis 04/16/2024       Social History     Socioeconomic History    Marital status:      Spouse name: None    Number of children: None    Years of education: None    Highest education level: None   Tobacco Use    Smoking status: Never    Smokeless tobacco: Never   Substance and Sexual Activity    Alcohol use: Yes     Comment: occasional    Drug use: No    Sexual activity: Not Currently     Social Determinants of Health     Food Insecurity: Food Insecurity Present (5/12/2024)    Hunger Vital Sign     Worried About Running Out of Food in the Last Year: Sometimes true     Ran Out of Food in the Last Year: Sometimes true   Transportation Needs: No Transportation Needs (5/12/2024)    PRAPARE - Transportation     Lack of Transportation (Medical): No     Lack of Transportation (Non-Medical): No   Housing Stability: High Risk (5/12/2024)    Housing Stability Vital Sign     Unable to Pay for Housing in the Last Year: Yes     Number of Places Lived in the Last Year: 1     Unstable Housing in the Last Year: No       Current Outpatient Medications   Medication Sig Dispense Refill    predniSONE (DELTASONE) 10 MG tablet       rifAXIMin (XIFAXAN) 550 MG tablet Take 1 tablet by mouth 2 times daily      lactulose (CHRONULAC) 10 GM/15ML solution Take 30 mLs by mouth 3 times daily 120 mL 11    apixaban (ELIQUIS) 5 MG TABS tablet Take 1 tablet by mouth 2 times daily 120 tablet 0    zinc sulfate (ZINCATE) 220 (50 Zn)

## 2024-07-17 NOTE — TELEPHONE ENCOUNTER
Per Dr. Spaulding, patient needs Coronary CTA re: chest pain.  Prior auth pending.    Coronary CTA ([id number])  CP (R07.89)

## 2024-07-17 NOTE — TELEPHONE ENCOUNTER
Prior authorization is PENDING. Documentation uploaded on [email or web address]      Trackin        Will keep you updated

## 2024-07-23 ENCOUNTER — HOSPITAL ENCOUNTER (OUTPATIENT)
Dept: OCCUPATIONAL THERAPY | Age: 64
Setting detail: THERAPIES SERIES
Discharge: HOME | End: 2024-07-23
Payer: COMMERCIAL

## 2024-07-23 ENCOUNTER — HOSPITAL ENCOUNTER (OUTPATIENT)
Dept: PHYSICAL THERAPY | Age: 64
Setting detail: THERAPIES SERIES
Discharge: HOME | End: 2024-07-23
Payer: COMMERCIAL

## 2024-07-23 PROCEDURE — 97530 THERAPEUTIC ACTIVITIES: CPT | Performed by: OCCUPATIONAL THERAPIST

## 2024-07-23 PROCEDURE — 97110 THERAPEUTIC EXERCISES: CPT | Performed by: OCCUPATIONAL THERAPIST

## 2024-07-23 PROCEDURE — 97530 THERAPEUTIC ACTIVITIES: CPT

## 2024-07-23 NOTE — PROGRESS NOTES
back pain. Pt able to ascend/descend stairs in reciprocal manner without difficulty with use of rails. Pt also with good balance during cone negotiation and grapevine tasks. Pt did report having back pain and moist heat applied following session.     Treatment Charges: Mins Units   Initial Evaluation       Re-Evaluation       Ther Exercise         TE     Manual Therapy     MT  45 3    Ther Activities        TA       Gait Training          GT       Neuro Re-education NR       Modalities 10 -   Non-Billable Service Time     Other       Total Time/Units 55 3        Treatment/Activity Tolerance:  [x] Patient tolerated treatment well [] Patient limited by fatigue  [] Patient limited by pain  [] Patient limited by other medical complications  [] Other:     Prognosis: [x] Good [] Fair  [] Poor    Patient Requires Follow-up: [x] Yes  [] No    Plan:   [x] Continue per plan of care [] Alter current plan (see comments)  [] Plan of care initiated [] Hold pending MD visit [] Discharge  Plan for Next Session:        Electronically signed by:    Fernanda Sparks PT, DPT  License [id number]

## 2024-07-23 NOTE — PROGRESS NOTES
OCCUPATIONAL THERAPY DAILY NOTE  [facility]  [facility]  [address] [address]  Dept: [phone]  Loc: [phone]   [facility] OT Fax: [phone]      Date:  2024      Initial Evaluation Date: 07/15/2024                          Evaluating Therapist: Luz Mcgraw OT     Patient Name:  Erna Stoner                      :  1960     Restrictions/Precautions:  none, low fall risk  Diagnosis:  R27.8 (ICD-10-CM) - Other lack of coordination                                                             Date of Surgery/Injury: Ongoing since September     Insurance/Certification information:  [employer] Auth#[id number]  Plan of care signed (Y/N): N  Visit# / total visits: 1 / 10- until 10/16/2024     Referring Practitioner:  Elia Daniel MD   Specific Practitioner Orders: Eval and treat    OT PLAN OF CARE   OT POC based on physician orders, patient diagnosis and results of clinical assessment     Frequency/Duration: 1-2x / week for 10-12 visits.   Certification period From: 07/15/2024  To: 11/15/2024    GOALS (Long term same as Short term):   1) Patient will demonstrate good understanding of home program (exercises/activities/diagnosis/prognosis/goals) with good accuracy.   2) Patient will pay her bills with [employer] [place] and use of adaptive strategies as needed-  4) Patient will decrease QuickDASH score to 15% or less for increased participation in daily functional activities.   5) Patient to demonstrate proper follow through of home modification/adaptive recommendations (e.g., memory aides) to increase safe functional ADL/IADLs.   6) Patient will demonstrate Fair+ dynamic standing balance during ADL/IADLs with ability to use righting reactions 100% of time when LOB occurs  7) Patient will complete meal prep and with Indep and no cueing for problem solving, memory, or sequencing Goal Progressing:     TODAY'S TREATMENT     Pain Level: 6 on

## 2024-07-25 ENCOUNTER — HOSPITAL ENCOUNTER (OUTPATIENT)
Dept: OCCUPATIONAL THERAPY | Age: 64
Setting detail: THERAPIES SERIES
Discharge: HOME | End: 2024-07-25
Payer: COMMERCIAL

## 2024-07-25 ENCOUNTER — HOSPITAL ENCOUNTER (OUTPATIENT)
Dept: PHYSICAL THERAPY | Age: 64
Setting detail: THERAPIES SERIES
Discharge: HOME | End: 2024-07-25
Payer: COMMERCIAL

## 2024-07-25 PROCEDURE — 97530 THERAPEUTIC ACTIVITIES: CPT

## 2024-07-25 PROCEDURE — 97110 THERAPEUTIC EXERCISES: CPT

## 2024-07-25 PROCEDURE — 97110 THERAPEUTIC EXERCISES: CPT | Performed by: OCCUPATIONAL THERAPIST

## 2024-07-25 PROCEDURE — 97530 THERAPEUTIC ACTIVITIES: CPT | Performed by: OCCUPATIONAL THERAPIST

## 2024-07-25 NOTE — PROGRESS NOTES
[facility]          Phone: [phone] Fax: [phone]    Physical Therapy Daily Treatment Note  Date:  2024    Patient Name:  Erna Stoner    :  1960  MRN: [id number]    Restrictions/Precautions:    Diagnosis:    [unfilled]  Treatment Diagnosis:    Insurance/Certification information:  NA  Referring Physician:  Elia Daniel MD   Plan of care signed (Y/N):    Visit# / total visits:  3/12  Pain level: 3-4/10 back pain   Time In:  1315  Time Out:  1400    Subjective:  Pt states back pain is better, but still having pain.         OBJECTIVE:     Functional Activities:   Transfers (sit to stand ):  NT    Gait Testing:   Gait Deviations (firm surface/linoleum): None  Assistive Device Used: None  Steps: NT    Strength Testing: NT  ROM: NT    Exercises:  Exercise/Equipment Resistance/Repetitions Other comments     STS 20x nt     Tandem walking  20 feet x 3 Vc's for proper technique     Secor:  Stepping fwd  Stepping behind  Alternating Fwd/behind   20 feet x 4  20 feet x 4  20 feet x 2             Hip/Knee flexion/extension  20x BLEs with red band nt     Negotiating around 6 cones placed 2 feet apart  4x nt     Stepping over 6 cones placed 2 feet apart  4x nt     Picking up 6 cones placed 2 feet apart  nt           Ambulating with horizontal/ vertical head turns  While reading word cards   220 feet   75 feet x 6   nt            SLS    SLS on 3\" foam 30 sec x 4 R/L  30 sec x 4 R/ Moderate use of hands for balance    Romberg on 3\" foam  Romberg on 3\" foam with H/V head turns 1 min  1 min ea V/H Fair balance  Moderate imbalance with head movement in H vs V   Balance on blue lawson discs 4 min total Intermittent use of hands for balance.     Bike 10 min      Other Therapeutic Activities:  as noted in objective and grid    Home Exercise Program:  NA    Manual Treatments:  NA    Modalities:  Moist heat to low back x 10 min, pt in supine    Comments:  Pt arrived

## 2024-07-26 NOTE — TELEPHONE ENCOUNTER
Prior authorization is APPROVED. Documentation will be scanned in the patient chart.      Request ID:[id number]  Trackin    Valid dates: 24-9/15/24      [facility]

## 2024-07-26 NOTE — PROGRESS NOTES
OCCUPATIONAL THERAPY DAILY NOTE  [facility]  [facility]  [address] [address]  Dept: [phone]  Loc: [phone]   [facility] OT Fax: [phone]      Date:  2024      Initial Evaluation Date: 07/15/2024                          Evaluating Therapist: Luz Mcgraw OT     Patient Name:  Erna Stoner                      :  1960     Restrictions/Precautions:  none, low fall risk  Diagnosis:  R27.8 (ICD-10-CM) - Other lack of coordination                                                             Date of Surgery/Injury: Ongoing since September     Insurance/Certification information:  [facility][id number]  Plan of care signed (Y/N): N  Visit# / total visits: 2/ 10- until 10/16/2024     Referring Practitioner:  Elia Daniel MD   Specific Practitioner Orders: Eval and treat    OT PLAN OF CARE   OT POC based on physician orders, patient diagnosis and results of clinical assessment     Frequency/Duration: 1-2x / week for 10-12 visits.   Certification period From: 07/15/2024  To: 11/15/2024    GOALS (Long term same as Short term):   1) Patient will demonstrate good understanding of home program (exercises/activities/diagnosis/prognosis/goals) with good accuracy.   2) Patient will pay her bills with [employer] [place] and use of adaptive strategies as needed-  4) Patient will decrease QuickDASH score to 15% or less for increased participation in daily functional activities.   5) Patient to demonstrate proper follow through of home modification/adaptive recommendations (e.g., memory aides) to increase safe functional ADL/IADLs.   6) Patient will demonstrate Fair+ dynamic standing balance during ADL/IADLs with ability to use righting reactions 100% of time when LOB occurs  7) Patient will complete meal prep and with Indep and no cueing for problem solving, memory, or sequencing Goal Progressing:     TODAY'S TREATMENT     Pain Level: no report

## 2024-07-29 ENCOUNTER — OFFICE VISIT (OUTPATIENT)
Dept: NEUROSURGERY | Age: 64
End: 2024-07-29
Payer: COMMERCIAL

## 2024-07-29 ENCOUNTER — APPOINTMENT (OUTPATIENT)
Dept: PHYSICAL THERAPY | Age: 64
End: 2024-07-29
Payer: COMMERCIAL

## 2024-07-29 VITALS
WEIGHT: 165 LBS | DIASTOLIC BLOOD PRESSURE: 65 MMHG | SYSTOLIC BLOOD PRESSURE: 118 MMHG | BODY MASS INDEX: 30.36 KG/M2 | HEIGHT: 62 IN | OXYGEN SATURATION: 97 % | HEART RATE: 91 BPM

## 2024-07-29 DIAGNOSIS — M54.50 CHRONIC BILATERAL LOW BACK PAIN WITHOUT SCIATICA: Primary | ICD-10-CM

## 2024-07-29 DIAGNOSIS — G89.29 CHRONIC BILATERAL LOW BACK PAIN WITHOUT SCIATICA: Primary | ICD-10-CM

## 2024-07-29 PROCEDURE — G8417 CALC BMI ABV UP PARAM F/U: HCPCS | Performed by: PHYSICIAN ASSISTANT

## 2024-07-29 PROCEDURE — 3017F COLORECTAL CA SCREEN DOC REV: CPT | Performed by: PHYSICIAN ASSISTANT

## 2024-07-29 PROCEDURE — G8427 DOCREV CUR MEDS BY ELIG CLIN: HCPCS | Performed by: PHYSICIAN ASSISTANT

## 2024-07-29 PROCEDURE — 99203 OFFICE O/P NEW LOW 30 MIN: CPT | Performed by: PHYSICIAN ASSISTANT

## 2024-07-29 PROCEDURE — 1036F TOBACCO NON-USER: CPT | Performed by: PHYSICIAN ASSISTANT

## 2024-07-29 RX ORDER — PREDNISONE 5 MG/1
5 TABLET ORAL DAILY
COMMUNITY
Start: 2024-07-09

## 2024-07-29 ASSESSMENT — ENCOUNTER SYMPTOMS
ABDOMINAL PAIN: 0
SHORTNESS OF BREATH: 0
TROUBLE SWALLOWING: 0
BACK PAIN: 1
PHOTOPHOBIA: 0

## 2024-07-29 NOTE — PROGRESS NOTES
[facility]      Subjective:  Erna Stoner is a 63 year old female with a past medical history of DM, sarcoidosis, hx DVT, known right cyst of basal ganglia, osteoporosis, and chronic low back pain. She presents to the office today as a new patient c/o worsening low back pain. Describes the pain as stabbing and sharp. Bending and lifting makes the pain worse. She has tried prednisone, heat, recent physical therapy, and lumbar MANDA by Dr. Burch 4 months ago without significant lasting relief. Denies loss of bowel or bladder, saddle anesthesia, pain down the legs, numbness, tingling, headache, loss of dexterity, abnormal gait, fever, chills, N/V, SOB, or chest pain.    Of note, patient takes Eliquis daily    Review of Systems   Constitutional:  Negative for fever and unexpected weight change.   HENT:  Negative for trouble swallowing.    Eyes:  Negative for photophobia and visual disturbance.   Respiratory:  Negative for shortness of breath.    Cardiovascular:  Negative for chest pain.   Gastrointestinal:  Negative for abdominal pain.   Endocrine: Negative for heat intolerance.   Genitourinary:  Negative for flank pain.   Musculoskeletal:  Positive for arthralgias and back pain. Negative for gait problem, myalgias and neck pain.   Skin:  Negative for wound.   Neurological:  Positive for weakness. Negative for numbness and headaches.   Psychiatric/Behavioral:  Negative for confusion.        Objective:  Vitals:    07/29/24 1413   BP: 118/65   Pulse: 91   SpO2: 97%       Physical Exam  Constitutional:       Appearance: Normal appearance. She is well-developed.   HENT:      Head: Normocephalic and atraumatic.   Eyes:      Extraocular Movements: Extraocular movements intact.      Conjunctiva/sclera: Conjunctivae normal.      Pupils: Pupils are equal, round, and reactive to light.   Cardiovascular:      Rate and Rhythm: Normal rate.   Pulmonary:      Effort: Pulmonary effort is

## 2024-07-30 ENCOUNTER — HOSPITAL ENCOUNTER (OUTPATIENT)
Dept: OCCUPATIONAL THERAPY | Age: 64
Setting detail: THERAPIES SERIES
Discharge: HOME | End: 2024-07-30
Payer: COMMERCIAL

## 2024-07-30 PROCEDURE — 97530 THERAPEUTIC ACTIVITIES: CPT

## 2024-07-30 PROCEDURE — 97110 THERAPEUTIC EXERCISES: CPT

## 2024-07-30 NOTE — PROGRESS NOTES
OCCUPATIONAL THERAPY DAILY NOTE  Y [facility]  [facility] OCCUPATIONAL THERAPY  [address] [address]  Dept: [phone]  Loc: [phone]   [facility] OT Fax: [phone]      Date:  2024      Initial Evaluation Date: 07/15/2024                          Evaluating Therapist: Luz Mcgraw OT     Patient Name:  Erna Stoner                      :  1960     Restrictions/Precautions:  none, low fall risk  Diagnosis:  R27.8 (ICD-10-CM) - Other lack of coordination                                                             Date of Surgery/Injury: Ongoing since September     Insurance/Certification information:  [facility][id number]  Plan of care signed (Y/N): N  Visit# / total visits: 3/ 10- until 10/16/2024     Referring Practitioner:  Elia Daniel MD   Specific Practitioner Orders: Eval and treat    OT PLAN OF CARE   OT POC based on physician orders, patient diagnosis and results of clinical assessment     Frequency/Duration: 1-2x / week for 10-12 visits.   Certification period From: 07/15/2024  To: 11/15/2024    GOALS (Long term same as Short term):   1) Patient will demonstrate good understanding of home program (exercises/activities/diagnosis/prognosis/goals) with good accuracy.   2) Patient will pay her bills with [employer] [place] and use of adaptive strategies as needed-  4) Patient will decrease QuickDASH score to 15% or less for increased participation in daily functional activities.   5) Patient to demonstrate proper follow through of home modification/adaptive recommendations (e.g., memory aides) to increase safe functional ADL/IADLs.   6) Patient will demonstrate Fair+ dynamic standing balance during ADL/IADLs with ability to use righting reactions 100% of time when LOB occurs  7) Patient will complete meal prep and with Indep and no cueing for problem solving, memory, or sequencing Goal Progressing:     TODAY'S TREATMENT     Pain Level: no report

## 2024-07-31 ENCOUNTER — OFFICE VISIT (OUTPATIENT)
Dept: GASTROENTEROLOGY | Age: 64
End: 2024-07-31
Payer: COMMERCIAL

## 2024-07-31 VITALS
HEART RATE: 82 BPM | WEIGHT: 171 LBS | BODY MASS INDEX: 31.47 KG/M2 | SYSTOLIC BLOOD PRESSURE: 106 MMHG | OXYGEN SATURATION: 96 % | HEIGHT: 62 IN | TEMPERATURE: 97.1 F | RESPIRATION RATE: 16 BRPM | DIASTOLIC BLOOD PRESSURE: 60 MMHG

## 2024-07-31 DIAGNOSIS — K74.69 OTHER CIRRHOSIS OF LIVER (HCC): Primary | ICD-10-CM

## 2024-07-31 DIAGNOSIS — I86.4 GASTRIC VARICES: ICD-10-CM

## 2024-07-31 DIAGNOSIS — Z95.828 S/P TIPS (TRANSJUGULAR INTRAHEPATIC PORTOSYSTEMIC SHUNT): ICD-10-CM

## 2024-07-31 PROCEDURE — G8417 CALC BMI ABV UP PARAM F/U: HCPCS | Performed by: STUDENT IN AN ORGANIZED HEALTH CARE EDUCATION/TRAINING PROGRAM

## 2024-07-31 PROCEDURE — 99214 OFFICE O/P EST MOD 30 MIN: CPT | Performed by: STUDENT IN AN ORGANIZED HEALTH CARE EDUCATION/TRAINING PROGRAM

## 2024-07-31 PROCEDURE — G8427 DOCREV CUR MEDS BY ELIG CLIN: HCPCS | Performed by: STUDENT IN AN ORGANIZED HEALTH CARE EDUCATION/TRAINING PROGRAM

## 2024-07-31 PROCEDURE — 3017F COLORECTAL CA SCREEN DOC REV: CPT | Performed by: STUDENT IN AN ORGANIZED HEALTH CARE EDUCATION/TRAINING PROGRAM

## 2024-07-31 PROCEDURE — 1036F TOBACCO NON-USER: CPT | Performed by: STUDENT IN AN ORGANIZED HEALTH CARE EDUCATION/TRAINING PROGRAM

## 2024-07-31 RX ORDER — LACTULOSE 10 G/15ML
20 SOLUTION ORAL SEE ADMIN INSTRUCTIONS
Qty: 946 ML | Refills: 11 | Status: SHIPPED
Start: 2024-07-31 | End: 2024-08-21

## 2024-07-31 NOTE — PROGRESS NOTES
[facility]  Gastroenterology, Hepatology &  Advanced Endoscopy   Progress Note        SUBJECTIVE:      Ms. 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 She presents to clinic in follow-up. She has been feeling better.  She has not had any recurrent hematemesis, but has rectal bleeding x1 after starting back on eliquis for DVT. She denies any jaundice, abdominal pain, nausea or vomiting.    Cirrhosis:  Etiology: Sarcoidosis  MELD: 10  HE: No symptoms. Now on lactulose and rifaximin following TIPS.  Gastric varices: She is now s/p TIPS with no evidence of recurrent bleeding.  Ascites: No history  HCC: Simple liver cysts 3.7 cm on abdominal ultrasound 6/4/2024.     OBJECTIVE      Medications    No current facility-administered medications for this visit.    Physical    VITALS:  /60 (Site: Right Upper Arm, Position: Sitting, Cuff Size: Medium Adult)   Pulse 82   Temp 97.1 °F (36.2 °C) (Infrared)   Resp 16   Ht 1.575 m (5' 2\")   Wt 77.6 kg (171 lb)   SpO2 96%   BMI 31.28 kg/m²   Physical Exam:  General: Overall well-appearing, NAD  HEENT: PERRLA, EOMI, Anicteric sclera, MMM, no rhinorrhea  Cards: RRR, no LE edema  Resp: Breathing comfortably on room air, good air movement, no use of accessory muscles, no audible wheezing  Abdomen: Soft, nondistended, nontender, bowel sounds present  Extremities: Moves all extremities, no effusions or bruising.  Skin: No rashes or jaundice  Neuro: A&O x 3, CN grossly intact, non-focal exam     Data    CBC with Differential:    Lab Results   Component Value Date/Time    WBC 3.0 07/11/2024 04:10 PM    RBC 4.87 07/11/2024 04:10 PM    HGB 10.3 07/11/2024 04:10 PM    HCT 36.5 07/11/2024 04:10 PM     05/18/2024 05:37 AM    MCV 74.9 07/11/2024 04:10 PM    MCH 21.1 07/11/2024 04:10 PM    MCHC 28.2

## 2024-08-01 ENCOUNTER — HOSPITAL ENCOUNTER (OUTPATIENT)
Dept: OCCUPATIONAL THERAPY | Age: 64
Setting detail: THERAPIES SERIES
Discharge: HOME | End: 2024-08-01
Payer: COMMERCIAL

## 2024-08-01 ENCOUNTER — HOSPITAL ENCOUNTER (OUTPATIENT)
Dept: PHYSICAL THERAPY | Age: 64
Setting detail: THERAPIES SERIES
Discharge: HOME | End: 2024-08-01
Payer: COMMERCIAL

## 2024-08-01 PROCEDURE — 97530 THERAPEUTIC ACTIVITIES: CPT

## 2024-08-01 PROCEDURE — 97110 THERAPEUTIC EXERCISES: CPT

## 2024-08-01 NOTE — PROGRESS NOTES
[facility]          Phone: [phone] Fax: [phone]    Physical Therapy Daily Treatment Note  Date:  2024    Patient Name:  Erna Stoner    :  1960  MRN: [id number]    Restrictions/Precautions:    Diagnosis:    [unfilled]  Treatment Diagnosis:    Insurance/Certification information:  NA  Referring Physician:  Elia Daniel MD   Plan of care signed (Y/N):    Visit# / total visits:    Pain level: 3-4/10 back pain   Time In:  1410  Time Out:  1505    Subjective:  Pt reports ongoing back pain. Pt states she is having an MRI next week for her back.         OBJECTIVE:     Functional Activities:   Transfers (sit to stand ):  NT    Gait Testing:   Gait Deviations (firm surface/linoleum): None  Assistive Device Used: None  Steps: 16 steps with 1 rail    Strength Testing: NT  ROM: NT    Exercises:  Exercise/Equipment Resistance/Repetitions Other comments     STS 20x Reports back discomfort     Tandem walking  6 feet x 6 in // bars Vc's for proper technique   [place]:  Stepping fwd  Stepping behind  Alternating Fwd/behind   20 feet x 4  20 feet x 4  20 feet x 4             Hip/Knee flexion/extension  20x BLEs with red band Vc's for technique     Negotiating around 6 cones placed 2 feet apart  4x nt     Stepping over 6 cones placed 2 feet apart  4x nt     Picking up 6 cones placed 2 feet apart  nt           Ambulating with horizontal/ vertical head turns  While reading word cards   220 feet   75 feet x 6   nt            SLS    SLS on 3\" foam 30 sec x 4 R/L  30 sec x 4 R/ nt    Romberg on 3\" foam  Romberg on 3\" foam with H/V head turns 1 min  1 min ea V/H nt   Balance on blue lawson discs 4 min total nt     Bike 10 min      Other Therapeutic Activities:  as noted in objective and grid    Home Exercise Program:  NA    Manual Treatments:  NA    Modalities:  Moist heat to low back x 15 min, pt in supine    Comments:  Pt continues to have low back pain and did not

## 2024-08-01 NOTE — PROGRESS NOTES
OCCUPATIONAL THERAPY DAILY NOTE  Y [facility]  [facility] OCCUPATIONAL THERAPY  [address] [address]  Dept: [phone]  Loc: [phone]   [facility] OT Fax: [phone]      Date:  2024      Initial Evaluation Date: 07/15/2024                          Evaluating Therapist: Luz Mcgraw OT     Patient Name:  Erna Stoner                      :  1960     Restrictions/Precautions:  none, low fall risk  Diagnosis:  R27.8 (ICD-10-CM) - Other lack of coordination                                                             Date of Surgery/Injury: Ongoing since September     Insurance/Certification information:  [employer] Auth#[id number]  Plan of care signed (Y/N): N  Visit# / total visits: 4/ 10- until 10/16/2024     Referring Practitioner:  Elia Daniel MD   Specific Practitioner Orders: Eval and treat    OT PLAN OF CARE   OT POC based on physician orders, patient diagnosis and results of clinical assessment     Frequency/Duration: 1-2x / week for 10-12 visits.   Certification period From: 07/15/2024  To: 11/15/2024    GOALS (Long term same as Short term):   1) Patient will demonstrate good understanding of home program (exercises/activities/diagnosis/prognosis/goals) with good accuracy.   2) Patient will pay her bills with Modified [place] and use of adaptive strategies as needed-  4) Patient will decrease QuickDASH score to 15% or less for increased participation in daily functional activities.   5) Patient to demonstrate proper follow through of home modification/adaptive recommendations (e.g., memory aides) to increase safe functional ADL/IADLs.   6) Patient will demonstrate Fair+ dynamic standing balance during ADL/IADLs with ability to use righting reactions 100% of time when LOB occurs  7) Patient will complete meal prep and with Indep and no cueing for problem solving, memory, or sequencing    TODAY'S TREATMENT     Pain Level: no report of pain this

## 2024-08-01 NOTE — PROGRESS NOTES
[facility]          Phone: [phone] Fax: [phone]    Physical Therapy Daily Treatment Note  Date:  2024    Patient Name:  Erna Stoner    :  1960  MRN: [id number]    Restrictions/Precautions:    Diagnosis:    [unfilled]  Treatment Diagnosis:    Insurance/Certification information:  NA  Referring Physician:  Elia Daniel MD   Plan of care signed (Y/N):    Visit# / total visits:  3/12  Pain level: 3-4/10 back pain   Time In:  0  Time Out:  ***    Subjective:  Pt reports ongoing back pain. Pt states she is having an MRI next week for her back.         OBJECTIVE:     Functional Activities:   Transfers (sit to stand ):  NT    Gait Testing:   Gait Deviations (firm surface/linoleum): None  Assistive Device Used: None  Steps: NT    Strength Testing: NT  ROM: NT    Exercises:  Exercise/Equipment Resistance/Repetitions Other comments     STS 20x nt     Tandem walking  20 feet x 3 Vc's for proper technique     [place]:  Stepping fwd  Stepping behind  Alternating Fwd/behind   20 feet x 4  20 feet x 4  20 feet x 2             Hip/Knee flexion/extension  20x BLEs with red band nt     Negotiating around 6 cones placed 2 feet apart  4x nt     Stepping over 6 cones placed 2 feet apart  4x nt     Picking up 6 cones placed 2 feet apart  nt           Ambulating with horizontal/ vertical head turns  While reading word cards   220 feet   75 feet x 6   nt            SLS    SLS on 3\" foam 30 sec x 4 R/L  30 sec x 4 R/ Moderate use of hands for balance    Romberg on 3\" foam  Romberg on 3\" foam with H/V head turns 1 min  1 min ea V/H Fair balance  Moderate imbalance with head movement in H vs V   Balance on blue lawson discs 4 min total Intermittent use of hands for balance.     Bike 10 min      Other Therapeutic Activities:  as noted in objective and grid    Home Exercise Program:  NA    Manual Treatments:  NA    Modalities:  Moist heat to low back x 10 min, pt in

## 2024-08-05 ENCOUNTER — TELEPHONE (OUTPATIENT)
Age: 64
End: 2024-08-05

## 2024-08-05 ENCOUNTER — PREP FOR PROCEDURE (OUTPATIENT)
Age: 64
End: 2024-08-05

## 2024-08-05 ENCOUNTER — TELEPHONE (OUTPATIENT)
Dept: ADMINISTRATIVE | Age: 64
End: 2024-08-05

## 2024-08-05 DIAGNOSIS — K62.5 RECTAL BLEEDING: ICD-10-CM

## 2024-08-05 DIAGNOSIS — R07.89 OTHER CHEST PAIN: Primary | ICD-10-CM

## 2024-08-05 DIAGNOSIS — Z01.818 PREOP TESTING: ICD-10-CM

## 2024-08-05 PROBLEM — I85.00 ESOPHAGEAL VARICES (HCC): Status: ACTIVE | Noted: 2024-08-05

## 2024-08-05 RX ORDER — MIDODRINE HYDROCHLORIDE 5 MG/1
TABLET ORAL
Qty: 1 TABLET | Refills: 0 | Status: SHIPPED | OUTPATIENT
Start: 2024-08-05

## 2024-08-05 NOTE — TELEPHONE ENCOUNTER
Prior Authorization Form:      DEMOGRAPHICS:                     Patient Name:  Erna Stoner  Patient :  1960            Insurance:  Payor: [facility] / Plan: [facility] MEDICAID / Product Type: *No Product type* /   Insurance ID Number:    Payer/Plan Subscr  Sex Relation Sub. Ins. ID Effective Group Num   1. [employer] - * ERNA STONER 1960 Female Self [id number] [address]         DIAGNOSIS & PROCEDURE:                       Procedure/Operation: egd, colonoscopy           CPT Code: 82895, 44862    Diagnosis:  esophageal varices, rectal bleeding    ICD10 Code: k62.5, I85.00    [facility]:  [facility]    Surgeon:  cris    SCHEDULING INFORMATION:                          Date: 10-10-24    Time: 130              Anesthesia:  MAC/TIVA                                                       Status:  Outpatient        Special Comments:  na       Electronically signed by HERMINIA KENNEY LPN on 2024 at 10:59 AM

## 2024-08-05 NOTE — TELEPHONE ENCOUNTER
Pt called and said CTA was approved and requested to schedule.  Staff unavailable.  Please contact pt.

## 2024-08-06 ENCOUNTER — HOSPITAL ENCOUNTER (OUTPATIENT)
Dept: OCCUPATIONAL THERAPY | Age: 64
Setting detail: THERAPIES SERIES
Discharge: HOME | End: 2024-08-06
Payer: COMMERCIAL

## 2024-08-06 PROCEDURE — 97110 THERAPEUTIC EXERCISES: CPT

## 2024-08-06 PROCEDURE — 97530 THERAPEUTIC ACTIVITIES: CPT

## 2024-08-06 NOTE — PROGRESS NOTES
OCCUPATIONAL THERAPY DAILY NOTE  Y [facility]  [facility] OCCUPATIONAL THERAPY  [address] [address]  Dept: [phone]  Loc: [phone]   [facility] OT Fax: [phone]      Date:  2024      Initial Evaluation Date: 07/15/2024                          Evaluating Therapist: Luz Mcgraw OT     Patient Name:  Erna Stoner                      :  1960     Restrictions/Precautions:  none, low fall risk  Diagnosis:  R27.8 (ICD-10-CM) - Other lack of coordination                                                             Date of Surgery/Injury: Ongoing since September     Insurance/Certification information:  [facility]#[id number]  Plan of care signed (Y/N): N  Visit# / total visits: 5/ 10- until 10/16/2024     Referring Practitioner:  Elia Daniel MD   Specific Practitioner Orders: Eval and treat    OT PLAN OF CARE   OT POC based on physician orders, patient diagnosis and results of clinical assessment     Frequency/Duration: 1-2x / week for 10-12 visits.   Certification period From: 07/15/2024  To: 11/15/2024    GOALS (Long term same as Short term):   1) Patient will demonstrate good understanding of home program (exercises/activities/diagnosis/prognosis/goals) with good accuracy.   2) Patient will pay her bills with [employer] [place] and use of adaptive strategies as needed-  4) Patient will decrease QuickDASH score to 15% or less for increased participation in daily functional activities.   5) Patient to demonstrate proper follow through of home modification/adaptive recommendations (e.g., memory aides) to increase safe functional ADL/IADLs.   6) Patient will demonstrate Fair+ dynamic standing balance during ADL/IADLs with ability to use righting reactions 100% of time when LOB occurs  7) Patient will complete meal prep and with Indep and no cueing for problem solving, memory, or sequencing    TODAY'S TREATMENT     Pain Level: no report of pain this

## 2024-08-08 ENCOUNTER — HOSPITAL ENCOUNTER (OUTPATIENT)
Dept: OCCUPATIONAL THERAPY | Age: 64
Setting detail: THERAPIES SERIES
Discharge: HOME | End: 2024-08-08
Payer: COMMERCIAL

## 2024-08-08 ENCOUNTER — HOSPITAL ENCOUNTER (OUTPATIENT)
Dept: GENERAL RADIOLOGY | Age: 64
Discharge: HOME | End: 2024-08-10
Payer: COMMERCIAL

## 2024-08-08 ENCOUNTER — HOSPITAL ENCOUNTER (OUTPATIENT)
Dept: MRI IMAGING | Age: 64
Discharge: HOME | End: 2024-08-10
Payer: COMMERCIAL

## 2024-08-08 DIAGNOSIS — G89.29 CHRONIC BILATERAL LOW BACK PAIN WITHOUT SCIATICA: ICD-10-CM

## 2024-08-08 DIAGNOSIS — M54.50 CHRONIC BILATERAL LOW BACK PAIN WITHOUT SCIATICA: ICD-10-CM

## 2024-08-08 PROCEDURE — 72148 MRI LUMBAR SPINE W/O DYE: CPT

## 2024-08-08 PROCEDURE — 97110 THERAPEUTIC EXERCISES: CPT

## 2024-08-08 PROCEDURE — 97530 THERAPEUTIC ACTIVITIES: CPT

## 2024-08-08 PROCEDURE — 72120 X-RAY BEND ONLY L-S SPINE: CPT

## 2024-08-08 NOTE — PROGRESS NOTES
graduated to patient's progress. Pt education continues at each visit to obtain maximum benefits from skilled OT intervention.  []  Alter Plan of care:   []  Discharge:      Jelena VAZQUEZ, [id number]

## 2024-08-13 ENCOUNTER — HOSPITAL ENCOUNTER (OUTPATIENT)
Age: 64
Discharge: HOME | End: 2024-08-13
Payer: COMMERCIAL

## 2024-08-13 ENCOUNTER — APPOINTMENT (OUTPATIENT)
Dept: OCCUPATIONAL THERAPY | Age: 64
End: 2024-08-13
Payer: COMMERCIAL

## 2024-08-13 DIAGNOSIS — Z01.818 PREOP TESTING: ICD-10-CM

## 2024-08-13 DIAGNOSIS — R07.89 OTHER CHEST PAIN: ICD-10-CM

## 2024-08-13 LAB
ANION GAP SERPL CALCULATED.3IONS-SCNC: 10 MMOL/L (ref 7–16)
BUN SERPL-MCNC: 9 MG/DL (ref 6–23)
CALCIUM SERPL-MCNC: 8.9 MG/DL (ref 8.6–10.2)
CHLORIDE SERPL-SCNC: 105 MMOL/L (ref 98–107)
CO2 SERPL-SCNC: 25 MMOL/L (ref 22–29)
CREAT SERPL-MCNC: 0.5 MG/DL (ref 0.5–1)
GFR, ESTIMATED: >90 ML/MIN/1.73M2
GLUCOSE SERPL-MCNC: 271 MG/DL (ref 74–99)
POTASSIUM SERPL-SCNC: 3.4 MMOL/L (ref 3.5–5)
SODIUM SERPL-SCNC: 140 MMOL/L (ref 132–146)

## 2024-08-13 PROCEDURE — 36415 COLL VENOUS BLD VENIPUNCTURE: CPT

## 2024-08-13 PROCEDURE — 80048 BASIC METABOLIC PNL TOTAL CA: CPT

## 2024-08-14 ENCOUNTER — TELEPHONE (OUTPATIENT)
Dept: CARDIOLOGY CLINIC | Age: 64
End: 2024-08-14

## 2024-08-14 RX ORDER — POTASSIUM CHLORIDE 20 MEQ/1
40 TABLET, EXTENDED RELEASE ORAL DAILY
Qty: 2 TABLET | Refills: 0 | Status: SHIPPED | OUTPATIENT
Start: 2024-08-14 | End: 2024-08-15

## 2024-08-14 NOTE — TELEPHONE ENCOUNTER
----- Message from Dr. Serge Spaulding, DO sent at 8/13/2024  4:35 PM EDT -----  Potassium is a little low.  Give her kdur 40 mill equivalents 1 dose only

## 2024-08-15 ENCOUNTER — HOSPITAL ENCOUNTER (OUTPATIENT)
Dept: OCCUPATIONAL THERAPY | Age: 64
Setting detail: THERAPIES SERIES
Discharge: HOME | End: 2024-08-15
Payer: COMMERCIAL

## 2024-08-15 ENCOUNTER — TELEPHONE (OUTPATIENT)
Age: 64
End: 2024-08-15

## 2024-08-15 PROCEDURE — 97530 THERAPEUTIC ACTIVITIES: CPT

## 2024-08-15 PROCEDURE — 97110 THERAPEUTIC EXERCISES: CPT

## 2024-08-15 NOTE — TELEPHONE ENCOUNTER
Dr lynch has pt taking lactulose daily to get where she has 2-3 BM each day. She has had to increase her daily dose to accommodate this and ran out before she can fill again. Called to [facility] in [place].   Lactulose 10 gm/15 ml  Takes 30 ml TID, 2700 ml with 5 refills.   Pt notified. Electronically signed by HERMINIA KENNEY LPN on 8/15/2024 at 1:08 PM

## 2024-08-15 NOTE — PROGRESS NOTES
OCCUPATIONAL THERAPY DAILY NOTE  Y [facility]  [facility] OCCUPATIONAL THERAPY  [address] [address]  Dept: [phone]  Loc: [phone]   [facility] OT Fax: [phone]      Date:  8/15/2024      Initial Evaluation Date: 07/15/2024                          Evaluating Therapist: uLz Mcgraw OT     Patient Name:  Erna Stoner                      :  1960     Restrictions/Precautions:  none, low fall risk  Diagnosis:  R27.8 (ICD-10-CM) - Other lack of coordination                                                             Date of Surgery/Injury: Ongoing since September     Insurance/Certification information:  [facility]#[id number]  Plan of care signed (Y/N): N  Visit# / total visits: 7/ 10- until 10/16/2024     Referring Practitioner:  Elia Daniel MD   Specific Practitioner Orders: Eval and treat    OT PLAN OF CARE   OT POC based on physician orders, patient diagnosis and results of clinical assessment     Frequency/Duration: 1-2x / week for 10-12 visits.   Certification period From: 07/15/2024  To: 11/15/2024    GOALS (Long term same as Short term):   1) Patient will demonstrate good understanding of home program (exercises/activities/diagnosis/prognosis/goals) with good accuracy.   2) Patient will pay her bills with [employer] [place] and use of adaptive strategies as needed-  4) Patient will decrease QuickDASH score to 15% or less for increased participation in daily functional activities.   5) Patient to demonstrate proper follow through of home modification/adaptive recommendations (e.g., memory aides) to increase safe functional ADL/IADLs.   6) Patient will demonstrate Fair+ dynamic standing balance during ADL/IADLs with ability to use righting reactions 100% of time when LOB occurs  7) Patient will complete meal prep and with Indep and no cueing for problem solving, memory, or sequencing    TODAY'S TREATMENT     Pain Level: no report of pain this

## 2024-08-19 ENCOUNTER — TELEPHONE (OUTPATIENT)
Dept: PHYSICAL THERAPY | Age: 64
End: 2024-08-19

## 2024-08-19 NOTE — TELEPHONE ENCOUNTER
Called patient to schedule as authorization received for additional therapy.   Left a voice message

## 2024-08-20 ENCOUNTER — HOSPITAL ENCOUNTER (OUTPATIENT)
Dept: PHYSICAL THERAPY | Age: 64
Setting detail: THERAPIES SERIES
Discharge: HOME | End: 2024-08-20
Payer: COMMERCIAL

## 2024-08-20 ENCOUNTER — HOSPITAL ENCOUNTER (OUTPATIENT)
Dept: OCCUPATIONAL THERAPY | Age: 64
Setting detail: THERAPIES SERIES
Discharge: HOME | End: 2024-08-20
Payer: COMMERCIAL

## 2024-08-20 PROCEDURE — 97110 THERAPEUTIC EXERCISES: CPT

## 2024-08-20 PROCEDURE — 97530 THERAPEUTIC ACTIVITIES: CPT

## 2024-08-20 NOTE — PROGRESS NOTES
Total  60 4        Plan: OT 1-2x / week for 10-12 visits     [x]  Continues Plan of care with focus on  improve functional use of the RUE by increasing strength, short term/long term memory, increasing endurance  and functional independence when using RUE to complete tasks. : Treatment covered based on POC and graduated to patient's progress. Pt education continues at each visit to obtain maximum benefits from skilled OT intervention.  []  Alter Plan of care:   []  Discharge:      Jelena VAZQUEZ, [id number]

## 2024-08-20 NOTE — PROGRESS NOTES
Initial Evaluation       Re-Evaluation       Ther Exercise         TE 30 2   Manual Therapy     MT     Ther Activities        TA     Gait Training          GT       Neuro Re-education NR       Modalities     Non-Billable Service Time 15 --   Other       Total Time/Units 45 2        Treatment/Activity Tolerance:  [x] Patient tolerated treatment well [] Patient limited by fatigue  [] Patient limited by pain  [] Patient limited by other medical complications  [] Other:     Prognosis: [x] Good [] Fair  [] Poor    Patient Requires Follow-up: [x] Yes  [] No    Plan:   [x] Continue per plan of care [] Alter current plan (see comments)  [] Plan of care initiated [] Hold pending MD visit [] Discharge  Plan for Next Session:        Electronically signed by:    Madhuri Farris, LPTA  [id number]

## 2024-08-21 ENCOUNTER — TELEPHONE (OUTPATIENT)
Dept: GASTROENTEROLOGY | Age: 64
End: 2024-08-21

## 2024-08-21 RX ORDER — LACTULOSE 10 G/15ML
20 SOLUTION ORAL 3 TIMES DAILY
Qty: 2700 ML | Refills: 11 | Status: SHIPPED | OUTPATIENT
Start: 2024-08-21

## 2024-08-22 ENCOUNTER — HOSPITAL ENCOUNTER (OUTPATIENT)
Dept: CT IMAGING | Age: 64
Discharge: HOME | End: 2024-08-24
Payer: COMMERCIAL

## 2024-08-22 ENCOUNTER — HOSPITAL ENCOUNTER (OUTPATIENT)
Dept: OCCUPATIONAL THERAPY | Age: 64
Setting detail: THERAPIES SERIES
End: 2024-08-22
Payer: COMMERCIAL

## 2024-08-22 VITALS
OXYGEN SATURATION: 99 % | RESPIRATION RATE: 16 BRPM | DIASTOLIC BLOOD PRESSURE: 59 MMHG | HEART RATE: 68 BPM | SYSTOLIC BLOOD PRESSURE: 105 MMHG

## 2024-08-22 DIAGNOSIS — R07.89 OTHER CHEST PAIN: ICD-10-CM

## 2024-08-22 PROCEDURE — 2580000003 HC RX 258: Performed by: INTERNAL MEDICINE

## 2024-08-22 PROCEDURE — 6370000000 HC RX 637 (ALT 250 FOR IP): Performed by: INTERNAL MEDICINE

## 2024-08-22 PROCEDURE — 75571 CT HRT W/O DYE W/CA TEST: CPT

## 2024-08-22 RX ORDER — METOPROLOL TARTRATE 50 MG/1
100 TABLET, FILM COATED ORAL ONCE
Status: COMPLETED | OUTPATIENT
Start: 2024-08-22 | End: 2024-08-22

## 2024-08-22 RX ORDER — 0.9 % SODIUM CHLORIDE 0.9 %
1000 INTRAVENOUS SOLUTION INTRAVENOUS ONCE
Status: COMPLETED | OUTPATIENT
Start: 2024-08-22 | End: 2024-08-22

## 2024-08-22 RX ADMIN — METOPROLOL TARTRATE 100 MG: 50 TABLET, FILM COATED ORAL at 10:18

## 2024-08-22 RX ADMIN — METOPROLOL TARTRATE 100 MG: 50 TABLET, FILM COATED ORAL at 09:17

## 2024-08-22 RX ADMIN — SODIUM CHLORIDE 1000 ML: 9 INJECTION, SOLUTION INTRAVENOUS at 09:17

## 2024-08-22 RX ADMIN — IVABRADINE 15 MG: 5 TABLET, FILM COATED ORAL at 11:32

## 2024-08-22 ASSESSMENT — PAIN - FUNCTIONAL ASSESSMENT: PAIN_FUNCTIONAL_ASSESSMENT: NONE - DENIES PAIN

## 2024-08-22 NOTE — PROGRESS NOTES
Patient arrived via for CTA coronary arteries. Allergies reviewed, instructions given to  include protocol for heart rate, b/p, necessary medications that will be given, IV site and proper breath hold during scan. Questions answered and expressed understanding confirmed. Placed on monitoring devices for heart rate and rhythm, B/P taken,  IV flushed / started, flushed and prn adapter attached. Radiologist informed of patient's arrival.      Order received from Dr. Palacios for PO Metoprolol Tartrate 100 mg and Normal saline 1000cc at 200/hr. Patient had home order for PO Midodrine 5 mg and to drink gatorade. This was relayed to Dr. Palacios who instructed patient to not drink Gatorade.    New VS updated to Dr. Palacios, HR 70, /63. New verbal order received for second dose of Metoprolol Tartrate 100 mg.    Dr. Palacios notified of patient current VS BP 99/58 HR 70. Dr. Palacios gave verbal order for 15 mg Ivabridine PO.    Dr. Palacios notified of HR 70 and /59. Dr. Palacios stated to only proceed with calcium score and to update Dr. Spaulding. Patient updated.    Dr. Spaulding updated of procedure cancellation. Patient escorted out of department without difficulty.

## 2024-08-27 ENCOUNTER — HOSPITAL ENCOUNTER (OUTPATIENT)
Dept: SPEECH THERAPY | Age: 64
Setting detail: THERAPIES SERIES
Discharge: HOME | End: 2024-08-27
Payer: COMMERCIAL

## 2024-08-27 PROCEDURE — 92523 SPEECH SOUND LANG COMPREHEN: CPT

## 2024-08-27 NOTE — PROGRESS NOTES
[facility]  SPEECH/LANGUAGE PATHOLOGY  ROSS INFORMATION PROCESSING ASSESSMENT-2 (RIPA-2)   EVALUATION RESULTS and PLAN OF CARE    PATIENT NAME:  Erna Stoner  (female)     MRN:  [id number]    :  1960  (63 y.o.)  STATUS:  Outpatient clinic   TODAY'S DATE:  2024  REFERRING PROVIDER:    Elia Daniel MD        PROVIDER NPI:  [id number]  SPECIFIC PROVIDER ORDER: Speech language pathology evaluation  Date of order:  24  EVALUATING THERAPIST: SHAILA Farfan    CERTIFICATION/RECERTIFICATION PERIOD: 2024 to 24  INSURANCE PROVIDER:  [facility] / Plan: [employer] [place] MEDICAID / Product Type: *No Product type* /    INSURANCE ID:  [id number] - (Medicaid Managed)   SECONDARY INSURANCE (if applicable):        CPT Codes  EVALUATION: 41859 speech evaluation    TREATMENT:  Requesting treatment authorization for  10 visits over 12 weeks focusing on the following CPT codes:      75156  therapeutic interventions that focus on cognitive function , initial  15 min  97782  therapeutic interventions that focus on cognitive function, each additional 15 min  15931  speech/language tx     REFERRING/TREATMENT DIAGNOSIS: Other lack of coordination [R27.8]  Cognitive communication deficit [R41.841]          SPEECH THERAPY  PLAN OF CARE   The speech therapy  POC is established based on physician order, speech pathology diagnosis and results of clinical assessment     SPEECH PATHOLOGY DIAGNOSIS:    mild-moderate cognitive impairment     The patient has a history of sarcoidosis that has spread from lungs to liver. Per patient report, the liver damage has caused her anomia levels to be off leading to difficulty with severe fatigue and confusion. The patient currently lives with her son on and off and has his assistance, at times, with medication and finance management.     Outpatient Speech Pathology intervention is recommended 1 time  per week for

## 2024-08-28 ENCOUNTER — OFFICE VISIT (OUTPATIENT)
Dept: NEUROSURGERY | Age: 64
End: 2024-08-28
Payer: COMMERCIAL

## 2024-08-28 VITALS
WEIGHT: 172 LBS | HEIGHT: 62 IN | HEART RATE: 94 BPM | OXYGEN SATURATION: 97 % | BODY MASS INDEX: 31.65 KG/M2 | DIASTOLIC BLOOD PRESSURE: 69 MMHG | RESPIRATION RATE: 16 BRPM | SYSTOLIC BLOOD PRESSURE: 115 MMHG

## 2024-08-28 DIAGNOSIS — G89.29 CHRONIC BILATERAL LOW BACK PAIN WITHOUT SCIATICA: Primary | ICD-10-CM

## 2024-08-28 DIAGNOSIS — M54.50 CHRONIC BILATERAL LOW BACK PAIN WITHOUT SCIATICA: Primary | ICD-10-CM

## 2024-08-28 DIAGNOSIS — M47.816 FACET ARTHROPATHY, LUMBAR: ICD-10-CM

## 2024-08-28 PROCEDURE — 99213 OFFICE O/P EST LOW 20 MIN: CPT | Performed by: PHYSICIAN ASSISTANT

## 2024-08-28 PROCEDURE — 1036F TOBACCO NON-USER: CPT | Performed by: PHYSICIAN ASSISTANT

## 2024-08-28 PROCEDURE — G8427 DOCREV CUR MEDS BY ELIG CLIN: HCPCS | Performed by: PHYSICIAN ASSISTANT

## 2024-08-28 PROCEDURE — G8417 CALC BMI ABV UP PARAM F/U: HCPCS | Performed by: PHYSICIAN ASSISTANT

## 2024-08-28 PROCEDURE — 3017F COLORECTAL CA SCREEN DOC REV: CPT | Performed by: PHYSICIAN ASSISTANT

## 2024-08-28 RX ORDER — MELOXICAM 7.5 MG/1
7.5 TABLET ORAL DAILY PRN
Qty: 30 TABLET | Refills: 2 | Status: SHIPPED | OUTPATIENT
Start: 2024-08-28 | End: 2024-11-26

## 2024-08-28 NOTE — PROGRESS NOTES
Office Follow-up     Subjective: Erna Stoner is a 63 year old female with a past medical history of DM, sarcoidosis, hx DVT, known right cyst of basal ganglia, osteoporosis, and chronic low back pain.     She presented to the office 7/29/24 c/o worsening low back pain. Describes the pain as stabbing and sharp. Bending and lifting makes the pain worse. She has tried prednisone, heat, recent physical therapy, and lumbar MANDA by Dr. Burch 4 months ago without significant lasting relief. Updated MRI lumbar spine and lumbar flex/ex XR ordered.     Patient presents to the office today to review imaging. Pain remains the same, no better or worse. Denies loss of bowel or bladder, saddle anesthesia, pain down the legs, numbness, tingling, headache, loss of dexterity, abnormal gait, fever, chills, N/V, SOB, or chest pain.      Physical Exam:              WDWN, no apparent distress              Non-labored breathing               Vitals Stable              Alert and oriented x3              CN 3-12 intact              PERRL              EOMI              URIBE well              Motor strength symmetric              Sensation to LT intact bilaterally   Diffuse TTP lumbar spine                Imaging: MRI lumbar spine 8/10/24: IMPRESSION:  1. Moderate to severe disc space narrowing at L5/S1 with edematous  degenerative endplate changes.  2. No acute osseous abnormality.  3. Mild bilateral neural foraminal narrowing at L5/S1.  4. No central canal stenosis.    Lumbar flex/ex XR: IMPRESSION:  Lower lumbar degenerative changes.  No dynamic instability or significant  listhesis.     Assessment: This is a 63 y.o.  female presenting for a follow-up to review imaging     Plan:  -MRI and XR reviewed and discussed with patient in detail  -Patient with facet arthropathy, DDD, and L5-S1 endplate degeneration. No significant stenosis.   -Recommend facet joint injections - Dr. Burch. If no help, can consider SCS trial  -Meloxicam sent to  pharmacy - patient states she is not taking Eliquis anymore  -OARRS report reviewed   -Follow-up in neurosurgery clinic if fail all conservative treatments or if symptoms worsen/new issues arise in the interim.    Electronically signed by Joan Cornejo PA-C on 8/28/2024 at 2:05 PM

## 2024-08-29 ENCOUNTER — HOSPITAL ENCOUNTER (OUTPATIENT)
Dept: OCCUPATIONAL THERAPY | Age: 64
Setting detail: THERAPIES SERIES
Discharge: HOME | End: 2024-08-29
Payer: COMMERCIAL

## 2024-08-29 ENCOUNTER — HOSPITAL ENCOUNTER (OUTPATIENT)
Dept: PHYSICAL THERAPY | Age: 64
Setting detail: THERAPIES SERIES
Discharge: HOME | End: 2024-08-29
Payer: COMMERCIAL

## 2024-08-29 PROCEDURE — 97530 THERAPEUTIC ACTIVITIES: CPT

## 2024-08-29 PROCEDURE — 97110 THERAPEUTIC EXERCISES: CPT

## 2024-08-29 NOTE — PROGRESS NOTES
[facility]          Phone: [phone] Fax: [phone]    Physical Therapy Daily Treatment Note  Date:  2024    Patient Name:  Erna Stoner    :  1960  MRN: [id number]    Restrictions/Precautions:    Diagnosis:  Coordination Issues, Balance Issues  Treatment Diagnosis:    Insurance/Certification information: [employer] 12 visits thru 10/16/24  Referring Physician:  Elia Daniel MD   Plan of care signed (Y/N):    Visit# / total visits:    Pain level:  /10 back pain   Time In: 14:05  Time Out: 15:05    Subjective:  Pt has no new c/o this afternoon.   Reports she still feels she has an issue with balance especially amb on uneven surfaces.       OBJECTIVE:     Exercises:  Exercise/Equipment Resistance/Repetitions Other comments   Bike  10 min      STS 20\" box 10 x      Tandem walking  10 feet x 2 outside  // bars    [place]:  Alternating Fwd/behind   20 feet x 4             Hip/Knee flexion/extension  20x BLEs with red band  nt Vc's for technique     Negotiating around 6 cones placed 2 feet apart  4x nt     Stepping over 6 cones placed 2 feet apart  4x nt     Picking up 6 cones placed 2 feet apart  nt           Ambulating with horizontal/ vertical head turns  While reading word cards   220 feet   75 feet x 6   nt            SLS    SLS on 3\" foam 30 sec x 4 R/L  30 sec x 4 R/ nt    Romberg on 3\" foam  Romberg on 3\" foam with H/V head turns 1 min  1 min ea V/H nt   Balance on green lawson discs  Marching  Static stand   10 x B  15\" x 3 nt   Ball transfer OH feet together                        Tandem stand 0.5 kg 10 x    5 x each R/L fwd    Ball transfer horiz ft together 0.5 kg 5 x  5 x each R/L fwd Trunk rotation w/narrow CORBIN          Other Therapeutic Activities:  as noted in objective and grid    Home Exercise Program:  NA    Manual Treatments:  NA    Modalities:  Moist heat to low back x 15 min, pt in supine    Comments:  Pt tolerated activities  as noted without report of increase in back pain.      Treatment Charges: Mins Units   Initial Evaluation       Re-Evaluation       Ther Exercise         TE 15 1   Manual Therapy     MT     Ther Activities        TA 25 2   Gait Training          GT       Neuro Re-education NR       Modalities     Non-Billable Service Time 15 --   Other 5      Total Time/Units 60 3        Treatment/Activity Tolerance:  [x] Patient tolerated treatment well [] Patient limited by fatigue  [] Patient limited by pain  [] Patient limited by other medical complications  [x] Other: Good tolerance for progression of balance exercises.     Prognosis: [x] Good [] Fair  [] Poor    Patient Requires Follow-up: [x] Yes  [] No    Plan:   [x] Continue per plan of care [] Alter current plan (see comments)  [] Plan of care initiated [] Hold pending MD visit [] Discharge  Plan for Next Session:        Electronically signed by:    Madhuri Farris, LPTA  301

## 2024-08-29 NOTE — PROGRESS NOTES
OCCUPATIONAL THERAPY DAILY NOTE  [facility]  [facility]  [address] [facility] [address] [facility] [address]  Dept: [phone]  Loc: [phone]   [facility] OT Fax: [phone]      Date:  2024      Initial Evaluation Date: 07/15/2024                          Evaluating Therapist: Luz Mcgraw OT     Patient Name:  Erna Stoner                      :  1960     Restrictions/Precautions:  none, low fall risk  Diagnosis:  R27.8 (ICD-10-CM) - Other lack of coordination                                                             Date of Surgery/Injury: Ongoing since September     Insurance/Certification information:  [id number]  Plan of care signed (Y/N): N  Visit# / total visits 9/ 10- until 10/16/2024     Referring Practitioner:  Elia Daniel MD   Specific Practitioner Orders: Eval and treat    OT PLAN OF CARE   OT POC based on physician orders, patient diagnosis and results of clinical assessment     Frequency/Duration: 1-2x / week for 10-12 visits.   Certification period From: 07/15/2024  To: 11/15/2024    GOALS (Long term same as Short term):   1) Patient will demonstrate good understanding of home program (exercises/activities/diagnosis/prognosis/goals) with good accuracy.   2) Patient will pay her bills with [employer] [place] and use of adaptive strategies as needed-  4) Patient will decrease QuickDASH score to 15% or less for increased participation in daily functional activities.   5) Patient to demonstrate proper follow through of home modification/adaptive recommendations (e.g., memory aides) to increase safe functional ADL/IADLs.   6) Patient will demonstrate Fair+ dynamic standing balance during ADL/IADLs with ability to use righting reactions 100% of time when LOB occurs  7) Patient will complete meal prep and with Indep and no cueing for problem solving, memory, or sequencing    TODAY'S TREATMENT     Pain Level: no report of pain this  of care with focus on  improve functional use of the RUE by increasing strength, short term/long term memory, increasing endurance  and functional independence when using RUE to complete tasks. : Treatment covered based on POC and graduated to patient's progress. Pt education continues at each visit to obtain maximum benefits from skilled OT intervention.  []  Alter Plan of care:   []  Discharge:      Jelena VAZQUEZ, [id number]

## 2024-09-03 ENCOUNTER — HOSPITAL ENCOUNTER (OUTPATIENT)
Dept: SPEECH THERAPY | Age: 64
Setting detail: THERAPIES SERIES
Discharge: HOME | End: 2024-09-03
Payer: COMMERCIAL

## 2024-09-03 ENCOUNTER — TELEPHONE (OUTPATIENT)
Dept: CARDIOLOGY CLINIC | Age: 64
End: 2024-09-03

## 2024-09-03 PROCEDURE — 97129 THER IVNTJ 1ST 15 MIN: CPT

## 2024-09-03 PROCEDURE — 97130 THER IVNTJ EA ADDL 15 MIN: CPT

## 2024-09-03 NOTE — TELEPHONE ENCOUNTER
----- Message from Dr. Serge Spaulding, DO sent at 9/2/2024  1:59 PM EDT -----  Let her know that this shows that she does have some calcium in her arteries.  This indicates that she is at risk for having coronary artery disease.  Overall this is consistent with the same information as her stress test.  I would continue to treat her in the same way.

## 2024-09-03 NOTE — PROGRESS NOTES
SPEECH LANGUAGE PATHOLOGY  DAILY PROGRESS NOTE        PATIENT NAME:  Erna Stoner      :  1960          TODAY'S DATE:  9/3/2024     POC:  Pt will improve immediate, short term, recent memory during structured and unstructured tasks with 80% accuracy   Pt will improve problem solving/thought organization during structured and unstructured tasks with 80% accuracy      Subjective:                 Patient was seen for therapy today. She arrived independently. Pt was cooperative and pleasant. The patient stated she was very tired this date and was observed to fatigue more throughout therapy.      Objective:                             patient completed following 2 step directions activity on the ipad. She completed task with 67% accuracy given max assistance. She required many repetitions of the directions. Accuracy did increase as trials went on. She completed a repeat the pattern task with 5 steps. She was able to immediately repeat the pattern she saw with 40% accuracy given repetitions. Lastly, pt completed putting concepts in order task. She was able to put words in order based on a concept (I.e from lightest to heaviest) with 90% accuracy.      Assessment:                 Making progress with goals     Plan:                 Continue POC.        CPT code(s) 56171  therapeutic interventions that focus on cognitive function , initial  15 min  23909  therapeutic interventions that focus on cognitive function, each additional 15 min  Total minutes :  30 minutes    Britt Santiago M.A., CCC-SLP

## 2024-09-05 ENCOUNTER — HOSPITAL ENCOUNTER (OUTPATIENT)
Dept: OCCUPATIONAL THERAPY | Age: 64
Setting detail: THERAPIES SERIES
Discharge: HOME | End: 2024-09-05
Payer: COMMERCIAL

## 2024-09-05 ENCOUNTER — HOSPITAL ENCOUNTER (OUTPATIENT)
Dept: PHYSICAL THERAPY | Age: 64
Setting detail: THERAPIES SERIES
Discharge: HOME | End: 2024-09-05
Payer: COMMERCIAL

## 2024-09-05 PROCEDURE — 97530 THERAPEUTIC ACTIVITIES: CPT

## 2024-09-05 PROCEDURE — 97110 THERAPEUTIC EXERCISES: CPT

## 2024-09-05 NOTE — PROGRESS NOTES
[facility]          Phone: [phone] Fax: [phone]    Physical Therapy Daily Treatment Note  Date:  2024    Patient Name:  Erna Stoner    :  1960  MRN: [id number]    Restrictions/Precautions:    Diagnosis:  Coordination Issues, Balance Issues  Treatment Diagnosis:    Insurance/Certification information: [facility] 12 visits thru 10/16/24  Referring Physician:  Elia Daniel MD   Plan of care signed (Y/N):    Visit# / total visits:    Pain level:  /10 back pain   Time In: 14:15  Time Out: 15:20    Subjective:  Pt states she was able to pull her garden hose and walk to water her lawn.      OBJECTIVE:     Exercises:  Exercise/Equipment Resistance/Repetitions Other comments   Bike  10 min      STS 20\" box 10 x 2      Tandem walking  15 feet x 2 outside  // bars Fwd/bckwd   [place]:  Alternating Fwd/behind   20 feet x 2 B             Hip/Knee flexion/extension  20x BLEs with red band  nt Vc's for technique     Negotiating around 6 cones placed 2 feet apart  4x nt     Stepping over 6 cones placed 2 feet apart  4x nt     Picking up 6 cones placed 2 feet apart  nt           Ambulating with horizontal/ vertical head turns  While reading word cards   220 feet   75 feet x 6   nt            SLS    SLS on 3\" foam 30 sec x 4 R/L  30 sec x 4 R/ nt    Romberg on 3\" foam  Romberg on 3\" foam with H/V head turns 1 min  1 min ea V/H nt   Balance on green lawson discs  Marching  Static stand   10 x B  15\" x 3 nt   Ball transfer OH feet together                        Tandem stand 1 kg 10 x    10 x each R/L fwd    Ball transfer horiz ft together 1 kg 5 x  10 x each R/L fwd Trunk rotation w/narrow CORBIN          Other Therapeutic Activities:  Ambulated outside across grass and rocks, transferred 1 kg med ball from one hand to the other while walking across grass.   No issues with balance across these surfaces.     Home Exercise Program:  NA    Manual Treatments:

## 2024-09-05 NOTE — PROGRESS NOTES
OCCUPATIONAL THERAPY DAILY NOTE  [facility]  [facility]  [address] [facility] [address]  Dept: [phone]  Loc: [phone]   [facility] OT Fax: [phone]      Date:  2024      Initial Evaluation Date: 07/15/2024                          Evaluating Therapist: Luz Mcgraw OT     Patient Name:  Erna Stoner                      :  1960     Restrictions/Precautions:  none, low fall risk  Diagnosis:  R27.8 (ICD-10-CM) - Other lack of coordination                                                             Date of Surgery/Injury: Ongoing since September     Insurance/Certification information:  [id number]  Plan of care signed (Y/N): N  Visit# / total visits 10/ 10- until 10/16/2024     Referring Practitioner:  Elia Daniel MD   Specific Practitioner Orders: Eval and treat    OT PLAN OF CARE   OT POC based on physician orders, patient diagnosis and results of clinical assessment     Frequency/Duration: 1-2x / week for 10-12 visits.   Certification period From: 07/15/2024  To: 11/15/2024    GOALS (Long term same as Short term):   1) Patient will demonstrate good understanding of home program (exercises/activities/diagnosis/prognosis/goals) with good accuracy.   2) Patient will pay her bills with [employer] [place] and use of adaptive strategies as needed-  4) Patient will decrease QuickDASH score to 15% or less for increased participation in daily functional activities.   5) Patient to demonstrate proper follow through of home modification/adaptive recommendations (e.g., memory aides) to increase safe functional ADL/IADLs.   6) Patient will demonstrate Fair+ dynamic standing balance during ADL/IADLs with ability to use righting reactions 100% of time when LOB occurs  7) Patient will complete meal prep and with Indep and no cueing for problem solving, memory, or sequencing    TODAY'S TREATMENT     Pain Level: no report of pain this

## 2024-09-10 ENCOUNTER — HOSPITAL ENCOUNTER (OUTPATIENT)
Dept: SPEECH THERAPY | Age: 64
Setting detail: THERAPIES SERIES
Discharge: HOME | End: 2024-09-10
Payer: COMMERCIAL

## 2024-09-10 ENCOUNTER — TELEPHONE (OUTPATIENT)
Dept: CARDIOLOGY CLINIC | Age: 64
End: 2024-09-10

## 2024-09-10 ENCOUNTER — HOSPITAL ENCOUNTER (OUTPATIENT)
Dept: PHYSICAL THERAPY | Age: 64
Setting detail: THERAPIES SERIES
Discharge: HOME | End: 2024-09-10
Payer: COMMERCIAL

## 2024-09-10 ENCOUNTER — HOSPITAL ENCOUNTER (OUTPATIENT)
Dept: SPEECH THERAPY | Age: 64
Setting detail: THERAPIES SERIES
End: 2024-09-10
Payer: COMMERCIAL

## 2024-09-10 ENCOUNTER — HOSPITAL ENCOUNTER (OUTPATIENT)
Dept: OCCUPATIONAL THERAPY | Age: 64
Setting detail: THERAPIES SERIES
Discharge: HOME | End: 2024-09-10
Payer: COMMERCIAL

## 2024-09-10 PROCEDURE — 97530 THERAPEUTIC ACTIVITIES: CPT

## 2024-09-10 PROCEDURE — 97110 THERAPEUTIC EXERCISES: CPT

## 2024-09-10 PROCEDURE — 97130 THER IVNTJ EA ADDL 15 MIN: CPT

## 2024-09-10 PROCEDURE — 97129 THER IVNTJ 1ST 15 MIN: CPT

## 2024-09-17 ENCOUNTER — HOSPITAL ENCOUNTER (OUTPATIENT)
Dept: OCCUPATIONAL THERAPY | Age: 64
Setting detail: THERAPIES SERIES
Discharge: HOME | End: 2024-09-17
Payer: COMMERCIAL

## 2024-09-17 ENCOUNTER — APPOINTMENT (OUTPATIENT)
Dept: SPEECH THERAPY | Age: 64
End: 2024-09-17
Payer: COMMERCIAL

## 2024-09-17 ENCOUNTER — HOSPITAL ENCOUNTER (OUTPATIENT)
Dept: SPEECH THERAPY | Age: 64
Setting detail: THERAPIES SERIES
Discharge: HOME | End: 2024-09-17
Payer: COMMERCIAL

## 2024-09-17 ENCOUNTER — HOSPITAL ENCOUNTER (OUTPATIENT)
Dept: PHYSICAL THERAPY | Age: 64
Setting detail: THERAPIES SERIES
Discharge: HOME | End: 2024-09-17
Payer: COMMERCIAL

## 2024-09-17 PROCEDURE — 97130 THER IVNTJ EA ADDL 15 MIN: CPT

## 2024-09-17 PROCEDURE — 97110 THERAPEUTIC EXERCISES: CPT

## 2024-09-17 PROCEDURE — 97530 THERAPEUTIC ACTIVITIES: CPT

## 2024-09-17 PROCEDURE — 97129 THER IVNTJ 1ST 15 MIN: CPT

## 2024-09-24 ENCOUNTER — PROCEDURE VISIT (OUTPATIENT)
Dept: PODIATRY | Age: 64
End: 2024-09-24
Payer: COMMERCIAL

## 2024-09-24 ENCOUNTER — HOSPITAL ENCOUNTER (OUTPATIENT)
Dept: PHYSICAL THERAPY | Age: 64
Setting detail: THERAPIES SERIES
Discharge: HOME | End: 2024-09-24
Payer: COMMERCIAL

## 2024-09-24 ENCOUNTER — HOSPITAL ENCOUNTER (OUTPATIENT)
Dept: OCCUPATIONAL THERAPY | Age: 64
Setting detail: THERAPIES SERIES
Discharge: HOME | End: 2024-09-24
Payer: COMMERCIAL

## 2024-09-24 ENCOUNTER — HOSPITAL ENCOUNTER (OUTPATIENT)
Dept: SPEECH THERAPY | Age: 64
Setting detail: THERAPIES SERIES
Discharge: HOME | End: 2024-09-24
Payer: COMMERCIAL

## 2024-09-24 VITALS — HEIGHT: 62 IN | BODY MASS INDEX: 30.91 KG/M2 | WEIGHT: 168 LBS

## 2024-09-24 DIAGNOSIS — L84 CORNS AND CALLOSITIES: ICD-10-CM

## 2024-09-24 DIAGNOSIS — B35.1 TINEA UNGUIUM: ICD-10-CM

## 2024-09-24 DIAGNOSIS — Z79.4 TYPE 2 DIABETES MELLITUS WITHOUT COMPLICATION, WITH LONG-TERM CURRENT USE OF INSULIN (HCC): Primary | ICD-10-CM

## 2024-09-24 DIAGNOSIS — E11.9 TYPE 2 DIABETES MELLITUS WITHOUT COMPLICATION, WITH LONG-TERM CURRENT USE OF INSULIN (HCC): Primary | ICD-10-CM

## 2024-09-24 DIAGNOSIS — G60.8 HEREDITARY SENSORY NEUROPATHY: ICD-10-CM

## 2024-09-24 PROCEDURE — 11721 DEBRIDE NAIL 6 OR MORE: CPT | Performed by: PODIATRIST

## 2024-09-24 PROCEDURE — 11056 PARNG/CUTG B9 HYPRKR LES 2-4: CPT | Performed by: PODIATRIST

## 2024-09-24 PROCEDURE — 97129 THER IVNTJ 1ST 15 MIN: CPT

## 2024-09-24 PROCEDURE — 97130 THER IVNTJ EA ADDL 15 MIN: CPT

## 2024-09-24 PROCEDURE — 97110 THERAPEUTIC EXERCISES: CPT

## 2024-09-24 PROCEDURE — 97530 THERAPEUTIC ACTIVITIES: CPT

## 2024-10-01 ENCOUNTER — HOSPITAL ENCOUNTER (OUTPATIENT)
Dept: SPEECH THERAPY | Age: 64
Setting detail: THERAPIES SERIES
Discharge: HOME | End: 2024-10-01
Payer: COMMERCIAL

## 2024-10-01 ENCOUNTER — HOSPITAL ENCOUNTER (OUTPATIENT)
Dept: OCCUPATIONAL THERAPY | Age: 64
Setting detail: THERAPIES SERIES
Discharge: HOME | End: 2024-10-01
Payer: COMMERCIAL

## 2024-10-01 PROCEDURE — 97110 THERAPEUTIC EXERCISES: CPT

## 2024-10-01 PROCEDURE — 97129 THER IVNTJ 1ST 15 MIN: CPT

## 2024-10-01 PROCEDURE — 97530 THERAPEUTIC ACTIVITIES: CPT

## 2024-10-01 PROCEDURE — 97130 THER IVNTJ EA ADDL 15 MIN: CPT

## 2024-10-01 NOTE — PROGRESS NOTES
Good. Pt was happy with her noted improvements    Patient. Education:  [] Plans/Goals, Risks/Benefits discussed  [] Home exercise program  Method of Education: [x] Verbal  [x] Demo  [] Written  Comprehension of Education:  [x] Verbalizes understanding.  [x] Demonstrates understanding.  [x] Needs Review.  [] Demonstrates/verbalizes understanding of HEP/Ed previously given.    Time In: 2pm           Time Out: 3pm             CODE  Minutes Units  Units- 48 each approved   01036 Fluidotherapy      48986 Paraffin      59252 Ultrasound      09429 Electrical Stim - Attended      34255 Iontophoresis      14309 Therapeutic Ex 30 2 4/24   92072 Therapeutic Activity 30 2 4/24   68338 Neuromuscular Re-Ed      43656 Manual Therapy      90057 ADL/COMP Tech Train      84314 Orthotic Management/Training       Other                    Total  60 4        Plan: OT 1-2x / week for 10-12 visits     []  Continues Plan of care with focus on  improve functional use of the RUE by increasing strength, short term/long term memory, increasing endurance  and functional independence when using RUE to complete tasks. : Treatment covered based on POC and graduated to patient's progress. Pt education continues at each visit to obtain maximum benefits from skilled OT intervention.  [x]  Alter Plan of care: Cont wit approval of additional visits  []  Discharge:      Jelena GARCIA/JENNIFER, [id number]

## 2024-10-02 NOTE — PROGRESS NOTES
SPEECH LANGUAGE PATHOLOGY  DAILY PROGRESS NOTE        PATIENT NAME:  Erna Stoner      :  1960          TODAY'S DATE:  10/2/2024     POC:  Pt will improve immediate, short term, recent memory during structured and unstructured tasks with 80% accuracy   Pt will improve problem solving/thought organization during structured and unstructured tasks with 80% accuracy      Subjective:                 Patient was seen for therapy today. She arrived independently. Pt was cooperative and pleasant. The patient stated she was feeling good but tired as she had an epidural procedure the previous day. Pt stated she continues to have good day and bad days at home.      Objective:                             Patient completed check book balancing activity. She completed task with min verbal assistance. She did self correct a few errors without cues from clinician to do so. She checked her work independently with a calculator following the task using mental math and processing.     Patient completed working memory task with min-mod verbal cues. She was able to complete task with 70% accuracy, increasing to 100% with cues from clinician. She demonstrated fair recall and organization skills with this task.       Assessment:                 Making progress with goals     Plan:                 Continue POC.        CPT code(s) 04537  therapeutic interventions that focus on cognitive function , initial  15 min  39439  therapeutic interventions that focus on cognitive function, each additional 15 min  Total minutes :  30 minutes    Britt Santiago M.A., CCC-SLP

## 2024-10-08 ENCOUNTER — HOSPITAL ENCOUNTER (OUTPATIENT)
Dept: SPEECH THERAPY | Age: 64
Setting detail: THERAPIES SERIES
Discharge: HOME | End: 2024-10-08
Payer: COMMERCIAL

## 2024-10-08 ENCOUNTER — TELEPHONE (OUTPATIENT)
Dept: GASTROENTEROLOGY | Age: 64
End: 2024-10-08

## 2024-10-08 NOTE — PROGRESS NOTES
Speech-Language Pathology  Cancellation/No Show Note      For today's appointment patient:    [x]  Cancelled                  []  Rescheduled appointment    []  No show       []  Therapist cancelled             Reason given by patient:  []  No reason given  [x]  Conflicting appointment  []  No transportation  []  Conflict with work  []  Illness  []  Inclement weather   []  Insurance related issues  []  Other           Comments:    Continue as per established POC    Britt Santiago M.A., CCC-SLP    10/8/2024

## 2024-10-08 NOTE — PROGRESS NOTES
[facility] PRE-ADMISSION TESTING INSTRUCTIONS    The Preadmission Testing patient is instructed accordingly using the following criteria (check applicable):    ARRIVAL INSTRUCTIONS:  [x] Parking the day of Surgery is located in the Main Entrance lot.  Upon entering the door, make an immediate right to the surgery reception desk    [x] Bring photo ID and insurance card    [] Bring in a copy of Living will or Durable Power of  papers.    [x] Please be sure to arrange for a responsible adult to provide transportation to and from the hospital    [x] Please arrange for a responsible adult to be with you for the 24 hour period post procedure due to having anesthesia    [x] If you awake am of surgery not feeling well or have temperature >100 please call [phone]    GENERAL INSTRUCTIONS:    [x] No solid foods after midnight, may have up to 8oz of water until 4 hours prior to surgery. No gum, no candy, no mints. NPO time:       [x] You may brush your teeth, do not swallow any toothpaste    [x] Take medications as instructed     [x] Stop herbal supplements and vitamins 5 days prior to procedure    [x] Follow preop dosing of blood thinners per physician instructions    [] Take 1/2 dose of evening insulin, but no insulin after midnight    [] No oral diabetic medications after midnight    [] If diabetic and have low blood sugar or feel symptomatic, take 1-2oz apple juice only    [] Bring inhalers day of surgery    [] Bring urine specimen day of surgery    [x] Shower or bath with soap, lather and rinse well, AM of Surgery, no lotion, powders or creams to surgical site    [x] Follow bowel prep as instructed per surgeon    [x] No tobacco products within 24 hours of surgery     [x] No alcohol or illegal drug use, marijuana included, within 24 hours of surgery.    [x] Jewelry, body piercing's, eyeglasses, contact lenses and dentures are not permitted into surgery (bring cases)      [x] Please do

## 2024-10-08 NOTE — TELEPHONE ENCOUNTER
Pt called in she needs to speak with a nurse, she has questions regarding medications. Erna can be reached at [phone]

## 2024-10-08 NOTE — PROGRESS NOTES
Left VM with Peyton Ho at Dr Montes De Oca office pt scheduled for EGD/colon 10/10/24 Last dose synjardy 10/7/24 and will need to be rescheduled. Pt instructed DOS does not count as one of the 3 days.      ADDENDUM 10/8/24 14 25 Pt called back states she mis spoke and that her last dose of synjardy was 10/5/24. Gillian at Dr Montes De Oca office updated and OK to proceed with EGD/colon on 10/10/24

## 2024-10-09 ENCOUNTER — ANESTHESIA EVENT (OUTPATIENT)
Dept: ENDOSCOPY | Age: 64
End: 2024-10-09
Payer: COMMERCIAL

## 2024-10-09 ASSESSMENT — LIFESTYLE VARIABLES: SMOKING_STATUS: 0

## 2024-10-09 NOTE — ANESTHESIA PRE PROCEDURE
input(s): \"POCGLU\", \"POCNA\", \"POCK\", \"POCCL\", \"POCBUN\", \"POCHEMO\", \"POCHCT\" in the last 72 hours.    Coags:   Lab Results   Component Value Date/Time    PROTIME 14.5 07/11/2024 04:10 PM    PROTIME 12.4 05/07/2012 10:20 AM    INR 1.3 07/11/2024 04:10 PM    APTT 68.2 04/29/2024 05:46 AM       HCG (If Applicable): No results found for: \"PREGTESTUR\", \"PREGSERUM\", \"HCG\", \"HCGQUANT\"     ABGs: No results found for: \"PHART\", \"PO2ART\", \"BDF6WFU\", \"OOE9SUK\", \"BEART\", \"D9HYLQWW\"     Type & Screen (If Applicable):  Lab Results   Component Value Date    ABORH O POSITIVE 05/13/2024    LABANTI NEGATIVE 05/13/2024       Drug/Infectious Status (If Applicable):  No results found for: \"HIV\", \"HEPCAB\"    COVID-19 Screening (If Applicable):   Lab Results   Component Value Date/Time    COVID19 Not Detected 09/18/2023 06:30 PM           Anesthesia Evaluation  Patient summary reviewed and Nursing notes reviewed   no history of anesthetic complications:   Airway: Mallampati: III  TM distance: >3 FB   Neck ROM: full  Mouth opening: > = 3 FB   Dental:          Pulmonary:normal exam  breath sounds clear to auscultation  (+)           asthma (Mild intermittent w/o recnet exacerbations):     (-) sleep apnea and not a current smoker                           Cardiovascular:Negative CV ROS  Exercise tolerance: good (>4 METS)        ECG reviewed  Rhythm: regular  Rate: normal           Beta Blocker:  Not on Beta Blocker      ROS comment: EKG:  Sinus Rhythm   Poor R-wave progression -nonspecific -consider old anterior infarct.  BORDERLINE     Neuro/Psych:   Negative Neuro/Psych ROS              GI/Hepatic/Renal:   (+) GERD: no interval change, liver disease (Cirrhosis s/p TIPS): portal hypertension, esophageal varices, bowel prep         ROS comment: Rectal bleeding  Hematemesis.   Endo/Other:    (+) Diabetes (Synjardy LD 10/5)Type II DM, hypothyroidism, blood dyscrasia: anemia, arthritis (Chronic back pain): OA..          Pt had no PAT

## 2024-10-10 ENCOUNTER — HOSPITAL ENCOUNTER (OUTPATIENT)
Age: 64
Setting detail: OUTPATIENT SURGERY
Discharge: HOME | End: 2024-10-10
Attending: STUDENT IN AN ORGANIZED HEALTH CARE EDUCATION/TRAINING PROGRAM | Admitting: STUDENT IN AN ORGANIZED HEALTH CARE EDUCATION/TRAINING PROGRAM
Payer: COMMERCIAL

## 2024-10-10 ENCOUNTER — ANESTHESIA (OUTPATIENT)
Dept: ENDOSCOPY | Age: 64
End: 2024-10-10
Payer: COMMERCIAL

## 2024-10-10 VITALS
WEIGHT: 169 LBS | BODY MASS INDEX: 31.1 KG/M2 | OXYGEN SATURATION: 97 % | HEIGHT: 62 IN | TEMPERATURE: 96.6 F | HEART RATE: 77 BPM | DIASTOLIC BLOOD PRESSURE: 60 MMHG | RESPIRATION RATE: 20 BRPM | SYSTOLIC BLOOD PRESSURE: 129 MMHG

## 2024-10-10 PROBLEM — Z12.11 COLON CANCER SCREENING: Status: ACTIVE | Noted: 2024-10-10

## 2024-10-10 LAB
GLUCOSE BLD-MCNC: 58 MG/DL (ref 74–99)
GLUCOSE BLD-MCNC: 60 MG/DL (ref 74–99)
GLUCOSE BLD-MCNC: 60 MG/DL (ref 74–99)
GLUCOSE BLD-MCNC: 65 MG/DL (ref 74–99)
GLUCOSE BLD-MCNC: 79 MG/DL (ref 74–99)

## 2024-10-10 PROCEDURE — 2580000003 HC RX 258: Performed by: NURSE ANESTHETIST, CERTIFIED REGISTERED

## 2024-10-10 PROCEDURE — 82962 GLUCOSE BLOOD TEST: CPT

## 2024-10-10 PROCEDURE — 3700000001 HC ADD 15 MINUTES (ANESTHESIA): Performed by: STUDENT IN AN ORGANIZED HEALTH CARE EDUCATION/TRAINING PROGRAM

## 2024-10-10 PROCEDURE — 3609017100 HC EGD: Performed by: STUDENT IN AN ORGANIZED HEALTH CARE EDUCATION/TRAINING PROGRAM

## 2024-10-10 PROCEDURE — 6360000002 HC RX W HCPCS: Performed by: NURSE ANESTHETIST, CERTIFIED REGISTERED

## 2024-10-10 PROCEDURE — 3700000000 HC ANESTHESIA ATTENDED CARE: Performed by: STUDENT IN AN ORGANIZED HEALTH CARE EDUCATION/TRAINING PROGRAM

## 2024-10-10 PROCEDURE — 7100000011 HC PHASE II RECOVERY - ADDTL 15 MIN: Performed by: STUDENT IN AN ORGANIZED HEALTH CARE EDUCATION/TRAINING PROGRAM

## 2024-10-10 PROCEDURE — 45378 DIAGNOSTIC COLONOSCOPY: CPT | Performed by: STUDENT IN AN ORGANIZED HEALTH CARE EDUCATION/TRAINING PROGRAM

## 2024-10-10 PROCEDURE — 7100000010 HC PHASE II RECOVERY - FIRST 15 MIN: Performed by: STUDENT IN AN ORGANIZED HEALTH CARE EDUCATION/TRAINING PROGRAM

## 2024-10-10 PROCEDURE — 43235 EGD DIAGNOSTIC BRUSH WASH: CPT | Performed by: STUDENT IN AN ORGANIZED HEALTH CARE EDUCATION/TRAINING PROGRAM

## 2024-10-10 PROCEDURE — 3609027000 HC COLONOSCOPY: Performed by: STUDENT IN AN ORGANIZED HEALTH CARE EDUCATION/TRAINING PROGRAM

## 2024-10-10 PROCEDURE — 2709999900 HC NON-CHARGEABLE SUPPLY: Performed by: STUDENT IN AN ORGANIZED HEALTH CARE EDUCATION/TRAINING PROGRAM

## 2024-10-10 RX ORDER — DEXTROSE MONOHYDRATE 100 MG/ML
INJECTION, SOLUTION INTRAVENOUS CONTINUOUS
Status: ACTIVE | OUTPATIENT
Start: 2024-10-10 | End: 2024-10-10

## 2024-10-10 RX ORDER — SODIUM CHLORIDE 0.9 % (FLUSH) 0.9 %
5-40 SYRINGE (ML) INJECTION PRN
Status: CANCELLED | OUTPATIENT
Start: 2024-10-10

## 2024-10-10 RX ORDER — ONDANSETRON 4 MG/1
4 TABLET, ORALLY DISINTEGRATING ORAL EVERY 8 HOURS PRN
Status: CANCELLED | OUTPATIENT
Start: 2024-10-10

## 2024-10-10 RX ORDER — SODIUM CHLORIDE 0.9 % (FLUSH) 0.9 %
5-40 SYRINGE (ML) INJECTION EVERY 12 HOURS SCHEDULED
Status: CANCELLED | OUTPATIENT
Start: 2024-10-10

## 2024-10-10 RX ORDER — PROPOFOL 10 MG/ML
INJECTION, EMULSION INTRAVENOUS
Status: DISCONTINUED | OUTPATIENT
Start: 2024-10-10 | End: 2024-10-10 | Stop reason: SDUPTHER

## 2024-10-10 RX ORDER — SODIUM CHLORIDE 9 MG/ML
INJECTION, SOLUTION INTRAVENOUS PRN
Status: CANCELLED | OUTPATIENT
Start: 2024-10-10

## 2024-10-10 RX ORDER — FENTANYL CITRATE 50 UG/ML
INJECTION, SOLUTION INTRAMUSCULAR; INTRAVENOUS
Status: DISCONTINUED | OUTPATIENT
Start: 2024-10-10 | End: 2024-10-10 | Stop reason: SDUPTHER

## 2024-10-10 RX ORDER — SODIUM CHLORIDE 9 MG/ML
INJECTION, SOLUTION INTRAVENOUS
Status: DISCONTINUED | OUTPATIENT
Start: 2024-10-10 | End: 2024-10-10 | Stop reason: SDUPTHER

## 2024-10-10 RX ORDER — ONDANSETRON 2 MG/ML
4 INJECTION INTRAMUSCULAR; INTRAVENOUS EVERY 6 HOURS PRN
Status: CANCELLED | OUTPATIENT
Start: 2024-10-10

## 2024-10-10 RX ORDER — GLYCOPYRROLATE 0.2 MG/ML
INJECTION INTRAMUSCULAR; INTRAVENOUS
Status: DISCONTINUED | OUTPATIENT
Start: 2024-10-10 | End: 2024-10-10 | Stop reason: SDUPTHER

## 2024-10-10 RX ADMIN — GLYCOPYRROLATE 0.2 MG: 0.2 INJECTION INTRAMUSCULAR; INTRAVENOUS at 10:22

## 2024-10-10 RX ADMIN — PROPOFOL 350 MG: 10 INJECTION, EMULSION INTRAVENOUS at 10:23

## 2024-10-10 RX ADMIN — FENTANYL CITRATE 25 MCG: 50 INJECTION, SOLUTION INTRAMUSCULAR; INTRAVENOUS at 10:35

## 2024-10-10 RX ADMIN — FENTANYL CITRATE 50 MCG: 50 INJECTION, SOLUTION INTRAMUSCULAR; INTRAVENOUS at 10:23

## 2024-10-10 RX ADMIN — SODIUM CHLORIDE: 9 INJECTION, SOLUTION INTRAVENOUS at 10:19

## 2024-10-10 RX ADMIN — FENTANYL CITRATE 25 MCG: 50 INJECTION, SOLUTION INTRAMUSCULAR; INTRAVENOUS at 10:28

## 2024-10-10 ASSESSMENT — PAIN SCALES - GENERAL: PAINLEVEL_OUTOF10: 0

## 2024-10-10 ASSESSMENT — PAIN - FUNCTIONAL ASSESSMENT: PAIN_FUNCTIONAL_ASSESSMENT: 0-10

## 2024-10-10 NOTE — DISCHARGE INSTRUCTIONS
Recommendations:  -The patient will be observed post procedure until all discharge criteria are met.    -Patient has a contact number available for emergencies.  The signs and symptoms of potential delayed complications were discussed with the patient.    -Return to normal activities tomorrow.    -Written discharge instructions were provided to the patient.    -Timeframe until next colonoscopy will be 3-5 years.   -Clinic appointment scheduled 2/12.

## 2024-10-10 NOTE — ANESTHESIA POSTPROCEDURE EVALUATION
Department of Anesthesiology  Postprocedure Note    Patient: Erna Stoner  MRN: [id number]  YOB: 1960  Date of evaluation: 10/10/2024    Procedure Summary       Date: 10/10/24 Room / Location: [place] [address] / [facility]    Anesthesia Start: 1019 Anesthesia Stop: 1111    Procedures:       ESOPHAGOGASTRODUODENOSCOPY DIAGNOSTIC ONLY (Upper GI Region)      COLONOSCOPY DIAGNOSTIC (Lower GI Region) Diagnosis:       Rectal bleeding      Esophageal varices (HCC)      (Rectal bleeding [K62.5])      (Esophageal varices (HCC) [I85.00])    Surgeons: Augie Montes De Oca MD Responsible Provider: Claude Desai DO    Anesthesia Type: MAC ASA Status: 4            Anesthesia Type: No value filed.    Mary Jo Phase I: Mary Jo Score: 10    Mary Jo Phase II:      Anesthesia Post Evaluation    Patient location during evaluation: bedside  Patient participation: complete - patient participated  Level of consciousness: awake and alert  Airway patency: patent  Nausea & Vomiting: no vomiting and no nausea  Cardiovascular status: hemodynamically stable  Respiratory status: spontaneous ventilation and room air  Hydration status: stable        No notable events documented.

## 2024-10-10 NOTE — H&P
[facility]   Gastroenterology, Hepatology, &  Advanced Endoscopy    Consult       ASSESSMENT AND PLAN:    63y/F w/ history of sarcoid cirrhosis c/b bleeding gastric varices s/p TIPS who presents for surveillance and colon cancer screening.     PLAN:  EGD/Colonoscopy         HISTORY OF PRESENT ILLNESS:      Ms. Erna Stoner is a 63y/F w/ history of sarcoid cirrhosis c/b bleeding gastric varices s/p TIPS who presents for surveillance and colon cancer screening.     Past Medical History:        Diagnosis Date    Asthma     stable, is mild    Back pain     Diabetes mellitus (HCC)     Hypothyroidism     Thyroid disease      Past Surgical History:        Procedure Laterality Date    CARPAL TUNNEL RELEASE      bilateral     SECTION      CHOLECYSTECTOMY      COLONOSCOPY  2012    HYSTERECTOMY (CERVIX STATUS UNKNOWN)      IR EMBOLIZATION VENOUS  2024    IR EMBOLIZATION VENOUS 2024 JENNIFER Pack MD [facility]    IR TIPS INSERTION  2024    IR TIPS INSERTION 2024 JENNIFER Pack MD [facility]    LIVER BIOPSY  2017    NOSE SURGERY N/A 2020    EXCISIONAL BIOPSY OF RIGHT NARES (PATHOLOGY NOTIFIED) performed by Bandar Solis Jr., MD at [facility] OR    OVARY REMOVAL      TONSILLECTOMY      UPPER GASTROINTESTINAL ENDOSCOPY  2012    UPPER GASTROINTESTINAL ENDOSCOPY  2024    ESOPHAGOGASTRODUODENOSCOPY CONTROL HEMORRHAGE performed by Noé Montilla MD at [facility] ENDOSCOPY    UPPER GASTROINTESTINAL ENDOSCOPY  2024    ESOPHAGOGASTRODUODENOSCOPY SUBMUCOSAL INJECTION performed by Augie Montes De Oca MD at [facility] ENDOSCOPY     Social History:    TOBACCO:   reports that she has never smoked. She has never been exposed to tobacco smoke. She has never used smokeless tobacco.  ETOH:   reports that she does not currently use alcohol.  DRUGS:   reports no history of drug use.  Family History:       Problem Relation Age of Onset    Diabetes Mother     High

## 2024-10-15 ENCOUNTER — HOSPITAL ENCOUNTER (OUTPATIENT)
Dept: OCCUPATIONAL THERAPY | Age: 64
Setting detail: THERAPIES SERIES
Discharge: HOME | End: 2024-10-15
Payer: COMMERCIAL

## 2024-10-15 ENCOUNTER — HOSPITAL ENCOUNTER (OUTPATIENT)
Dept: SPEECH THERAPY | Age: 64
Setting detail: THERAPIES SERIES
Discharge: HOME | End: 2024-10-15
Payer: COMMERCIAL

## 2024-10-15 PROCEDURE — 97530 THERAPEUTIC ACTIVITIES: CPT | Performed by: OCCUPATIONAL THERAPIST

## 2024-10-15 PROCEDURE — 97110 THERAPEUTIC EXERCISES: CPT | Performed by: OCCUPATIONAL THERAPIST

## 2024-10-15 PROCEDURE — 97129 THER IVNTJ 1ST 15 MIN: CPT

## 2024-10-15 PROCEDURE — 97130 THER IVNTJ EA ADDL 15 MIN: CPT

## 2024-10-15 NOTE — PROGRESS NOTES
SPEECH LANGUAGE PATHOLOGY  DAILY PROGRESS NOTE        PATIENT NAME:  Erna Stoner      :  1960          TODAY'S DATE:  10/15/2024     POC:  Pt will improve immediate, short term, recent memory during structured and unstructured tasks with 80% accuracy   Pt will improve problem solving/thought organization during structured and unstructured tasks with 80% accuracy      Subjective:                 Patient was seen for therapy today. She arrived independently. Pt was cooperative and pleasant. The patient stated she was feeling good but tired as she had an epidural procedure the previous day. Pt stated she continues to have good day and bad days at home.      Objective:                             Patient completed a scheduling task this date. Given a paragraph to read thru and organize the activities and the times, she was able to organize tasks with min assistance. Pt demonstrated good organization and reasoning skills with the task.     Pt completed clock math task with 50% accuracy. Difficulty with working forward and backward from a given time on manual clock.    Lastly, pt sequenced words based on alphabetical order. She completed task with independence.        Assessment:                 Making progress with goals     Plan:                 Continue POC.        CPT code(s) 36009  therapeutic interventions that focus on cognitive function , initial  15 min  26482  therapeutic interventions that focus on cognitive function, each additional 15 min  Total minutes :  30 minutes    Britt Santiago M.A., CCC-SLP

## 2024-10-15 NOTE — PROGRESS NOTES
ADL/COMP Tech Train      [id number] Orthotic Management/Training       Other                    Total  60 4        Plan: OT 1-2x / week for 10-12 visits     []  Continues Plan of care with focus on  improve functional use of the RUE by increasing strength, short term/long term memory, increasing endurance  and functional independence when using RUE to complete tasks. : Treatment covered based on POC and graduated to patient's progress. Pt education continues at each visit to obtain maximum benefits from skilled OT intervention.  [x]  Alter Plan of care: Cont wit approval of additional visits  []  Discharge:      Ave Silva MS, OTR/L #[id number]

## 2024-10-22 ENCOUNTER — APPOINTMENT (OUTPATIENT)
Dept: SPEECH THERAPY | Age: 64
End: 2024-10-22
Payer: COMMERCIAL

## 2024-10-22 ENCOUNTER — APPOINTMENT (OUTPATIENT)
Dept: OCCUPATIONAL THERAPY | Age: 64
End: 2024-10-22
Payer: COMMERCIAL

## 2024-10-23 ENCOUNTER — HOSPITAL ENCOUNTER (OUTPATIENT)
Dept: OCCUPATIONAL THERAPY | Age: 64
Setting detail: THERAPIES SERIES
Discharge: HOME | End: 2024-10-23
Payer: COMMERCIAL

## 2024-10-23 ENCOUNTER — HOSPITAL ENCOUNTER (OUTPATIENT)
Dept: SPEECH THERAPY | Age: 64
Setting detail: THERAPIES SERIES
Discharge: HOME | End: 2024-10-23
Payer: COMMERCIAL

## 2024-10-23 PROCEDURE — 97129 THER IVNTJ 1ST 15 MIN: CPT

## 2024-10-23 PROCEDURE — 97110 THERAPEUTIC EXERCISES: CPT

## 2024-10-23 PROCEDURE — 97130 THER IVNTJ EA ADDL 15 MIN: CPT

## 2024-10-23 PROCEDURE — 97530 THERAPEUTIC ACTIVITIES: CPT

## 2024-10-23 NOTE — PROGRESS NOTES
rotation. Pt able to coordinate movement with min difficulty.    Fine coordination         home                  X Tiny pegs for in hand manipulation followed by pinching to pull out of mixed res putty w/ tweezers- short term memory skills addressed during task also- patterning & recall of visual & verbal directions  Francisca ex's- stacking, in hand translation thru thumb/fingertips- short term memory challenged also    Marbles for quick reflex rolls & catches with therapist across tabletop- up to 4 marbles used at a time- min to mod challenging selina for left non dominant hand    Typing on computer keyboard     Writing & drawing tasks along with short term memory tasks   Gross coordination X Ball catching & throwing tasks w/ therapist and involving therapist and targets and also with therapist direction for quick reflex movement         x Functional reaching with crossing of midline to place various resistive pinch pins on elevated rods. Pt able to follow proper instructions w/ about 50% accuracy. 8+ corrections required.        Strengthening:     B Shoulders        3# total for kiran wrist wraps (1 1/2# each) for chest press 10 reps/2 sets  3# total for kiran wrist wraps (1 1/2# each) for overhead press 10 reps/2 sets  3# total for kiran wrist wraps (1 1/2# each)  for shoulder flex/ ext /abd 10 reps/2 sets each  3# total for kiran wrist wraps (1 1/2# each) for serving arms 10 reps/2 sets       B Elbows/Forearm  3# total for kiran wrist wraps (1 1/2# each) for biceps curl 15 reps x 2 sets  3# total for kiran wrist wraps (1 1/2# each) for oh triceps 15 reps x 2 sets (ModA) for proper positioning   B wrist/hands X    X    X                    Orange medium resistance 5# putty utilized for grasps, pinches, pushes, pulls  5# yellow theraputty with cizer simulation cap, peg & jar lid turning tools  Green Therabar, wringing, bending, shaking ex's x 5 mins    Washer turning tool with 1 1/2# wrist wraps added to kiran Ue's . Pt performs

## 2024-10-23 NOTE — PROGRESS NOTES
SPEECH LANGUAGE PATHOLOGY  DAILY PROGRESS NOTE        PATIENT NAME:  Erna Stoner      :  1960          TODAY'S DATE:  10/23/2024     POC:  Pt will improve immediate, short term, recent memory during structured and unstructured tasks with 80% accuracy   Pt will improve problem solving/thought organization during structured and unstructured tasks with 80% accuracy      Subjective:                 Patient was seen for therapy today. She arrived independently. Pt was cooperative and pleasant. Pt reported she was having an off day. Pt stated she continues to have good day and bad days at home.      Objective:                              Pt completed recall for 5 step pattern this date with 50% accuracy given mod assistance on ipad activity. She was distractible and had difficulty attending to task today.    Pt completed recall for 2 step directions with 70% accuracy. She required several verbal cues to wait to complete the direction til after a delay. Overall, pt appeared to have slower processing this date.       Assessment:                 Making progress with goals     Plan:                 Continue POC.        CPT code(s) 92374  therapeutic interventions that focus on cognitive function , initial  15 min  56071  therapeutic interventions that focus on cognitive function, each additional 15 min  Total minutes :  30 minutes    Britt Santiago M.A., CCC-SLP

## 2024-10-29 ENCOUNTER — HOSPITAL ENCOUNTER (OUTPATIENT)
Dept: OCCUPATIONAL THERAPY | Age: 64
Setting detail: THERAPIES SERIES
Discharge: HOME | End: 2024-10-29
Payer: COMMERCIAL

## 2024-10-29 ENCOUNTER — HOSPITAL ENCOUNTER (OUTPATIENT)
Dept: SPEECH THERAPY | Age: 64
Setting detail: THERAPIES SERIES
Discharge: HOME | End: 2024-10-29
Payer: COMMERCIAL

## 2024-10-29 PROCEDURE — 97530 THERAPEUTIC ACTIVITIES: CPT

## 2024-10-29 NOTE — PROGRESS NOTES
OCCUPATIONAL THERAPY DAILY TX NOTE  [facility]  [facility]CCUPATIONAL [facility]ERAP[facility]  [address]  [facility] [address]  Dept: [phone]  Loc: [phone]   [facility] OT Fax: [phone]      Date:  10/29/2024      Initial Evaluation Date: 07/15/2024                          Evaluating Therapist: Luz Mcgraw OT     Patient Name:  Erna Stoner                      :  1960     Restrictions/Precautions:  none, low fall risk  Diagnosis:  R27.8 (ICD-10-CM) - Other lack of coordination                                                             Date of Surgery/Injury: Ongoing since September     Insurance/Certification information:  [employer] Auth#[id number]  Plan of care signed (Y/N): Yes  Visit# / total visits / until 2024     Referring Practitioner:  Elia Daniel MD   Specific Practitioner Orders: Eval and treat    OT PLAN OF CARE   OT POC based on physician orders, patient diagnosis and results of clinical assessment     Frequency/Duration: 1-2x / week for 10-12 visits.   Certification period From: 07/15/2024  To: 11/15/2024    GOALS (Long term same as Short term): Progress Update 24  1) Patient will demonstrate good understanding of home program (exercises/activities/diagnosis/prognosis/goals) with good accuracy. Goal Progressing. Pt displays good understanding of HEP and cont'd additions  2) Patient will pay her bills with [employer] and use of adaptive strategies as needed- Goal Met 24  4) Patient will decrease QuickDASH score to 15% or less for increased participation in daily functional activities. Goal Progressing Well: Pt improved to 16% disabled  5) Patient to demonstrate proper follow through of home modification/adaptive recommendations (e.g., memory aides) to increase safe functional ADL/IADLs. Goal Progressing. Pt currently using \"Reminders\" & \"Alarms\" for appts & medicine taking on cell phone with good success  6) Patient will demonstrate Fair+

## 2024-10-29 NOTE — PROGRESS NOTES
Speech-Language Pathology  Cancellation/No Show Note      For today's appointment patient:    []  Cancelled                  []  Rescheduled appointment    []  No show       [x]  Therapist cancelled             Reason given by patient:  []  No reason given  [x]  Conflicting appointment  []  No transportation  []  Conflict with work  []  Illness  []  Inclement weather   []  Insurance related issues  []  Other           Comments:    Continue as per established POC    Britt Santiago M.A., CCC-SLP    10/29/2024

## 2024-11-03 ENCOUNTER — HOSPITAL ENCOUNTER (INPATIENT)
Age: 64
LOS: 4 days | Discharge: HOME | DRG: 283 | End: 2024-11-07
Attending: EMERGENCY MEDICINE | Admitting: INTERNAL MEDICINE
Payer: COMMERCIAL

## 2024-11-03 ENCOUNTER — APPOINTMENT (OUTPATIENT)
Dept: CT IMAGING | Age: 64
DRG: 283 | End: 2024-11-03
Payer: COMMERCIAL

## 2024-11-03 DIAGNOSIS — E86.0 DEHYDRATION: Primary | ICD-10-CM

## 2024-11-03 DIAGNOSIS — R41.82 ALTERED MENTAL STATUS, UNSPECIFIED ALTERED MENTAL STATUS TYPE: ICD-10-CM

## 2024-11-03 DIAGNOSIS — Z95.828 S/P TIPS (TRANSJUGULAR INTRAHEPATIC PORTOSYSTEMIC SHUNT): ICD-10-CM

## 2024-11-03 DIAGNOSIS — E72.20 HYPERAMMONEMIA (HCC): ICD-10-CM

## 2024-11-03 LAB
ALBUMIN SERPL-MCNC: 3.8 G/DL (ref 3.5–5.2)
ALP SERPL-CCNC: 125 U/L (ref 35–104)
ALT SERPL-CCNC: 56 U/L (ref 0–32)
AMMONIA PLAS-SCNC: 56 UMOL/L (ref 11–51)
AMPHET UR QL SCN: NEGATIVE
ANION GAP SERPL CALCULATED.3IONS-SCNC: 15 MMOL/L (ref 7–16)
AST SERPL-CCNC: 59 U/L (ref 0–31)
B PARAP IS1001 DNA NPH QL NAA+NON-PROBE: NOT DETECTED
B PERT DNA SPEC QL NAA+PROBE: NOT DETECTED
BARBITURATES UR QL SCN: NEGATIVE
BASOPHILS # BLD: 0 K/UL (ref 0–0.2)
BASOPHILS NFR BLD: 0 % (ref 0–2)
BENZODIAZ UR QL: NEGATIVE
BILIRUB DIRECT SERPL-MCNC: 0.9 MG/DL (ref 0–0.3)
BILIRUB INDIRECT SERPL-MCNC: 2.2 MG/DL (ref 0–1)
BILIRUB SERPL-MCNC: 3.1 MG/DL (ref 0–1.2)
BILIRUB UR QL STRIP: NEGATIVE
BUN SERPL-MCNC: 9 MG/DL (ref 6–23)
BUPRENORPHINE UR QL: NEGATIVE
C PNEUM DNA NPH QL NAA+NON-PROBE: NOT DETECTED
CALCIUM SERPL-MCNC: 8.3 MG/DL (ref 8.6–10.2)
CANNABINOIDS UR QL SCN: NEGATIVE
CHLORIDE SERPL-SCNC: 103 MMOL/L (ref 98–107)
CLARITY UR: CLEAR
CO2 SERPL-SCNC: 25 MMOL/L (ref 22–29)
COCAINE UR QL SCN: NEGATIVE
COLOR UR: YELLOW
CREAT SERPL-MCNC: 0.5 MG/DL (ref 0.5–1)
EOSINOPHIL # BLD: 0.04 K/UL (ref 0.05–0.5)
EOSINOPHILS RELATIVE PERCENT: 1 % (ref 0–6)
ERYTHROCYTE [DISTWIDTH] IN BLOOD BY AUTOMATED COUNT: 21.8 % (ref 11.5–15)
FENTANYL UR QL: NEGATIVE
FLUAV RNA NPH QL NAA+NON-PROBE: NOT DETECTED
FLUBV RNA NPH QL NAA+NON-PROBE: NOT DETECTED
GFR, ESTIMATED: >90 ML/MIN/1.73M2
GLUCOSE BLD-MCNC: 126 MG/DL (ref 74–99)
GLUCOSE BLD-MCNC: 135 MG/DL (ref 74–99)
GLUCOSE BLD-MCNC: 86 MG/DL (ref 74–99)
GLUCOSE SERPL-MCNC: 109 MG/DL (ref 74–99)
GLUCOSE UR STRIP-MCNC: 500 MG/DL
HADV DNA NPH QL NAA+NON-PROBE: NOT DETECTED
HCOV 229E RNA NPH QL NAA+NON-PROBE: NOT DETECTED
HCOV HKU1 RNA NPH QL NAA+NON-PROBE: NOT DETECTED
HCOV NL63 RNA NPH QL NAA+NON-PROBE: NOT DETECTED
HCOV OC43 RNA NPH QL NAA+NON-PROBE: NOT DETECTED
HCT VFR BLD AUTO: 51.7 % (ref 34–48)
HGB BLD-MCNC: 17.1 G/DL (ref 11.5–15.5)
HGB UR QL STRIP.AUTO: NEGATIVE
HMPV RNA NPH QL NAA+NON-PROBE: NOT DETECTED
HPIV1 RNA NPH QL NAA+NON-PROBE: NOT DETECTED
HPIV2 RNA NPH QL NAA+NON-PROBE: NOT DETECTED
HPIV3 RNA NPH QL NAA+NON-PROBE: NOT DETECTED
HPIV4 RNA NPH QL NAA+NON-PROBE: NOT DETECTED
KETONES UR STRIP-MCNC: 40 MG/DL
LEUKOCYTE ESTERASE UR QL STRIP: NEGATIVE
LYMPHOCYTES NFR BLD: 0.37 K/UL (ref 1.5–4)
LYMPHOCYTES RELATIVE PERCENT: 8 % (ref 20–42)
M PNEUMO DNA NPH QL NAA+NON-PROBE: NOT DETECTED
MCH RBC QN AUTO: 28 PG (ref 26–35)
MCHC RBC AUTO-ENTMCNC: 33.1 G/DL (ref 32–34.5)
MCV RBC AUTO: 84.6 FL (ref 80–99.9)
METHADONE UR QL: NEGATIVE
MONOCYTES NFR BLD: 0.08 K/UL (ref 0.1–0.95)
MONOCYTES NFR BLD: 2 % (ref 2–12)
NEUTROPHILS NFR BLD: 90 % (ref 43–80)
NEUTS SEG NFR BLD: 4.21 K/UL (ref 1.8–7.3)
NITRITE UR QL STRIP: NEGATIVE
OPIATES UR QL SCN: NEGATIVE
OXYCODONE UR QL SCN: NEGATIVE
PCP UR QL SCN: NEGATIVE
PH UR STRIP: 7 [PH] (ref 5–9)
PLATELET # BLD AUTO: 122 K/UL (ref 130–450)
PMV BLD AUTO: 8.7 FL (ref 7–12)
POTASSIUM SERPL-SCNC: 4.2 MMOL/L (ref 3.5–5)
PROT SERPL-MCNC: 6.3 G/DL (ref 6.4–8.3)
PROT UR STRIP-MCNC: NEGATIVE MG/DL
RBC # BLD AUTO: 6.11 M/UL (ref 3.5–5.5)
RBC # BLD: ABNORMAL 10*6/UL
RBC #/AREA URNS HPF: ABNORMAL /HPF
RSV RNA NPH QL NAA+NON-PROBE: NOT DETECTED
RV+EV RNA NPH QL NAA+NON-PROBE: NOT DETECTED
SARS-COV-2 RDRP RESP QL NAA+PROBE: NOT DETECTED
SARS-COV-2 RNA NPH QL NAA+NON-PROBE: NOT DETECTED
SODIUM SERPL-SCNC: 143 MMOL/L (ref 132–146)
SP GR UR STRIP: 1.02 (ref 1–1.03)
SPECIMEN DESCRIPTION: NORMAL
SPECIMEN DESCRIPTION: NORMAL
TEST INFORMATION: NORMAL
TROPONIN I SERPL HS-MCNC: <6 NG/L (ref 0–9)
UROBILINOGEN UR STRIP-ACNC: 1 EU/DL (ref 0–1)
WBC #/AREA URNS HPF: ABNORMAL /HPF
WBC OTHER # BLD: 4.7 K/UL (ref 4.5–11.5)

## 2024-11-03 PROCEDURE — 2580000003 HC RX 258

## 2024-11-03 PROCEDURE — 70450 CT HEAD/BRAIN W/O DYE: CPT

## 2024-11-03 PROCEDURE — 84484 ASSAY OF TROPONIN QUANT: CPT

## 2024-11-03 PROCEDURE — 6370000000 HC RX 637 (ALT 250 FOR IP): Performed by: NURSE PRACTITIONER

## 2024-11-03 PROCEDURE — 80053 COMPREHEN METABOLIC PANEL: CPT

## 2024-11-03 PROCEDURE — 81001 URINALYSIS AUTO W/SCOPE: CPT

## 2024-11-03 PROCEDURE — 82140 ASSAY OF AMMONIA: CPT

## 2024-11-03 PROCEDURE — 2060000000 HC ICU INTERMEDIATE R&B

## 2024-11-03 PROCEDURE — 87635 SARS-COV-2 COVID-19 AMP PRB: CPT

## 2024-11-03 PROCEDURE — 82962 GLUCOSE BLOOD TEST: CPT

## 2024-11-03 PROCEDURE — 96360 HYDRATION IV INFUSION INIT: CPT

## 2024-11-03 PROCEDURE — 6360000002 HC RX W HCPCS: Performed by: NURSE PRACTITIONER

## 2024-11-03 PROCEDURE — 80307 DRUG TEST PRSMV CHEM ANLYZR: CPT

## 2024-11-03 PROCEDURE — 0202U NFCT DS 22 TRGT SARS-COV-2: CPT

## 2024-11-03 PROCEDURE — 82248 BILIRUBIN DIRECT: CPT

## 2024-11-03 PROCEDURE — 96361 HYDRATE IV INFUSION ADD-ON: CPT

## 2024-11-03 PROCEDURE — 85025 COMPLETE CBC W/AUTO DIFF WBC: CPT

## 2024-11-03 PROCEDURE — 2580000003 HC RX 258: Performed by: NURSE PRACTITIONER

## 2024-11-03 PROCEDURE — 99285 EMERGENCY DEPT VISIT HI MDM: CPT

## 2024-11-03 PROCEDURE — 93005 ELECTROCARDIOGRAM TRACING: CPT

## 2024-11-03 RX ORDER — ALBUTEROL SULFATE 0.83 MG/ML
2.5 SOLUTION RESPIRATORY (INHALATION) EVERY 4 HOURS PRN
Status: DISCONTINUED | OUTPATIENT
Start: 2024-11-03 | End: 2024-11-07 | Stop reason: HOSPADM

## 2024-11-03 RX ORDER — 0.9 % SODIUM CHLORIDE 0.9 %
1000 INTRAVENOUS SOLUTION INTRAVENOUS ONCE
Status: COMPLETED | OUTPATIENT
Start: 2024-11-03 | End: 2024-11-03

## 2024-11-03 RX ORDER — AMMONIUM LACTATE 12 G/100G
LOTION TOPICAL PRN
COMMUNITY

## 2024-11-03 RX ORDER — TIRZEPATIDE 5 MG/.5ML
5 INJECTION, SOLUTION SUBCUTANEOUS WEEKLY
COMMUNITY

## 2024-11-03 RX ORDER — M-VIT,TX,IRON,MINS/CALC/FOLIC 27MG-0.4MG
1 TABLET ORAL EVERY MORNING
Status: DISCONTINUED | OUTPATIENT
Start: 2024-11-04 | End: 2024-11-07 | Stop reason: HOSPADM

## 2024-11-03 RX ORDER — LEVOTHYROXINE SODIUM 25 UG/1
100 TABLET ORAL
Status: DISCONTINUED | OUTPATIENT
Start: 2024-11-04 | End: 2024-11-07 | Stop reason: HOSPADM

## 2024-11-03 RX ORDER — CHOLESTYRAMINE LIGHT 4 G/5.7G
4 POWDER, FOR SUSPENSION ORAL 2 TIMES DAILY
COMMUNITY

## 2024-11-03 RX ORDER — SODIUM CHLORIDE 9 MG/ML
INJECTION, SOLUTION INTRAVENOUS PRN
Status: DISCONTINUED | OUTPATIENT
Start: 2024-11-03 | End: 2024-11-07 | Stop reason: HOSPADM

## 2024-11-03 RX ORDER — INSULIN LISPRO 100 [IU]/ML
0-4 INJECTION, SOLUTION INTRAVENOUS; SUBCUTANEOUS
Status: DISCONTINUED | OUTPATIENT
Start: 2024-11-03 | End: 2024-11-07 | Stop reason: HOSPADM

## 2024-11-03 RX ORDER — POTASSIUM CHLORIDE 7.45 MG/ML
10 INJECTION INTRAVENOUS PRN
Status: DISCONTINUED | OUTPATIENT
Start: 2024-11-03 | End: 2024-11-04

## 2024-11-03 RX ORDER — MAGNESIUM SULFATE IN WATER 40 MG/ML
2000 INJECTION, SOLUTION INTRAVENOUS PRN
Status: DISCONTINUED | OUTPATIENT
Start: 2024-11-03 | End: 2024-11-04

## 2024-11-03 RX ORDER — IRON POLYSACCHARIDE COMPLEX 150 MG
1 CAPSULE ORAL EVERY MORNING
Status: DISCONTINUED | OUTPATIENT
Start: 2024-11-04 | End: 2024-11-07 | Stop reason: HOSPADM

## 2024-11-03 RX ORDER — IRON POLYSACCHARIDE COMPLEX 150 MG
150 CAPSULE ORAL DAILY
COMMUNITY

## 2024-11-03 RX ORDER — ACETAMINOPHEN 325 MG/1
650 TABLET ORAL EVERY 6 HOURS PRN
Status: DISCONTINUED | OUTPATIENT
Start: 2024-11-03 | End: 2024-11-07 | Stop reason: HOSPADM

## 2024-11-03 RX ORDER — ALBUTEROL SULFATE 90 UG/1
1 INHALANT RESPIRATORY (INHALATION) PRN
Status: DISCONTINUED | OUTPATIENT
Start: 2024-11-03 | End: 2024-11-03 | Stop reason: SDUPTHER

## 2024-11-03 RX ORDER — DIPHENOXYLATE HYDROCHLORIDE AND ATROPINE SULFATE 2.5; .025 MG/1; MG/1
1-2 TABLET ORAL 4 TIMES DAILY PRN
COMMUNITY

## 2024-11-03 RX ORDER — POTASSIUM CHLORIDE 1500 MG/1
40 TABLET, EXTENDED RELEASE ORAL PRN
Status: DISCONTINUED | OUTPATIENT
Start: 2024-11-03 | End: 2024-11-04

## 2024-11-03 RX ORDER — ACETAMINOPHEN 650 MG/1
650 SUPPOSITORY RECTAL EVERY 6 HOURS PRN
Status: DISCONTINUED | OUTPATIENT
Start: 2024-11-03 | End: 2024-11-07 | Stop reason: HOSPADM

## 2024-11-03 RX ORDER — LACTULOSE 10 G/15ML
20 SOLUTION ORAL ONCE
Status: DISCONTINUED | OUTPATIENT
Start: 2024-11-03 | End: 2024-11-03

## 2024-11-03 RX ORDER — ONDANSETRON 2 MG/ML
4 INJECTION INTRAMUSCULAR; INTRAVENOUS EVERY 6 HOURS PRN
Status: DISCONTINUED | OUTPATIENT
Start: 2024-11-03 | End: 2024-11-07 | Stop reason: HOSPADM

## 2024-11-03 RX ORDER — SODIUM CHLORIDE 0.9 % (FLUSH) 0.9 %
5-40 SYRINGE (ML) INJECTION EVERY 12 HOURS SCHEDULED
Status: DISCONTINUED | OUTPATIENT
Start: 2024-11-03 | End: 2024-11-07 | Stop reason: HOSPADM

## 2024-11-03 RX ORDER — FOLIC ACID 1 MG/1
1 TABLET ORAL EVERY MORNING
Status: DISCONTINUED | OUTPATIENT
Start: 2024-11-04 | End: 2024-11-07 | Stop reason: HOSPADM

## 2024-11-03 RX ORDER — SODIUM CHLORIDE 9 MG/ML
INJECTION, SOLUTION INTRAVENOUS CONTINUOUS
Status: DISCONTINUED | OUTPATIENT
Start: 2024-11-03 | End: 2024-11-07 | Stop reason: HOSPADM

## 2024-11-03 RX ORDER — LACTULOSE 10 G/15ML
20 SOLUTION ORAL 3 TIMES DAILY
Status: DISCONTINUED | OUTPATIENT
Start: 2024-11-03 | End: 2024-11-05

## 2024-11-03 RX ORDER — METFORMIN HYDROCHLORIDE 500 MG/1
1000 TABLET, EXTENDED RELEASE ORAL DAILY
Status: DISCONTINUED | OUTPATIENT
Start: 2024-11-03 | End: 2024-11-04

## 2024-11-03 RX ORDER — FAMOTIDINE 20 MG/1
20 TABLET, FILM COATED ORAL 2 TIMES DAILY
COMMUNITY

## 2024-11-03 RX ORDER — VITAMIN B COMPLEX
1000 TABLET ORAL EVERY MORNING
Status: DISCONTINUED | OUTPATIENT
Start: 2024-11-04 | End: 2024-11-07 | Stop reason: HOSPADM

## 2024-11-03 RX ORDER — ENOXAPARIN SODIUM 100 MG/ML
40 INJECTION SUBCUTANEOUS DAILY
Status: DISCONTINUED | OUTPATIENT
Start: 2024-11-03 | End: 2024-11-07 | Stop reason: HOSPADM

## 2024-11-03 RX ORDER — ZINC SULFATE 50(220)MG
50 CAPSULE ORAL DAILY
Status: DISCONTINUED | OUTPATIENT
Start: 2024-11-03 | End: 2024-11-07 | Stop reason: HOSPADM

## 2024-11-03 RX ORDER — POTASSIUM CITRATE 10 MEQ/1
10 TABLET, EXTENDED RELEASE ORAL DAILY
COMMUNITY

## 2024-11-03 RX ORDER — SODIUM CHLORIDE 0.9 % (FLUSH) 0.9 %
10 SYRINGE (ML) INJECTION PRN
Status: DISCONTINUED | OUTPATIENT
Start: 2024-11-03 | End: 2024-11-07 | Stop reason: HOSPADM

## 2024-11-03 RX ORDER — HYDROCHLOROTHIAZIDE 12.5 MG/1
12.5 TABLET ORAL DAILY
COMMUNITY

## 2024-11-03 RX ORDER — ONDANSETRON 4 MG/1
4 TABLET, ORALLY DISINTEGRATING ORAL EVERY 8 HOURS PRN
Status: DISCONTINUED | OUTPATIENT
Start: 2024-11-03 | End: 2024-11-07 | Stop reason: HOSPADM

## 2024-11-03 RX ADMIN — SODIUM CHLORIDE: 9 INJECTION, SOLUTION INTRAVENOUS at 17:43

## 2024-11-03 RX ADMIN — RIFAXIMIN 400 MG: 200 TABLET ORAL at 22:46

## 2024-11-03 RX ADMIN — METFORMIN HYDROCHLORIDE 1000 MG: 500 TABLET, EXTENDED RELEASE ORAL at 17:33

## 2024-11-03 RX ADMIN — SODIUM CHLORIDE 1000 ML: 9 INJECTION, SOLUTION INTRAVENOUS at 11:38

## 2024-11-03 RX ADMIN — SODIUM CHLORIDE 1000 ML: 9 INJECTION, SOLUTION INTRAVENOUS at 14:51

## 2024-11-03 RX ADMIN — Medication 50 MG: at 17:32

## 2024-11-03 RX ADMIN — LACTULOSE 20 G: 20 SOLUTION ORAL at 17:31

## 2024-11-03 RX ADMIN — ENOXAPARIN SODIUM 40 MG: 100 INJECTION SUBCUTANEOUS at 17:35

## 2024-11-03 NOTE — ED PROVIDER NOTES
[facility] EMERGENCY DEPARTMENT  EMERGENCY DEPARTMENT ENCOUNTER        Pt Name: Erna Stoner  MRN: [id number]  Birthdate 1960  Date of evaluation: 11/3/2024  Provider: Vincent Irene DO  PCP: Elia Daniel MD  Note Started: 11:11 AM EST 11/3/24    CHIEF COMPLAINT       Chief Complaint   Patient presents with    Altered Mental Status     Baseline axox4, now axox2. Pt states she \"feels ammonia is high\" bc the same thing happened last time she was here. Hx sarcoidosis    Fatigue       HISTORY OF PRESENT ILLNESS: 1 or more Elements   History From: PATIENT     Limitations to history : Altered Mental Status    Erna Stoner is a 63 y.o. female with prior history of liver cirrhosis and TIPS procedure arriving from home with a complaint of altered mental status.  Her son called EMS.  Patient AO X2 for EMS and usually is AO X4 according to son.  Patient is on lactulose with history of hyperammonemia.  She reports she has been taking her lactulose.  She has no other complaints at this time.      Nursing Notes were all reviewed and agreed with or any disagreements were addressed in the HPI.    REVIEW OF SYSTEMS :      Review of Systems    POSITIVE (+): AMS  NEGATIVE (-): fevers, chills, nausea, vomiting, diarrhea, constipation, shortness of breath, chest pain, abdominal pain      SURGICAL HISTORY     Past Surgical History:   Procedure Laterality Date    CARPAL TUNNEL RELEASE      bilateral     SECTION      CHOLECYSTECTOMY      COLONOSCOPY  2012    COLONOSCOPY N/A 10/10/2024    COLONOSCOPY DIAGNOSTIC performed by Augie Montes De Oca MD at [facility] ENDOSCOPY    HYSTERECTOMY (CERVIX STATUS UNKNOWN)      IR EMBOLIZATION VENOUS  2024    IR EMBOLIZATION VENOUS 2024 JENNIFER Pack MD [facility]    IR TIPS INSERTION  2024    IR TIPS INSERTION 2024 JENNIFER Pack MD [facility]    LIVER BIOPSY  2017    NOSE 
ammonia is elevated. She denies infectious sx. She denies fever, chills, chest pain, sob, abdominal pain, bleeding, rash or any other complaints.  She denies headache, neck pain, neck stiffness, cough, or any infectious sx.    Nursing Notes were all reviewed and agreed with or any disagreements were addressed in the HPI.      REVIEW OF EXTERNAL NOTE :       10/30/24 outpatient GI notes    Chart Review/External Note Review    Last Echo reviewed by Me:  No results found for: \"LVEF\", \"LVEFMODE\"          Controlled Substance Monitoring:    Acute and Chronic Pain Monitoring:        No data to display                      REVIEW OF SYSTEMS :      Positives and Pertinent negatives as per HPI.     SURGICAL HISTORY     Past Surgical History:   Procedure Laterality Date    CARPAL TUNNEL RELEASE      bilateral     SECTION      CHOLECYSTECTOMY      COLONOSCOPY  2012    COLONOSCOPY N/A 10/10/2024    COLONOSCOPY DIAGNOSTIC performed by Augie Montes De Oca MD at [facility] ENDOSCOPY    HYSTERECTOMY (CERVIX STATUS UNKNOWN)      IR EMBOLIZATION VENOUS  2024    IR EMBOLIZATION VENOUS 2024 JENNIFER Pack MD [facility]    IR TIPS INSERTION  2024    IR TIPS INSERTION 2024 JENNIFER Pack MD [facility]    LIVER BIOPSY  2017    NOSE SURGERY N/A 2020    EXCISIONAL BIOPSY OF RIGHT NARES (PATHOLOGY NOTIFIED) performed by Bandar Solis Jr., MD at [facility] OR    OVARY REMOVAL      TONSILLECTOMY      UPPER GASTROINTESTINAL ENDOSCOPY  2012    UPPER GASTROINTESTINAL ENDOSCOPY  2024    ESOPHAGOGASTRODUODENOSCOPY CONTROL HEMORRHAGE performed by Noé Montilla MD at [facility] ENDOSCOPY    UPPER GASTROINTESTINAL ENDOSCOPY  2024    ESOPHAGOGASTRODUODENOSCOPY SUBMUCOSAL INJECTION performed by Augie Montes De Oca MD at [facility] ENDOSCOPY    UPPER GASTROINTESTINAL ENDOSCOPY N/A 10/10/2024    ESOPHAGOGASTRODUODENOSCOPY DIAGNOSTIC ONLY performed by Augie Montes De Oca MD at [facility]

## 2024-11-04 ENCOUNTER — TELEPHONE (OUTPATIENT)
Dept: CARDIOLOGY CLINIC | Age: 64
End: 2024-11-04

## 2024-11-04 ENCOUNTER — APPOINTMENT (OUTPATIENT)
Dept: ULTRASOUND IMAGING | Age: 64
DRG: 283 | End: 2024-11-04
Payer: COMMERCIAL

## 2024-11-04 PROBLEM — E11.9 DM (DIABETES MELLITUS), TYPE 2 (HCC): Status: ACTIVE | Noted: 2024-11-04

## 2024-11-04 PROBLEM — R79.89 INCREASED AMMONIA LEVEL: Status: ACTIVE | Noted: 2024-11-04

## 2024-11-04 PROBLEM — K74.60 CIRRHOSIS OF LIVER (HCC): Status: ACTIVE | Noted: 2024-05-23

## 2024-11-04 LAB
ALBUMIN SERPL-MCNC: 3.1 G/DL (ref 3.5–5.2)
ALP SERPL-CCNC: 110 U/L (ref 35–104)
ALT SERPL-CCNC: 42 U/L (ref 0–32)
AMMONIA PLAS-SCNC: 95 UMOL/L (ref 11–51)
ANION GAP SERPL CALCULATED.3IONS-SCNC: 11 MMOL/L (ref 7–16)
AST SERPL-CCNC: 42 U/L (ref 0–31)
BASOPHILS # BLD: 0.03 K/UL (ref 0–0.2)
BASOPHILS NFR BLD: 1 % (ref 0–2)
BILIRUB SERPL-MCNC: 2.7 MG/DL (ref 0–1.2)
BUN SERPL-MCNC: 6 MG/DL (ref 6–23)
CALCIUM SERPL-MCNC: 7.5 MG/DL (ref 8.6–10.2)
CHLORIDE SERPL-SCNC: 109 MMOL/L (ref 98–107)
CO2 SERPL-SCNC: 22 MMOL/L (ref 22–29)
CREAT SERPL-MCNC: 0.4 MG/DL (ref 0.5–1)
EOSINOPHIL # BLD: 0.04 K/UL (ref 0.05–0.5)
EOSINOPHILS RELATIVE PERCENT: 1 % (ref 0–6)
ERYTHROCYTE [DISTWIDTH] IN BLOOD BY AUTOMATED COUNT: 21.2 % (ref 11.5–15)
GFR, ESTIMATED: >90 ML/MIN/1.73M2
GLUCOSE BLD-MCNC: 120 MG/DL (ref 74–99)
GLUCOSE BLD-MCNC: 122 MG/DL (ref 74–99)
GLUCOSE BLD-MCNC: 93 MG/DL (ref 74–99)
GLUCOSE SERPL-MCNC: 125 MG/DL (ref 74–99)
HCT VFR BLD AUTO: 43.1 % (ref 34–48)
HGB BLD-MCNC: 14.3 G/DL (ref 11.5–15.5)
IMM GRANULOCYTES # BLD AUTO: 0.05 K/UL (ref 0–0.58)
IMM GRANULOCYTES NFR BLD: 1 % (ref 0–5)
LIPASE SERPL-CCNC: 30 U/L (ref 13–60)
LYMPHOCYTES NFR BLD: 0.52 K/UL (ref 1.5–4)
LYMPHOCYTES RELATIVE PERCENT: 14 % (ref 20–42)
MCH RBC QN AUTO: 28.4 PG (ref 26–35)
MCHC RBC AUTO-ENTMCNC: 33.2 G/DL (ref 32–34.5)
MCV RBC AUTO: 85.7 FL (ref 80–99.9)
MONOCYTES NFR BLD: 0.41 K/UL (ref 0.1–0.95)
MONOCYTES NFR BLD: 11 % (ref 2–12)
NEUTROPHILS NFR BLD: 71 % (ref 43–80)
NEUTS SEG NFR BLD: 2.55 K/UL (ref 1.8–7.3)
PLATELET # BLD AUTO: 100 K/UL (ref 130–450)
PMV BLD AUTO: 9.1 FL (ref 7–12)
POTASSIUM SERPL-SCNC: 4 MMOL/L (ref 3.5–5)
PROT SERPL-MCNC: 5.1 G/DL (ref 6.4–8.3)
RBC # BLD AUTO: 5.03 M/UL (ref 3.5–5.5)
RBC # BLD: ABNORMAL 10*6/UL
SODIUM SERPL-SCNC: 142 MMOL/L (ref 132–146)
WBC OTHER # BLD: 3.6 K/UL (ref 4.5–11.5)

## 2024-11-04 PROCEDURE — 2060000000 HC ICU INTERMEDIATE R&B

## 2024-11-04 PROCEDURE — 36415 COLL VENOUS BLD VENIPUNCTURE: CPT

## 2024-11-04 PROCEDURE — 99254 IP/OBS CNSLTJ NEW/EST MOD 60: CPT | Performed by: NURSE PRACTITIONER

## 2024-11-04 PROCEDURE — 82140 ASSAY OF AMMONIA: CPT

## 2024-11-04 PROCEDURE — 93975 VASCULAR STUDY: CPT

## 2024-11-04 PROCEDURE — 97535 SELF CARE MNGMENT TRAINING: CPT

## 2024-11-04 PROCEDURE — 82962 GLUCOSE BLOOD TEST: CPT

## 2024-11-04 PROCEDURE — 6360000002 HC RX W HCPCS: Performed by: NURSE PRACTITIONER

## 2024-11-04 PROCEDURE — 2580000003 HC RX 258: Performed by: NURSE PRACTITIONER

## 2024-11-04 PROCEDURE — 6370000000 HC RX 637 (ALT 250 FOR IP): Performed by: NURSE PRACTITIONER

## 2024-11-04 PROCEDURE — 97161 PT EVAL LOW COMPLEX 20 MIN: CPT

## 2024-11-04 PROCEDURE — 76705 ECHO EXAM OF ABDOMEN: CPT

## 2024-11-04 PROCEDURE — 97165 OT EVAL LOW COMPLEX 30 MIN: CPT

## 2024-11-04 PROCEDURE — 80053 COMPREHEN METABOLIC PANEL: CPT

## 2024-11-04 PROCEDURE — 83690 ASSAY OF LIPASE: CPT

## 2024-11-04 PROCEDURE — 97530 THERAPEUTIC ACTIVITIES: CPT

## 2024-11-04 PROCEDURE — 85025 COMPLETE CBC W/AUTO DIFF WBC: CPT

## 2024-11-04 RX ORDER — DEXTROSE MONOHYDRATE 100 MG/ML
INJECTION, SOLUTION INTRAVENOUS CONTINUOUS PRN
Status: DISCONTINUED | OUTPATIENT
Start: 2024-11-04 | End: 2024-11-07 | Stop reason: HOSPADM

## 2024-11-04 RX ORDER — GLUCAGON 1 MG/ML
1 KIT INJECTION PRN
Status: DISCONTINUED | OUTPATIENT
Start: 2024-11-04 | End: 2024-11-07 | Stop reason: HOSPADM

## 2024-11-04 RX ADMIN — RIFAXIMIN 400 MG: 200 TABLET ORAL at 08:54

## 2024-11-04 RX ADMIN — Medication 1 TABLET: at 08:55

## 2024-11-04 RX ADMIN — Medication 1000 UNITS: at 08:55

## 2024-11-04 RX ADMIN — Medication 150 MG: at 08:55

## 2024-11-04 RX ADMIN — FOLIC ACID 1 MG: 1 TABLET ORAL at 08:55

## 2024-11-04 RX ADMIN — SODIUM CHLORIDE, PRESERVATIVE FREE 10 ML: 5 INJECTION INTRAVENOUS at 20:03

## 2024-11-04 RX ADMIN — LACTULOSE 20 G: 20 SOLUTION ORAL at 08:54

## 2024-11-04 RX ADMIN — LACTULOSE 20 G: 20 SOLUTION ORAL at 03:44

## 2024-11-04 RX ADMIN — Medication 50 MG: at 08:55

## 2024-11-04 RX ADMIN — RIFAXIMIN 400 MG: 200 TABLET ORAL at 20:03

## 2024-11-04 RX ADMIN — SODIUM CHLORIDE: 9 INJECTION, SOLUTION INTRAVENOUS at 10:49

## 2024-11-04 RX ADMIN — LACTULOSE 20 G: 20 SOLUTION ORAL at 20:03

## 2024-11-04 RX ADMIN — ENOXAPARIN SODIUM 40 MG: 100 INJECTION SUBCUTANEOUS at 08:54

## 2024-11-04 RX ADMIN — LEVOTHYROXINE SODIUM 100 MCG: 0.03 TABLET ORAL at 06:42

## 2024-11-04 ASSESSMENT — PAIN SCALES - GENERAL: PAINLEVEL_OUTOF10: 0

## 2024-11-04 NOTE — ED NOTES
Report given to CHANDNI mcfadden from [facility]SE.   Report method by phone   The following was reviewed with receiving RN:   Current vital signs:  /70   Pulse 87   Temp 97.4 °F (36.3 °C) (Oral)   Resp 16   Wt 76.7 kg (169 lb 1.5 oz) Comment: from previous encounter  SpO2 96%   BMI 30.93 kg/m²                MEWS Score: 1     Any medication or safety alerts were reviewed. Any pending diagnostics and notifications were also reviewed, as well as any safety concerns or issues, abnormal labs, abnormal imaging, and abnormal assessment findings. Questions were answered.

## 2024-11-04 NOTE — H&P
Hospital Medicine History & Physical      PCP: Elia Daniel MD    Date of Admission: 11/3/2024    Date of Service: . 2024    Chief Complaint:  CHANGE IN MENTAL STATUS      History Of Present Illness:     63 y.o. female presented with  CHANGE IN MENTAL STATUS.. SHE HAD A TIPS ON 10/10 BY DR. MONTES DE OCA.   SHE STATES SHE HAS BEEN TAKING HER LACTULOSE, BUT WAS ADMITTED WITH AN ELEVATED NH3 AND CONFUSION    Past Medical History:          Diagnosis Date    Asthma     stable, is mild    Back pain     Diabetes mellitus (HCC)     Hypothyroidism     Thyroid disease        Past Surgical History:          Procedure Laterality Date    CARPAL TUNNEL RELEASE      bilateral     SECTION      CHOLECYSTECTOMY      COLONOSCOPY  2012    COLONOSCOPY N/A 10/10/2024    COLONOSCOPY DIAGNOSTIC performed by Augie Montes De Oca MD at [facility] ENDOSCOPY    HYSTERECTOMY (CERVIX STATUS UNKNOWN)      IR EMBOLIZATION VENOUS  2024    IR EMBOLIZATION VENOUS 2024 JENNIFER Pack MD [facility]    IR TIPS INSERTION  2024    IR TIPS INSERTION 2024 JENNIFER Pack MD [facility]    LIVER BIOPSY  2017    NOSE SURGERY N/A 2020    EXCISIONAL BIOPSY OF RIGHT NARES (PATHOLOGY NOTIFIED) performed by Bandar Solis Jr., MD at [facility] OR    OVARY REMOVAL      TONSILLECTOMY      UPPER GASTROINTESTINAL ENDOSCOPY  2012    UPPER GASTROINTESTINAL ENDOSCOPY  2024    ESOPHAGOGASTRODUODENOSCOPY CONTROL HEMORRHAGE performed by Noé Montilla MD at [facility] ENDOSCOPY    UPPER GASTROINTESTINAL ENDOSCOPY  2024    ESOPHAGOGASTRODUODENOSCOPY SUBMUCOSAL INJECTION performed by Augie Montes De Oca MD at [facility] ENDOSCOPY    UPPER GASTROINTESTINAL ENDOSCOPY N/A 10/10/2024    ESOPHAGOGASTRODUODENOSCOPY DIAGNOSTIC ONLY performed by Augie Montes De Oca MD at [facility] ENDOSCOPY

## 2024-11-05 ENCOUNTER — HOSPITAL ENCOUNTER (OUTPATIENT)
Dept: SPEECH THERAPY | Age: 64
Setting detail: THERAPIES SERIES
Discharge: HOME | End: 2024-11-05

## 2024-11-05 ENCOUNTER — HOSPITAL ENCOUNTER (OUTPATIENT)
Dept: OCCUPATIONAL THERAPY | Age: 64
Setting detail: THERAPIES SERIES
Discharge: HOME | End: 2024-11-05

## 2024-11-05 ENCOUNTER — APPOINTMENT (OUTPATIENT)
Dept: GENERAL RADIOLOGY | Age: 64
DRG: 283 | End: 2024-11-05
Payer: COMMERCIAL

## 2024-11-05 LAB
ALBUMIN SERPL-MCNC: 3.1 G/DL (ref 3.5–5.2)
ALP SERPL-CCNC: 119 U/L (ref 35–104)
ALT SERPL-CCNC: 44 U/L (ref 0–32)
AMMONIA PLAS-SCNC: 108 UMOL/L (ref 11–51)
ANION GAP SERPL CALCULATED.3IONS-SCNC: 13 MMOL/L (ref 7–16)
AST SERPL-CCNC: 39 U/L (ref 0–31)
BASOPHILS # BLD: 0.02 K/UL (ref 0–0.2)
BASOPHILS NFR BLD: 1 % (ref 0–2)
BILIRUB DIRECT SERPL-MCNC: 0.8 MG/DL (ref 0–0.3)
BILIRUB INDIRECT SERPL-MCNC: 2.2 MG/DL (ref 0–1)
BILIRUB SERPL-MCNC: 3 MG/DL (ref 0–1.2)
BUN SERPL-MCNC: 7 MG/DL (ref 6–23)
CALCIUM SERPL-MCNC: 8.3 MG/DL (ref 8.6–10.2)
CHLORIDE SERPL-SCNC: 111 MMOL/L (ref 98–107)
CO2 SERPL-SCNC: 22 MMOL/L (ref 22–29)
CREAT SERPL-MCNC: 0.7 MG/DL (ref 0.5–1)
EKG ATRIAL RATE: 80 BPM
EKG P AXIS: 52 DEGREES
EKG P-R INTERVAL: 114 MS
EKG Q-T INTERVAL: 402 MS
EKG QRS DURATION: 84 MS
EKG QTC CALCULATION (BAZETT): 463 MS
EKG R AXIS: 5 DEGREES
EKG T AXIS: 46 DEGREES
EKG VENTRICULAR RATE: 80 BPM
EOSINOPHIL # BLD: 0.07 K/UL (ref 0.05–0.5)
EOSINOPHILS RELATIVE PERCENT: 2 % (ref 0–6)
ERYTHROCYTE [DISTWIDTH] IN BLOOD BY AUTOMATED COUNT: 20.5 % (ref 11.5–15)
GFR, ESTIMATED: >90 ML/MIN/1.73M2
GLUCOSE BLD-MCNC: 130 MG/DL (ref 74–99)
GLUCOSE BLD-MCNC: 166 MG/DL (ref 74–99)
GLUCOSE BLD-MCNC: 168 MG/DL (ref 74–99)
GLUCOSE BLD-MCNC: 190 MG/DL (ref 74–99)
GLUCOSE BLD-MCNC: 95 MG/DL (ref 74–99)
GLUCOSE SERPL-MCNC: 105 MG/DL (ref 74–99)
HCT VFR BLD AUTO: 44.2 % (ref 34–48)
HGB BLD-MCNC: 14.5 G/DL (ref 11.5–15.5)
IMM GRANULOCYTES # BLD AUTO: 0.03 K/UL (ref 0–0.58)
IMM GRANULOCYTES NFR BLD: 1 % (ref 0–5)
LYMPHOCYTES NFR BLD: 0.53 K/UL (ref 1.5–4)
LYMPHOCYTES RELATIVE PERCENT: 16 % (ref 20–42)
MCH RBC QN AUTO: 28.2 PG (ref 26–35)
MCHC RBC AUTO-ENTMCNC: 32.8 G/DL (ref 32–34.5)
MCV RBC AUTO: 85.8 FL (ref 80–99.9)
MONOCYTES NFR BLD: 0.36 K/UL (ref 0.1–0.95)
MONOCYTES NFR BLD: 11 % (ref 2–12)
NEUTROPHILS NFR BLD: 70 % (ref 43–80)
NEUTS SEG NFR BLD: 2.34 K/UL (ref 1.8–7.3)
PLATELET # BLD AUTO: 86 K/UL (ref 130–450)
PLATELET CONFIRMATION: NORMAL
PMV BLD AUTO: 8.6 FL (ref 7–12)
POTASSIUM SERPL-SCNC: 4 MMOL/L (ref 3.5–5)
PROT SERPL-MCNC: 5.3 G/DL (ref 6.4–8.3)
RBC # BLD AUTO: 5.15 M/UL (ref 3.5–5.5)
SODIUM SERPL-SCNC: 146 MMOL/L (ref 132–146)
WBC OTHER # BLD: 3.4 K/UL (ref 4.5–11.5)

## 2024-11-05 PROCEDURE — 2580000003 HC RX 258: Performed by: NURSE PRACTITIONER

## 2024-11-05 PROCEDURE — 74230 X-RAY XM SWLNG FUNCJ C+: CPT

## 2024-11-05 PROCEDURE — 82140 ASSAY OF AMMONIA: CPT

## 2024-11-05 PROCEDURE — 92611 MOTION FLUOROSCOPY/SWALLOW: CPT

## 2024-11-05 PROCEDURE — 6370000000 HC RX 637 (ALT 250 FOR IP): Performed by: NURSE PRACTITIONER

## 2024-11-05 PROCEDURE — 85025 COMPLETE CBC W/AUTO DIFF WBC: CPT

## 2024-11-05 PROCEDURE — 36415 COLL VENOUS BLD VENIPUNCTURE: CPT

## 2024-11-05 PROCEDURE — 93010 ELECTROCARDIOGRAM REPORT: CPT | Performed by: INTERNAL MEDICINE

## 2024-11-05 PROCEDURE — 6360000002 HC RX W HCPCS: Performed by: NURSE PRACTITIONER

## 2024-11-05 PROCEDURE — 2500000003 HC RX 250 WO HCPCS: Performed by: PHYSICIAN ASSISTANT

## 2024-11-05 PROCEDURE — 82962 GLUCOSE BLOOD TEST: CPT

## 2024-11-05 PROCEDURE — 80053 COMPREHEN METABOLIC PANEL: CPT

## 2024-11-05 PROCEDURE — 82248 BILIRUBIN DIRECT: CPT

## 2024-11-05 PROCEDURE — 2060000000 HC ICU INTERMEDIATE R&B

## 2024-11-05 PROCEDURE — 99232 SBSQ HOSP IP/OBS MODERATE 35: CPT | Performed by: NURSE PRACTITIONER

## 2024-11-05 RX ORDER — LACTULOSE 10 G/15ML
20 SOLUTION ORAL
Status: DISCONTINUED | OUTPATIENT
Start: 2024-11-05 | End: 2024-11-05

## 2024-11-05 RX ORDER — LACTULOSE 10 G/15ML
20 SOLUTION ORAL
Status: DISCONTINUED | OUTPATIENT
Start: 2024-11-05 | End: 2024-11-07 | Stop reason: HOSPADM

## 2024-11-05 RX ORDER — 0.9 % SODIUM CHLORIDE 0.9 %
250 INTRAVENOUS SOLUTION INTRAVENOUS ONCE
Status: COMPLETED | OUTPATIENT
Start: 2024-11-05 | End: 2024-11-05

## 2024-11-05 RX ADMIN — LACTULOSE 20 G: 20 SOLUTION ORAL at 18:09

## 2024-11-05 RX ADMIN — INSULIN LISPRO 1 UNITS: 100 INJECTION, SOLUTION INTRAVENOUS; SUBCUTANEOUS at 19:41

## 2024-11-05 RX ADMIN — SODIUM CHLORIDE, PRESERVATIVE FREE 10 ML: 5 INJECTION INTRAVENOUS at 09:44

## 2024-11-05 RX ADMIN — Medication 1 TABLET: at 09:29

## 2024-11-05 RX ADMIN — LACTULOSE 20 G: 20 SOLUTION ORAL at 11:16

## 2024-11-05 RX ADMIN — RIFAXIMIN 400 MG: 200 TABLET ORAL at 09:29

## 2024-11-05 RX ADMIN — LEVOTHYROXINE SODIUM 100 MCG: 0.03 TABLET ORAL at 05:20

## 2024-11-05 RX ADMIN — SODIUM CHLORIDE 250 ML: 9 INJECTION, SOLUTION INTRAVENOUS at 09:14

## 2024-11-05 RX ADMIN — RIFAXIMIN 400 MG: 200 TABLET ORAL at 13:06

## 2024-11-05 RX ADMIN — BARIUM SULFATE 15 ML: 0.81 POWDER, FOR SUSPENSION ORAL at 10:35

## 2024-11-05 RX ADMIN — RIFAXIMIN 400 MG: 200 TABLET ORAL at 19:50

## 2024-11-05 RX ADMIN — LACTULOSE 20 G: 20 SOLUTION ORAL at 13:07

## 2024-11-05 RX ADMIN — BARIUM SULFATE 15 ML: 400 PASTE ORAL at 10:34

## 2024-11-05 RX ADMIN — Medication 50 MG: at 09:44

## 2024-11-05 RX ADMIN — Medication 1000 UNITS: at 09:28

## 2024-11-05 RX ADMIN — LACTULOSE 20 G: 20 SOLUTION ORAL at 16:38

## 2024-11-05 RX ADMIN — BARIUM SULFATE 15 ML: 400 SUSPENSION ORAL at 10:35

## 2024-11-05 RX ADMIN — Medication 150 MG: at 09:44

## 2024-11-05 RX ADMIN — FOLIC ACID 1 MG: 1 TABLET ORAL at 09:30

## 2024-11-05 RX ADMIN — LACTULOSE 20 G: 20 SOLUTION ORAL at 09:37

## 2024-11-05 RX ADMIN — ENOXAPARIN SODIUM 40 MG: 100 INJECTION SUBCUTANEOUS at 09:40

## 2024-11-05 ASSESSMENT — PAIN SCALES - GENERAL
PAINLEVEL_OUTOF10: 0

## 2024-11-05 NOTE — PROGRESS NOTES
Speech-Language Pathology  Cancellation/No Show Note      For today's appointment patient:    [x]  Cancelled                  []  Rescheduled appointment    []  No show       []  Therapist cancelled             Reason given by patient:  []  No reason given  []  Conflicting appointment  []  No transportation  []  Conflict with work  []  Illness  []  Inclement weather   []  Insurance related issues  [x]  Other           Comments: admitted to hospital    Continue as per established POC    Britt Santiago M.A., CCC-SLP    11/5/2024

## 2024-11-05 NOTE — PROGRESS NOTES
Occupational Therapy      Phone: [phone] Fax: [phone]     Occupational Therapy   Cancellation Note     Patient Name:  Erna Stoner  : 1960  Date:  2024  MRN: [id number]    For today's appointment patient:   [x]  Cancelled   []  Rescheduled appointment   []  No-show     Reason given by patient:   [x]  Patient ill   []  Conflicting appointment   []  No transportation   []  Conflict with work   []  No reason given   [x]  Other:    Comments: Pt admitted to hospital    Electronically signed by: Jelena VAZQUEZ, [id number]

## 2024-11-05 NOTE — PLAN OF CARE
Problem: Discharge Planning  Goal: Discharge to home or other facility with appropriate resources  Outcome: Progressing  Flowsheets (Taken 11/4/2024 2013)  Discharge to home or other facility with appropriate resources: Identify barriers to discharge with patient and caregiver     Problem: Skin/Tissue Integrity  Goal: Absence of new skin breakdown  Description: 1.  Monitor for areas of redness and/or skin breakdown  2.  Assess vascular access sites hourly  3.  Every 4-6 hours minimum:  Change oxygen saturation probe site  4.  Every 4-6 hours:  If on nasal continuous positive airway pressure, respiratory therapy assess nares and determine need for appliance change or resting period.  Outcome: Progressing     Problem: Safety - Adult  Goal: Free from fall injury  Outcome: Progressing  Flowsheets (Taken 11/4/2024 2013)  Free From Fall Injury: Instruct family/caregiver on patient safety

## 2024-11-06 VITALS
TEMPERATURE: 98.6 F | HEIGHT: 62 IN | BODY MASS INDEX: 28.03 KG/M2 | RESPIRATION RATE: 15 BRPM | SYSTOLIC BLOOD PRESSURE: 106 MMHG | OXYGEN SATURATION: 97 % | HEART RATE: 80 BPM | WEIGHT: 152.34 LBS | DIASTOLIC BLOOD PRESSURE: 59 MMHG

## 2024-11-06 LAB
ALBUMIN SERPL-MCNC: 3 G/DL (ref 3.5–5.2)
ALP SERPL-CCNC: 152 U/L (ref 35–104)
ALT SERPL-CCNC: 44 U/L (ref 0–32)
AMMONIA PLAS-SCNC: 109 UMOL/L (ref 11–51)
AMMONIA PLAS-SCNC: 120 UMOL/L (ref 11–51)
ANION GAP SERPL CALCULATED.3IONS-SCNC: 10 MMOL/L (ref 7–16)
AST SERPL-CCNC: 41 U/L (ref 0–31)
BASOPHILS # BLD: 0.04 K/UL (ref 0–0.2)
BASOPHILS NFR BLD: 1 % (ref 0–2)
BILIRUB DIRECT SERPL-MCNC: 0.6 MG/DL (ref 0–0.3)
BILIRUB INDIRECT SERPL-MCNC: 1.9 MG/DL (ref 0–1)
BILIRUB SERPL-MCNC: 2.5 MG/DL (ref 0–1.2)
BUN SERPL-MCNC: 6 MG/DL (ref 6–23)
CALCIUM SERPL-MCNC: 8.1 MG/DL (ref 8.6–10.2)
CHLORIDE SERPL-SCNC: 114 MMOL/L (ref 98–107)
CO2 SERPL-SCNC: 21 MMOL/L (ref 22–29)
CREAT SERPL-MCNC: 0.4 MG/DL (ref 0.5–1)
EOSINOPHIL # BLD: 0.08 K/UL (ref 0.05–0.5)
EOSINOPHILS RELATIVE PERCENT: 2 % (ref 0–6)
ERYTHROCYTE [DISTWIDTH] IN BLOOD BY AUTOMATED COUNT: 19.9 % (ref 11.5–15)
GFR, ESTIMATED: >90 ML/MIN/1.73M2
GLUCOSE BLD-MCNC: 142 MG/DL (ref 74–99)
GLUCOSE BLD-MCNC: 207 MG/DL (ref 74–99)
GLUCOSE BLD-MCNC: 210 MG/DL (ref 74–99)
GLUCOSE BLD-MCNC: 244 MG/DL (ref 74–99)
GLUCOSE SERPL-MCNC: 193 MG/DL (ref 74–99)
HCT VFR BLD AUTO: 42.8 % (ref 34–48)
HGB BLD-MCNC: 13.7 G/DL (ref 11.5–15.5)
IMM GRANULOCYTES # BLD AUTO: 0.06 K/UL (ref 0–0.58)
IMM GRANULOCYTES NFR BLD: 1 % (ref 0–5)
LYMPHOCYTES NFR BLD: 0.54 K/UL (ref 1.5–4)
LYMPHOCYTES RELATIVE PERCENT: 12 % (ref 20–42)
MCH RBC QN AUTO: 27.7 PG (ref 26–35)
MCHC RBC AUTO-ENTMCNC: 32 G/DL (ref 32–34.5)
MCV RBC AUTO: 86.6 FL (ref 80–99.9)
MONOCYTES NFR BLD: 0.56 K/UL (ref 0.1–0.95)
MONOCYTES NFR BLD: 12 % (ref 2–12)
NEUTROPHILS NFR BLD: 72 % (ref 43–80)
NEUTS SEG NFR BLD: 3.23 K/UL (ref 1.8–7.3)
PLATELET # BLD AUTO: 81 K/UL (ref 130–450)
PLATELET CONFIRMATION: NORMAL
PMV BLD AUTO: 8.9 FL (ref 7–12)
POTASSIUM SERPL-SCNC: 3.7 MMOL/L (ref 3.5–5)
PROT SERPL-MCNC: 5 G/DL (ref 6.4–8.3)
RBC # BLD AUTO: 4.94 M/UL (ref 3.5–5.5)
RBC # BLD: ABNORMAL 10*6/UL
SODIUM SERPL-SCNC: 145 MMOL/L (ref 132–146)
WBC OTHER # BLD: 4.5 K/UL (ref 4.5–11.5)

## 2024-11-06 PROCEDURE — 80053 COMPREHEN METABOLIC PANEL: CPT

## 2024-11-06 PROCEDURE — 6360000002 HC RX W HCPCS: Performed by: NURSE PRACTITIONER

## 2024-11-06 PROCEDURE — 82962 GLUCOSE BLOOD TEST: CPT

## 2024-11-06 PROCEDURE — 6370000000 HC RX 637 (ALT 250 FOR IP): Performed by: NURSE PRACTITIONER

## 2024-11-06 PROCEDURE — 82248 BILIRUBIN DIRECT: CPT

## 2024-11-06 PROCEDURE — 99232 SBSQ HOSP IP/OBS MODERATE 35: CPT | Performed by: NURSE PRACTITIONER

## 2024-11-06 PROCEDURE — 2580000003 HC RX 258: Performed by: NURSE PRACTITIONER

## 2024-11-06 PROCEDURE — 97530 THERAPEUTIC ACTIVITIES: CPT

## 2024-11-06 PROCEDURE — 97535 SELF CARE MNGMENT TRAINING: CPT

## 2024-11-06 PROCEDURE — 85025 COMPLETE CBC W/AUTO DIFF WBC: CPT

## 2024-11-06 PROCEDURE — 2060000000 HC ICU INTERMEDIATE R&B

## 2024-11-06 PROCEDURE — 82140 ASSAY OF AMMONIA: CPT

## 2024-11-06 PROCEDURE — 36415 COLL VENOUS BLD VENIPUNCTURE: CPT

## 2024-11-06 RX ADMIN — INSULIN LISPRO 1 UNITS: 100 INJECTION, SOLUTION INTRAVENOUS; SUBCUTANEOUS at 21:08

## 2024-11-06 RX ADMIN — Medication 150 MG: at 08:21

## 2024-11-06 RX ADMIN — FOLIC ACID 1 MG: 1 TABLET ORAL at 08:21

## 2024-11-06 RX ADMIN — LACTULOSE 20 G: 20 SOLUTION ORAL at 14:09

## 2024-11-06 RX ADMIN — RIFAXIMIN 400 MG: 200 TABLET ORAL at 14:10

## 2024-11-06 RX ADMIN — INSULIN LISPRO 4 UNITS: 100 INJECTION, SOLUTION INTRAVENOUS; SUBCUTANEOUS at 11:47

## 2024-11-06 RX ADMIN — LACTULOSE 20 G: 20 SOLUTION ORAL at 17:34

## 2024-11-06 RX ADMIN — RIFAXIMIN 400 MG: 200 TABLET ORAL at 20:58

## 2024-11-06 RX ADMIN — LEVOTHYROXINE SODIUM 100 MCG: 0.03 TABLET ORAL at 06:07

## 2024-11-06 RX ADMIN — RIFAXIMIN 400 MG: 200 TABLET ORAL at 08:20

## 2024-11-06 RX ADMIN — INSULIN LISPRO 1 UNITS: 100 INJECTION, SOLUTION INTRAVENOUS; SUBCUTANEOUS at 17:34

## 2024-11-06 RX ADMIN — LACTULOSE 20 G: 20 SOLUTION ORAL at 08:20

## 2024-11-06 RX ADMIN — LACTULOSE 20 G: 20 SOLUTION ORAL at 20:58

## 2024-11-06 RX ADMIN — ENOXAPARIN SODIUM 40 MG: 100 INJECTION SUBCUTANEOUS at 08:21

## 2024-11-06 RX ADMIN — Medication 1 TABLET: at 08:21

## 2024-11-06 RX ADMIN — LACTULOSE 20 G: 20 SOLUTION ORAL at 16:43

## 2024-11-06 RX ADMIN — SODIUM CHLORIDE, PRESERVATIVE FREE 10 ML: 5 INJECTION INTRAVENOUS at 08:21

## 2024-11-06 RX ADMIN — Medication 50 MG: at 08:20

## 2024-11-06 RX ADMIN — Medication 1000 UNITS: at 08:21

## 2024-11-06 ASSESSMENT — PAIN SCALES - GENERAL
PAINLEVEL_OUTOF10: 0

## 2024-11-07 LAB
ALBUMIN SERPL-MCNC: 2.8 G/DL (ref 3.5–5.2)
ALP SERPL-CCNC: 162 U/L (ref 35–104)
ALT SERPL-CCNC: 51 U/L (ref 0–32)
AMMONIA PLAS-SCNC: 95 UMOL/L (ref 11–51)
ANION GAP SERPL CALCULATED.3IONS-SCNC: 11 MMOL/L (ref 7–16)
AST SERPL-CCNC: 57 U/L (ref 0–31)
BILIRUB DIRECT SERPL-MCNC: 0.6 MG/DL (ref 0–0.3)
BILIRUB INDIRECT SERPL-MCNC: 1.8 MG/DL (ref 0–1)
BILIRUB SERPL-MCNC: 2.4 MG/DL (ref 0–1.2)
BUN SERPL-MCNC: 6 MG/DL (ref 6–23)
CALCIUM SERPL-MCNC: 7.6 MG/DL (ref 8.6–10.2)
CHLORIDE SERPL-SCNC: 110 MMOL/L (ref 98–107)
CO2 SERPL-SCNC: 21 MMOL/L (ref 22–29)
CREAT SERPL-MCNC: 0.4 MG/DL (ref 0.5–1)
GFR, ESTIMATED: >90 ML/MIN/1.73M2
GLUCOSE BLD-MCNC: 226 MG/DL (ref 74–99)
GLUCOSE SERPL-MCNC: 226 MG/DL (ref 74–99)
MAGNESIUM SERPL-MCNC: 2.1 MG/DL (ref 1.6–2.6)
POTASSIUM SERPL-SCNC: 3.4 MMOL/L (ref 3.5–5)
PROT SERPL-MCNC: 4.8 G/DL (ref 6.4–8.3)
SODIUM SERPL-SCNC: 142 MMOL/L (ref 132–146)

## 2024-11-07 PROCEDURE — 82962 GLUCOSE BLOOD TEST: CPT

## 2024-11-07 PROCEDURE — 6370000000 HC RX 637 (ALT 250 FOR IP): Performed by: NURSE PRACTITIONER

## 2024-11-07 PROCEDURE — 80053 COMPREHEN METABOLIC PANEL: CPT

## 2024-11-07 PROCEDURE — 99232 SBSQ HOSP IP/OBS MODERATE 35: CPT | Performed by: NURSE PRACTITIONER

## 2024-11-07 PROCEDURE — 82248 BILIRUBIN DIRECT: CPT

## 2024-11-07 PROCEDURE — 82140 ASSAY OF AMMONIA: CPT

## 2024-11-07 PROCEDURE — 2580000003 HC RX 258: Performed by: NURSE PRACTITIONER

## 2024-11-07 PROCEDURE — 83735 ASSAY OF MAGNESIUM: CPT

## 2024-11-07 PROCEDURE — 36415 COLL VENOUS BLD VENIPUNCTURE: CPT

## 2024-11-07 PROCEDURE — 6360000002 HC RX W HCPCS: Performed by: NURSE PRACTITIONER

## 2024-11-07 RX ORDER — POTASSIUM CHLORIDE 1500 MG/1
40 TABLET, EXTENDED RELEASE ORAL ONCE
Status: COMPLETED | OUTPATIENT
Start: 2024-11-07 | End: 2024-11-07

## 2024-11-07 RX ADMIN — Medication 50 MG: at 09:14

## 2024-11-07 RX ADMIN — FOLIC ACID 1 MG: 1 TABLET ORAL at 09:13

## 2024-11-07 RX ADMIN — ENOXAPARIN SODIUM 40 MG: 100 INJECTION SUBCUTANEOUS at 09:13

## 2024-11-07 RX ADMIN — Medication 150 MG: at 09:14

## 2024-11-07 RX ADMIN — Medication 1000 UNITS: at 09:13

## 2024-11-07 RX ADMIN — LEVOTHYROXINE SODIUM 100 MCG: 0.03 TABLET ORAL at 05:56

## 2024-11-07 RX ADMIN — SODIUM CHLORIDE, PRESERVATIVE FREE 10 ML: 5 INJECTION INTRAVENOUS at 09:16

## 2024-11-07 RX ADMIN — Medication 1 TABLET: at 09:13

## 2024-11-07 RX ADMIN — RIFAXIMIN 400 MG: 200 TABLET ORAL at 09:13

## 2024-11-07 RX ADMIN — LACTULOSE 20 G: 20 SOLUTION ORAL at 02:26

## 2024-11-07 RX ADMIN — LACTULOSE 20 G: 20 SOLUTION ORAL at 05:55

## 2024-11-07 RX ADMIN — POTASSIUM CHLORIDE 40 MEQ: 1500 TABLET, EXTENDED RELEASE ORAL at 11:04

## 2024-11-07 RX ADMIN — LACTULOSE 20 G: 20 SOLUTION ORAL at 09:13

## 2024-11-07 RX ADMIN — INSULIN LISPRO 1 UNITS: 100 INJECTION, SOLUTION INTRAVENOUS; SUBCUTANEOUS at 06:05

## 2024-11-07 ASSESSMENT — PAIN SCALES - GENERAL: PAINLEVEL_OUTOF10: 0

## 2024-11-07 NOTE — CARE COORDINATION
Here for AMS  Potassium 3.4 Ammonia today 95 Continues on lactulose.Currently A&O  amb 250 ft with PT (no needs ) is actve with outpt therapy. When medically ready Plan is home and family will transport. Declining c.Electronically signed by Jocelyne Gandara RN on 11/7/2024 at 9:08 AM    
Here for AMS and fatigue For MBSS today.Ammonia level today 108. GI consult Call from son Richard last evening to discuss discharge plan. They live in 1 story home  her PCP is Dr Daniel and she uses [facility]. She has a walker and  h/o [facility]. Plan is home with [facility] Will need hhc orders @ discharge. CM/SW to follow.Electronically signed by Jocelyne Gandara RN on 11/5/2024 at 9:07 AM    Referral to [facility] via [facility].Electronically signed by Jocelyne Gandara RN on 11/5/2024 at 9:12 AM    Asked for PT/OT updated tomorrow.Electronically signed by Jocelyne Gandara RN on 11/5/2024 at 11:01 AM    
Met with patient today who is alert and oriented x3  thought she was at [facility]  She is waiting to go home explained to her that her ammonia level is higher than when she came in.  OT 19 PT 18 She is declining hhc. Plan is home  and family will transport. Buck Huggins with [facility] that she is declining hhc..Electronically signed by Jocelyne Gandara RN on 11/6/2024 at 2:04 PM      
patient's individualized plan of care/goals and shares the quality data associated with the providers was provided to:     Patient Representative Name:   leticia ribeiro    The Patient and/or Patient Representative Agree with the Discharge Plan?  Home  vs snf     Jocelyne Gandara RN  Case Management Department  Ph: [phone] Fax: [phone]

## 2024-11-07 NOTE — DISCHARGE SUMMARY
[facility]   Discharge summary   Patient ID:  Erna Stoner  [id number]  63 y.o.  1960    Admit date: 11/3/2024    Discharge date and time: 11/7/2024    Admission Diagnoses:   Patient Active Problem List   Diagnosis    Hematemesis    Gastric varices    S/P TIPS (transjugular intrahepatic portosystemic shunt)    Moderate protein-calorie malnutrition (HCC)    Chest pain    Cirrhosis of liver (HCC)    Senile osteoporosis    Rectal bleeding    Esophageal varices (HCC)    Colon cancer screening    AMS (altered mental status)    DM (diabetes mellitus), type 2 (HCC)    Increased ammonia level       Discharge Diagnoses: AMS, elevated ammonia secondary to liver cirrhosis    Consults: GI    Procedures: none    Hospital Course: The patient is a 63 y.o. female of Elia Daniel MD     Assessment:  AMS  Increased ammonia level  Cirrhosis of the liver s/p TIPS procedure 04/2024  Dehydration  T2DM  Hypothyroidism     Plan:  GI consult  Lactulose  SSI  Synthroid     11/05/2024  Hepatic encephalopathy s/p TIPS 04/23/2024  Appreciate GI input  Ammonia level 56 (11/03)--> 95 (11/04)--> 108 (11/05)  Continue Lactulose and Rifaximin  Elevated hepatocellular enzymes, trending up  Continue to monitor CMP  Borderline hypotension   cc bolus x 1 now  Leukopenia  Thrombocytopenia with platelet count now 86,000  Monitor CBC     11/6/24  -Ammonia level 109, lactulose adjustments made by GI  -Had been refusing to take lactulose, nursing was able to get her to take a dose at 8 and 2, recheck ammonia level stat  -Repeat ammonia 1 worse at 120  -Continue lactulose and rifaximin  -Follow-up with GI outpatient at their discretion  -Discharge home if GI ok with repeat ammonia     11/7/24  -Ammonia level improved some  -Continue Xifaxan and lactulose at home  -Follow-up outpatient with GI  -Follow-up with PCP within 1 week of discharge     Recent Labs     11/05/24  0446 11/06/24  0447   WBC 3.4* 4.5   HGB 14.5 13.7

## 2024-11-07 NOTE — PLAN OF CARE
Problem: Discharge Planning  Goal: Discharge to home or other facility with appropriate resources  11/7/2024 1102 by Laurie Villa, RN  Outcome: Completed  11/7/2024 1019 by Laurie Villa RN  Outcome: Progressing     Problem: Skin/Tissue Integrity  Goal: Absence of new skin breakdown  Description: 1.  Monitor for areas of redness and/or skin breakdown  2.  Assess vascular access sites hourly  3.  Every 4-6 hours minimum:  Change oxygen saturation probe site  4.  Every 4-6 hours:  If on nasal continuous positive airway pressure, respiratory therapy assess nares and determine need for appliance change or resting period.  11/7/2024 1102 by Laurie Villa, RN  Outcome: Completed  11/7/2024 1019 by Laurie Villa RN  Outcome: Progressing     Problem: Safety - Adult  Goal: Free from fall injury  Outcome: Completed  Flowsheets (Taken 11/7/2024 0920)  Free From Fall Injury: Instruct family/caregiver on patient safety     Problem: Pain  Goal: Verbalizes/displays adequate comfort level or baseline comfort level  Outcome: Completed  Flowsheets (Taken 11/7/2024 0920)  Verbalizes/displays adequate comfort level or baseline comfort level: Encourage patient to monitor pain and request assistance     Problem: Chronic Conditions and Co-morbidities  Goal: Patient's chronic conditions and co-morbidity symptoms are monitored and maintained or improved  Outcome: Completed

## 2024-11-07 NOTE — DISCHARGE INSTRUCTIONS
derecha del abdomen.  Fatiga.  Pérdida del apetito.  Comezón.  Fiebre.  Heces de color oscuro.  Acumulación de líquido en el abdomen, las piernas y los brazos.  Fácil formación de moretones.  Cambios en la conducta, confusión, habla arrastrada y temblores en las kerry.  Los síntomas pueden volverse graves y potencialmente mortales.  ¿Cómo se diagnostica?  El médico le preguntará sobre mónica antecedentes de yusuf y le hará un examen físico. Se verificará si tiene ictericia e hinchazón y dolor en el hígado, entre otras cosas. Le harán análisis de montez para renato lo homa que está funcionando el hígado.  Los análisis de montez miden los niveles de ciertas sustancias producidas por el hígado. Estas incluyen los factores de la coagulación y proteínas. Baez médico también podría medirle el acetaminofén en la montez.  Si los resultados no son brannon, scottie vez le evy otras pruebas. Estas incluyen estudios de diagnóstico por imágenes, gab ecografías, tomografías computarizadas o resonancias magnéticas del abdomen. Estas exploraciones pueden detectar diferentes causas de insuficiencia hepática, gab tumores y obstrucción de las vías biliares.  ¿Cómo se trata?  El principal tratamiento para la insuficiencia hepática aguda es tratamiento de apoyo. [place] incluye manejar los líquidos corporales y controlar baez riesgo de sangrado. El tratamiento generalmente tiene lugar en el hospital. Es posible que los casos graves se traten en la unidad de cuidados intensivos.  El tratamiento que reciba dependerá de la causa de baez insuficiencia hepática. Si tomó demasiado acetaminofén, pueden darle medicamentos para detener mónica efectos secundarios. La acumulación de líquido en el cuerpo puede tratarse con diuréticos. Si la acumulación de líquido es en el abdomen, baez médico podría drenarlo con bartolo aguja o bartolo sonda.  Un trasplante de hígado puede ser bartolo opción para algunas personas con insuficiencia hepática grave. Alanna un trasplante, un médico

## 2024-11-07 NOTE — PROGRESS NOTES
[facility]                                Progress note    Subjective:      Objective:    /64   Pulse 88   Temp 97.8 °F (36.6 °C) (Oral)   Resp 18   Ht 1.575 m (5' 2\")   Wt 69.1 kg (152 lb 5.4 oz)   SpO2 96%   BMI 27.86 kg/m²   CONST:  Well developed, well nourished middle aged  female who appears stated age. Awake, alert, cooperative, no apparent distress  HEENT:   Head- Normocephalic, atraumatic   Eyes- Conjunctivae pink, anicteric  Throat- Oral mucosa pink and moist  Neck-  No stridor, trachea midline, no jugular venous distention. No adenopathy   CHEST: Chest symmetrical and non-tender to palpation. No accessory muscle use or intercostal retractions  RESPIRATORY: Lung sounds - clear throughout fields   CARDIOVASCULAR:     No carotid bruit  Heart Inspection- shows no noted pulsations  Heart Palpation- no heaves or thrills; PMI is non-displaced   Heart Ausculation- Regular rate and rhythm, no murmur. No s3, s4 or rub   PV: No lower extremity edema. No varicosities. Pedal pulses palpable, no clubbing or cyanosis   ABDOMEN: Soft, non-tender to light palpation. Bowel sounds present. No palpable masses no organomegaly; no abdominal bruit  MS: Good muscle strength and tone. No atrophy or abnormal movements.   : Deferred  SKIN: Warm and dry no statis dermatitis or ulcers   NEURO / PSYCH: Oriented to person, place and time. Speech clear and appropriate. Follows all commands. Pleasant affect     Recent Labs     11/04/24  0445 11/05/24 0446 11/06/24 0447   WBC 3.6* 3.4* 4.5   HGB 14.3 14.5 13.7   HCT 43.1 44.2 42.8   * 86* 81*       Recent Labs     11/04/24  0445 11/05/24  0446 11/06/24 0447    146 145   K 4.0 4.0 3.7   * 111* 114*   CO2 22 22 21*   BUN 6 7 6   CREATININE 0.4* 0.7 0.4*   CALCIUM 7.5* 8.3* 8.1*     11/03/2024 CT Head WO contrast:  1. There is no acute intracranial hemorrhage or acute intracranial abnormality  2. Smooth bordered round stable 
  Gastroenterology, Hepatology, &  Advanced Endoscopy    Progress Note      Reason for Consult: Mental Status change in the setting of recent TIPS.    HPI:   Erna Stoner is a 63 y.o. female w/ PMH of  has a past medical history of Asthma, Back pain, Diabetes mellitus (HCC), Hypothyroidism, and Thyroid disease. 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 Post EGD/C-Scope 10/10/24 with Dr. Montes De Oca. Presents to the ED arriving from home with a complaint of altered mental status. Her son called EMS. Patient AO X2 for EMS and usually is AO X4 according to son. Patient is on lactulose with history of hyperammonemia. She reports she has been taking her lactulose. She has no other complaints at this time.     PMH:       Diagnosis Date    Asthma     stable, is mild    Back pain     Diabetes mellitus (HCC)     Hypothyroidism     Thyroid disease         PSH:       Procedure Laterality Date    CARPAL TUNNEL RELEASE      bilateral     SECTION      CHOLECYSTECTOMY      COLONOSCOPY  2012    COLONOSCOPY N/A 10/10/2024    COLONOSCOPY DIAGNOSTIC performed by Augie Monets De Oca MD at [facility] ENDOSCOPY    HYSTERECTOMY (CERVIX STATUS UNKNOWN)      IR EMBOLIZATION VENOUS  2024    IR EMBOLIZATION VENOUS 2024 JENNIFER Pack MD [facility]    IR TIPS INSERTION  2024    IR TIPS INSERTION 2024 JENNIFER Pack MD [facility]    LIVER BIOPSY  2017    NOSE SURGERY N/A 2020    EXCISIONAL BIOPSY OF RIGHT NARES (PATHOLOGY NOTIFIED) performed by Bandar Solis Jr., MD at [facility] OR    OVARY REMOVAL      TONSILLECTOMY      UPPER GASTROINTESTINAL ENDOSCOPY  2012    UPPER GASTROINTESTINAL ENDOSCOPY  2024    ESOPHAGOGASTRODUODENOSCOPY CONTROL HEMORRHAGE performed by Noé Montilla MD at [facility] ENDOSCOPY    UPPER GASTROINTESTINAL ENDOSCOPY  2024    ESOPHAGOGASTRODUODENOSCOPY SUBMUCOSAL INJECTION performed by Augie Montes De Oca MD at [facility] 
  Gastroenterology, Hepatology, &  Advanced Endoscopy    Progress Note    Subjective: I feel much better today, I am not feeling fuzzy anymore. I had multiple bowel movements yesterday.    Reason for Consult: Mental Status change in the setting of recent TIPS.    HPI:   Erna Stoner is a 63 y.o. female w/ PMH of  has a past medical history of Asthma, Back pain, Diabetes mellitus (HCC), Hypothyroidism, and Thyroid disease. 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 Post EGD/C-Scope 10/10/24 with Dr. Montes De Oca. Presents to the ED arriving from home with a complaint of altered mental status. Her son called EMS. Patient AO X2 for EMS and usually is AO X4 according to son. Patient is on lactulose with history of hyperammonemia. She reports she has been taking her lactulose. She has no other complaints at this time.     PMH:       Diagnosis Date    Asthma     stable, is mild    Back pain     Diabetes mellitus (HCC)     Hypothyroidism     Thyroid disease         PSH:       Procedure Laterality Date    CARPAL TUNNEL RELEASE      bilateral     SECTION      CHOLECYSTECTOMY      COLONOSCOPY  2012    COLONOSCOPY N/A 10/10/2024    COLONOSCOPY DIAGNOSTIC performed by Augie Montes De Oca MD at [facility] ENDOSCOPY    HYSTERECTOMY (CERVIX STATUS UNKNOWN)      IR EMBOLIZATION VENOUS  2024    IR EMBOLIZATION VENOUS 2024 JENNIFER Pack MD [facility]    IR TIPS INSERTION  2024    IR TIPS INSERTION 2024 JENNIFER Pack MD [facility]    LIVER BIOPSY  2017    NOSE SURGERY N/A 2020    EXCISIONAL BIOPSY OF RIGHT NARES (PATHOLOGY NOTIFIED) performed by Bandar Solis Jr., MD at [facility] OR    OVARY REMOVAL      TONSILLECTOMY      UPPER GASTROINTESTINAL ENDOSCOPY  2012    UPPER GASTROINTESTINAL ENDOSCOPY  2024    ESOPHAGOGASTRODUODENOSCOPY CONTROL HEMORRHAGE performed by Noé Montilla MD at [facility] ENDOSCOPY    UPPER GASTROINTESTINAL 
  Speech Language Pathology  NAME:  Erna Stoner  :  1960  DATE: 2024  ROOM:  [facility]8/8508-A    Pt unavailable at 1328 for Clinical Swallow Evaluation Discussed with patient nurse via phone     REASON:  Patient NPO for testing not able to address dysphagia goals due to this. NPO for ultrasound per RN    Will re-attempt as appropriate.       Thank You    Hillary Lu M.S., CCC-SLP  Speech-Language Pathologist  SP. [phone]        
4 Eyes Skin Assessment     NAME:  Erna Stoner  YOB: 1960  MEDICAL RECORD NUMBER:  [id number]    The patient is being assessed for  Admission    I agree that at least one RN has performed a thorough Head to Toe Skin Assessment on the patient. ALL assessment sites listed below have been assessed.      Areas assessed by both nurses:    Head, Face, Ears, Shoulders, Back, Chest, Arms, Elbows, Hands, Sacrum. Buttock, Coccyx, Ischium, and Legs. Feet and Heels        Does the Patient have a Wound? No noted wound(s)       Jose M Prevention initiated by RN: Yes  Wound Care Orders initiated by RN: No    Pressure Injury (Stage 3,4, Unstageable, DTI, NWPT, and Complex wounds) if present, place Wound referral order by RN under : No    New Ostomies, if present place, Ostomy referral order under : No     Nurse 1 eSignature: Electronically signed by Nisa Diamond RN on 11/3/24 at 10:42 PM EST    **SHARE this note so that the co-signing nurse can place an eSignature**    Nurse 2 eSignature: Electronically signed by Marni Light RN on 11/3/24 at 10:43 PM EST  
Notified Carly Canchola NP, pt ammonia 108.    Message sent to Dr. Montes De Oca pt ammonia 108.    Orders given to order Lactulose every 3 hours per Dr. Montes De Oca.  
Notified Dr. Davila pt ammonia 95 and calcium 7.5.  
Occupational Therapy  OT BEDSIDE TREATMENT NOTE   [facility]  [address], [place]       Date:2024  Patient Name: Erna Stoner  MRN: [id number]  : 1960  Room: [address] [place] [address]-A     Per OT Eval:    Evaluating OT: Can Jones OTR/L; [id number]        Referring Provider: Elvis Davila DO     Specific Provider Orders/Date: OT Eval and Treat 24        Diagnosis: AMS (altered mental status)      Surgery: None this admission     Pertinent Medical History:  has a past medical history of Asthma, Back pain, Diabetes mellitus (HCC), Hypothyroidism, and Thyroid disease.      Recommended Adaptive Equipment: BSC, AE for LE bathing and dressing PRN      Precautions:  Fall Risk, cognition, +alarms,     Assessment of current deficits    [x] Functional mobility            [x]ADLs           [x] Strength                  [x]Cognition    [x] Functional transfers          [x] IADLs         [x] Safety Awareness   [x]Endurance    [x] Fine Coordination                         [x] Balance      [] Vision/perception   []Sensation      []Gross Motor Coordination             [] ROM           [] Delirium                   [x] Motor Control      OT PLAN OF CARE   OT POC based on physician orders, patient diagnosis and results of clinical assessment     Frequency/Duration 1-3 days/wk for 2 weeks PRN   Specific OT Treatment Interventions to include:   * Instruction/training on adapted ADL techniques and AE recommendations to increase functional independence within precautions       * Training on energy conservation strategies, correct breathing pattern and techniques to improve independence/tolerance for self-care routine  * Functional transfer/mobility training/DME recommendations for increased independence, safety, and fall prevention  * Patient/Family education to increase follow through with safety techniques and functional independence  * 
Patient received the Sacrament of the Anointing of the Sick by Father David Rivera on November 5, 2024.    If additional support is requested or needed please reach out to Spiritual Health ([phone]).    Chap. Neal Diaz MDIV, BCC   
Patient stated she has had diarrhea and did not want her lactulose. Patient said no bowel movement this am. Informed patient that ammonia level is increasing and lactulose will help decrease that level. Patient took 8 am dose. Will re-evaluate following doses    Patient refusing 1025 lactulose. See mar  
Physical Therapy  Physical Therapy Initial Assessment     Name: Erna Stoner  : 1960  MRN: [id number]      Date of Service: 2024    Evaluating PT:  Fara Baldwin, PT, DPT, [id number]    Room #:  8508/8508-A  Diagnosis:  Dehydration [E86.0]  Hyperammonemia (HCC) [E72.20]  Altered mental status, unspecified altered mental status type [R41.82]  AMS (altered mental status) [R41.82]  PMHx/PSHx:    Past Medical History:   Diagnosis Date    Asthma     stable, is mild    Back pain     Diabetes mellitus (HCC)     Hypothyroidism     Thyroid disease       Past Surgical History:   Procedure Laterality Date    CARPAL TUNNEL RELEASE      bilateral     SECTION      CHOLECYSTECTOMY      COLONOSCOPY  2012    COLONOSCOPY N/A 10/10/2024    COLONOSCOPY DIAGNOSTIC performed by Augie Montes De Oca MD at [facility] ENDOSCOPY    HYSTERECTOMY (CERVIX STATUS UNKNOWN)      IR EMBOLIZATION VENOUS  2024    IR EMBOLIZATION VENOUS 2024 JENNIFER Pack MD [facility]    IR TIPS INSERTION  2024    IR TIPS INSERTION 2024 EJNNIFER Pack MD [facility]    LIVER BIOPSY  2017    NOSE SURGERY N/A 2020    EXCISIONAL BIOPSY OF RIGHT NARES (PATHOLOGY NOTIFIED) performed by aBndar Solis Jr., MD at [facility] OR    OVARY REMOVAL      TONSILLECTOMY      UPPER GASTROINTESTINAL ENDOSCOPY  2012    UPPER GASTROINTESTINAL ENDOSCOPY  2024    ESOPHAGOGASTRODUODENOSCOPY CONTROL HEMORRHAGE performed by Noé Montilla MD at [facility] ENDOSCOPY    UPPER GASTROINTESTINAL ENDOSCOPY  2024    ESOPHAGOGASTRODUODENOSCOPY SUBMUCOSAL INJECTION performed by Augie Montes De Oca MD at [facility] ENDOSCOPY    UPPER GASTROINTESTINAL ENDOSCOPY N/A 10/10/2024    ESOPHAGOGASTRODUODENOSCOPY DIAGNOSTIC ONLY performed by Augie Montes De Oca MD at [facility] ENDOSCOPY      Procedure/Surgery:  none this admission   Precautions:  Fall Risk, Cognition, + Alarms, Incontinence/Purewick, lethargy  Equipment Needs:  
Physical Therapy  Physical Therapy Treatment    Name: Erna Stoner  : 1960  MRN: [id number]      Date of Service: 2024    Evaluating PT:  Fara Baldwin, PT, DPT, [id number]    Room #:  8508/8508-A  Diagnosis:  Dehydration [E86.0]  Hyperammonemia (HCC) [E72.20]  Altered mental status, unspecified altered mental status type [R41.82]  AMS (altered mental status) [R41.82]  PMHx/PSHx:   has a past medical history of Asthma, Back pain, Diabetes mellitus (HCC), Hypothyroidism, and Thyroid disease.   has a past surgical history that includes Hysterectomy (); Carpal tunnel release ();  section (); Tonsillectomy; Cholecystectomy (); Colonoscopy (2012); Upper gastrointestinal endoscopy (2012); liver biopsy (2017); Nose surgery (N/A, 2020); Ovary removal; Upper gastrointestinal endoscopy (2024); Upper gastrointestinal endoscopy (2024); IR EMBOLIZATION VENOUS (2024); IR TIPS INSERTION (2024); Upper gastrointestinal endoscopy (N/A, 10/10/2024); and Colonoscopy (N/A, 10/10/2024).  Procedure/Surgery:  none this admission   Precautions:  Fall Risk, Cognition, + Alarms  Equipment Needs:  TBD    SUBJECTIVE:    Pt lives alone in a 2 story home with 0 stairs to enter and 0 rail.  Bed is on 1 floor and bath is on 1 floor.  Pt ambulated with no AD PTA. Active with outpatient PT/OT.    OBJECTIVE:   Initial Evaluation  Date: 24 Treatment  2024 Short Term/ Long Term   Goals   AM-PAC 6 Clicks     Was pt agreeable to Eval/treatment? yes Yes    Does pt have pain? No c/o pain  No c/o pain    Bed Mobility  Rolling: NT  Supine to sit: Alisia  Sit to supine: NT  Scooting: Alisia Rolling: NT  Supine to sit: SBA  Sit to supine: NT  Scooting: NT Rolling: Independent .  Supine to sit: Independent   Sit to supine: Independent   Scooting: Independent    Transfers Sit to stand: Alisia  Stand to sit: Alisia  Stand pivot: Rajeev HERNANDEZ Sit to stand: SBA  Stand to sit: 
SPEECH/LANGUAGE PATHOLOGY  VIDEOFLUOROSCOPIC STUDY OF SWALLOWING (MBS)   and PLAN OF CARE    PATIENT NAME:  Erna Stoner  (female)     MRN:  [id number]    :  1960  (63 y.o.)  STATUS:  Inpatient: Room 8508/8508-A    TODAY'S DATE:  2024  ORDER DATE, DESCRIPTION AND REFERRING PROVIDER:Elvis Davila, DO :   : FL MODIFIED BARIUM SWALLOW W VIDEO :  24  REASON FOR REFERRAL: dysphagia   EVALUATING THERAPIST: Hillary Lu, SLP      RESULTS:      DYSPHAGIA DIAGNOSIS:  normal swallow function     DIET RECOMMENDATIONS:  Regular consistency solids (IDDSI level 7) with  thin liquids (IDDSI level 0)    FEEDING RECOMMENDATIONS:    Assistance level:  No assistance needed     Compensatory strategies recommended: No strategies are recommended at this time     Discussed recommendations with:  charge nurse via phone     SPEECH THERAPY  PLAN OF CARE   The dysphagia POC is established based on physician order and dysphagia diagnosis    Dysphagia therapy is not recommended       Conditions Requiring Skilled Therapeutic Intervention for dysphagia:    not applicable    SPECIFIC DYSPHAGIA INTERVENTIONS TO INCLUDE:     Not applicable    Specific instructions for next treatment:  not applicable   Treatment Goals:    Short Term Goals:  Not applicable no therapy warranted     Long Term Goals:   Not applicable no therapy warranted      Patient/family Goal:    not applicable                    ADMITTING DIAGNOSIS: Dehydration [E86.0]  Hyperammonemia (HCC) [E72.20]  Altered mental status, unspecified altered mental status type [R41.82]  AMS (altered mental status) [R41.82]     VISIT DIAGNOSIS:   Visit Diagnoses         Codes    Dehydration    -  Primary E86.0    Hyperammonemia (HCC)     E72.20                PATIENT REPORT/COMPLAINT: denies difficulty swallowing    PRIOR LEVEL OF SWALLOW FUNCTION:    Past History of Dysphagia?:  none reported    Home diet: Regular consistency solids (IDDSI level 7) with  thin liquids (IDDSI 
103 109* 111*   CO2 25 22 22   BUN 9 6 7   CREATININE 0.5 0.4* 0.7   CALCIUM 8.3* 7.5* 8.3*     11/03/2024 CT Head WO contrast:  1. There is no acute intracranial hemorrhage or acute intracranial abnormality  2. Smooth bordered round stable CSF density focus seen within the right  mesial temporal lobe/dorsal basal ganglia representing either a prominent  perivascular space or periarticular cyst.  The finding is unchanged compared  to the prior MRI 09/25/2023.    11/04/2024 Limited Abdominal Ultrasound:  1.  Heterogeneous appearing liver parenchyma.  Right hepatic lobe cystic area  measuring 23 mm on today's exam.  Stable from prior exam.  No intrahepatic  ductal dilatation.  Overall liver appearance consistent with cirrhotic liver.  2.  A tips stent is present.  The peak velocity in the stent was 67  centimeters/second.  3.  There is normal color flow identified in the imaged portal venous system.  Normal spectral waveforms.  Hepatic venous system appears patent.    Assessment:  AMS  Increased ammonia level  Cirrhosis of the liver s/p TIPS procedure 04/2024  Dehydration  T2DM  Hypothyroidism    Plan:  GI consult  Lactulose  SSI  Synthroid    11/05/2024  Hepatic encephalopathy s/p TIPS 04/23/2024  Appreciate GI input  Ammonia level 56 (11/03)--> 95 (11/04)--> 108 (11/05)  Continue Lactulose and Rifaximin  Elevated hepatocellular enzymes, trending up  Continue to monitor CMP  Borderline hypotension   cc bolus x 1 now  Leukopenia  Thrombocytopenia with platelet count now 86,000  Monitor CBC        Code status: FULL  Consultants:  GI   DVT Prophylaxis Lovenox 40 mg daily  PT/OT  Discharge planning       I have spent a total time of 30 minutes of this patient encounter reviewing chart, labs, coordinating care with interdisciplinary teams, face to face encounter with patient, providing counseling/education to patient/family.      Carly Canchola, SHANE - CNP,  8:05 AM  11/5/2024  
all lines and tubes intact.  Overall patient demonstrated decreased independence, activity tolerance, sitting/standing balance, and safety during completion of ADL/functional transfer/mobility tasks. Therapist facilitated ADL tasks, functional transfers, functional mobility, bed mobility to address safety awareness, implementation of fall prevention strategies, & functional engagement throughout daily activities. Pt required increased time & assist/cues throughout session 2/2 lethargy & fair- sequencing/processing of simple, 1-step commands. Pt would benefit from continued skilled OT to increase safety and independence with completion of ADL/IADL tasks for functional independence and quality of life.    Treatment: OT treatment provided this date includes:   ADL-  Instruction/training on safety and adapted techniques for completion of ADLs: Therapist facilitated & pt educated on activity modifications/adaptations to promote implementation of fall prevention strategies, EC/WS strategies, & safety awareness throughout ADLs.   Mobility-  Instruction/training on safety and improved independence with bed mobility/functional transfers and functional mobility.   Sitting EOB ~10 minutes to improve dynamic sitting balance and activity tolerance during ADLs.   Activity tolerance- Instruction/training on energy conservation/work simplification for completion of ADLs.   Skilled positioning/alignment-  Therapist facilitated proper positioning/alignment throughout session to maintain skin/joint integrity & proper body mechanics.    Rehab Potential: Good for established goals     LTG: maximize independence with ADLs to return to PLOF    Patient and/or family were instructed on functional diagnosis, prognosis/goals and OT plan of care. Demonstrated fair- understanding.     Eval Complexity: Low  History: Expanded chart review of medical records and additional review of physical, cognitive, or psychosocial history related to current 
nose/mouth.  Resp: Patient denies SOB, wheezing, productive cough.  Cardio: Patient denies CP, palpitations, significant edema  GI: As above.  Derm: Patient denies jaundice/rashes.   Misc: Patient denies diffuse/irregular joint swelling or myalgias.      PHYSICAL EXAMINATION:   Vitals:    11/06/24 0745 11/06/24 1100 11/06/24 1952 11/06/24 2241   BP: 117/69 116/64 (!) 114/56 (!) 106/59   Pulse: 77 88 90 80   Resp: 16 18 22 15   Temp: 97.4 °F (36.3 °C) 97.8 °F (36.6 °C) 98.8 °F (37.1 °C) 98.6 °F (37 °C)   TempSrc: Oral Oral Oral Oral   SpO2: 95% 96% 97% 97%   Weight:       Height:         General: Overall well-appearing, NAD  HEENT: PERRLA, EOMI, Anicteric sclera, MMM, no rhinorrhea  Cards: RRR, no LE edema  Resp: Breathing comfortably, good air movement, no use of accessory muscles, no audible wheezing  Abdomen: soft, non-tender, non-distended  Extremities: Moves all extremities, no effusions or bruising.  Skin: No rashes or jaundice  Neuro: A&O x 3, CN grossly intact, non-focal exam    US Result (most recent):  US ABDOMEN LIMITED 11/04/2024    Narrative  EXAMINATION:  RIGHT UPPER QUADRANT ULTRASOUND    11/4/2024 4:22 pm    COMPARISON:  Ultrasound abdomen from June 4, 2024.    HISTORY:  ORDERING SYSTEM PROVIDED HISTORY: S/P TIPS (transjugular intrahepatic  portosystemic shunt)  TECHNOLOGIST PROVIDED HISTORY:    Reason for exam:->TIPS Patency  What reading provider will be dictating this exam?->CRC    FINDINGS:  LIVER:  Heterogeneous echotexture.  Somewhat lobular liver margins.  No  hepatic mass or evidence of intrahepatic ductal dilatation.  Right hepatic  lobe measured 13.3 cm.  Cystic area identified in the right hepatic lobe  measuring 23 mm.  Stable from prior exams.    BILIARY SYSTEM: Not seen.  Appears to be surgically absent common bile duct  is within normal limits measuring 12 mm.    RIGHT KIDNEY: Not shown on the images provided.    PANCREAS:  Visualized portions of the pancreas are unremarkable.    OTHER: 
23 mm on today's exam.  Stable from prior exam.  No intrahepatic  ductal dilatation.  Overall liver appearance consistent with cirrhotic liver.    2.  A tips stent is present.  The peak velocity in the stent was 67  centimeters/second.    3.  There is normal color flow identified in the imaged portal venous system.  Normal spectral waveforms.  Hepatic venous system appears patent.         DYSPHAGIA DIAGNOSIS:  normal swallow function               DIET RECOMMENDATIONS:  Regular consistency solids (IDDSI level 7) with  thin liquids (IDDSI level 0)     FEEDING RECOMMENDATIONS:              Assistance level:  No assistance needed    ASSESSMENT:   Hepatic Encephalopathy post TIPS 4/23/24, relatively common.  Pt with H.E. during examination.     Elevated LFT's in the setting of Sarcoid cirrhosis, chronic    PLAN:   -Doing well today, negative H.E.,OK for discharge from GI POV. Continue Lactulose as OP.  - Increased Lactulose to q 2 hours until diarrhea then resume previous schedule. Multiple BM's over the last 2 days.  -Pt states she declined Lactulose yesterday due to her nurse said if was a option, not because she refused.  - Checked TIPS patency Vascular duplex , appearance of a Patent TIPS stent.   - Monitor LFT trend  - Supportive care  - Monitor cognition  - Lactulose and Rifaximin.  - Lactulose dosing to be titrated to 2-3 BM daily. Once he has 2 BM and mental status is at baseline, can hold additional doses for the day and start again the following day.    - Follow as OP once cognition improves.    Thank you for including us in the care of this patient. Please do not hesitate to reach out with any questions.    Greater than or equal to 35 minutes was spent providing face-to-face patient care, including:  and coordinating care, reviewing the chart, labs, and diagnostics, as well as medical decision making. Greater than 50% of this time was spent instructing and counseling the patient face to face regarding 
    Discussed with Dr. ZAPATA  Plan per Dr. JODI Matute, MSN,CRNP 8/22/2024 8:55 AM For Dr. ZAPATA

## 2024-11-09 PROBLEM — Z12.11 COLON CANCER SCREENING: Status: RESOLVED | Noted: 2024-10-10 | Resolved: 2024-11-09

## 2024-11-12 ENCOUNTER — HOSPITAL ENCOUNTER (OUTPATIENT)
Dept: SPEECH THERAPY | Age: 64
Setting detail: THERAPIES SERIES
Discharge: HOME | End: 2024-11-12

## 2024-11-12 ENCOUNTER — HOSPITAL ENCOUNTER (OUTPATIENT)
Dept: OCCUPATIONAL THERAPY | Age: 64
Setting detail: THERAPIES SERIES
Discharge: HOME | End: 2024-11-12

## 2024-11-12 NOTE — PROGRESS NOTES
Occupational Therapy      Phone: [phone] Fax: [phone]     Occupational Therapy   Cancellation Note     Patient Name:  Erna Stoner  : 1960  Date:  2024  MRN: [id number]    For today's appointment patient:   [x]  Cancelled   [x]  Rescheduled appointment   []  No-show     Reason given by patient:   [x]  Patient ill   []  Conflicting appointment   []  No transportation   []  Conflict with work   []  No reason given   []  Other:    Comments: discharged from hospital this weekend, wants to wait til next week to start therapy again      Electronically signed by: Jelena VAZQUEZ, [id number]

## 2024-11-12 NOTE — PROGRESS NOTES
Speech-Language Pathology  Cancellation/No Show Note      For today's appointment patient:    [x]  Cancelled                  []  Rescheduled appointment    []  No show       []  Therapist cancelled             Reason given by patient:  []  No reason given  []  Conflicting appointment  []  No transportation  []  Conflict with work  [x]  Illness  []  Inclement weather   []  Insurance related issues  []  Other           Comments: discharged from hospital this weekend, wants to wait til next week to start therapy again    Continue as per established POC    Britt Santiago M.A., CCC-SLP    11/12/2024

## 2024-11-16 ENCOUNTER — APPOINTMENT (OUTPATIENT)
Dept: GENERAL RADIOLOGY | Age: 64
DRG: 283 | End: 2024-11-16
Payer: COMMERCIAL

## 2024-11-16 ENCOUNTER — HOSPITAL ENCOUNTER (INPATIENT)
Age: 64
LOS: 4 days | Discharge: HOME | DRG: 283 | End: 2024-11-20
Attending: STUDENT IN AN ORGANIZED HEALTH CARE EDUCATION/TRAINING PROGRAM | Admitting: INTERNAL MEDICINE
Payer: COMMERCIAL

## 2024-11-16 ENCOUNTER — APPOINTMENT (OUTPATIENT)
Dept: CT IMAGING | Age: 64
DRG: 283 | End: 2024-11-16
Attending: STUDENT IN AN ORGANIZED HEALTH CARE EDUCATION/TRAINING PROGRAM
Payer: COMMERCIAL

## 2024-11-16 DIAGNOSIS — R41.82 ALTERED MENTAL STATUS, UNSPECIFIED ALTERED MENTAL STATUS TYPE: Primary | ICD-10-CM

## 2024-11-16 DIAGNOSIS — K76.82 HEPATIC ENCEPHALOPATHY (HCC): ICD-10-CM

## 2024-11-16 PROBLEM — E72.20 HYPERAMMONEMIA (HCC): Status: ACTIVE | Noted: 2024-11-16

## 2024-11-16 LAB
ALBUMIN SERPL-MCNC: 3.2 G/DL (ref 3.5–5.2)
ALP SERPL-CCNC: 129 U/L (ref 35–104)
ALT SERPL-CCNC: 65 U/L (ref 0–32)
AMMONIA PLAS-SCNC: 78 UMOL/L (ref 11–51)
ANION GAP SERPL CALCULATED.3IONS-SCNC: 9 MMOL/L (ref 7–16)
AST SERPL-CCNC: 72 U/L (ref 0–31)
BASOPHILS # BLD: 0.03 K/UL (ref 0–0.2)
BASOPHILS NFR BLD: 1 % (ref 0–2)
BILIRUB SERPL-MCNC: 2.9 MG/DL (ref 0–1.2)
BUN SERPL-MCNC: 8 MG/DL (ref 6–23)
CALCIUM SERPL-MCNC: 8.2 MG/DL (ref 8.6–10.2)
CHLORIDE SERPL-SCNC: 110 MMOL/L (ref 98–107)
CO2 SERPL-SCNC: 27 MMOL/L (ref 22–29)
CREAT SERPL-MCNC: 0.6 MG/DL (ref 0.5–1)
EOSINOPHIL # BLD: 0.08 K/UL (ref 0.05–0.5)
EOSINOPHILS RELATIVE PERCENT: 3 % (ref 0–6)
ERYTHROCYTE [DISTWIDTH] IN BLOOD BY AUTOMATED COUNT: 20.9 % (ref 11.5–15)
GFR, ESTIMATED: >90 ML/MIN/1.73M2
GLUCOSE SERPL-MCNC: 208 MG/DL (ref 74–99)
HCT VFR BLD AUTO: 43.3 % (ref 34–48)
HGB BLD-MCNC: 14.1 G/DL (ref 11.5–15.5)
IMM GRANULOCYTES # BLD AUTO: <0.03 K/UL (ref 0–0.58)
IMM GRANULOCYTES NFR BLD: 1 % (ref 0–5)
LYMPHOCYTES NFR BLD: 0.59 K/UL (ref 1.5–4)
LYMPHOCYTES RELATIVE PERCENT: 23 % (ref 20–42)
MAGNESIUM SERPL-MCNC: 2.4 MG/DL (ref 1.6–2.6)
MCH RBC QN AUTO: 28.9 PG (ref 26–35)
MCHC RBC AUTO-ENTMCNC: 32.6 G/DL (ref 32–34.5)
MCV RBC AUTO: 88.7 FL (ref 80–99.9)
MONOCYTES NFR BLD: 0.32 K/UL (ref 0.1–0.95)
MONOCYTES NFR BLD: 12 % (ref 2–12)
NEUTROPHILS NFR BLD: 60 % (ref 43–80)
NEUTS SEG NFR BLD: 1.54 K/UL (ref 1.8–7.3)
PLATELET # BLD AUTO: 89 K/UL (ref 130–450)
PLATELET CONFIRMATION: NORMAL
PMV BLD AUTO: 8.9 FL (ref 7–12)
POTASSIUM SERPL-SCNC: 3.5 MMOL/L (ref 3.5–5)
PROT SERPL-MCNC: 5.5 G/DL (ref 6.4–8.3)
RBC # BLD AUTO: 4.88 M/UL (ref 3.5–5.5)
RBC # BLD: ABNORMAL 10*6/UL
SODIUM SERPL-SCNC: 146 MMOL/L (ref 132–146)
T4 FREE SERPL-MCNC: 1.3 NG/DL (ref 0.9–1.7)
TROPONIN I SERPL HS-MCNC: 7 NG/L (ref 0–9)
TSH SERPL DL<=0.05 MIU/L-ACNC: 1.78 UIU/ML (ref 0.27–4.2)
WBC OTHER # BLD: 2.6 K/UL (ref 4.5–11.5)

## 2024-11-16 PROCEDURE — 84443 ASSAY THYROID STIM HORMONE: CPT

## 2024-11-16 PROCEDURE — 2580000003 HC RX 258: Performed by: PHYSICIAN ASSISTANT

## 2024-11-16 PROCEDURE — 85025 COMPLETE CBC W/AUTO DIFF WBC: CPT

## 2024-11-16 PROCEDURE — 99285 EMERGENCY DEPT VISIT HI MDM: CPT

## 2024-11-16 PROCEDURE — 6360000002 HC RX W HCPCS: Performed by: PHYSICIAN ASSISTANT

## 2024-11-16 PROCEDURE — 93005 ELECTROCARDIOGRAM TRACING: CPT | Performed by: STUDENT IN AN ORGANIZED HEALTH CARE EDUCATION/TRAINING PROGRAM

## 2024-11-16 PROCEDURE — 6370000000 HC RX 637 (ALT 250 FOR IP): Performed by: STUDENT IN AN ORGANIZED HEALTH CARE EDUCATION/TRAINING PROGRAM

## 2024-11-16 PROCEDURE — 6370000000 HC RX 637 (ALT 250 FOR IP): Performed by: PHYSICIAN ASSISTANT

## 2024-11-16 PROCEDURE — 71045 X-RAY EXAM CHEST 1 VIEW: CPT

## 2024-11-16 PROCEDURE — 84439 ASSAY OF FREE THYROXINE: CPT

## 2024-11-16 PROCEDURE — 80053 COMPREHEN METABOLIC PANEL: CPT

## 2024-11-16 PROCEDURE — 82140 ASSAY OF AMMONIA: CPT

## 2024-11-16 PROCEDURE — 70450 CT HEAD/BRAIN W/O DYE: CPT

## 2024-11-16 PROCEDURE — 2060000000 HC ICU INTERMEDIATE R&B

## 2024-11-16 PROCEDURE — 83735 ASSAY OF MAGNESIUM: CPT

## 2024-11-16 PROCEDURE — 84484 ASSAY OF TROPONIN QUANT: CPT

## 2024-11-16 RX ORDER — PROMETHAZINE HYDROCHLORIDE 12.5 MG/1
12.5 TABLET ORAL EVERY 6 HOURS PRN
Status: DISCONTINUED | OUTPATIENT
Start: 2024-11-16 | End: 2024-11-20 | Stop reason: HOSPADM

## 2024-11-16 RX ORDER — POTASSIUM CHLORIDE 750 MG/1
10 CAPSULE, EXTENDED RELEASE ORAL DAILY
Status: ON HOLD | COMMUNITY
End: 2024-11-20 | Stop reason: HOSPADM

## 2024-11-16 RX ORDER — ONDANSETRON 2 MG/ML
4 INJECTION INTRAMUSCULAR; INTRAVENOUS EVERY 6 HOURS PRN
Status: DISCONTINUED | OUTPATIENT
Start: 2024-11-16 | End: 2024-11-20 | Stop reason: HOSPADM

## 2024-11-16 RX ORDER — LEVOTHYROXINE SODIUM 100 UG/1
100 TABLET ORAL
Status: DISCONTINUED | OUTPATIENT
Start: 2024-11-17 | End: 2024-11-20 | Stop reason: HOSPADM

## 2024-11-16 RX ORDER — POTASSIUM CHLORIDE 1500 MG/1
40 TABLET, EXTENDED RELEASE ORAL ONCE
Status: COMPLETED | OUTPATIENT
Start: 2024-11-16 | End: 2024-11-16

## 2024-11-16 RX ORDER — LACTULOSE 10 G/15ML
20 SOLUTION ORAL ONCE
Status: COMPLETED | OUTPATIENT
Start: 2024-11-16 | End: 2024-11-16

## 2024-11-16 RX ORDER — LACTULOSE 10 G/15ML
20 SOLUTION ORAL 3 TIMES DAILY
Status: DISCONTINUED | OUTPATIENT
Start: 2024-11-16 | End: 2024-11-17

## 2024-11-16 RX ORDER — IPRATROPIUM BROMIDE AND ALBUTEROL SULFATE 2.5; .5 MG/3ML; MG/3ML
1 SOLUTION RESPIRATORY (INHALATION) EVERY 4 HOURS PRN
Status: DISCONTINUED | OUTPATIENT
Start: 2024-11-16 | End: 2024-11-20 | Stop reason: HOSPADM

## 2024-11-16 RX ORDER — SODIUM CHLORIDE 9 MG/ML
INJECTION, SOLUTION INTRAVENOUS PRN
Status: DISCONTINUED | OUTPATIENT
Start: 2024-11-16 | End: 2024-11-20 | Stop reason: HOSPADM

## 2024-11-16 RX ORDER — HYDROCHLOROTHIAZIDE 12.5 MG/1
12.5 TABLET ORAL DAILY
Status: DISCONTINUED | OUTPATIENT
Start: 2024-11-17 | End: 2024-11-20 | Stop reason: HOSPADM

## 2024-11-16 RX ORDER — SODIUM CHLORIDE, SODIUM LACTATE, POTASSIUM CHLORIDE, CALCIUM CHLORIDE 600; 310; 30; 20 MG/100ML; MG/100ML; MG/100ML; MG/100ML
INJECTION, SOLUTION INTRAVENOUS CONTINUOUS
Status: ACTIVE | OUTPATIENT
Start: 2024-11-16 | End: 2024-11-17

## 2024-11-16 RX ORDER — SODIUM CHLORIDE 0.9 % (FLUSH) 0.9 %
5-40 SYRINGE (ML) INJECTION EVERY 12 HOURS SCHEDULED
Status: DISCONTINUED | OUTPATIENT
Start: 2024-11-16 | End: 2024-11-20 | Stop reason: HOSPADM

## 2024-11-16 RX ORDER — POLYETHYLENE GLYCOL 3350 17 G/17G
17 POWDER, FOR SOLUTION ORAL DAILY PRN
Status: DISCONTINUED | OUTPATIENT
Start: 2024-11-16 | End: 2024-11-20 | Stop reason: HOSPADM

## 2024-11-16 RX ORDER — FOLIC ACID 1 MG/1
1 TABLET ORAL EVERY MORNING
Status: DISCONTINUED | OUTPATIENT
Start: 2024-11-17 | End: 2024-11-20 | Stop reason: HOSPADM

## 2024-11-16 RX ORDER — LACTULOSE 10 G/15ML
20 SOLUTION ORAL 3 TIMES DAILY
Status: DISCONTINUED | OUTPATIENT
Start: 2024-11-17 | End: 2024-11-17

## 2024-11-16 RX ORDER — ENOXAPARIN SODIUM 100 MG/ML
40 INJECTION SUBCUTANEOUS DAILY
Status: DISCONTINUED | OUTPATIENT
Start: 2024-11-17 | End: 2024-11-20 | Stop reason: HOSPADM

## 2024-11-16 RX ORDER — SODIUM CHLORIDE 0.9 % (FLUSH) 0.9 %
5-40 SYRINGE (ML) INJECTION PRN
Status: DISCONTINUED | OUTPATIENT
Start: 2024-11-16 | End: 2024-11-20 | Stop reason: HOSPADM

## 2024-11-16 RX ORDER — PANTOPRAZOLE SODIUM 40 MG/1
40 TABLET, DELAYED RELEASE ORAL 2 TIMES DAILY
Status: DISCONTINUED | OUTPATIENT
Start: 2024-11-16 | End: 2024-11-20 | Stop reason: HOSPADM

## 2024-11-16 RX ORDER — POTASSIUM CHLORIDE 7.45 MG/ML
10 INJECTION INTRAVENOUS ONCE
Status: COMPLETED | OUTPATIENT
Start: 2024-11-16 | End: 2024-11-16

## 2024-11-16 RX ORDER — AMMONIUM LACTATE 12 G/100G
LOTION TOPICAL PRN
Status: DISCONTINUED | OUTPATIENT
Start: 2024-11-16 | End: 2024-11-20 | Stop reason: HOSPADM

## 2024-11-16 RX ADMIN — SODIUM CHLORIDE, POTASSIUM CHLORIDE, SODIUM LACTATE AND CALCIUM CHLORIDE: 600; 310; 30; 20 INJECTION, SOLUTION INTRAVENOUS at 23:46

## 2024-11-16 RX ADMIN — POTASSIUM CHLORIDE 40 MEQ: 1500 TABLET, EXTENDED RELEASE ORAL at 23:34

## 2024-11-16 RX ADMIN — PANTOPRAZOLE SODIUM 40 MG: 40 TABLET, DELAYED RELEASE ORAL at 23:34

## 2024-11-16 RX ADMIN — LACTULOSE 20 G: 20 SOLUTION ORAL at 19:27

## 2024-11-16 RX ADMIN — POTASSIUM CHLORIDE 10 MEQ: 7.45 INJECTION INTRAVENOUS at 23:49

## 2024-11-16 ASSESSMENT — PAIN SCALES - GENERAL: PAINLEVEL_OUTOF10: 0

## 2024-11-17 PROBLEM — R19.6: Status: ACTIVE | Noted: 2024-11-17

## 2024-11-17 LAB
ALBUMIN SERPL-MCNC: 2.9 G/DL (ref 3.5–5.2)
ALP SERPL-CCNC: 118 U/L (ref 35–104)
ALT SERPL-CCNC: 63 U/L (ref 0–32)
AMMONIA PLAS-SCNC: 151 UMOL/L (ref 11–51)
ANION GAP SERPL CALCULATED.3IONS-SCNC: 7 MMOL/L (ref 7–16)
AST SERPL-CCNC: 74 U/L (ref 0–31)
BACTERIA URNS QL MICRO: ABNORMAL
BASOPHILS # BLD: 0 K/UL (ref 0–0.2)
BASOPHILS NFR BLD: 0 % (ref 0–2)
BILIRUB SERPL-MCNC: 2.9 MG/DL (ref 0–1.2)
BILIRUB UR QL STRIP: NEGATIVE
BUN SERPL-MCNC: 10 MG/DL (ref 6–23)
CA-I BLD-SCNC: 1.11 MMOL/L (ref 1.15–1.33)
CALCIUM SERPL-MCNC: 7.7 MG/DL (ref 8.6–10.2)
CHLORIDE SERPL-SCNC: 116 MMOL/L (ref 98–107)
CLARITY UR: ABNORMAL
CO2 SERPL-SCNC: 26 MMOL/L (ref 22–29)
COLOR UR: ABNORMAL
CREAT SERPL-MCNC: 0.4 MG/DL (ref 0.5–1)
EOSINOPHIL # BLD: 0.1 K/UL (ref 0.05–0.5)
EOSINOPHILS RELATIVE PERCENT: 4 % (ref 0–6)
EPI CELLS #/AREA URNS HPF: ABNORMAL /HPF
ERYTHROCYTE [DISTWIDTH] IN BLOOD BY AUTOMATED COUNT: 20.6 % (ref 11.5–15)
GFR, ESTIMATED: >90 ML/MIN/1.73M2
GLUCOSE SERPL-MCNC: 113 MG/DL (ref 74–99)
GLUCOSE UR STRIP-MCNC: >=1000 MG/DL
HCT VFR BLD AUTO: 41.8 % (ref 34–48)
HGB BLD-MCNC: 13.7 G/DL (ref 11.5–15.5)
HGB UR QL STRIP.AUTO: ABNORMAL
INR PPP: 1.2
KETONES UR STRIP-MCNC: ABNORMAL MG/DL
LEUKOCYTE ESTERASE UR QL STRIP: ABNORMAL
LYMPHOCYTES NFR BLD: 0.35 K/UL (ref 1.5–4)
LYMPHOCYTES RELATIVE PERCENT: 12 % (ref 20–42)
MCH RBC QN AUTO: 29 PG (ref 26–35)
MCHC RBC AUTO-ENTMCNC: 32.8 G/DL (ref 32–34.5)
MCV RBC AUTO: 88.4 FL (ref 80–99.9)
MONOCYTES NFR BLD: 0.1 K/UL (ref 0.1–0.95)
MONOCYTES NFR BLD: 4 % (ref 2–12)
NEUTROPHILS NFR BLD: 81 % (ref 43–80)
NEUTS SEG NFR BLD: 2.25 K/UL (ref 1.8–7.3)
NITRITE UR QL STRIP: POSITIVE
PARTIAL THROMBOPLASTIN TIME: 30.6 SEC (ref 24.5–35.1)
PH UR STRIP: 6.5 [PH] (ref 5–9)
PHOSPHATE SERPL-MCNC: 3 MG/DL (ref 2.5–4.5)
PLATELET # BLD AUTO: 87 K/UL (ref 130–450)
PLATELET CONFIRMATION: NORMAL
PMV BLD AUTO: 8.6 FL (ref 7–12)
POTASSIUM SERPL-SCNC: 3.7 MMOL/L (ref 3.5–5)
PROT SERPL-MCNC: 5.1 G/DL (ref 6.4–8.3)
PROT UR STRIP-MCNC: NEGATIVE MG/DL
PROTHROMBIN TIME: 13.6 SEC (ref 9.3–12.4)
RBC # BLD AUTO: 4.73 M/UL (ref 3.5–5.5)
RBC # BLD: ABNORMAL 10*6/UL
RBC #/AREA URNS HPF: ABNORMAL /HPF
SODIUM SERPL-SCNC: 149 MMOL/L (ref 132–146)
SP GR UR STRIP: 1.01 (ref 1–1.03)
UROBILINOGEN UR STRIP-ACNC: 0.2 EU/DL (ref 0–1)
WBC #/AREA URNS HPF: ABNORMAL /HPF
WBC OTHER # BLD: 2.8 K/UL (ref 4.5–11.5)

## 2024-11-17 PROCEDURE — 82330 ASSAY OF CALCIUM: CPT

## 2024-11-17 PROCEDURE — 81001 URINALYSIS AUTO W/SCOPE: CPT

## 2024-11-17 PROCEDURE — 36415 COLL VENOUS BLD VENIPUNCTURE: CPT

## 2024-11-17 PROCEDURE — 6370000000 HC RX 637 (ALT 250 FOR IP): Performed by: STUDENT IN AN ORGANIZED HEALTH CARE EDUCATION/TRAINING PROGRAM

## 2024-11-17 PROCEDURE — 80053 COMPREHEN METABOLIC PANEL: CPT

## 2024-11-17 PROCEDURE — 6370000000 HC RX 637 (ALT 250 FOR IP): Performed by: PHYSICIAN ASSISTANT

## 2024-11-17 PROCEDURE — 2700000000 HC OXYGEN THERAPY PER DAY

## 2024-11-17 PROCEDURE — 1200000000 HC SEMI PRIVATE

## 2024-11-17 PROCEDURE — 2580000003 HC RX 258: Performed by: PHYSICIAN ASSISTANT

## 2024-11-17 PROCEDURE — 84100 ASSAY OF PHOSPHORUS: CPT

## 2024-11-17 PROCEDURE — 85610 PROTHROMBIN TIME: CPT

## 2024-11-17 PROCEDURE — 85025 COMPLETE CBC W/AUTO DIFF WBC: CPT

## 2024-11-17 PROCEDURE — 85730 THROMBOPLASTIN TIME PARTIAL: CPT

## 2024-11-17 PROCEDURE — 6360000002 HC RX W HCPCS: Performed by: PHYSICIAN ASSISTANT

## 2024-11-17 PROCEDURE — 82140 ASSAY OF AMMONIA: CPT

## 2024-11-17 RX ORDER — LACTULOSE 10 G/15ML
20 SOLUTION ORAL
Status: DISCONTINUED | OUTPATIENT
Start: 2024-11-17 | End: 2024-11-20 | Stop reason: HOSPADM

## 2024-11-17 RX ORDER — TRAMADOL HYDROCHLORIDE 50 MG/1
25 TABLET ORAL EVERY 6 HOURS PRN
Status: DISCONTINUED | OUTPATIENT
Start: 2024-11-17 | End: 2024-11-20 | Stop reason: HOSPADM

## 2024-11-17 RX ADMIN — HYDROCHLOROTHIAZIDE 12.5 MG: 12.5 TABLET ORAL at 11:12

## 2024-11-17 RX ADMIN — LACTULOSE 20 G: 20 SOLUTION ORAL at 15:38

## 2024-11-17 RX ADMIN — LACTULOSE 20 G: 20 SOLUTION ORAL at 17:20

## 2024-11-17 RX ADMIN — SODIUM CHLORIDE, POTASSIUM CHLORIDE, SODIUM LACTATE AND CALCIUM CHLORIDE: 600; 310; 30; 20 INJECTION, SOLUTION INTRAVENOUS at 15:47

## 2024-11-17 RX ADMIN — ENOXAPARIN SODIUM 40 MG: 100 INJECTION SUBCUTANEOUS at 11:11

## 2024-11-17 RX ADMIN — PANTOPRAZOLE SODIUM 40 MG: 40 TABLET, DELAYED RELEASE ORAL at 20:25

## 2024-11-17 RX ADMIN — RIFAXIMIN 400 MG: 200 TABLET ORAL at 17:20

## 2024-11-17 RX ADMIN — LACTULOSE 20 G: 20 SOLUTION ORAL at 20:25

## 2024-11-17 RX ADMIN — LACTULOSE 20 G: 20 SOLUTION ORAL at 21:37

## 2024-11-17 RX ADMIN — LACTULOSE 20 G: 20 SOLUTION ORAL at 04:26

## 2024-11-17 RX ADMIN — LACTULOSE 20 G: 20 SOLUTION ORAL at 11:11

## 2024-11-17 RX ADMIN — SODIUM CHLORIDE, PRESERVATIVE FREE 10 ML: 5 INJECTION INTRAVENOUS at 20:25

## 2024-11-17 RX ADMIN — LEVOTHYROXINE SODIUM 100 MCG: 0.1 TABLET ORAL at 05:59

## 2024-11-17 RX ADMIN — FOLIC ACID 1 MG: 1 TABLET ORAL at 11:11

## 2024-11-17 RX ADMIN — PANTOPRAZOLE SODIUM 40 MG: 40 TABLET, DELAYED RELEASE ORAL at 11:12

## 2024-11-17 RX ADMIN — SODIUM CHLORIDE, PRESERVATIVE FREE 10 ML: 5 INJECTION INTRAVENOUS at 11:13

## 2024-11-17 NOTE — H&P
Hospital Medicine History & Physical      PCP: Elia Daniel MD    Date of Admission: 2024    Date of Service: .2024    Chief Complaint:   CHANGE IN MENTAL STATUS      History Of Present Illness:     63 y.o. female presented with  CHANGE IN MENTAL STATUS.  SHE HAS A KNOWN HISTORY OF CIRRHOSIS OF THE LIVER.  SHE IS S/O TIPS AND WAS RECENTLY DISCHARGED  DUE TO ELEVATED AMMONIA.   SHE WAS ADMITTED WITH A [facility] .     Past Medical History:          Diagnosis Date    Asthma     stable, is mild    Back pain     Diabetes mellitus (HCC)     Hypothyroidism     Thyroid disease        Past Surgical History:          Procedure Laterality Date    CARPAL TUNNEL RELEASE      bilateral     SECTION      CHOLECYSTECTOMY      COLONOSCOPY  2012    COLONOSCOPY N/A 10/10/2024    COLONOSCOPY DIAGNOSTIC performed by Augie Montes De Oca MD at [facility] ENDOSCOPY    HYSTERECTOMY (CERVIX STATUS UNKNOWN)      IR EMBOLIZATION VENOUS  2024    IR EMBOLIZATION VENOUS 2024 JENNIFER Pack MD [facility]    IR TIPS INSERTION  2024    IR TIPS INSERTION 2024 JENNIFER Pack MD [facility]    LIVER BIOPSY  2017    NOSE SURGERY N/A 2020    EXCISIONAL BIOPSY OF RIGHT NARES (PATHOLOGY NOTIFIED) performed by Bandar Solis Jr., MD at [facility] OR    OVARY REMOVAL      TONSILLECTOMY      UPPER GASTROINTESTINAL ENDOSCOPY  2012    UPPER GASTROINTESTINAL ENDOSCOPY  2024    ESOPHAGOGASTRODUODENOSCOPY CONTROL HEMORRHAGE performed by Noé Montilla MD at [facility] ENDOSCOPY    UPPER GASTROINTESTINAL ENDOSCOPY  2024    ESOPHAGOGASTRODUODENOSCOPY SUBMUCOSAL INJECTION performed by Augie Montes De Oca MD at [facility] ENDOSCOPY    UPPER GASTROINTESTINAL ENDOSCOPY N/A 10/10/2024    ESOPHAGOGASTRODUODENOSCOPY DIAGNOSTIC ONLY performed by Augie Montes De Oca

## 2024-11-17 NOTE — PLAN OF CARE
Problem: Chronic Conditions and Co-morbidities  Goal: Patient's chronic conditions and co-morbidity symptoms are monitored and maintained or improved  11/17/2024 0421 by Lara Arias RN  Outcome: Progressing  11/16/2024 2238 by Patricia Fernando RN  Outcome: Progressing     Problem: Discharge Planning  Goal: Discharge to home or other facility with appropriate resources  11/17/2024 0421 by Lara Arias RN  Outcome: Progressing  11/16/2024 2238 by Patricia Fernando RN  Outcome: Progressing     Problem: Safety - Adult  Goal: Free from fall injury  11/17/2024 0421 by Lara Arias RN  Outcome: Progressing  11/16/2024 2238 by Patricia Fernando RN  Outcome: Progressing     Problem: Risk for Elopement  Goal: Patient will not exit the unit/facility without proper excort  11/17/2024 0421 by Lara Arias RN  Outcome: Progressing  Flowsheets (Taken 11/17/2024 0349)  Nursing Interventions for Elopement Risk: Assist with personal care needs such as toileting, eating, dressing, as needed to reduce the risk of wandering  11/16/2024 2238 by Patricia Fernando RN  Outcome: Progressing     Problem: Skin/Tissue Integrity  Goal: Absence of new skin breakdown  Description: 1.  Monitor for areas of redness and/or skin breakdown  2.  Assess vascular access sites hourly  3.  Every 4-6 hours minimum:  Change oxygen saturation probe site  4.  Every 4-6 hours:  If on nasal continuous positive airway pressure, respiratory therapy assess nares and determine need for appliance change or resting period.  Outcome: Progressing

## 2024-11-17 NOTE — PLAN OF CARE
Problem: Chronic Conditions and Co-morbidities  Goal: Patient's chronic conditions and co-morbidity symptoms are monitored and maintained or improved  11/17/2024 1226 by Raya Chang, RN  Outcome: Progressing  Flowsheets (Taken 11/17/2024 0800)  Care Plan - Patient's Chronic Conditions and Co-Morbidity Symptoms are Monitored and Maintained or Improved:   Monitor and assess patient's chronic conditions and comorbid symptoms for stability, deterioration, or improvement   Collaborate with multidisciplinary team to address chronic and comorbid conditions and prevent exacerbation or deterioration   Update acute care plan with appropriate goals if chronic or comorbid symptoms are exacerbated and prevent overall improvement and discharge     Problem: Discharge Planning  Goal: Discharge to home or other facility with appropriate resources  11/17/2024 1226 by Raya Chang, RN  Outcome: Progressing  Flowsheets (Taken 11/17/2024 0800)  Discharge to home or other facility with appropriate resources:   Identify barriers to discharge with patient and caregiver   Arrange for needed discharge resources and transportation as appropriate   Identify discharge learning needs (meds, wound care, etc)

## 2024-11-17 NOTE — PLAN OF CARE
Problem: Chronic Conditions and Co-morbidities  Goal: Patient's chronic conditions and co-morbidity symptoms are monitored and maintained or improved  Outcome: Progressing     Problem: Discharge Planning  Goal: Discharge to home or other facility with appropriate resources  Outcome: Progressing     Problem: Safety - Adult  Goal: Free from fall injury  Outcome: Progressing     Problem: Risk for Elopement  Goal: Patient will not exit the unit/facility without proper excort  Outcome: Progressing

## 2024-11-18 LAB
ALBUMIN SERPL-MCNC: 3.1 G/DL (ref 3.5–5.2)
ALP SERPL-CCNC: 127 U/L (ref 35–104)
ALT SERPL-CCNC: 74 U/L (ref 0–32)
AMMONIA PLAS-SCNC: 91 UMOL/L (ref 11–51)
AST SERPL-CCNC: 101 U/L (ref 0–31)
BASOPHILS # BLD: 0 K/UL (ref 0–0.2)
BASOPHILS NFR BLD: 0 % (ref 0–2)
BILIRUB DIRECT SERPL-MCNC: 0.8 MG/DL (ref 0–0.3)
BILIRUB INDIRECT SERPL-MCNC: 3.4 MG/DL (ref 0–1)
BILIRUB SERPL-MCNC: 4.2 MG/DL (ref 0–1.2)
EOSINOPHIL # BLD: 0.05 K/UL (ref 0.05–0.5)
EOSINOPHILS RELATIVE PERCENT: 2 % (ref 0–6)
ERYTHROCYTE [DISTWIDTH] IN BLOOD BY AUTOMATED COUNT: 20.6 % (ref 11.5–15)
GLUCOSE BLD-MCNC: 137 MG/DL (ref 74–99)
HCT VFR BLD AUTO: 43.9 % (ref 34–48)
HGB BLD-MCNC: 14.2 G/DL (ref 11.5–15.5)
LYMPHOCYTES NFR BLD: 0.49 K/UL (ref 1.5–4)
LYMPHOCYTES RELATIVE PERCENT: 17 % (ref 20–42)
MCH RBC QN AUTO: 28.6 PG (ref 26–35)
MCHC RBC AUTO-ENTMCNC: 32.3 G/DL (ref 32–34.5)
MCV RBC AUTO: 88.5 FL (ref 80–99.9)
MONOCYTES NFR BLD: 0.37 K/UL (ref 0.1–0.95)
MONOCYTES NFR BLD: 13 % (ref 2–12)
NEUTROPHILS NFR BLD: 68 % (ref 43–80)
NEUTS SEG NFR BLD: 1.9 K/UL (ref 1.8–7.3)
PLATELET # BLD AUTO: 92 K/UL (ref 130–450)
PLATELET CONFIRMATION: NORMAL
PMV BLD AUTO: 8.8 FL (ref 7–12)
PROT SERPL-MCNC: 5.2 G/DL (ref 6.4–8.3)
RBC # BLD AUTO: 4.96 M/UL (ref 3.5–5.5)
RBC # BLD: ABNORMAL 10*6/UL
WBC OTHER # BLD: 2.8 K/UL (ref 4.5–11.5)

## 2024-11-18 PROCEDURE — 97535 SELF CARE MNGMENT TRAINING: CPT

## 2024-11-18 PROCEDURE — 85025 COMPLETE CBC W/AUTO DIFF WBC: CPT

## 2024-11-18 PROCEDURE — 99222 1ST HOSP IP/OBS MODERATE 55: CPT

## 2024-11-18 PROCEDURE — 82140 ASSAY OF AMMONIA: CPT

## 2024-11-18 PROCEDURE — 80076 HEPATIC FUNCTION PANEL: CPT

## 2024-11-18 PROCEDURE — 6370000000 HC RX 637 (ALT 250 FOR IP): Performed by: STUDENT IN AN ORGANIZED HEALTH CARE EDUCATION/TRAINING PROGRAM

## 2024-11-18 PROCEDURE — 97530 THERAPEUTIC ACTIVITIES: CPT

## 2024-11-18 PROCEDURE — 6360000002 HC RX W HCPCS: Performed by: PHYSICIAN ASSISTANT

## 2024-11-18 PROCEDURE — 36415 COLL VENOUS BLD VENIPUNCTURE: CPT

## 2024-11-18 PROCEDURE — 97161 PT EVAL LOW COMPLEX 20 MIN: CPT

## 2024-11-18 PROCEDURE — 1200000000 HC SEMI PRIVATE

## 2024-11-18 PROCEDURE — 6370000000 HC RX 637 (ALT 250 FOR IP): Performed by: PHYSICIAN ASSISTANT

## 2024-11-18 PROCEDURE — 82962 GLUCOSE BLOOD TEST: CPT

## 2024-11-18 PROCEDURE — 97165 OT EVAL LOW COMPLEX 30 MIN: CPT

## 2024-11-18 PROCEDURE — 2580000003 HC RX 258: Performed by: PHYSICIAN ASSISTANT

## 2024-11-18 RX ADMIN — PANTOPRAZOLE SODIUM 40 MG: 40 TABLET, DELAYED RELEASE ORAL at 20:29

## 2024-11-18 RX ADMIN — LACTULOSE 20 G: 20 SOLUTION ORAL at 14:42

## 2024-11-18 RX ADMIN — LACTULOSE 20 G: 20 SOLUTION ORAL at 16:43

## 2024-11-18 RX ADMIN — PANTOPRAZOLE SODIUM 40 MG: 40 TABLET, DELAYED RELEASE ORAL at 08:31

## 2024-11-18 RX ADMIN — SODIUM CHLORIDE, PRESERVATIVE FREE 10 ML: 5 INJECTION INTRAVENOUS at 20:30

## 2024-11-18 RX ADMIN — LACTULOSE 20 G: 20 SOLUTION ORAL at 04:04

## 2024-11-18 RX ADMIN — LACTULOSE 20 G: 20 SOLUTION ORAL at 06:01

## 2024-11-18 RX ADMIN — LACTULOSE 20 G: 20 SOLUTION ORAL at 00:06

## 2024-11-18 RX ADMIN — LACTULOSE 20 G: 20 SOLUTION ORAL at 18:01

## 2024-11-18 RX ADMIN — FOLIC ACID 1 MG: 1 TABLET ORAL at 08:30

## 2024-11-18 RX ADMIN — LEVOTHYROXINE SODIUM 100 MCG: 0.1 TABLET ORAL at 06:01

## 2024-11-18 RX ADMIN — LACTULOSE 20 G: 20 SOLUTION ORAL at 22:14

## 2024-11-18 RX ADMIN — RIFAXIMIN 400 MG: 200 TABLET ORAL at 01:10

## 2024-11-18 RX ADMIN — LACTULOSE 20 G: 20 SOLUTION ORAL at 20:29

## 2024-11-18 RX ADMIN — HYDROCHLOROTHIAZIDE 12.5 MG: 12.5 TABLET ORAL at 08:30

## 2024-11-18 RX ADMIN — SODIUM CHLORIDE, PRESERVATIVE FREE 10 ML: 5 INJECTION INTRAVENOUS at 08:31

## 2024-11-18 RX ADMIN — LACTULOSE 20 G: 20 SOLUTION ORAL at 02:11

## 2024-11-18 RX ADMIN — LACTULOSE 20 G: 20 SOLUTION ORAL at 12:12

## 2024-11-18 RX ADMIN — ENOXAPARIN SODIUM 40 MG: 100 INJECTION SUBCUTANEOUS at 08:30

## 2024-11-18 RX ADMIN — RIFAXIMIN 400 MG: 200 TABLET ORAL at 18:01

## 2024-11-18 RX ADMIN — LACTULOSE 20 G: 20 SOLUTION ORAL at 08:30

## 2024-11-18 RX ADMIN — RIFAXIMIN 400 MG: 200 TABLET ORAL at 08:31

## 2024-11-18 NOTE — CARE COORDINATION
Patient presented to the ED from home due to altered mental status; admitted for hepatic encephalopathy. Met with patient and her sister, Ondina at bedside for transition of care planning; patient alert and oriented x4; answered all orientation questions appropriately. Patient reports she lives in a 2-story home, is currently independent in the room, ambulates without a device; has a walker at home. Goes to outpatient therapy, uses [facility] and PCP is Dr. Elia Daniel. Discussed need for home care services, patient states she had services with [facility] and would like services set up again. Patient plans to return home, no needs and her son will transport home when discharged. Patient on room air, ammonia 91, liver enzymes elevated; GI consulted. Referral made to [facility]; pending acceptance.    Case Management Assessment  Initial Evaluation    Date/Time of Evaluation: 11/18/2024 3:40 PM  Assessment Completed by: MATIAS Ramos    If patient is discharged prior to next notation, then this note serves as note for discharge by case management.    Patient Name: Erna Stoner                   YOB: 1960  Diagnosis: Hepatic encephalopathy (HCC) [K76.82]                   Date / Time: 11/16/2024  4:32 PM    Patient Admission Status: Inpatient   Readmission Risk (Low < 19, Mod (19-27), High > 27): Readmission Risk Score: 25.7    Current PCP: Elia Daniel MD  PCP verified by ? Yes    Chart Reviewed: Yes      History Provided by: Patient, Medical Record, Child/Family  Patient Orientation: Alert and Oriented    Patient Cognition: Alert    Hospitalization in the last 30 days (Readmission):  Yes    If yes, Readmission Assessment in  Navigator will be completed.    Advance Directives:      Code Status: Full Code   Patient's Primary Decision Maker is: Legal Next of Kin    Primary Decision Maker: Amanuel Stoner - Child - [phone]    Discharge Planning:    Patient

## 2024-11-18 NOTE — CONSULTS
opportunity to participate in the care of Erna Stoner.     SHANE Andino CNP  Palliative Medicine     SUBJECTIVE:     Details of Conversation:    Chart reviewed.  Patient seen at the bedside, she was awake, alert to self, disoriented to situation, was not able to tell me, recent ammonia levels were elevated of while she was admitted to the hospital, she was found with no capacity for medical decision.  Case discussed with Dr. Davila.  Attempt to call son Amanuel over the phone, left voicemail to call us back.    Prognosis: Guarded    OBJECTIVE:     /70   Pulse 93   Temp 98.7 °F (37.1 °C) (Oral)   Resp 21   Ht 1.575 m (5' 2\")   Wt 68.3 kg (150 lb 9.2 oz)   SpO2 94%   BMI 27.54 kg/m²     Physical Examination:  Gen: Awake, alert to self,  Lungs: respirations easy ,   Heart: regular rate   Abdomen:  soft, non-tender  Extremities: no clubbing, cyanosis or edema  Skin: warm, dry without rashes, lesions, bruising  Neuro: awake, alert,    Objective data reviewed: labs, images, records, medication use, vitals, and chart    Time/Communication  Greater than 50% of time spent, total 55 minutes in counseling and coordination of care at the bedside regarding goals of care.    Thank you for allowing Palliative Medicine to participate in the care of Erna Stoner.    Note: This report was completed using [employer] voiced recognition software.  Every effort has been made to ensure accuracy; however, inadvertent computerized transcription errors may be present.   
appears patent.     CT Result (most recent):  CT HEAD WO CONTRAST 11/16/2024    Narrative  EXAMINATION:  CT OF THE HEAD WITHOUT CONTRAST  11/16/2024 7:08 pm    TECHNIQUE:  CT of the head was performed without the administration of intravenous  contrast. Automated exposure control, iterative reconstruction, and/or weight  based adjustment of the mA/kV was utilized to reduce the radiation dose to as  low as reasonably achievable.    COMPARISON:  None.    HISTORY:  ORDERING SYSTEM PROVIDED HISTORY: ams  TECHNOLOGIST PROVIDED HISTORY:  Reason for exam:->ams  Has a \"code stroke\" or \"stroke alert\" been called?->No  Decision Support Exception - unselect if not a suspected or confirmed  emergency medical condition->Emergency Medical Condition (MA)  What reading provider will be dictating this exam?->CRC    FINDINGS:  BRAIN/VENTRICLES: There is no acute intracranial hemorrhage, mass effect or  midline shift.  No abnormal extra-axial fluid collection.  The gray-white  differentiation is maintained without evidence of an acute infarct.  There is  no evidence of hydrocephalus.   Stable 2.5 cm cyst-like lesion involving the  right basal ganglia.    ORBITS: The visualized portion of the orbits demonstrate no acute abnormality.    SINUSES: The visualized paranasal sinuses and mastoid air cells demonstrate  no acute abnormality.    SOFT TISSUES/SKULL:  No acute abnormality of the visualized skull or soft  tissues.    Impression  1. No acute intracranial abnormality.  2. Stable 2.5 cm cyst-like lesion involving the right basal ganglia.  Follow-up MRI with and without contrast is recommended for further evaluation.     MRI Result (most recent):  MRI LUMBAR SPINE WO CONTRAST 08/08/2024    Narrative  EXAMINATION:  MRI OF THE LUMBAR SPINE WITHOUT CONTRAST, 8/8/2024 3:21 pm    TECHNIQUE:  Multiplanar multisequence MRI of the lumbar spine was performed without the  administration of intravenous

## 2024-11-18 NOTE — PLAN OF CARE
Problem: Chronic Conditions and Co-morbidities  Goal: Patient's chronic conditions and co-morbidity symptoms are monitored and maintained or improved  Outcome: Progressing     Problem: Discharge Planning  Goal: Discharge to home or other facility with appropriate resources  Outcome: Progressing     Problem: Safety - Adult  Goal: Free from fall injury  Outcome: Progressing     Problem: Risk for Elopement  Goal: Patient will not exit the unit/facility without proper excort  Outcome: Progressing     Problem: Skin/Tissue Integrity  Goal: Absence of new skin breakdown  Description: 1.  Monitor for areas of redness and/or skin breakdown  2.  Assess vascular access sites hourly  3.  Every 4-6 hours minimum:  Change oxygen saturation probe site  4.  Every 4-6 hours:  If on nasal continuous positive airway pressure, respiratory therapy assess nares and determine need for appliance change or resting period.  Outcome: Progressing

## 2024-11-18 NOTE — ACP (ADVANCE CARE PLANNING)
Advance Care Planning   Healthcare Decision Maker:    Primary Decision Maker: Amanuel Stoner - Child - [phone]    Today we documented Decision Maker(s) consistent with Legal Next of Kin hierarchy.    Electronically signed by MATIAS Ramos on 11/18/2024 at 3:46 PM

## 2024-11-18 NOTE — ED PROVIDER NOTES
[facility]  EMERGENCY DEPARTMENT ENCOUNTER      Pt Name: Erna Stoner  MRN: [id number]  Birthdate 1960  Date of evaluation: 2024  Provider: Marino Saha DO  PCP: Elia Daniel MD    CHIEF COMPLAINT       Chief Complaint   Patient presents with    Altered Mental Status     Patient altered today, per family when ammonia levels rise patient acts this way       HISTORY OF PRESENT ILLNESS: 1 or more Elements   History From: Patient  Limitations to history : None    Erna Stoner is a 63 y.o. female Past medical history of hypothyroidism, diabetes as well as asthma.  Patient presents with chief complaint of altered mental status.  Patient has been more confused and altered since earlier today [facility]  Symptoms have been moderate in severity constant onset.  Patient does have history of hepatic encephalopathy and cirrhosis has had multiple similar episodes when her ammonia level becomes elevated.  On arrival patient is awake and alert she is able to tell me where she is at she is able to answer some simple questions she denies any specific complaints.  Patient denies any fevers, chills, chest pain, cough, Jack pain, constipation or diarrhea    Nursing Notes were all reviewed and agreed with or any disagreements were addressed in the HPI.    REVIEW OF SYSTEMS :    Positives and Pertinent negatives as per HPI.     PAST MEDICAL HISTORY/Chronic Conditions Affecting Care    has a past medical history of Asthma, Back pain, Diabetes mellitus (HCC), Hypothyroidism, and Thyroid disease.     SURGICAL HISTORY     Past Surgical History:   Procedure Laterality Date    CARPAL TUNNEL RELEASE      bilateral     SECTION      CHOLECYSTECTOMY  1998    COLONOSCOPY  2012    COLONOSCOPY N/A 10/10/2024    COLONOSCOPY DIAGNOSTIC performed by Augie Montes De Oca MD at [facility] ENDOSCOPY    HYSTERECTOMY (CERVIX STATUS UNKNOWN)      IR EMBOLIZATION VENOUS  2024    IR

## 2024-11-18 NOTE — PLAN OF CARE
Problem: Chronic Conditions and Co-morbidities  Goal: Patient's chronic conditions and co-morbidity symptoms are monitored and maintained or improved  11/17/2024 2219 by Lucien Hooker RN  Outcome: Progressing  Flowsheets (Taken 11/17/2024 1936)  Care Plan - Patient's Chronic Conditions and Co-Morbidity Symptoms are Monitored and Maintained or Improved:   Monitor and assess patient's chronic conditions and comorbid symptoms for stability, deterioration, or improvement   Collaborate with multidisciplinary team to address chronic and comorbid conditions and prevent exacerbation or deterioration   Update acute care plan with appropriate goals if chronic or comorbid symptoms are exacerbated and prevent overall improvement and discharge  11/17/2024 1226 by Raya Chang RN  Outcome: Progressing  Flowsheets (Taken 11/17/2024 0800)  Care Plan - Patient's Chronic Conditions and Co-Morbidity Symptoms are Monitored and Maintained or Improved:   Monitor and assess patient's chronic conditions and comorbid symptoms for stability, deterioration, or improvement   Collaborate with multidisciplinary team to address chronic and comorbid conditions and prevent exacerbation or deterioration   Update acute care plan with appropriate goals if chronic or comorbid symptoms are exacerbated and prevent overall improvement and discharge     Problem: Discharge Planning  Goal: Discharge to home or other facility with appropriate resources  11/17/2024 2219 by Lucien Hooker, RN  Outcome: Progressing  Flowsheets (Taken 11/17/2024 1936)  Discharge to home or other facility with appropriate resources:   Identify barriers to discharge with patient and caregiver   Arrange for needed discharge resources and transportation as appropriate   Identify discharge learning needs (meds, wound care, etc)   Arrange for interpreters to assist at discharge as needed   Refer to discharge planning if patient needs post-hospital services based on physician

## 2024-11-19 ENCOUNTER — HOSPITAL ENCOUNTER (OUTPATIENT)
Dept: SPEECH THERAPY | Age: 64
Setting detail: THERAPIES SERIES
Discharge: HOME | End: 2024-11-19

## 2024-11-19 ENCOUNTER — HOSPITAL ENCOUNTER (OUTPATIENT)
Dept: OCCUPATIONAL THERAPY | Age: 64
Setting detail: THERAPIES SERIES
Discharge: HOME | End: 2024-11-19

## 2024-11-19 LAB
ALBUMIN SERPL-MCNC: 3 G/DL (ref 3.5–5.2)
ALP SERPL-CCNC: 133 U/L (ref 35–104)
ALT SERPL-CCNC: 66 U/L (ref 0–32)
AMMONIA PLAS-SCNC: 60 UMOL/L (ref 11–51)
ANION GAP SERPL CALCULATED.3IONS-SCNC: 13 MMOL/L (ref 7–16)
AST SERPL-CCNC: 71 U/L (ref 0–31)
BASOPHILS # BLD: 0.04 K/UL (ref 0–0.2)
BASOPHILS NFR BLD: 1 % (ref 0–2)
BILIRUB DIRECT SERPL-MCNC: 0.7 MG/DL (ref 0–0.3)
BILIRUB INDIRECT SERPL-MCNC: 2.9 MG/DL (ref 0–1)
BILIRUB SERPL-MCNC: 3.6 MG/DL (ref 0–1.2)
BUN SERPL-MCNC: 6 MG/DL (ref 6–23)
CALCIUM SERPL-MCNC: 7.7 MG/DL (ref 8.6–10.2)
CHLORIDE SERPL-SCNC: 110 MMOL/L (ref 98–107)
CO2 SERPL-SCNC: 20 MMOL/L (ref 22–29)
CREAT SERPL-MCNC: 0.4 MG/DL (ref 0.5–1)
EKG ATRIAL RATE: 92 BPM
EKG P AXIS: 49 DEGREES
EKG P-R INTERVAL: 132 MS
EKG Q-T INTERVAL: 390 MS
EKG QRS DURATION: 78 MS
EKG QTC CALCULATION (BAZETT): 482 MS
EKG R AXIS: -19 DEGREES
EKG T AXIS: 27 DEGREES
EKG VENTRICULAR RATE: 92 BPM
EOSINOPHIL # BLD: 0.12 K/UL (ref 0.05–0.5)
EOSINOPHILS RELATIVE PERCENT: 4 % (ref 0–6)
ERYTHROCYTE [DISTWIDTH] IN BLOOD BY AUTOMATED COUNT: 19.9 % (ref 11.5–15)
GFR, ESTIMATED: >90 ML/MIN/1.73M2
GLUCOSE BLD-MCNC: 160 MG/DL (ref 74–99)
GLUCOSE SERPL-MCNC: 160 MG/DL (ref 74–99)
HCT VFR BLD AUTO: 38.4 % (ref 34–48)
HGB BLD-MCNC: 12.8 G/DL (ref 11.5–15.5)
IMM GRANULOCYTES # BLD AUTO: 0.03 K/UL (ref 0–0.58)
IMM GRANULOCYTES NFR BLD: 1 % (ref 0–5)
LYMPHOCYTES NFR BLD: 0.71 K/UL (ref 1.5–4)
LYMPHOCYTES RELATIVE PERCENT: 26 % (ref 20–42)
MCH RBC QN AUTO: 28.8 PG (ref 26–35)
MCHC RBC AUTO-ENTMCNC: 33.3 G/DL (ref 32–34.5)
MCV RBC AUTO: 86.3 FL (ref 80–99.9)
MONOCYTES NFR BLD: 0.42 K/UL (ref 0.1–0.95)
MONOCYTES NFR BLD: 15 % (ref 2–12)
NEUTROPHILS NFR BLD: 53 % (ref 43–80)
NEUTS SEG NFR BLD: 1.46 K/UL (ref 1.8–7.3)
PLATELET # BLD AUTO: 79 K/UL (ref 130–450)
PLATELET CONFIRMATION: NORMAL
PMV BLD AUTO: 9 FL (ref 7–12)
POTASSIUM SERPL-SCNC: 2.8 MMOL/L (ref 3.5–5)
PROT SERPL-MCNC: 4.8 G/DL (ref 6.4–8.3)
RBC # BLD AUTO: 4.45 M/UL (ref 3.5–5.5)
SODIUM SERPL-SCNC: 143 MMOL/L (ref 132–146)
WBC OTHER # BLD: 2.8 K/UL (ref 4.5–11.5)

## 2024-11-19 PROCEDURE — 6370000000 HC RX 637 (ALT 250 FOR IP): Performed by: STUDENT IN AN ORGANIZED HEALTH CARE EDUCATION/TRAINING PROGRAM

## 2024-11-19 PROCEDURE — 6360000002 HC RX W HCPCS: Performed by: NURSE PRACTITIONER

## 2024-11-19 PROCEDURE — 2580000003 HC RX 258: Performed by: PHYSICIAN ASSISTANT

## 2024-11-19 PROCEDURE — 82140 ASSAY OF AMMONIA: CPT

## 2024-11-19 PROCEDURE — 99232 SBSQ HOSP IP/OBS MODERATE 35: CPT | Performed by: NURSE PRACTITIONER

## 2024-11-19 PROCEDURE — 82947 ASSAY GLUCOSE BLOOD QUANT: CPT

## 2024-11-19 PROCEDURE — 6360000002 HC RX W HCPCS: Performed by: PHYSICIAN ASSISTANT

## 2024-11-19 PROCEDURE — 84132 ASSAY OF SERUM POTASSIUM: CPT

## 2024-11-19 PROCEDURE — 36415 COLL VENOUS BLD VENIPUNCTURE: CPT

## 2024-11-19 PROCEDURE — 82248 BILIRUBIN DIRECT: CPT

## 2024-11-19 PROCEDURE — 1200000000 HC SEMI PRIVATE

## 2024-11-19 PROCEDURE — 6370000000 HC RX 637 (ALT 250 FOR IP): Performed by: PHYSICIAN ASSISTANT

## 2024-11-19 PROCEDURE — 99232 SBSQ HOSP IP/OBS MODERATE 35: CPT | Performed by: EMERGENCY MEDICINE

## 2024-11-19 PROCEDURE — 80053 COMPREHEN METABOLIC PANEL: CPT

## 2024-11-19 PROCEDURE — 85025 COMPLETE CBC W/AUTO DIFF WBC: CPT

## 2024-11-19 RX ORDER — POTASSIUM CHLORIDE 7.45 MG/ML
10 INJECTION INTRAVENOUS PRN
Status: DISCONTINUED | OUTPATIENT
Start: 2024-11-19 | End: 2024-11-20 | Stop reason: HOSPADM

## 2024-11-19 RX ORDER — POTASSIUM CHLORIDE 1500 MG/1
40 TABLET, EXTENDED RELEASE ORAL PRN
Status: DISCONTINUED | OUTPATIENT
Start: 2024-11-19 | End: 2024-11-20 | Stop reason: HOSPADM

## 2024-11-19 RX ADMIN — RIFAXIMIN 400 MG: 200 TABLET ORAL at 08:28

## 2024-11-19 RX ADMIN — SODIUM CHLORIDE, PRESERVATIVE FREE 10 ML: 5 INJECTION INTRAVENOUS at 08:29

## 2024-11-19 RX ADMIN — POTASSIUM CHLORIDE 10 MEQ: 7.46 INJECTION, SOLUTION INTRAVENOUS at 16:24

## 2024-11-19 RX ADMIN — LACTULOSE 20 G: 20 SOLUTION ORAL at 07:57

## 2024-11-19 RX ADMIN — POTASSIUM CHLORIDE 10 MEQ: 7.46 INJECTION, SOLUTION INTRAVENOUS at 14:59

## 2024-11-19 RX ADMIN — ENOXAPARIN SODIUM 40 MG: 100 INJECTION SUBCUTANEOUS at 08:28

## 2024-11-19 RX ADMIN — LEVOTHYROXINE SODIUM 100 MCG: 0.1 TABLET ORAL at 06:19

## 2024-11-19 RX ADMIN — LACTULOSE 20 G: 20 SOLUTION ORAL at 10:33

## 2024-11-19 RX ADMIN — LACTULOSE 20 G: 20 SOLUTION ORAL at 19:59

## 2024-11-19 RX ADMIN — PANTOPRAZOLE SODIUM 40 MG: 40 TABLET, DELAYED RELEASE ORAL at 08:28

## 2024-11-19 RX ADMIN — HYDROCHLOROTHIAZIDE 12.5 MG: 12.5 TABLET ORAL at 08:29

## 2024-11-19 RX ADMIN — LACTULOSE 20 G: 20 SOLUTION ORAL at 17:57

## 2024-11-19 RX ADMIN — LACTULOSE 20 G: 20 SOLUTION ORAL at 13:53

## 2024-11-19 RX ADMIN — PANTOPRAZOLE SODIUM 40 MG: 40 TABLET, DELAYED RELEASE ORAL at 19:59

## 2024-11-19 RX ADMIN — POTASSIUM CHLORIDE 10 MEQ: 7.46 INJECTION, SOLUTION INTRAVENOUS at 13:49

## 2024-11-19 RX ADMIN — POTASSIUM CHLORIDE 10 MEQ: 7.46 INJECTION, SOLUTION INTRAVENOUS at 17:56

## 2024-11-19 RX ADMIN — POTASSIUM CHLORIDE 10 MEQ: 7.46 INJECTION, SOLUTION INTRAVENOUS at 12:14

## 2024-11-19 RX ADMIN — POTASSIUM CHLORIDE 10 MEQ: 7.46 INJECTION, SOLUTION INTRAVENOUS at 19:54

## 2024-11-19 RX ADMIN — LACTULOSE 20 G: 20 SOLUTION ORAL at 16:16

## 2024-11-19 RX ADMIN — RIFAXIMIN 400 MG: 200 TABLET ORAL at 16:20

## 2024-11-19 RX ADMIN — LACTULOSE 20 G: 20 SOLUTION ORAL at 04:42

## 2024-11-19 RX ADMIN — LACTULOSE 20 G: 20 SOLUTION ORAL at 12:11

## 2024-11-19 RX ADMIN — FOLIC ACID 1 MG: 1 TABLET ORAL at 08:29

## 2024-11-19 NOTE — PROGRESS NOTES
Speech-Language Pathology  Cancellation/No Show Note      For today's appointment patient:    [x]  Cancelled                  []  Rescheduled appointment    []  No show       []  Therapist cancelled             Reason given by patient:  []  No reason given  []  Conflicting appointment  []  No transportation  []  Conflict with work  [x]  Illness  []  Inclement weather   []  Insurance related issues  []  Other           Comments: outpatient speech cx this date as pt is admitted to hospital    Continue as per established POC    Britt Santiago M.A., CCC-SLP    11/19/2024

## 2024-11-19 NOTE — PLAN OF CARE
Problem: Safety - Adult  Goal: Free from fall injury  11/19/2024 0158 by Radha Bates, RN  Outcome: Progressing  Flowsheets (Taken 11/19/2024 0158)  Free From Fall Injury:   Based on caregiver fall risk screen, instruct family/caregiver to ask for assistance with transferring infant if caregiver noted to have fall risk factors   Instruct family/caregiver on patient safety  Note: Bed alarm on, bed in lowest position, call bell within  reach, instructed & reminded frequently to use call bell, gripper socks on, increased frequency of rounding on patient.  11/18/2024 1539 by Prema Moreno, RN  Outcome: Progressing

## 2024-11-19 NOTE — PLAN OF CARE
Problem: Chronic Conditions and Co-morbidities  Goal: Patient's chronic conditions and co-morbidity symptoms are monitored and maintained or improved  Outcome: Progressing  Flowsheets (Taken 11/19/2024 0758)  Care Plan - Patient's Chronic Conditions and Co-Morbidity Symptoms are Monitored and Maintained or Improved:   Monitor and assess patient's chronic conditions and comorbid symptoms for stability, deterioration, or improvement   Collaborate with multidisciplinary team to address chronic and comorbid conditions and prevent exacerbation or deterioration   Update acute care plan with appropriate goals if chronic or comorbid symptoms are exacerbated and prevent overall improvement and discharge     Problem: Discharge Planning  Goal: Discharge to home or other facility with appropriate resources  Outcome: Progressing  Flowsheets (Taken 11/19/2024 0758)  Discharge to home or other facility with appropriate resources:   Identify barriers to discharge with patient and caregiver   Arrange for needed discharge resources and transportation as appropriate   Identify discharge learning needs (meds, wound care, etc)

## 2024-11-19 NOTE — CARE COORDINATION
Patient's K 2.8, ammonia 60; GI following. Patient plans to return home, independent in the room, no needs and her son will transport home when discharged. [facility] has accepted; start of care on Friday.    Electronically signed by MATIAS Ramos on 11/19/2024 at 3:46 PM

## 2024-11-19 NOTE — PROGRESS NOTES
Occupational Therapy      Phone: [phone] Fax: [phone]     Occupational Therapy   Cancellation Note     Patient Name:  Erna Stoner  : 1960  Date:  2024  MRN: [id number]    For today's appointment patient:   [x]  Cancelled   []  Rescheduled appointment   []  No-show     Reason given by patient:   [x]  Patient ill   []  Conflicting appointment   []  No transportation   []  Conflict with work   []  No reason given   []  Other:    Comments: Pt is back in hospital. Called to reschedule OT for first week of December    Electronically signed by: Jelena VAZQUEZ, [id number]

## 2024-11-19 NOTE — ACP (ADVANCE CARE PLANNING)
Advance Care Planning     Palliative Team Advance Care Planning (ACP) Conversation    Date of Conversation: 11/19/24    Individuals present for the conversation: Patient with decision making capacity     ACP documents on file prior to discussion:  -None    Previously completed document/s not on file: Patient / participant reports that there are no previously executed ACP documents.    Healthcare Decision Maker:    Primary Decision Maker: Amanuel Stoner - Child - [phone]     Conversation Summary: BLAKEW met with to follow up on advance directive conversation she had with team. She has been provided with blank advance directives already and wants to look them over with her son before completing.    Resuscitation Status:   Code Status: Full Code     Documentation Completed:  -No new documents completed.    I spent 16 minutes with the patient and/or surrogate decision maker discussing the patient's wishes and goals.      JAMES Eden

## 2024-11-20 VITALS
RESPIRATION RATE: 18 BRPM | HEIGHT: 62 IN | WEIGHT: 162.48 LBS | SYSTOLIC BLOOD PRESSURE: 114 MMHG | BODY MASS INDEX: 29.9 KG/M2 | OXYGEN SATURATION: 98 % | DIASTOLIC BLOOD PRESSURE: 63 MMHG | TEMPERATURE: 97.8 F | HEART RATE: 101 BPM

## 2024-11-20 LAB
ALBUMIN SERPL-MCNC: 2.6 G/DL (ref 3.5–5.2)
ALP SERPL-CCNC: 159 U/L (ref 35–104)
ALT SERPL-CCNC: 59 U/L (ref 0–32)
AMMONIA PLAS-SCNC: 93 UMOL/L (ref 11–51)
AST SERPL-CCNC: 63 U/L (ref 0–31)
BILIRUB DIRECT SERPL-MCNC: 0.7 MG/DL (ref 0–0.3)
BILIRUB INDIRECT SERPL-MCNC: 1.7 MG/DL (ref 0–1)
BILIRUB SERPL-MCNC: 2.4 MG/DL (ref 0–1.2)
POTASSIUM SERPL-SCNC: 3.1 MMOL/L (ref 3.5–5)
POTASSIUM SERPL-SCNC: 3.7 MMOL/L (ref 3.5–5)
PROT SERPL-MCNC: 4.5 G/DL (ref 6.4–8.3)

## 2024-11-20 PROCEDURE — 84132 ASSAY OF SERUM POTASSIUM: CPT

## 2024-11-20 PROCEDURE — 6370000000 HC RX 637 (ALT 250 FOR IP): Performed by: NURSE PRACTITIONER

## 2024-11-20 PROCEDURE — 6360000002 HC RX W HCPCS: Performed by: PHYSICIAN ASSISTANT

## 2024-11-20 PROCEDURE — 6370000000 HC RX 637 (ALT 250 FOR IP): Performed by: STUDENT IN AN ORGANIZED HEALTH CARE EDUCATION/TRAINING PROGRAM

## 2024-11-20 PROCEDURE — 97535 SELF CARE MNGMENT TRAINING: CPT

## 2024-11-20 PROCEDURE — 6370000000 HC RX 637 (ALT 250 FOR IP): Performed by: PHYSICIAN ASSISTANT

## 2024-11-20 PROCEDURE — 80076 HEPATIC FUNCTION PANEL: CPT

## 2024-11-20 PROCEDURE — 36415 COLL VENOUS BLD VENIPUNCTURE: CPT

## 2024-11-20 PROCEDURE — 99232 SBSQ HOSP IP/OBS MODERATE 35: CPT | Performed by: NURSE PRACTITIONER

## 2024-11-20 PROCEDURE — 82140 ASSAY OF AMMONIA: CPT

## 2024-11-20 PROCEDURE — 2580000003 HC RX 258: Performed by: PHYSICIAN ASSISTANT

## 2024-11-20 RX ORDER — POTASSIUM CHLORIDE 1500 MG/1
20 TABLET, EXTENDED RELEASE ORAL DAILY
Qty: 30 TABLET | Refills: 0 | Status: SHIPPED | OUTPATIENT
Start: 2024-11-20

## 2024-11-20 RX ORDER — LACTULOSE 10 G/15ML
20 SOLUTION ORAL 4 TIMES DAILY
Qty: 2700 ML | Refills: 0 | Status: SHIPPED
Start: 2024-11-20

## 2024-11-20 RX ADMIN — POTASSIUM CHLORIDE 40 MEQ: 1500 TABLET, EXTENDED RELEASE ORAL at 01:50

## 2024-11-20 RX ADMIN — HYDROCHLOROTHIAZIDE 12.5 MG: 12.5 TABLET ORAL at 09:01

## 2024-11-20 RX ADMIN — LACTULOSE 20 G: 20 SOLUTION ORAL at 05:50

## 2024-11-20 RX ADMIN — ENOXAPARIN SODIUM 40 MG: 100 INJECTION SUBCUTANEOUS at 09:02

## 2024-11-20 RX ADMIN — FOLIC ACID 1 MG: 1 TABLET ORAL at 09:02

## 2024-11-20 RX ADMIN — LACTULOSE 20 G: 20 SOLUTION ORAL at 10:47

## 2024-11-20 RX ADMIN — LACTULOSE 20 G: 20 SOLUTION ORAL at 01:50

## 2024-11-20 RX ADMIN — LACTULOSE 20 G: 20 SOLUTION ORAL at 09:01

## 2024-11-20 RX ADMIN — PANTOPRAZOLE SODIUM 40 MG: 40 TABLET, DELAYED RELEASE ORAL at 09:01

## 2024-11-20 RX ADMIN — RIFAXIMIN 400 MG: 200 TABLET ORAL at 01:50

## 2024-11-20 RX ADMIN — RIFAXIMIN 400 MG: 200 TABLET ORAL at 09:02

## 2024-11-20 RX ADMIN — LEVOTHYROXINE SODIUM 100 MCG: 0.1 TABLET ORAL at 05:50

## 2024-11-20 RX ADMIN — SODIUM CHLORIDE, PRESERVATIVE FREE 10 ML: 5 INJECTION INTRAVENOUS at 09:02

## 2024-11-20 NOTE — HOME CARE
PLEASE UPDATE [facility] WITH DISCHARGE DATE AND PLAN.     MEDINA GOLDEN LPN   [facility]

## 2024-11-20 NOTE — PROGRESS NOTES
Hospitalist Progress Note      PCP: Elia Daniel MD    Date of Admission: 11/16/2024        Hospital Course:  63 y.o. female presented with  CHANGE IN MENTAL STATUS.  SHE HAS A KNOWN HISTORY OF CIRRHOSIS OF THE LIVER.  SHE IS S/O TIPS AND WAS RECENTLY DISCHARGED  DUE TO ELEVATED AMMONIA.   SHE WAS ADMITTED WITH A NH3      11/18 91 NH3    Subjective:   Mentation has improved   No complaints         Medications:  Reviewed    Infusion Medications    sodium chloride       Scheduled Medications    lactulose  20 g Oral Q2H    rifAXIMin  400 mg Oral TID    folic acid  1 mg Oral QAM    hydroCHLOROthiazide  12.5 mg Oral Daily    levothyroxine  100 mcg Oral QAM AC    pantoprazole  40 mg Oral BID    sodium chloride flush  5-40 mL IntraVENous 2 times per day    enoxaparin  40 mg SubCUTAneous Daily     PRN Meds: potassium chloride **OR** potassium alternative oral replacement **OR** potassium chloride, traMADol, ammonium lactate, sodium chloride flush, sodium chloride, promethazine **OR** ondansetron, polyethylene glycol, ipratropium 0.5 mg-albuterol 2.5 mg    No intake or output data in the 24 hours ending 11/19/24 1432    Exam:    /62   Pulse 93   Temp 99.1 °F (37.3 °C) (Oral)   Resp 13   Ht 1.575 m (5' 2\")   Wt 68.3 kg (150 lb 9.2 oz)   SpO2 97%   BMI 27.54 kg/m²       General appearance:  No apparent distress, appears stated age. LETHARGIC   HEENT:  Normal cephalic,   Neck: Supple, with full range of motion.Trachea midline.  Respiratory:  Normal respiratory effort. Clear to auscultation,   Cardiovascular:  RRR  Abdomen: Soft, non-tender, SOFTLY -distended with normal bowel sounds.  Musculoskeletal:  No clubbing, cyanosis or edema bilaterally.    Skin: smooth and dry   Neurologic:   Cranial nerves: II-XII intact,   Psychiatric:  Alert and oriented x 4               Labs:   Recent Labs     11/17/24  0532 11/18/24  0516 11/19/24  0526   WBC 2.8* 2.8* 2.8*   HGB 13.7 14.2 12.8   HCT 41.8 43.9 
      Hospitalist Progress Note      PCP: Elia Daniel MD    Date of Admission: 11/16/2024        Hospital Course:  63 y.o. female presented with  CHANGE IN MENTAL STATUS.  SHE HAS A KNOWN HISTORY OF CIRRHOSIS OF THE LIVER.  SHE IS S/O TIPS AND WAS RECENTLY DISCHARGED  DUE TO ELEVATED AMMONIA.   SHE WAS ADMITTED WITH A NH3      11/18 91 NH3    Subjective: STILL CONFUSED           Medications:  Reviewed    Infusion Medications    sodium chloride       Scheduled Medications    lactulose  20 g Oral Q2H    rifAXIMin  400 mg Oral TID    folic acid  1 mg Oral QAM    hydroCHLOROthiazide  12.5 mg Oral Daily    levothyroxine  100 mcg Oral QAM AC    pantoprazole  40 mg Oral BID    sodium chloride flush  5-40 mL IntraVENous 2 times per day    enoxaparin  40 mg SubCUTAneous Daily     PRN Meds: traMADol, ammonium lactate, sodium chloride flush, sodium chloride, promethazine **OR** ondansetron, polyethylene glycol, ipratropium 0.5 mg-albuterol 2.5 mg    No intake or output data in the 24 hours ending 11/18/24 1255    Exam:    /70   Pulse 92   Temp 99.5 °F (37.5 °C) (Oral)   Resp 17   Ht 1.575 m (5' 2\")   Wt 68.3 kg (150 lb 9.2 oz)   SpO2 98%   BMI 27.54 kg/m²       General appearance:  No apparent distress, appears stated age. LETHARGIC   HEENT:  Normal cephalic,  FETOR HEPATICUS  Neck: Supple, with full range of motion.Trachea midline.  Respiratory:  Normal respiratory effort. Clear to auscultation,   Cardiovascular:  RRR  Abdomen: Soft, non-tender, SOFTLY -distended with normal bowel sounds.  Musculoskeletal:  No clubbing, cyanosis or edema bilaterally.    Skin: smooth and dry   Neurologic:   Cranial nerves: II-XII intact,   Psychiatric:  Alert and oriented x 1               Labs:   Recent Labs     11/16/24  1735 11/17/24  0532 11/18/24  0516   WBC 2.6* 2.8* 2.8*   HGB 14.1 13.7 14.2   HCT 43.3 41.8 43.9   PLT 89* 87* 92*     Recent Labs     11/16/24  1735 11/17/24  0532    149*   K 3.5 3.7 
  Gastroenterology, Hepatology, &  Advanced Endoscopy    Progress Note      Reason for Consult: Mental Status change in the setting of recent TIPS.     HPI:   Erna Stoner is a 63 y.o. female w/ PMH of  Asthma, Back pain, Diabetes mellitus (HCC), Hypothyroidism, and Thyroid disease. 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 Post EGD/C-Scope 10/10/24 with Dr. Montes De Oca.  Patient is on lactulose with history of hyperammonemia. She reports she has been taking her lactulose. She has no other complaints at this time.       PMH:       Diagnosis Date    Asthma     stable, is mild    Back pain     Diabetes mellitus (HCC)     Hypothyroidism     Thyroid disease         PSH:       Procedure Laterality Date    CARPAL TUNNEL RELEASE      bilateral     SECTION      CHOLECYSTECTOMY      COLONOSCOPY  2012    COLONOSCOPY N/A 10/10/2024    COLONOSCOPY DIAGNOSTIC performed by Augie Montes De Oca MD at [facility] ENDOSCOPY    HYSTERECTOMY (CERVIX STATUS UNKNOWN)      IR EMBOLIZATION VENOUS  2024    IR EMBOLIZATION VENOUS 2024 JENNIFER Pack MD [facility]    IR TIPS INSERTION  2024    IR TIPS INSERTION 2024 JENNIFER Pack MD [facility]    LIVER BIOPSY  2017    NOSE SURGERY N/A 2020    EXCISIONAL BIOPSY OF RIGHT NARES (PATHOLOGY NOTIFIED) performed by Bandar Solis Jr., MD at [facility] OR    OVARY REMOVAL      TONSILLECTOMY      UPPER GASTROINTESTINAL ENDOSCOPY  2012    UPPER GASTROINTESTINAL ENDOSCOPY  2024    ESOPHAGOGASTRODUODENOSCOPY CONTROL HEMORRHAGE performed by Noé Montilla MD at [facility] ENDOSCOPY    UPPER GASTROINTESTINAL ENDOSCOPY  2024    ESOPHAGOGASTRODUODENOSCOPY SUBMUCOSAL INJECTION performed by Augie Montes De Oca MD at [facility] ENDOSCOPY    UPPER GASTROINTESTINAL ENDOSCOPY N/A 10/10/2024    ESOPHAGOGASTRODUODENOSCOPY DIAGNOSTIC ONLY performed by Augie Montes De Oca MD at [facility] ENDOSCOPY        Family History:       Problem 
  Gastroenterology, Hepatology, &  Advanced Endoscopy    Progress Note  Subjective:  \" I feel better today.\"    Reason for Consult: Mental Status change in the setting of recent TIPS.     HPI:   Erna Stoner is a 63 y.o. female w/ PMH of  Asthma, Back pain, Diabetes mellitus (HCC), Hypothyroidism, and Thyroid disease. 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 Post EGD/C-Scope 10/10/24 with Dr. Montes De Oca.  Patient is on lactulose with history of hyperammonemia. She reports she has been taking her lactulose. She has no other complaints at this time.       PMH:       Diagnosis Date    Asthma     stable, is mild    Back pain     Diabetes mellitus (HCC)     Hypothyroidism     Thyroid disease         PSH:       Procedure Laterality Date    CARPAL TUNNEL RELEASE      bilateral     SECTION      CHOLECYSTECTOMY      COLONOSCOPY  2012    COLONOSCOPY N/A 10/10/2024    COLONOSCOPY DIAGNOSTIC performed by Augie Montes De Oca MD at [facility] ENDOSCOPY    HYSTERECTOMY (CERVIX STATUS UNKNOWN)      IR EMBOLIZATION VENOUS  2024    IR EMBOLIZATION VENOUS 2024 JENNIFER Pack MD [facility]    IR TIPS INSERTION  2024    IR TIPS INSERTION 2024 JENNIFER Pack MD [facility]    LIVER BIOPSY  2017    NOSE SURGERY N/A 2020    EXCISIONAL BIOPSY OF RIGHT NARES (PATHOLOGY NOTIFIED) performed by Bandar Solis Jr., MD at [facility] OR    OVARY REMOVAL      TONSILLECTOMY      UPPER GASTROINTESTINAL ENDOSCOPY  2012    UPPER GASTROINTESTINAL ENDOSCOPY  2024    ESOPHAGOGASTRODUODENOSCOPY CONTROL HEMORRHAGE performed by Noé Montilla MD at [facility] ENDOSCOPY    UPPER GASTROINTESTINAL ENDOSCOPY  2024    ESOPHAGOGASTRODUODENOSCOPY SUBMUCOSAL INJECTION performed by Augie Montes De Oca MD at [facility] ENDOSCOPY    UPPER GASTROINTESTINAL ENDOSCOPY N/A 10/10/2024    ESOPHAGOGASTRODUODENOSCOPY DIAGNOSTIC ONLY performed by Augie Montes De Oca MD at [facility] ENDOSCOPY    
  Palliative Care Department  [phone]  Palliative Care Progress Note  Provider Kevin Shaw MD      PATIENT: Erna Stoner  : 1960  MRN: [id number]  ADMISSION DATE: 2024  4:32 PM  Referring Provider:  Elvis Davila DO    Palliative Medicine was consulted on hospital day 3 for assistance with Goals of care.    HPI:     Clinical Summary:Erna Stoner is a 63 y.o. y/o female with a history of cirrhosis of the liver, bleeding gastric varices SP TIPS, recently EGD/C-scope 2024 with Dr. Montes De Oca, asthma, diabetes, hypothyroidism, thyroid is who presented to [facility] on 2024 with altered mental status.  Most significant laboratory finding showed sodium 149, ammonia 78> 151> 91, alk phos 127 ALT 74, amylase 21, , bilirubin 3.4, WBC 2.8.  UA positive for UTI.  CT head showed stable 2.5 cm cystic-like lesion involving the right basal ganglia.  Chest x-ray negative for acute process.  Admitted for further medical management, GI consulted.  Palliative medicine consulted for further goals of care.    ASSESSMENT/PLAN:     Pertinent Hospital Diagnoses     Hepatic encephalopathy  History of cirrhosis of the liver  History of bleeding gastric varices  SP EGD C-scope 2024      Palliative Care Encounter / Counseling Regarding Goals of Care  Please see detailed goals of care discussion as below  At this time, Erna Stoner, Does have capacity for medical decision-making.  Capacity is time limited and situation/question specific  Patient had capacity to make decisions today  At this time she wishes to remain a full code, does wish to complete ACP documents  Plan is to return home with family.  Outcome of goals of care meeting:  Full code plan to return home    Code status Full Code  Advanced Directives: no POA or living will in [facility]  Surrogate/Legal NOK:  Amanuel Stoner (Son) [phone]    Spiritual assessment: no spiritual distress identified  Bereavement and grief: to be 
4 Eyes Skin Assessment     NAME:  Erna Stoner  YOB: 1960  MEDICAL RECORD NUMBER:  [id number]    The patient is being assessed for  Admission    I agree that at least one RN has performed a thorough Head to Toe Skin Assessment on the patient. ALL assessment sites listed below have been assessed.      Areas assessed by both nurses:    Head, Face, Ears, Shoulders, Back, Chest, Arms, Elbows, Hands, Sacrum. Buttock, Coccyx, Ischium, Legs. Feet and Heels, and Under Medical Devices         Does the Patient have a Wound? No noted wound(s)       Jose M Prevention initiated by RN: No  Wound Care Orders initiated by RN: No    Pressure Injury (Stage 3,4, Unstageable, DTI, NWPT, and Complex wounds) if present, place Wound referral order by RN under : No    New Ostomies, if present place, Ostomy referral order under : No     Nurse 1 eSignature: Electronically signed by Patricia Fernando RN on 11/16/24 at 10:40 PM EST    **SHARE this note so that the co-signing nurse can place an eSignature**    Nurse 2 eSignature: Electronically signed by Andrew Taylor RN on 11/16/24 at 10:41 PM EST  
4 Eyes Skin Assessment     NAME:  Erna Stoner  YOB: 1960  MEDICAL RECORD NUMBER:  [id number]    The patient is being assessed for  Transfer to New Unit    I agree that at least one RN has performed a thorough Head to Toe Skin Assessment on the patient. ALL assessment sites listed below have been assessed.      Areas assessed by both nurses:    Head, Face, Ears, Shoulders, Back, Chest, Arms, Elbows, Hands, Sacrum. Buttock, Coccyx, Ischium, and Legs. Feet and Heels        Does the Patient have a Wound? No noted wound(s)       Jose M Prevention initiated by RN: Yes  Wound Care Orders initiated by RN: No    Pressure Injury (Stage 3,4, Unstageable, DTI, NWPT, and Complex wounds) if present, place Wound referral order by RN under : Yes    New Ostomies, if present place, Ostomy referral order under : No     Nurse 1 eSignature: Electronically signed by Lara Arias RN on 11/17/24 at 3:55 AM EST    **SHARE this note so that the co-signing nurse can place an eSignature**    Nurse 2 eSignature: Electronically signed by Nani Jerez RN on 11/17/24 at 3:57 AM EST  
Message sent to Dr. Montes De Oca to check discharge. Ok to discharge today per Dr. Montes De Oca. Notified Yana Muñoz.    LOLA RAJPUT RN      
New patient consult sent to Palliative Care via [employer] serve.     Prema Moreno RN    
Occupational Therapy  OCCUPATIONAL THERAPY INITIAL EVALUATION    [facility]  [address], [place]      Date:2024                                                Patient Name: Erna Stoner  MRN: [id number]  : 1960  Room: [facility]6402-    Evaluating OT: Fran Sosa OTR/L #[id number]     Referring Provider: Cristian Vega PA   Specific Provider Orders/Date: OT eval and treat 24    Diagnosis: Hepatic encephalopathy (HCC) [K76.82]   Pt admitted to hospital with change in mental status    Pertinent Medical History:  has a past medical history of Asthma, Back pain, Diabetes mellitus (HCC), Hypothyroidism, and Thyroid disease.       Precautions:  Fall Risk, bed/chair alarm, cognition     Assessment of current deficits    [x] Functional mobility  [x]ADLs  [x] Strength               [x]Cognition    [x] Functional transfers   [x] IADLs         [x] Safety Awareness   [x]Endurance    [] Fine Coordination              [x] Balance      [] Vision/perception   []Sensation     []Gross Motor Coordination  [] ROM  [] Delirium                   [] Motor Control     OT PLAN OF CARE   OT POC based on physician orders, patient diagnosis and results of clinical assessment    Frequency/Duration 1-3 days/wk for 2 weeks PRN   Specific OT Treatment Interventions to include:   * Instruction/training on adapted ADL techniques and AE recommendations to increase functional independence within precautions       * Training on energy conservation strategies, correct breathing pattern and techniques to improve independence/tolerance for self-care routine  * Functional transfer/mobility training/DME recommendations for increased independence, safety, and fall prevention  * Patient/Family education to increase follow through with safety techniques and functional independence  * Recommendation of environmental modifications for increased safety with functional 
Palliative Medicine Social Work     Patient Name: Erna Stoner    Age: 63 y.o.    Marital Status:     Black Status: no    Next of Kin:son Amanuel Stoner    Additional Support: sister and friends    Minor Children: no    Advanced Directives: none on file    Confirm Code Status: full    Current Goals of Care: to be reviewed    Mental Health History: no    Substance Abuse:no    Indications of Abuse/Neglect: no    Financial Concerns: no    Living Situation: pt lives with her son Amanuel, 1 story home.     Physical Care Needs Met: yes    Emotional Needs Met:yes    Assessment: 64 yo  female admitted from home with AMS, history of  cirrhosis of the liver, s/p tips. Recent admission due to elevated Ammonia. Pt seen at bedside.  Pt is alert to self and location , she is able to tell me her labs are elevated which is why she came to the hospital. Reviewed with team for goals of care.      
Patient received the Sacrament of the Anointing of the Sick on 11/17/2024 by Father David Rivera.    If additional support is requested or needed please reach out to Spiritual Health ([phone]).    Chap. Neal Diaz MDIV, BCC    
Perfect serve sent to GI requesting diet order.     Prema Moreno RN    
Physical Therapy  Physical Therapy Initial Assessment     Name: Erna Stoner  : 1960  MRN: [id number]      Date of Service: 2024    Evaluating PT:  Wilmer Rosenbaum PT, DPT [id number]    Room #:  6402/6402-B  Diagnosis:  Hepatic encephalopathy (HCC) [K76.82]  PMHx/PSHx:    Past Medical History:   Diagnosis Date    Asthma     stable, is mild    Back pain     Diabetes mellitus (HCC)     Hypothyroidism     Thyroid disease      Procedure/Surgery:  none  Precautions:  Falls, alarms, cognition  Equipment Needs:  TBD    SUBJECTIVE:    Pt lives alone in a 2 story home, 1st floor setup, with level entry.      Pt ambulated without device and was independent PTA.    OBJECTIVE:   Initial Evaluation  Date: 24 Treatment Short Term/ Long Term   Goals   AM-PAC 6 Clicks      Was pt agreeable to Eval/treatment? Yes     Does pt have pain? No c/o pain     Bed Mobility  Rolling: SBA  Supine to sit: SBA  Sit to supine: NT  Scooting: SBA  Mod Independent   Transfers Sit to stand: Alisia  Stand to sit: Alisia  Stand pivot: Alisia no device  Mod Independent with AAD if needed   Ambulation   60 feet x 2 reps with Alisia no device  >400 feet with Mod Independent with AAD if needed   Stair negotiation: ascended and descended NT  >4 steps with 1 rail Mod Independent   ROM BUE:  WFL  BLE:  WFL     Strength BUE:  WFL  BLE:  4/5  Increase by 1/3 MMT grade   Balance Sitting EOB:  SBA  Dynamic Standing:  Alisia no device  Sitting EOB:  Independent  Dynamic Standing:  Mod Independent with AAD if needed     Pt is A & O x 3-4 not month and unable to recall later in session  Sensation:  no reported paresthesias  Edema:  none    Therapeutic Exercises:  sit to stand x 2 reps    Patient education  Pt educated on safety    Patient response to education:   Pt verbalized understanding Pt demonstrated skill Pt requires further education in this area   yes yes yes     ASSESSMENT:    Conditions Requiring Skilled Therapeutic 
Spiritual Health History and Assessment/Progress Note  [facility]    (P) Palliative Care, Initial Encounter, Spiritual/Emotional Needs,  ,  ,      Name: Erna Stoner MRN: [id number]    Age: 63 y.o.     Sex: female   Language: English   Taoist: Latter day   Hepatic encephalopathy (HCC)     Date: 11/18/2024                           Spiritual Assessment began in [facility]        Referral/Consult From: (P) Palliative Care, Rounding   Encounter Overview/Reason: (P) Palliative Care, Initial Encounter, Spiritual/Emotional Needs  Service Provided For: (P) Patient    May, Belief, Meaning:   Patient identifies as spiritual, is connected with a may tradition or spiritual practice, and has beliefs or practices that help with coping during difficult times  Family/Friends No family/friends present      Importance and Influence:  Patient has spiritual/personal beliefs that influence decisions regarding their health  Family/Friends No Family present  Community:  Patient is connected with a spiritual community  Family/Friends No family/friends present    Assessment and Plan of Care:     Patient Interventions include: Facilitated expression of thoughts and feelings, Explored spiritual coping/struggle/distress, Engaged in theological reflection, Affirmed coping skills/support systems, and Facilitated life review and/ or legacy  Family/Friends Interventions include: No family/friends present    Patient Plan of Care: Other: Malena rousseau  will follow as needed.  Family/Friends Plan of Care: No family/friends present    Electronically signed by Chaplain Emily on 11/18/2024 at 4:16 PM    
Spiritual Health History and Assessment/Progress Note  [facility]    (P) Palliative Care, Follow-up, Spiritual/Emotional Needs,  ,  ,      Name: Erna Stoner MRN: [id number]    Age: 63 y.o.     Sex: female   Language: English   Gnosticist: Mormon   Hepatic encephalopathy (HCC)     Date: 11/19/2024                           Spiritual Assessment began in [facility]        Referral/Consult From: (P) Palliative Care   Encounter Overview/Reason: (P) Palliative Care, Follow-up, Spiritual/Emotional Needs  Service Provided For: (P) Patient    May, Belief, Meaning:   Patient identifies as spiritual, is connected with a may tradition or spiritual practice, and has beliefs or practices that help with coping during difficult times  Family/Friends No family/friends present      Importance and Influence:  Patient has spiritual/personal beliefs that influence decisions regarding their health  Family/Friends No family/friends present    Community:  Patient is connected with a spiritual community and feels well-supported. Support system includes: Children and May Community  Family/Friends No family/friends present    Assessment and Plan of Care:     Patient Interventions include: Facilitated expression of thoughts and feelings, Explored spiritual coping/struggle/distress, Engaged in theological reflection, Affirmed coping skills/support systems, and Facilitated life review and/ or legacy  Family/Friends Interventions include: No family/friends present    Patient Plan of Care: The  will follow as needed.  Family/Friends Plan of Care: No family/friends present    Electronically signed by Chaplain Emily on 11/19/2024 at 3:22 PM   
Distant Supervision  Based on simulated tasks  Modified [place]    Toileting Moderate Assist   Independent Modified [place]    Bed Mobility  Supine to sit: Stand by Assist   Sit to supine: NT NT  Pt up in chair  Supine to sit: Modified [place]   Sit to supine: Modified [place]    Functional Transfers Minimal Assist  STS: Independent Modified [place]    Functional Mobility Minimal Assist      Ambulated in room and hallway without AD (+ unsteadiness)  Independent  No AD, household distances Modified [place]    Balance Sitting:     Static:  SBA    Dynamic:SBA  Standing: min A Sitting:     Static:  Independent    Dynamic: Independent  Standing:  Independent no AD      Activity Tolerance F Good with light to moderate activity  G   Visual/  Perceptual wfl     WFL                        Hand Dominance right    Strength ROM Additional Info:    RUE   4-/5 wfl good  and wfl FMC/dexterity noted during ADL tasks      LUE 4-/5 wfl good  and wfl FMC/dexterity noted during ADL tasks      Hearing: wfl  Sensation:wfl  Tone: wfl  Edema:none noted     Comments: Upon arrival pt seated in bedside chair, awaiting discharge. Pt educated on techniques to increase independence and safety during ADL's, bed mobility, and functional transfers. ADL retraining to increase safety and I in dressing and grooming tasks, balance and trf training to increase participation in functional mobility and standing aspects of ADLs with increased safety.   At end of session pt left seated in bedside chair, call light within reach.     Pt has made Good progress towards set goals.     Continue with current plan of care    Treatment Time In:1353            Treatment Time Out: 1401             Treatment Charges: Mins Units   Ther Ex  42526     Manual Therapy 66253     Thera Activities 99273     ADL/Home Mgt 09423 8 1   Neuro Re-ed 45505     Group Therapy      Orthotic manage/training  80495     Non-Billable Time     Total

## 2024-11-20 NOTE — CARE COORDINATION
Patient's ammonia 93 today; per nursing, patient refused lactulose overnight. Patient plans to return home, independent in the room, no needs and her son will transport home when discharged. [facility] has accepted; start of care on Friday.     Electronically signed by MATIAS Ramos on 11/20/2024 at 10:44 AM

## 2024-11-20 NOTE — PLAN OF CARE
Problem: Chronic Conditions and Co-morbidities  Goal: Patient's chronic conditions and co-morbidity symptoms are monitored and maintained or improved  Outcome: Completed     Problem: Discharge Planning  Goal: Discharge to home or other facility with appropriate resources  Outcome: Completed     Problem: Safety - Adult  Goal: Free from fall injury  Outcome: Completed     Problem: Risk for Elopement  Goal: Patient will not exit the unit/facility without proper excort  Outcome: Completed     Problem: Skin/Tissue Integrity  Goal: Absence of new skin breakdown  Description: 1.  Monitor for areas of redness and/or skin breakdown  2.  Assess vascular access sites hourly  3.  Every 4-6 hours minimum:  Change oxygen saturation probe site  4.  Every 4-6 hours:  If on nasal continuous positive airway pressure, respiratory therapy assess nares and determine need for appliance change or resting period.  Outcome: Completed

## 2024-11-20 NOTE — CARE COORDINATION
Patient's PCP is Dr Elia Daniel. Per office policy, patient is required to call in once D/C and make their own appointment.     Reminder added to patients D/C.

## 2024-11-20 NOTE — PLAN OF CARE
Problem: Chronic Conditions and Co-morbidities  Goal: Patient's chronic conditions and co-morbidity symptoms are monitored and maintained or improved  11/19/2024 2337 by Bettie Matute RN  Outcome: Progressing  11/19/2024 1004 by Raya Chang RN  Outcome: Progressing  Flowsheets (Taken 11/19/2024 0758)  Care Plan - Patient's Chronic Conditions and Co-Morbidity Symptoms are Monitored and Maintained or Improved:   Monitor and assess patient's chronic conditions and comorbid symptoms for stability, deterioration, or improvement   Collaborate with multidisciplinary team to address chronic and comorbid conditions and prevent exacerbation or deterioration   Update acute care plan with appropriate goals if chronic or comorbid symptoms are exacerbated and prevent overall improvement and discharge     Problem: Discharge Planning  Goal: Discharge to home or other facility with appropriate resources  11/19/2024 2337 by Bettie Matute RN  Outcome: Progressing  11/19/2024 1004 by Raya Chang RN  Outcome: Progressing  Flowsheets (Taken 11/19/2024 0758)  Discharge to home or other facility with appropriate resources:   Identify barriers to discharge with patient and caregiver   Arrange for needed discharge resources and transportation as appropriate   Identify discharge learning needs (meds, wound care, etc)     Problem: Safety - Adult  Goal: Free from fall injury  11/19/2024 2337 by Bettie Matute RN  Outcome: Progressing  11/19/2024 1004 by Raya Chang RN  Outcome: Progressing  Flowsheets (Taken 11/19/2024 1003)  Free From Fall Injury:   Instruct family/caregiver on patient safety   Based on caregiver fall risk screen, instruct family/caregiver to ask for assistance with transferring infant if caregiver noted to have fall risk factors     Problem: Risk for Elopement  Goal: Patient will not exit the unit/facility without proper excort  11/19/2024 2337 by Bettie Matute RN  Outcome:

## 2024-11-20 NOTE — DISCHARGE SUMMARY
[facility]   Discharge summary   Patient ID:  Erna Stoner  [id number]  63 y.o.  1960    Admit date: 11/16/2024    Discharge date and time: 11/20/2024    Admission Diagnoses:   Patient Active Problem List   Diagnosis    Hematemesis    Gastric varices    S/P TIPS (transjugular intrahepatic portosystemic shunt)    Moderate protein-calorie malnutrition (HCC)    Chest pain    Cirrhosis of liver (HCC)    Senile osteoporosis    Rectal bleeding    Esophageal varices (HCC)    AMS (altered mental status)    DM (diabetes mellitus), type 2 (HCC)    Increased ammonia level    Hepatic encephalopathy (HCC)    Hyperammonemia (HCC)    Fetor hepaticus       Discharge Diagnoses: Hepatic Encephalopathy, elevated LFTs, elevated ammonia    Consults: GI, Palliative    Procedures: None    Hospital Course: The patient is a 63 y.o. female of Elia Daniel MD        Patient is a 63-year-old female admitted to [facility] for  Hepatic encephalopathy  -Monitor labs  -Improved ammonia, 60  -Improving LFTs  -GI following  -Continue aggressive lactulose administration  -Continue Xifaxan  -K+ 2.8, replace with as needed protocol, recheck this evening  -Continue to monitor labs and vitals  -Plan is for discharge home possibly tomorrow if improved electrolytes and okay with GI    11/20/2024  -Hypokalemia, resolved   -Heart rate and blood pressure stable   -Continue Lactulose 4 times daily and as needed per GI to achieve 2-3 bowel movements a day  -Follow-up outpatient with GI  -Resolution of hypokalemia  -Follow up with PCP 1 week     Recent Labs     11/18/24  0516 11/19/24  0526   WBC 2.8* 2.8*   HGB 14.2 12.8   HCT 43.9 38.4   PLT 92* 79*       Recent Labs     11/19/24  0526 11/19/24  2349 11/20/24  0523     --   --    K 2.8* 3.1* 3.7   *  --   --    CO2 20*  --   --    BUN 6  --   --    CREATININE 0.4*  --   --    CALCIUM 7.7*  --   --        CT HEAD WO CONTRAST    Result Date: 11/16/2024  EXAMINATION:

## 2024-12-03 ENCOUNTER — HOSPITAL ENCOUNTER (OUTPATIENT)
Dept: SPEECH THERAPY | Age: 64
Setting detail: THERAPIES SERIES
Discharge: HOME | End: 2024-12-03
Payer: COMMERCIAL

## 2024-12-03 ENCOUNTER — PROCEDURE VISIT (OUTPATIENT)
Dept: PODIATRY | Age: 64
End: 2024-12-03
Payer: COMMERCIAL

## 2024-12-03 ENCOUNTER — HOSPITAL ENCOUNTER (OUTPATIENT)
Dept: OCCUPATIONAL THERAPY | Age: 64
Setting detail: THERAPIES SERIES
Discharge: HOME | End: 2024-12-03
Payer: COMMERCIAL

## 2024-12-03 VITALS — BODY MASS INDEX: 29.81 KG/M2 | WEIGHT: 162 LBS | HEIGHT: 62 IN

## 2024-12-03 DIAGNOSIS — E11.9 TYPE 2 DIABETES MELLITUS WITHOUT COMPLICATION, WITH LONG-TERM CURRENT USE OF INSULIN (HCC): Primary | ICD-10-CM

## 2024-12-03 DIAGNOSIS — L84 CORNS AND CALLOSITIES: ICD-10-CM

## 2024-12-03 DIAGNOSIS — G60.8 HEREDITARY SENSORY NEUROPATHY: ICD-10-CM

## 2024-12-03 DIAGNOSIS — B35.1 TINEA UNGUIUM: ICD-10-CM

## 2024-12-03 DIAGNOSIS — Z79.4 TYPE 2 DIABETES MELLITUS WITHOUT COMPLICATION, WITH LONG-TERM CURRENT USE OF INSULIN (HCC): Primary | ICD-10-CM

## 2024-12-03 PROCEDURE — 11721 DEBRIDE NAIL 6 OR MORE: CPT | Performed by: PODIATRIST

## 2024-12-03 PROCEDURE — 11056 PARNG/CUTG B9 HYPRKR LES 2-4: CPT | Performed by: PODIATRIST

## 2024-12-03 PROCEDURE — 97530 THERAPEUTIC ACTIVITIES: CPT

## 2024-12-03 NOTE — PROGRESS NOTES
press 10 reps/2 sets  3# total for kiran wrist wraps (1 1/2# each)  for shoulder flex/ ext /abd 10 reps/2 sets each  3# total for kiran wrist wraps (1 1/2# each) for serving arms 10 reps/2 sets       B Elbows/Forearm  3# total for kiran wrist wraps (1 1/2# each) for biceps curl 15 reps x 2 sets  3# total for kiran wrist wraps (1 1/2# each) for oh triceps 15 reps x 2 sets (ModA) for proper positioning   B wrist/hands                             Orange medium resistance 5# putty utilized for grasps, pinches, pushes, pulls  5# yellow theraputty with cizer simulation cap, peg & jar lid turning tools  Green Therabar, wringing, bending, shaking ex's x 5 mins    Washer turning tool with 1 1/2# wrist wraps added to kiran Ue's . Pt performs well. X 2 full reps each UE  Green hand gripper x 30 reps- composite followed by individual fingers which pt requires physical assist from therapist to coordinate x 10 reps each    Hand gripper level 3 resistance with silver spring x 15 reps ea, challenged memory with directions and coordinating of colors to cone for object transferring  Large light mass of putty: flatten out and use cone to make biscuits   Gross  X    X UBE x 10 mins- kiran/uni/forward/reverse- standing to work on BUE & standing strength/ tolerance & memory recall of verbal instructions  Treadmill x 10 mins- 1.0-1.2 speed. Pt reports ^d fatigue level after 5 mins   Other:     HEP  7/25/24 Patient reported that she had arm bike home setting? Will verify   Energy Conservation x Patient instructed on energy conservation related to IADLs. She verbalized good understanding   Progress Update X Reassessed goals, updated HEP, noted measurable outcomes     Assessment/Comments:  Pt arrives to tx session reporting she is not feeling as well as she was before she was sick and had to stay in the hospital again. Pt also reports mild confusion with daily tasks-- Pt now receiving speech therapy services to address cognition. Pt tolerates todays

## 2024-12-03 NOTE — PROGRESS NOTES
open wounds.  No erythema.      Assessment and Plan:  Erna was seen today for nail problem, callouses and diabetes.    Diagnoses and all orders for this visit:    Type 2 diabetes mellitus without complication, with long-term current use of insulin ([facility])    Tinea unguium    Corns and callosities    Hereditary sensory neuropathy      Follow-up diabetic foot ankle exam  Formal debridement toenails 1 through 5 right and left with manual debridement for thickness and overall length.  Paring hyperkeratotic tissue with a #15 blade plantar second metatarsal head bilateral  I did also discuss close toed knee-high compression stockings 10 to 20 mmHg to be worn during the day remove at night any calf pain go to emergency room.  Follow-up 2 months    Return in about 2 months (around 2/3/2025).     Seen By:  Elia Castellano DPM      Document was created using voice recognition software.  Note was reviewed however may contain grammatical errors.

## 2024-12-04 NOTE — PROGRESS NOTES
Speech-Language Pathology  Cancellation/No Show Note      For today's appointment patient:    []  Cancelled                  []  Rescheduled appointment    []  No show       [x]  Therapist cancelled             Reason given by patient:  []  No reason given  []  Conflicting appointment  []  No transportation  []  Conflict with work  []  Illness  []  Inclement weather   []  Insurance related issues  []  Other           Comments:    Continue as per established POC    Britt Santiago M.A., CCC-SLP    12/4/2024

## 2024-12-10 ENCOUNTER — HOSPITAL ENCOUNTER (OUTPATIENT)
Dept: SPEECH THERAPY | Age: 64
Setting detail: THERAPIES SERIES
Discharge: HOME | End: 2024-12-10
Payer: COMMERCIAL

## 2024-12-10 ENCOUNTER — HOSPITAL ENCOUNTER (OUTPATIENT)
Dept: OCCUPATIONAL THERAPY | Age: 64
Setting detail: THERAPIES SERIES
Discharge: HOME | End: 2024-12-10
Payer: COMMERCIAL

## 2024-12-10 PROCEDURE — 97530 THERAPEUTIC ACTIVITIES: CPT

## 2024-12-10 PROCEDURE — 97130 THER IVNTJ EA ADDL 15 MIN: CPT

## 2024-12-10 PROCEDURE — 97129 THER IVNTJ 1ST 15 MIN: CPT

## 2024-12-10 PROCEDURE — 97110 THERAPEUTIC EXERCISES: CPT

## 2024-12-10 NOTE — PROGRESS NOTES
SPEECH LANGUAGE PATHOLOGY  DAILY PROGRESS NOTE        PATIENT NAME:  Erna Stoner      :  1960          TODAY'S DATE:  12/10/2024     POC:  Pt will improve immediate, short term, recent memory during structured and unstructured tasks with 80% accuracy   Pt will improve problem solving/thought organization during structured and unstructured tasks with 80% accuracy      Subjective:                Patient was seen for therapy today. She arrived independently. Pt was cooperative and pleasant. Today was patient's first day back since several hospital admissions due to elevated ammonia levels. She reported she now feels back to her baseline, but does feel her cognition was impacted.      Objective:                              Pt completed recall for 5 step pattern this date with 73% accuracy given mod assistance on ipad activity. She was distractible and had difficulty attending to task today.    Pt completed recall for 3 step directions with 65% accuracy. She benefited from repetition to process the direction.        Assessment:                 Making progress with goals     Plan:                 Continue POC.        CPT code(s) 03354  therapeutic interventions that focus on cognitive function , initial  15 min  24797  therapeutic interventions that focus on cognitive function, each additional 15 min  Total minutes :  30 minutes    Britt Santiago M.A., CCC-SLP

## 2024-12-10 NOTE — PROGRESS NOTES
disability         -Rehab Potential: Good     -Patient Response to Treatment: Good. Pt feels she has a lot more energy for todays tasks      Patient. Education:  [] Plans/Goals, Risks/Benefits discussed  [] Home exercise program  Method of Education: [x] Verbal  [x] Demo  [] Written  Comprehension of Education:  [x] Verbalizes understanding.  [x] Demonstrates understanding.  [x] Needs Review.  [] Demonstrates/verbalizes understanding of HEP/Ed previously given.    Time In: 1pm           Time Out: 2pm               CODE  Minutes Units   32031 Fluidotherapy     73047 Paraffin     95420 Ultrasound     12000 Electrical Stim - Attended     86902 Iontophoresis     74842 Therapeutic Ex 30 2   56762 Therapeutic Activity 30 2   16300 Neuromuscular Re-Ed     78915 Manual Therapy     45899 ADL/COMP Tech Train     38508 Orthotic Management/Training      Other                 Total  60 4       Plan: OT 1-2x / week for 10-12 visits     [x]  Continues Plan of care with focus on  improve functional use of the RUE by increasing strength, short term/long term memory, increasing endurance  and functional independence when using RUE to complete tasks. : Treatment covered based on POC and graduated to patient's progress. Pt education continues at each visit to obtain maximum benefits from skilled OT intervention.  []  Alter Plan of care: Cont with approval of additional visits/ recert  []  Discharge:      Jelena VAZQUEZ, [id number]

## 2024-12-17 ENCOUNTER — HOSPITAL ENCOUNTER (OUTPATIENT)
Dept: SPEECH THERAPY | Age: 64
Setting detail: THERAPIES SERIES
Discharge: HOME | End: 2024-12-17
Payer: COMMERCIAL

## 2024-12-17 ENCOUNTER — HOSPITAL ENCOUNTER (OUTPATIENT)
Dept: OCCUPATIONAL THERAPY | Age: 64
Setting detail: THERAPIES SERIES
Discharge: HOME | End: 2024-12-17
Payer: COMMERCIAL

## 2024-12-17 PROCEDURE — 97530 THERAPEUTIC ACTIVITIES: CPT

## 2024-12-17 PROCEDURE — 97129 THER IVNTJ 1ST 15 MIN: CPT

## 2024-12-17 PROCEDURE — 97130 THER IVNTJ EA ADDL 15 MIN: CPT

## 2024-12-17 PROCEDURE — 97110 THERAPEUTIC EXERCISES: CPT

## 2024-12-17 NOTE — PROGRESS NOTES
10 reps/2 sets  3# total for kiran wrist wraps (1 1/2# each) for overhead press 10 reps/2 sets  3# total for kiran wrist wraps (1 1/2# each)  for shoulder flex/ ext /abd 10 reps/2 sets each  3# total for kiran wrist wraps (1 1/2# each) for serving arms 10 reps/2 sets       B Elbows/Forearm X    X 3# total for kiran wrist wraps (1 1/2# each) for biceps curl 15 reps x 2 sets  3# total for kiran wrist wraps (1 1/2# each) for oh triceps 15 reps x 2 sets (ModA) for proper positioning   B wrist/hands       X      X                  Orange medium resistance 5# putty utilized for grasps, pinches, pushes, pulls    5# mixed theraputty with cizer simulation cap, peg & jar lid turning tools    Green Therabar, wringing, bending, isometric shaking ex's x 5 minutes. Pt displays ^ing strength & endurance    Washer turning tool with 1 1/2# wrist wraps added to kiran UE's . Pt performs well. X 2 full reps each UE  Green hand gripper x 30 reps- composite followed by individual fingers which pt requires physical assist from therapist to coordinate x 10 reps each    Hand gripper level 3 resistance with silver spring x 15 reps ea, challenged memory with directions and coordinating of colors to cone for object transferring  Large light mass of putty: flatten out and use cone to make biscuits   Gross  X      X UBE x 10 mins with ^d 4.0 resistance- kiran/uni/forward/reverse- standing to work on BUE & standing strength/ tolerance & memory recall of verbal instructions  Treadmill x 10 mins-  1.5 speed. Pt reports fatigue after 5 mins   Other:     HEP  7/25/24 Patient reported that she had arm bike home setting? Will verify   Energy Conservation x Patient instructed on energy conservation related to IADLs. She verbalized good understanding   Progress Update  Reassessed goals, updated HEP, noted measurable outcomes     Assessment/Comments:  Pt arrives to OT tx session displaying good energy level and ^d motivation for session tasks. Pt able to tolerate ^d

## 2024-12-18 NOTE — PROGRESS NOTES
SPEECH LANGUAGE PATHOLOGY  DAILY PROGRESS NOTE        PATIENT NAME:  Erna Stoner      :  1960          TODAY'S DATE:  2024     POC:  Pt will improve immediate, short term, recent memory during structured and unstructured tasks with 80% accuracy   Pt will improve problem solving/thought organization during structured and unstructured tasks with 80% accuracy      Subjective:                Patient was seen for therapy today. She arrived independently. Pt was cooperative and pleasant. She reported feeling very tired today.      Objective:                              Pt completed mild- moderately difficult deductive reasoning puzzles this date with 60% accuracy. Patient required moderate-max assistance to complete each puzzle. Pt demonstrated fair- organization and reasoning skills throughout each puzzle. She had difficulty inferencing through information given during each task as well.        Assessment:                 Making progress with goals     Plan:                 Continue POC.        CPT code(s) 85793  therapeutic interventions that focus on cognitive function , initial  15 min  35487  therapeutic interventions that focus on cognitive function, each additional 15 min  Total minutes :  30 minutes    Britt Santiago M.A., CCC-SLP

## 2024-12-30 ENCOUNTER — TELEPHONE (OUTPATIENT)
Age: 64
End: 2024-12-30

## 2024-12-30 NOTE — TELEPHONE ENCOUNTER
Patient called the office today regarding her lactulose. Pt stated her insurance won't cover this and the only way it'll be covered would be if it says \"take 4 times daily\". Patient was told that I would call that in to her pharmacy. Patient verbalized understanding.  Electronically signed by Quyen Ho MA on 12/30/24 at 11:44 AM EST

## 2024-12-31 ENCOUNTER — HOSPITAL ENCOUNTER (OUTPATIENT)
Dept: OCCUPATIONAL THERAPY | Age: 64
Setting detail: THERAPIES SERIES
Discharge: HOME | End: 2024-12-31
Payer: COMMERCIAL

## 2024-12-31 PROCEDURE — 97110 THERAPEUTIC EXERCISES: CPT

## 2024-12-31 PROCEDURE — 97530 THERAPEUTIC ACTIVITIES: CPT

## 2024-12-31 NOTE — PROGRESS NOTES
X    X    X    X 3# total for kiran wrist wraps (1 1/2# each) for chest press 10 reps/2 sets  3# total for kiran wrist wraps (1 1/2# each) for overhead press 10 reps/2 sets  3# total for kiran wrist wraps (1 1/2# each)  for shoulder flex/ ext /abd 10 reps/2 sets each  3# total for kiran wrist wraps (1 1/2# each) for serving arms 10 reps/2 sets       B Elbows/Forearm X    X 3# total for kiran wrist wraps (1 1/2# each) for biceps curl 15 reps x 2 sets  3# total for kiran wrist wraps (1 1/2# each) for oh triceps 15 reps x 2 sets (ModA) for proper positioning   B wrist/hands             X                  Orange medium resistance 5# putty utilized for grasps, pinches, pushes, pulls    5# mixed theraputty with cizer simulation cap, peg & jar lid turning tools    Green Therabar, wringing, bending, isometric shaking ex's x 5 minutes. Pt displays ^ing strength & endurance    Washer turning tool with 1 1/2# wrist wraps added to kiran UE's . Pt performs well. X 2 full reps each UE  Green hand gripper x 30 reps- composite followed by individual fingers which pt requires physical assist from therapist to coordinate x 10 reps each    Hand gripper level 3 resistance with silver spring x 15 reps ea, challenged memory with directions and coordinating of colors to cone for object transferring  Large light mass of putty: flatten out and use cone to make biscuits   Gross  X      X UBE x 10 mins with  4.0 resistance- kiran/uni/forward/reverse- standing to work on BUE & standing strength/ tolerance & memory recall of verbal instructions  Treadmill x 10 mins-  1.5 speed. Pt reports fatigue after 5 mins   Other:     HEP  7/25/24 Patient reported that she had arm bike home setting? Will verify   Energy Conservation x Patient instructed on energy conservation related to IADLs. She verbalized good understanding   Progress Update  Reassessed goals, updated HEP, noted measurable outcomes     Assessment/Comments:  Pt arrives to OT appt reporting she knows her

## 2025-01-03 ENCOUNTER — HOSPITAL ENCOUNTER (OUTPATIENT)
Dept: INFUSION THERAPY | Age: 65
Setting detail: INFUSION SERIES
Discharge: HOME | End: 2025-01-03
Payer: COMMERCIAL

## 2025-01-03 VITALS
TEMPERATURE: 97.2 F | OXYGEN SATURATION: 100 % | DIASTOLIC BLOOD PRESSURE: 66 MMHG | SYSTOLIC BLOOD PRESSURE: 128 MMHG | HEART RATE: 85 BPM | RESPIRATION RATE: 16 BRPM

## 2025-01-03 DIAGNOSIS — M81.0 SENILE OSTEOPOROSIS: Primary | ICD-10-CM

## 2025-01-03 PROCEDURE — 6360000002 HC RX W HCPCS: Performed by: FAMILY MEDICINE

## 2025-01-03 PROCEDURE — 96372 THER/PROPH/DIAG INJ SC/IM: CPT

## 2025-01-03 RX ORDER — EMPAGLIFLOZIN AND METFORMIN HYDROCHLORIDE 5; 1000 MG/1; MG/1
TABLET ORAL
COMMUNITY

## 2025-01-03 RX ORDER — ONDANSETRON 2 MG/ML
8 INJECTION INTRAMUSCULAR; INTRAVENOUS
OUTPATIENT
Start: 2025-06-27

## 2025-01-03 RX ORDER — ACETAMINOPHEN 325 MG/1
650 TABLET ORAL
OUTPATIENT
Start: 2025-06-27

## 2025-01-03 RX ORDER — SODIUM CHLORIDE 9 MG/ML
INJECTION, SOLUTION INTRAVENOUS CONTINUOUS
OUTPATIENT
Start: 2025-06-27

## 2025-01-03 RX ORDER — HYDROCORTISONE SODIUM SUCCINATE 100 MG/2ML
100 INJECTION INTRAMUSCULAR; INTRAVENOUS
OUTPATIENT
Start: 2025-06-27

## 2025-01-03 RX ORDER — DIPHENHYDRAMINE HYDROCHLORIDE 50 MG/ML
50 INJECTION INTRAMUSCULAR; INTRAVENOUS
OUTPATIENT
Start: 2025-06-27

## 2025-01-03 RX ORDER — EPINEPHRINE 1 MG/ML
0.3 INJECTION, SOLUTION, CONCENTRATE INTRAVENOUS PRN
OUTPATIENT
Start: 2025-06-27

## 2025-01-03 RX ORDER — ALBUTEROL SULFATE 90 UG/1
4 INHALANT RESPIRATORY (INHALATION) PRN
OUTPATIENT
Start: 2025-06-27

## 2025-01-03 RX ADMIN — DENOSUMAB 60 MG: 60 INJECTION SUBCUTANEOUS at 10:27

## 2025-01-03 NOTE — PROGRESS NOTES
Patient encouraged to follow up with PCP regarding checking Ca levels and possibly taking Ca oral supplements at home. Patient denies any pain at this time. Vitals stable.

## 2025-01-07 ENCOUNTER — HOSPITAL ENCOUNTER (OUTPATIENT)
Dept: OCCUPATIONAL THERAPY | Age: 65
Setting detail: THERAPIES SERIES
Discharge: HOME | End: 2025-01-07
Payer: COMMERCIAL

## 2025-01-07 ENCOUNTER — HOSPITAL ENCOUNTER (OUTPATIENT)
Dept: SPEECH THERAPY | Age: 65
Setting detail: THERAPIES SERIES
Discharge: HOME | End: 2025-01-07
Payer: COMMERCIAL

## 2025-01-07 ENCOUNTER — OFFICE VISIT (OUTPATIENT)
Dept: CARDIOLOGY CLINIC | Age: 65
End: 2025-01-07

## 2025-01-07 VITALS
HEART RATE: 95 BPM | BODY MASS INDEX: 28.23 KG/M2 | HEIGHT: 62 IN | OXYGEN SATURATION: 97 % | TEMPERATURE: 97.8 F | RESPIRATION RATE: 18 BRPM | WEIGHT: 153.4 LBS | DIASTOLIC BLOOD PRESSURE: 60 MMHG | SYSTOLIC BLOOD PRESSURE: 110 MMHG

## 2025-01-07 DIAGNOSIS — R60.0 LOWER EXTREMITY EDEMA: ICD-10-CM

## 2025-01-07 DIAGNOSIS — R07.89 OTHER CHEST PAIN: Primary | ICD-10-CM

## 2025-01-07 PROCEDURE — 97130 THER IVNTJ EA ADDL 15 MIN: CPT

## 2025-01-07 PROCEDURE — 97129 THER IVNTJ 1ST 15 MIN: CPT

## 2025-01-07 PROCEDURE — 97110 THERAPEUTIC EXERCISES: CPT

## 2025-01-07 PROCEDURE — 97530 THERAPEUTIC ACTIVITIES: CPT

## 2025-01-07 RX ORDER — OMEPRAZOLE 40 MG/1
40 CAPSULE, DELAYED RELEASE ORAL DAILY
COMMUNITY
Start: 2024-12-23

## 2025-01-07 RX ORDER — PREDNISONE 10 MG/1
10 TABLET ORAL DAILY
COMMUNITY
Start: 2024-12-19

## 2025-01-07 NOTE — PROGRESS NOTES
known to have a history of thyroid problems.  No recent nose bleeds.    PHYSICAL EXAM:  Vitals:    01/07/25 1222   BP: 110/60   Pulse: 95   Resp: 18   Temp: 97.8 °F (36.6 °C)   SpO2: 97%   Weight: 69.6 kg (153 lb 6.4 oz)   Height: 1.575 m (5' 2\")       GENERAL:  She is alert and oriented x 3, communicates well, in no distress.   NECK:  No masses, trachea is mid position.  Supple, full ROM, no JVD or bruits.  No palpable thyromegaly or lymphadenopathy.   HEART:  Regular rate and rhythm. Normal S1 and S2. There there is a 1/6 to 2/6 systolic murmur.    LUNGS:  Clear to auscultation bilaterally.  No use of accessory muscles.  symmetrical excursion.  Mildly decreased air movement.  ABDOMEN:  Soft, non-tender.  Normal bowel sounds.  EXTREMITIES:  Full ROM x 4.  Mild right and mild to moderate left lower extremity edema.  Good distal pulses.   EYES:  Extraocular muscles intact.  PERRL.  Normal lids & conjunctiva.  ENT:  Nares are clear & not bleeding.  Moist mucosa.  Normal lips formation.  No external masses   NEURO: no tremors, full ROM x 4, EOMI.  SKIN:  Warm, dry and intact.  Normal turgor.        EKG: Sinus rhythm, 95 bpm, nl axis, nonspecific ST - T wave changes.  Poor R wave progression.      Assessment:   Low risk abnormal stress test 5/13/2024.  He denies any symptoms at this time.  Prior noncardiac sounding chest pain.  None this visit.  Systolic murmur.  Negative echo 4-24.  Chronic left lower extremity swelling  Sarcoidosis  Cirrhosis.  No statins.  Esophageal varices.  Status post TIPS.  GI bleed  Portal thrombosis  Diabetes  Asthma      Recommendations:  Coronary CTA is to differentiate between breast attenuation artifact versus ischemic coronary artery disease was ordered.  She was not able to complete a coronary CTA in the hospital due to her low blood pressure which did not allow them to give medications to lower the heart rate for the images.    She is not a candidate for invasive cardiac procedures due

## 2025-01-07 NOTE — PROGRESS NOTES
Written  Comprehension of Education:  [x] Verbalizes understanding.  [x] Demonstrates understanding.  [x] Needs Review.  [] Demonstrates/verbalizes understanding of HEP/Ed previously given.    Time In: 1pm           Time Out: 2pm               CODE  Minutes Units   79533 Fluidotherapy     27285 Paraffin     97307 Ultrasound     67352 Electrical Stim - Attended     08443 Iontophoresis     59149 Therapeutic Ex 30 2   43203 Therapeutic Activity 30 2   76342 Neuromuscular Re-Ed     36762 Manual Therapy     05281 ADL/COMP Tech Train     38434 Orthotic Management/Training      Other                 Total  60 4       Plan: OT 1-2x / week for 10-12 visits     [x]  Continues Plan of care with focus on  improve functional use of the RUE by increasing strength, short term/long term memory, increasing endurance  and functional independence when using RUE to complete tasks. : Treatment covered based on POC and graduated to patient's progress. Pt education continues at each visit to obtain maximum benefits from skilled OT intervention.  []  Alter Plan of care: Cont with approval of additional visits/ recert  []  Discharge:      Jelena VAZQUEZ, [id number]

## 2025-01-07 NOTE — PROGRESS NOTES
SPEECH LANGUAGE PATHOLOGY  DAILY PROGRESS NOTE        PATIENT NAME:  Erna Stoner      :  1960          TODAY'S DATE:  2025     POC:  Pt will improve immediate, short term, recent memory during structured and unstructured tasks with 80% accuracy   Pt will improve problem solving/thought organization during structured and unstructured tasks with 80% accuracy      Subjective:                Patient was seen for therapy today. She arrived independently. Pt was cooperative and pleasant. She reported feeling very tired today. As therapy went on, pt was observed to fatigue due to mental demand of therapy task. She requested assistance multiple times with targeted tasks.      Objective:                              Pt completed first letter mnemonic recall task. Given word list (I.e. eggs, ketchup, coffee, asparagus) pt was tasks to create a mnemonic word that represented the words in the word list (I.e. cake). Patient demonstrated mental manipulation and reasoning skills difficulty with this task. She required max assistance with taking first letter of word list words and making a new word to help her recall the word list. Once max assistance was given from clinician, pt was able to recall word list from made up word with about 50% accuracy.        Assessment:                 Making progress with goals     Plan:                 Continue POC.        CPT code(s) 59471  therapeutic interventions that focus on cognitive function , initial  15 min  58918  therapeutic interventions that focus on cognitive function, each additional 15 min  Total minutes :  30 minutes    Britt Santiago M.A., CCC-SLP

## 2025-01-13 ENCOUNTER — HOSPITAL ENCOUNTER (OUTPATIENT)
Age: 65
Discharge: HOME | End: 2025-01-13
Payer: COMMERCIAL

## 2025-01-13 ENCOUNTER — TELEPHONE (OUTPATIENT)
Age: 65
End: 2025-01-13

## 2025-01-13 DIAGNOSIS — K74.60 HEPATIC CIRRHOSIS, UNSPECIFIED HEPATIC CIRRHOSIS TYPE, UNSPECIFIED WHETHER ASCITES PRESENT (HCC): Primary | ICD-10-CM

## 2025-01-13 DIAGNOSIS — K74.60 HEPATIC CIRRHOSIS, UNSPECIFIED HEPATIC CIRRHOSIS TYPE, UNSPECIFIED WHETHER ASCITES PRESENT (HCC): ICD-10-CM

## 2025-01-13 LAB
ALBUMIN SERPL-MCNC: 3.6 G/DL (ref 3.5–5.2)
ALP SERPL-CCNC: 128 U/L (ref 35–104)
ALT SERPL-CCNC: 41 U/L (ref 0–32)
AMMONIA PLAS-SCNC: 43 UMOL/L (ref 11–51)
ANION GAP SERPL CALCULATED.3IONS-SCNC: 16 MMOL/L (ref 7–16)
AST SERPL-CCNC: 42 U/L (ref 0–31)
BASOPHILS # BLD: 0 K/UL (ref 0–0.2)
BASOPHILS NFR BLD: 0 % (ref 0–2)
BILIRUB SERPL-MCNC: 3.9 MG/DL (ref 0–1.2)
BUN SERPL-MCNC: 6 MG/DL (ref 6–23)
CALCIUM SERPL-MCNC: 9.2 MG/DL (ref 8.6–10.2)
CHLORIDE SERPL-SCNC: 103 MMOL/L (ref 98–107)
CO2 SERPL-SCNC: 23 MMOL/L (ref 22–29)
CREAT SERPL-MCNC: 0.5 MG/DL (ref 0.5–1)
EOSINOPHIL # BLD: 0 K/UL (ref 0.05–0.5)
EOSINOPHILS RELATIVE PERCENT: 0 % (ref 0–6)
ERYTHROCYTE [DISTWIDTH] IN BLOOD BY AUTOMATED COUNT: 14.9 % (ref 11.5–15)
GFR, ESTIMATED: >90 ML/MIN/1.73M2
GLUCOSE SERPL-MCNC: 128 MG/DL (ref 74–99)
HCT VFR BLD AUTO: 44.6 % (ref 34–48)
HGB BLD-MCNC: 15.1 G/DL (ref 11.5–15.5)
INR PPP: 1.3
LYMPHOCYTES NFR BLD: 0.3 K/UL (ref 1.5–4)
LYMPHOCYTES RELATIVE PERCENT: 8 % (ref 20–42)
MCH RBC QN AUTO: 31.5 PG (ref 26–35)
MCHC RBC AUTO-ENTMCNC: 33.9 G/DL (ref 32–34.5)
MCV RBC AUTO: 93.1 FL (ref 80–99.9)
METAMYELOCYTES ABSOLUTE COUNT: 0.03 K/UL (ref 0–0.12)
METAMYELOCYTES: 1 % (ref 0–1)
MONOCYTES NFR BLD: 0.3 K/UL (ref 0.1–0.95)
MONOCYTES NFR BLD: 8 % (ref 2–12)
NEUTROPHILS NFR BLD: 84 % (ref 43–80)
NEUTS SEG NFR BLD: 3.17 K/UL (ref 1.8–7.3)
PLATELET # BLD AUTO: 122 K/UL (ref 130–450)
PMV BLD AUTO: 9.7 FL (ref 7–12)
POTASSIUM SERPL-SCNC: 3.4 MMOL/L (ref 3.5–5)
PROT SERPL-MCNC: 6.1 G/DL (ref 6.4–8.3)
PROTHROMBIN TIME: 14.2 SEC (ref 9.3–12.4)
RBC # BLD AUTO: 4.79 M/UL (ref 3.5–5.5)
RBC # BLD: ABNORMAL 10*6/UL
SODIUM SERPL-SCNC: 142 MMOL/L (ref 132–146)
WBC OTHER # BLD: 3.8 K/UL (ref 4.5–11.5)

## 2025-01-13 PROCEDURE — 80053 COMPREHEN METABOLIC PANEL: CPT

## 2025-01-13 PROCEDURE — 85610 PROTHROMBIN TIME: CPT

## 2025-01-13 PROCEDURE — 36415 COLL VENOUS BLD VENIPUNCTURE: CPT

## 2025-01-13 PROCEDURE — 82140 ASSAY OF AMMONIA: CPT

## 2025-01-13 PROCEDURE — 85025 COMPLETE CBC W/AUTO DIFF WBC: CPT

## 2025-01-13 NOTE — TELEPHONE ENCOUNTER
PATIENT CALLED AND STATES THAT SHE ISNT FEELING WELL she's feeling tired and jittery and is taking meds she doesn't want to go to the hospital   Can you please call patient with a response   Would like to know when should she have her amonia levels done again

## 2025-01-14 ENCOUNTER — APPOINTMENT (OUTPATIENT)
Dept: SPEECH THERAPY | Age: 65
End: 2025-01-14
Payer: COMMERCIAL

## 2025-01-15 ENCOUNTER — HOSPITAL ENCOUNTER (OUTPATIENT)
Dept: SPEECH THERAPY | Age: 65
Setting detail: THERAPIES SERIES
Discharge: HOME | End: 2025-01-15
Payer: COMMERCIAL

## 2025-01-15 ENCOUNTER — HOSPITAL ENCOUNTER (OUTPATIENT)
Dept: OCCUPATIONAL THERAPY | Age: 65
Setting detail: THERAPIES SERIES
Discharge: HOME | End: 2025-01-15
Payer: COMMERCIAL

## 2025-01-15 NOTE — PROGRESS NOTES
Occupational Therapy      Phone: [phone] Fax: [phone]     Occupational Therapy   Cancellation Note     Patient Name:  Erna Stoner  : 1960  Date:  1/15/2025  MRN: [id number]    For today's appointment patient:   [x]  Cancelled   [x]  Rescheduled appointment   []  No-show     Reason given by patient:   [x]  Patient ill   []  Conflicting appointment   []  No transportation   []  Conflict with work   []  No reason given   []  Other:    Comments: Pt is scheduled for an epidural for her back next week so she rescheduled for the following week    Electronically signed by: Jelena VAZQUEZ, [id number]

## 2025-01-15 NOTE — PROGRESS NOTES
Speech-Language Pathology  Cancellation/No Show Note      For today's appointment patient:    [x]  Cancelled                  []  Rescheduled appointment    []  No show       []  Therapist cancelled             Reason given by patient:  []  No reason given  []  Conflicting appointment  []  No transportation  []  Conflict with work  [x]  Illness  []  Inclement weather   []  Insurance related issues  []  Other           Comments:    Continue as per established POC    Britt Santiago M.A., CCC-SLP    1/15/2025

## 2025-01-17 ENCOUNTER — TELEPHONE (OUTPATIENT)
Age: 65
End: 2025-01-17

## 2025-01-17 NOTE — TELEPHONE ENCOUNTER
Returned patients call regarding her ammonia levels.  Rekha wrote to her through my chart with results and I read this to her on her voice mail.Asked her to call back with details if she still isn't feeling well. Electronically signed by HERMINIA KENNEY LPN on 1/17/2025 at 1:25 PM

## 2025-01-21 ENCOUNTER — APPOINTMENT (OUTPATIENT)
Dept: SPEECH THERAPY | Age: 65
End: 2025-01-21
Payer: COMMERCIAL

## 2025-01-22 ENCOUNTER — HOSPITAL ENCOUNTER (OUTPATIENT)
Dept: SPEECH THERAPY | Age: 65
Setting detail: THERAPIES SERIES
Discharge: HOME | End: 2025-01-22
Payer: COMMERCIAL

## 2025-01-22 ENCOUNTER — APPOINTMENT (OUTPATIENT)
Dept: OCCUPATIONAL THERAPY | Age: 65
End: 2025-01-22
Payer: COMMERCIAL

## 2025-01-22 NOTE — PROGRESS NOTES
Speech-Language Pathology  Cancellation/No Show Note      For today's appointment patient:    [x]  Cancelled                  []  Rescheduled appointment    []  No show       []  Therapist cancelled             Reason given by patient:  []  No reason given  [x]  Conflicting appointment  []  No transportation  []  Conflict with work  []  Illness  []  Inclement weather   []  Insurance related issues  []  Other           Comments:    Continue as per established POC    Britt Santiago M.A., CCC-SLP    1/22/2025

## 2025-01-25 ENCOUNTER — HOSPITAL ENCOUNTER (INPATIENT)
Age: 65
LOS: 5 days | Discharge: HOME | DRG: 423 | End: 2025-01-30
Attending: EMERGENCY MEDICINE | Admitting: INTERNAL MEDICINE
Payer: COMMERCIAL

## 2025-01-25 ENCOUNTER — APPOINTMENT (OUTPATIENT)
Dept: GENERAL RADIOLOGY | Age: 65
DRG: 423 | End: 2025-01-25
Payer: COMMERCIAL

## 2025-01-25 DIAGNOSIS — E87.20 LACTIC ACIDOSIS: ICD-10-CM

## 2025-01-25 DIAGNOSIS — E72.20 HYPERAMMONEMIA (HCC): Primary | ICD-10-CM

## 2025-01-25 LAB
ALBUMIN SERPL-MCNC: 3.6 G/DL (ref 3.5–5.2)
ALP SERPL-CCNC: 122 U/L (ref 35–104)
ALT SERPL-CCNC: 45 U/L (ref 0–32)
AMMONIA PLAS-SCNC: 119 UMOL/L (ref 11–51)
ANION GAP SERPL CALCULATED.3IONS-SCNC: 13 MMOL/L (ref 7–16)
AST SERPL-CCNC: 61 U/L (ref 0–31)
BACTERIA URNS QL MICRO: ABNORMAL
BASOPHILS # BLD: 0.07 K/UL (ref 0–0.2)
BASOPHILS NFR BLD: 2 % (ref 0–2)
BILIRUB DIRECT SERPL-MCNC: 0.9 MG/DL (ref 0–0.3)
BILIRUB INDIRECT SERPL-MCNC: 1.8 MG/DL (ref 0–1)
BILIRUB SERPL-MCNC: 2.7 MG/DL (ref 0–1.2)
BILIRUB UR QL STRIP: NEGATIVE
BNP SERPL-MCNC: 194 PG/ML (ref 0–125)
BUN SERPL-MCNC: 6 MG/DL (ref 6–23)
CALCIUM SERPL-MCNC: 8.6 MG/DL (ref 8.6–10.2)
CHLORIDE SERPL-SCNC: 102 MMOL/L (ref 98–107)
CLARITY UR: ABNORMAL
CO2 SERPL-SCNC: 25 MMOL/L (ref 22–29)
COLOR UR: YELLOW
CREAT SERPL-MCNC: 0.4 MG/DL (ref 0.5–1)
EOSINOPHIL # BLD: 0 K/UL (ref 0.05–0.5)
EOSINOPHILS RELATIVE PERCENT: 0 % (ref 0–6)
ERYTHROCYTE [DISTWIDTH] IN BLOOD BY AUTOMATED COUNT: 15 % (ref 11.5–15)
GFR, ESTIMATED: >90 ML/MIN/1.73M2
GLUCOSE SERPL-MCNC: 145 MG/DL (ref 74–99)
GLUCOSE UR STRIP-MCNC: >=1000 MG/DL
HCT VFR BLD AUTO: 43.2 % (ref 34–48)
HGB BLD-MCNC: 15 G/DL (ref 11.5–15.5)
HGB UR QL STRIP.AUTO: ABNORMAL
INR PPP: 1.4
KETONES UR STRIP-MCNC: 15 MG/DL
LACTATE BLDV-SCNC: 2.3 MMOL/L (ref 0.5–2.2)
LACTATE BLDV-SCNC: 2.9 MMOL/L (ref 0.5–2.2)
LEUKOCYTE ESTERASE UR QL STRIP: NEGATIVE
LIPASE SERPL-CCNC: 32 U/L (ref 13–60)
LYMPHOCYTES NFR BLD: 0.19 K/UL (ref 1.5–4)
LYMPHOCYTES RELATIVE PERCENT: 4 % (ref 20–42)
MCH RBC QN AUTO: 31.4 PG (ref 26–35)
MCHC RBC AUTO-ENTMCNC: 34.7 G/DL (ref 32–34.5)
MCV RBC AUTO: 90.4 FL (ref 80–99.9)
METAMYELOCYTES ABSOLUTE COUNT: 0.04 K/UL (ref 0–0.12)
METAMYELOCYTES: 1 % (ref 0–1)
MONOCYTES NFR BLD: 0.3 K/UL (ref 0.1–0.95)
MONOCYTES NFR BLD: 7 % (ref 2–12)
MYELOCYTES ABSOLUTE COUNT: 0.07 K/UL
MYELOCYTES: 2 %
NEUTROPHILS NFR BLD: 85 % (ref 43–80)
NEUTS SEG NFR BLD: 3.73 K/UL (ref 1.8–7.3)
NITRITE UR QL STRIP: NEGATIVE
PH UR STRIP: 7 [PH] (ref 5–9)
PLATELET, FLUORESCENCE: 125 K/UL (ref 130–450)
PMV BLD AUTO: 9.3 FL (ref 7–12)
POTASSIUM SERPL-SCNC: 3.8 MMOL/L (ref 3.5–5)
PROT SERPL-MCNC: 5.3 G/DL (ref 6.4–8.3)
PROT UR STRIP-MCNC: NEGATIVE MG/DL
PROTHROMBIN TIME: 15.3 SEC (ref 9.3–12.4)
RBC # BLD AUTO: 4.78 M/UL (ref 3.5–5.5)
RBC # BLD: ABNORMAL 10*6/UL
RBC # BLD: ABNORMAL 10*6/UL
RBC #/AREA URNS HPF: ABNORMAL /HPF
SODIUM SERPL-SCNC: 140 MMOL/L (ref 132–146)
SP GR UR STRIP: 1.02 (ref 1–1.03)
TROPONIN I SERPL HS-MCNC: <6 NG/L (ref 0–9)
UROBILINOGEN UR STRIP-ACNC: 0.2 EU/DL (ref 0–1)
WBC #/AREA URNS HPF: ABNORMAL /HPF
WBC OTHER # BLD: 4.4 K/UL (ref 4.5–11.5)

## 2025-01-25 PROCEDURE — 83880 ASSAY OF NATRIURETIC PEPTIDE: CPT

## 2025-01-25 PROCEDURE — 83605 ASSAY OF LACTIC ACID: CPT

## 2025-01-25 PROCEDURE — 6370000000 HC RX 637 (ALT 250 FOR IP): Performed by: EMERGENCY MEDICINE

## 2025-01-25 PROCEDURE — 99285 EMERGENCY DEPT VISIT HI MDM: CPT

## 2025-01-25 PROCEDURE — 1200000000 HC SEMI PRIVATE

## 2025-01-25 PROCEDURE — 85025 COMPLETE CBC W/AUTO DIFF WBC: CPT

## 2025-01-25 PROCEDURE — 85610 PROTHROMBIN TIME: CPT

## 2025-01-25 PROCEDURE — 84484 ASSAY OF TROPONIN QUANT: CPT

## 2025-01-25 PROCEDURE — 83690 ASSAY OF LIPASE: CPT

## 2025-01-25 PROCEDURE — 82140 ASSAY OF AMMONIA: CPT

## 2025-01-25 PROCEDURE — 2580000003 HC RX 258

## 2025-01-25 PROCEDURE — 82248 BILIRUBIN DIRECT: CPT

## 2025-01-25 PROCEDURE — 71045 X-RAY EXAM CHEST 1 VIEW: CPT

## 2025-01-25 PROCEDURE — 80053 COMPREHEN METABOLIC PANEL: CPT

## 2025-01-25 PROCEDURE — 81001 URINALYSIS AUTO W/SCOPE: CPT

## 2025-01-25 RX ORDER — LACTULOSE 10 G/15ML
20 SOLUTION ORAL ONCE
Status: COMPLETED | OUTPATIENT
Start: 2025-01-25 | End: 2025-01-25

## 2025-01-25 RX ORDER — 0.9 % SODIUM CHLORIDE 0.9 %
1000 INTRAVENOUS SOLUTION INTRAVENOUS ONCE
Status: COMPLETED | OUTPATIENT
Start: 2025-01-25 | End: 2025-01-26

## 2025-01-25 RX ADMIN — SODIUM CHLORIDE 1000 ML: 9 INJECTION, SOLUTION INTRAVENOUS at 20:07

## 2025-01-25 RX ADMIN — LACTULOSE 20 G: 20 SOLUTION ORAL at 21:14

## 2025-01-25 ASSESSMENT — PAIN DESCRIPTION - ORIENTATION: ORIENTATION: MID

## 2025-01-25 ASSESSMENT — PAIN - FUNCTIONAL ASSESSMENT: PAIN_FUNCTIONAL_ASSESSMENT: 0-10

## 2025-01-25 ASSESSMENT — PAIN DESCRIPTION - LOCATION: LOCATION: ABDOMEN

## 2025-01-25 ASSESSMENT — LIFESTYLE VARIABLES: HOW OFTEN DO YOU HAVE A DRINK CONTAINING ALCOHOL: NEVER

## 2025-01-25 ASSESSMENT — PAIN DESCRIPTION - DESCRIPTORS: DESCRIPTORS: DISCOMFORT

## 2025-01-25 ASSESSMENT — PAIN DESCRIPTION - PAIN TYPE: TYPE: ACUTE PAIN

## 2025-01-25 ASSESSMENT — PAIN SCALES - GENERAL: PAINLEVEL_OUTOF10: 4

## 2025-01-25 NOTE — ED PROVIDER NOTES
[facility] EMERGENCY DEPARTMENT  EMERGENCY DEPARTMENT ENCOUNTER        Pt Name: Erna Stoner  MRN: [id number]  Birthdate 1960  Date of evaluation: 2025  Provider: Gerson Stone II, DO  PCP: Elia Daniel MD  Note Started: 6:20 PM EST 25    CHIEF COMPLAINT       Chief Complaint   Patient presents with    Abdominal Pain     Starting yesterday, pt states she has sarcoidosis and experiencing flare up. C/o of weakness and fatigue, pt unable to tell what year it is. A&o x2 (person, place)       HISTORY OF PRESENT ILLNESS: 1 or more Elements            Erna Stoner is a 64 y.o. female history of sarcoidosis, who presents for complaint of subjective confusion and abdominal pain.  Patient states that she has been compliant with her lactulose at home.  Pain is nonspecific, not associated with bowel movement or p.o. intake.  She denies any fevers or chills, she denies any cough or congestion.  She reports some mild nausea with no vomiting.  Mild amount of dysuria over the past few days.     Nursing Notes were all reviewed and agreed with or any disagreements were addressed in the HPI.      REVIEW OF EXTERNAL NOTE :       Records reviewed for medical hx, surgical, hx, and medication lists      Chart Review/External Note Review    Last Echo reviewed by Me:  No results found for: \"LVEF\", \"LVEFMODE\"          Controlled Substance Monitoring:    Acute and Chronic Pain Monitoring:        No data to display                    REVIEW OF SYSTEMS :      Positives and Pertinent negatives as per HPI.     SURGICAL HISTORY     Past Surgical History:   Procedure Laterality Date    CARPAL TUNNEL RELEASE      bilateral     SECTION      CHOLECYSTECTOMY      COLONOSCOPY  2012    COLONOSCOPY N/A 10/10/2024    COLONOSCOPY DIAGNOSTIC performed by Augie Montes De Oca MD at [facility] ENDOSCOPY    HYSTERECTOMY (CERVIX STATUS UNKNOWN)      IR EMBOLIZATION VENOUS

## 2025-01-26 ENCOUNTER — APPOINTMENT (OUTPATIENT)
Dept: CT IMAGING | Age: 65
DRG: 423 | End: 2025-01-26
Payer: COMMERCIAL

## 2025-01-26 PROBLEM — D86.9 SARCOID: Status: ACTIVE | Noted: 2025-01-26

## 2025-01-26 PROBLEM — E87.20 LACTIC ACID ACIDOSIS: Status: ACTIVE | Noted: 2025-01-26

## 2025-01-26 LAB
AMMONIA PLAS-SCNC: 135 UMOL/L (ref 11–51)
ANION GAP SERPL CALCULATED.3IONS-SCNC: 16 MMOL/L (ref 7–16)
BASOPHILS # BLD: 0.02 K/UL (ref 0–0.2)
BASOPHILS NFR BLD: 1 % (ref 0–2)
BUN SERPL-MCNC: 5 MG/DL (ref 6–23)
CALCIUM SERPL-MCNC: 8.7 MG/DL (ref 8.6–10.2)
CHLORIDE SERPL-SCNC: 103 MMOL/L (ref 98–107)
CO2 SERPL-SCNC: 20 MMOL/L (ref 22–29)
CREAT SERPL-MCNC: 0.4 MG/DL (ref 0.5–1)
EOSINOPHIL # BLD: 0.07 K/UL (ref 0.05–0.5)
EOSINOPHILS RELATIVE PERCENT: 2 % (ref 0–6)
ERYTHROCYTE [DISTWIDTH] IN BLOOD BY AUTOMATED COUNT: 15.4 % (ref 11.5–15)
GFR, ESTIMATED: >90 ML/MIN/1.73M2
GLUCOSE BLD-MCNC: 121 MG/DL (ref 74–99)
GLUCOSE BLD-MCNC: 130 MG/DL (ref 74–99)
GLUCOSE BLD-MCNC: 134 MG/DL (ref 74–99)
GLUCOSE BLD-MCNC: 136 MG/DL (ref 74–99)
GLUCOSE SERPL-MCNC: 147 MG/DL (ref 74–99)
HCT VFR BLD AUTO: 38.4 % (ref 34–48)
HGB BLD-MCNC: 13.1 G/DL (ref 11.5–15.5)
IMM GRANULOCYTES # BLD AUTO: <0.03 K/UL (ref 0–0.58)
IMM GRANULOCYTES NFR BLD: 1 % (ref 0–5)
LACTATE BLDV-SCNC: 3.3 MMOL/L (ref 0.5–2.2)
LACTATE BLDV-SCNC: 7.7 MMOL/L (ref 0.5–2.2)
LACTATE BLDV-SCNC: 9.5 MMOL/L (ref 0.5–2.2)
LACTATE BLDV-SCNC: NORMAL MMOL/L
LACTIC ACID, WHOLE BLOOD: NORMAL MMOL/L (ref 0.7–2.1)
LYMPHOCYTES NFR BLD: 0.65 K/UL (ref 1.5–4)
LYMPHOCYTES RELATIVE PERCENT: 19 % (ref 20–42)
MCH RBC QN AUTO: 31.6 PG (ref 26–35)
MCHC RBC AUTO-ENTMCNC: 34.1 G/DL (ref 32–34.5)
MCV RBC AUTO: 92.8 FL (ref 80–99.9)
MONOCYTES NFR BLD: 0.4 K/UL (ref 0.1–0.95)
MONOCYTES NFR BLD: 12 % (ref 2–12)
NEUTROPHILS NFR BLD: 66 % (ref 43–80)
NEUTS SEG NFR BLD: 2.21 K/UL (ref 1.8–7.3)
PLATELET # BLD AUTO: 100 K/UL (ref 130–450)
PMV BLD AUTO: 8.9 FL (ref 7–12)
POTASSIUM SERPL-SCNC: 4.1 MMOL/L (ref 3.5–5)
PROCALCITONIN SERPL-MCNC: 0.08 NG/ML (ref 0–0.08)
RBC # BLD AUTO: 4.14 M/UL (ref 3.5–5.5)
SODIUM SERPL-SCNC: 139 MMOL/L (ref 132–146)
WBC OTHER # BLD: 3.4 K/UL (ref 4.5–11.5)

## 2025-01-26 PROCEDURE — 0042T CT BRAIN PERFUSION: CPT

## 2025-01-26 PROCEDURE — 70498 CT ANGIOGRAPHY NECK: CPT

## 2025-01-26 PROCEDURE — 70496 CT ANGIOGRAPHY HEAD: CPT

## 2025-01-26 PROCEDURE — 70450 CT HEAD/BRAIN W/O DYE: CPT

## 2025-01-26 PROCEDURE — 6360000002 HC RX W HCPCS

## 2025-01-26 PROCEDURE — 36415 COLL VENOUS BLD VENIPUNCTURE: CPT

## 2025-01-26 PROCEDURE — 83605 ASSAY OF LACTIC ACID: CPT

## 2025-01-26 PROCEDURE — 2500000003 HC RX 250 WO HCPCS: Performed by: NURSE PRACTITIONER

## 2025-01-26 PROCEDURE — 2580000003 HC RX 258: Performed by: INTERNAL MEDICINE

## 2025-01-26 PROCEDURE — 6360000004 HC RX CONTRAST MEDICATION: Performed by: RADIOLOGY

## 2025-01-26 PROCEDURE — 1200000000 HC SEMI PRIVATE

## 2025-01-26 PROCEDURE — 2500000003 HC RX 250 WO HCPCS

## 2025-01-26 PROCEDURE — 2580000003 HC RX 258: Performed by: NURSE PRACTITIONER

## 2025-01-26 PROCEDURE — 6360000002 HC RX W HCPCS: Performed by: NURSE PRACTITIONER

## 2025-01-26 PROCEDURE — 6370000000 HC RX 637 (ALT 250 FOR IP): Performed by: NURSE PRACTITIONER

## 2025-01-26 RX ORDER — SODIUM CHLORIDE 9 MG/ML
INJECTION, SOLUTION INTRAVENOUS CONTINUOUS
Status: DISCONTINUED | OUTPATIENT
Start: 2025-01-26 | End: 2025-01-30 | Stop reason: HOSPADM

## 2025-01-26 RX ORDER — IOPAMIDOL 755 MG/ML
110 INJECTION, SOLUTION INTRAVASCULAR
Status: COMPLETED | OUTPATIENT
Start: 2025-01-26 | End: 2025-01-26

## 2025-01-26 RX ORDER — ONDANSETRON 4 MG/1
4 TABLET, ORALLY DISINTEGRATING ORAL EVERY 8 HOURS PRN
Status: DISCONTINUED | OUTPATIENT
Start: 2025-01-26 | End: 2025-01-30 | Stop reason: HOSPADM

## 2025-01-26 RX ORDER — LACTULOSE 10 G/15ML
20 SOLUTION ORAL 4 TIMES DAILY
Status: DISCONTINUED | OUTPATIENT
Start: 2025-01-26 | End: 2025-01-27

## 2025-01-26 RX ORDER — VITAMIN B COMPLEX
1000 TABLET ORAL EVERY MORNING
Status: DISCONTINUED | OUTPATIENT
Start: 2025-01-26 | End: 2025-01-30 | Stop reason: HOSPADM

## 2025-01-26 RX ORDER — PREDNISONE 10 MG/1
10 TABLET ORAL DAILY
Status: DISCONTINUED | OUTPATIENT
Start: 2025-01-26 | End: 2025-01-30 | Stop reason: HOSPADM

## 2025-01-26 RX ORDER — DEXTROSE MONOHYDRATE 100 MG/ML
INJECTION, SOLUTION INTRAVENOUS CONTINUOUS PRN
Status: DISCONTINUED | OUTPATIENT
Start: 2025-01-26 | End: 2025-01-30 | Stop reason: HOSPADM

## 2025-01-26 RX ORDER — HYDROCHLOROTHIAZIDE 25 MG/1
12.5 TABLET ORAL DAILY
Status: DISCONTINUED | OUTPATIENT
Start: 2025-01-26 | End: 2025-01-30 | Stop reason: HOSPADM

## 2025-01-26 RX ORDER — SODIUM CHLORIDE 0.9 % (FLUSH) 0.9 %
5-40 SYRINGE (ML) INJECTION PRN
Status: DISCONTINUED | OUTPATIENT
Start: 2025-01-26 | End: 2025-01-30 | Stop reason: HOSPADM

## 2025-01-26 RX ORDER — LEVOTHYROXINE SODIUM 100 UG/1
100 TABLET ORAL
Status: DISCONTINUED | OUTPATIENT
Start: 2025-01-26 | End: 2025-01-30 | Stop reason: HOSPADM

## 2025-01-26 RX ORDER — BISACODYL 5 MG/1
5 TABLET, DELAYED RELEASE ORAL DAILY PRN
Status: DISCONTINUED | OUTPATIENT
Start: 2025-01-26 | End: 2025-01-30 | Stop reason: HOSPADM

## 2025-01-26 RX ORDER — POTASSIUM CHLORIDE 1500 MG/1
20 TABLET, EXTENDED RELEASE ORAL DAILY
Status: DISCONTINUED | OUTPATIENT
Start: 2025-01-26 | End: 2025-01-30 | Stop reason: HOSPADM

## 2025-01-26 RX ORDER — M-VIT,TX,IRON,MINS/CALC/FOLIC 27MG-0.4MG
1 TABLET ORAL EVERY MORNING
Status: DISCONTINUED | OUTPATIENT
Start: 2025-01-26 | End: 2025-01-30 | Stop reason: HOSPADM

## 2025-01-26 RX ORDER — FOLIC ACID 1 MG/1
1 TABLET ORAL EVERY MORNING
Status: DISCONTINUED | OUTPATIENT
Start: 2025-01-26 | End: 2025-01-30 | Stop reason: HOSPADM

## 2025-01-26 RX ORDER — IRON POLYSACCHARIDE COMPLEX 150 MG
150 CAPSULE ORAL DAILY
Status: DISCONTINUED | OUTPATIENT
Start: 2025-01-26 | End: 2025-01-30 | Stop reason: HOSPADM

## 2025-01-26 RX ORDER — ALBUTEROL SULFATE 0.83 MG/ML
2.5 SOLUTION RESPIRATORY (INHALATION) EVERY 6 HOURS PRN
Status: DISCONTINUED | OUTPATIENT
Start: 2025-01-26 | End: 2025-01-30 | Stop reason: HOSPADM

## 2025-01-26 RX ORDER — ONDANSETRON 2 MG/ML
4 INJECTION INTRAMUSCULAR; INTRAVENOUS EVERY 6 HOURS PRN
Status: DISCONTINUED | OUTPATIENT
Start: 2025-01-26 | End: 2025-01-30 | Stop reason: HOSPADM

## 2025-01-26 RX ORDER — CHOLESTYRAMINE LIGHT 4 G/5.7G
4 POWDER, FOR SUSPENSION ORAL 2 TIMES DAILY
Status: DISCONTINUED | OUTPATIENT
Start: 2025-01-26 | End: 2025-01-30 | Stop reason: HOSPADM

## 2025-01-26 RX ORDER — INSULIN LISPRO 100 [IU]/ML
0-4 INJECTION, SOLUTION INTRAVENOUS; SUBCUTANEOUS
Status: DISCONTINUED | OUTPATIENT
Start: 2025-01-26 | End: 2025-01-30 | Stop reason: HOSPADM

## 2025-01-26 RX ORDER — SODIUM CHLORIDE 9 MG/ML
INJECTION, SOLUTION INTRAVENOUS PRN
Status: DISCONTINUED | OUTPATIENT
Start: 2025-01-26 | End: 2025-01-30 | Stop reason: HOSPADM

## 2025-01-26 RX ORDER — GLUCAGON 1 MG/ML
1 KIT INJECTION PRN
Status: DISCONTINUED | OUTPATIENT
Start: 2025-01-26 | End: 2025-01-30 | Stop reason: HOSPADM

## 2025-01-26 RX ORDER — 0.9 % SODIUM CHLORIDE 0.9 %
500 INTRAVENOUS SOLUTION INTRAVENOUS ONCE
Status: COMPLETED | OUTPATIENT
Start: 2025-01-26 | End: 2025-01-26

## 2025-01-26 RX ORDER — SODIUM CHLORIDE 0.9 % (FLUSH) 0.9 %
5-40 SYRINGE (ML) INJECTION EVERY 12 HOURS SCHEDULED
Status: DISCONTINUED | OUTPATIENT
Start: 2025-01-26 | End: 2025-01-30 | Stop reason: HOSPADM

## 2025-01-26 RX ORDER — ZINC SULFATE 50(220)MG
50 CAPSULE ORAL DAILY
Status: DISCONTINUED | OUTPATIENT
Start: 2025-01-26 | End: 2025-01-30 | Stop reason: HOSPADM

## 2025-01-26 RX ADMIN — FOLIC ACID 1 MG: 1 TABLET ORAL at 08:44

## 2025-01-26 RX ADMIN — PREDNISONE 10 MG: 10 TABLET ORAL at 08:44

## 2025-01-26 RX ADMIN — LACTULOSE 20 G: 20 SOLUTION ORAL at 17:42

## 2025-01-26 RX ADMIN — WATER 1000 MG: 1 INJECTION INTRAMUSCULAR; INTRAVENOUS; SUBCUTANEOUS at 23:58

## 2025-01-26 RX ADMIN — Medication 1 TABLET: at 08:44

## 2025-01-26 RX ADMIN — CHOLESTYRAMINE 4 G: 4 POWDER, FOR SUSPENSION ORAL at 08:45

## 2025-01-26 RX ADMIN — CEFTRIAXONE SODIUM 1000 MG: 1 INJECTION, POWDER, FOR SOLUTION INTRAMUSCULAR; INTRAVENOUS at 01:32

## 2025-01-26 RX ADMIN — IOPAMIDOL 110 ML: 755 INJECTION, SOLUTION INTRAVENOUS at 16:35

## 2025-01-26 RX ADMIN — SODIUM CHLORIDE 500 ML: 9 INJECTION, SOLUTION INTRAVENOUS at 13:31

## 2025-01-26 RX ADMIN — LACTULOSE 20 G: 20 SOLUTION ORAL at 13:31

## 2025-01-26 RX ADMIN — Medication 50 MG: at 08:44

## 2025-01-26 RX ADMIN — CHOLESTYRAMINE 4 G: 4 POWDER, FOR SUSPENSION ORAL at 22:38

## 2025-01-26 RX ADMIN — SODIUM CHLORIDE: 9 INJECTION, SOLUTION INTRAVENOUS at 07:39

## 2025-01-26 RX ADMIN — LACTULOSE 20 G: 20 SOLUTION ORAL at 08:45

## 2025-01-26 RX ADMIN — RIFAXIMIN 400 MG: 200 TABLET ORAL at 08:45

## 2025-01-26 RX ADMIN — Medication 1000 UNITS: at 08:44

## 2025-01-26 RX ADMIN — RIFAXIMIN 400 MG: 200 TABLET ORAL at 13:31

## 2025-01-26 RX ADMIN — RIFAXIMIN 400 MG: 200 TABLET ORAL at 22:38

## 2025-01-26 RX ADMIN — LACTULOSE 20 G: 20 SOLUTION ORAL at 22:38

## 2025-01-26 RX ADMIN — Medication 150 MG: at 08:45

## 2025-01-26 RX ADMIN — POTASSIUM CHLORIDE 20 MEQ: 1500 TABLET, EXTENDED RELEASE ORAL at 08:44

## 2025-01-26 ASSESSMENT — PAIN SCALES - GENERAL
PAINLEVEL_OUTOF10: 0
PAINLEVEL_OUTOF10: 0

## 2025-01-26 NOTE — CONSULTS
NEUROLOGY CONSULT NOTE      Requesting Physician: Elvis Davila DO    Reason for Consult:  Evaluate for in-house code stroke with altered mental status and left sided drift    History of Present Illness:  Erna Stoner is a 64 y.o. female  with h/o cirrhosis of the liver who was admitted to [facility] on 2025 with presentation of above symptoms    Past Medical History:        Diagnosis Date    Asthma     stable, is mild    Back pain     Diabetes mellitus (HCC)     Hypothyroidism     Thyroid disease            Procedure Laterality Date    CARPAL TUNNEL RELEASE      bilateral     SECTION      CHOLECYSTECTOMY      COLONOSCOPY  2012    COLONOSCOPY N/A 10/10/2024    COLONOSCOPY DIAGNOSTIC performed by Augie Montes De Oca MD at [facility] ENDOSCOPY    HYSTERECTOMY (CERVIX STATUS UNKNOWN)      IR EMBOLIZATION VENOUS  2024    IR EMBOLIZATION VENOUS 2024 JENNIFER Pack MD [facility]    IR TIPS INSERTION  2024    IR TIPS INSERTION 2024 JENNIFER Pack MD [facility]    LIVER BIOPSY  2017    NOSE SURGERY N/A 2020    EXCISIONAL BIOPSY OF RIGHT NARES (PATHOLOGY NOTIFIED) performed by Bandar Solis Jr., MD at [facility] OR    OVARY REMOVAL      TONSILLECTOMY      UPPER GASTROINTESTINAL ENDOSCOPY  2012    UPPER GASTROINTESTINAL ENDOSCOPY  2024    ESOPHAGOGASTRODUODENOSCOPY CONTROL HEMORRHAGE performed by Noé Montilla MD at [facility] ENDOSCOPY    UPPER GASTROINTESTINAL ENDOSCOPY  2024    ESOPHAGOGASTRODUODENOSCOPY SUBMUCOSAL INJECTION performed by Augie Montes De Oca MD at [facility] ENDOSCOPY    UPPER GASTROINTESTINAL ENDOSCOPY N/A 10/10/2024    ESOPHAGOGASTRODUODENOSCOPY DIAGNOSTIC ONLY performed by Augie Montes De Oca MD at [facility] ENDOSCOPY       Social History:  Social History     Tobacco Use   Smoking Status Never    Passive exposure: Never   Smokeless Tobacco Never     Social History     Substance and Sexual Activity   Alcohol Use Not

## 2025-01-26 NOTE — H&P
Hospital Medicine History & Physical      PCP: Elia Daniel MD    Date of Admission: 2025    Date of Service: . 2025    Chief Complaint:   CHANGE IN MENTAL STATUS      History Of Present Illness:     64 y.o. female presented with CHANGE IN MENTAL STATUS.  HAS A KNOWN HISTORY OF CIRRHOSIS OF THE LIVES, S/P TIPS.  STATES SHE WAS TAKING HER LACTULOSE AND HAVING BM'S    FAMILY NOTICED THAT SHE WAS BECOMING CONFUSED . HER NH3 119 ON ADMISSION    Past Medical History:          Diagnosis Date    Asthma     stable, is mild    Back pain     Diabetes mellitus (HCC)     Hypothyroidism     Thyroid disease        Past Surgical History:          Procedure Laterality Date    CARPAL TUNNEL RELEASE      bilateral     SECTION      CHOLECYSTECTOMY      COLONOSCOPY  2012    COLONOSCOPY N/A 10/10/2024    COLONOSCOPY DIAGNOSTIC performed by Augie Montes De Oca MD at [facility] ENDOSCOPY    HYSTERECTOMY (CERVIX STATUS UNKNOWN)      IR EMBOLIZATION VENOUS  2024    IR EMBOLIZATION VENOUS 2024 JENNIFER Pack MD [facility]    IR TIPS INSERTION  2024    IR TIPS INSERTION 2024 JENNIFER Pack MD [facility]    LIVER BIOPSY  2017    NOSE SURGERY N/A 2020    EXCISIONAL BIOPSY OF RIGHT NARES (PATHOLOGY NOTIFIED) performed by Bandar Solis Jr., MD at [facility] OR    OVARY REMOVAL      TONSILLECTOMY      UPPER GASTROINTESTINAL ENDOSCOPY  2012    UPPER GASTROINTESTINAL ENDOSCOPY  2024    ESOPHAGOGASTRODUODENOSCOPY CONTROL HEMORRHAGE performed by Noé Montilla MD at [facility] ENDOSCOPY    UPPER GASTROINTESTINAL ENDOSCOPY  2024    ESOPHAGOGASTRODUODENOSCOPY SUBMUCOSAL INJECTION performed by Augie Montes De Oca MD at [facility] ENDOSCOPY    UPPER GASTROINTESTINAL ENDOSCOPY N/A 10/10/2024    ESOPHAGOGASTRODUODENOSCOPY DIAGNOSTIC ONLY

## 2025-01-26 NOTE — SIGNIFICANT EVENT
Rapid Response Team Note  Date of event: 1/26/2025   Time of event: 4:08 PM  Erna Stoner 64 y.o. year old female   YOB: 1960   Admit date:  1/25/2025   Location: [id number]/8407-   Witnessed? : [x]Yes  [] No  Monitored? : [x]Yes  [] No  Code status: [x] Full  [] DNR-CCA  []DNR-CC  ______________________________________________________________________  Reason for RRT:    [] RR < 8     [] RR > 28   [] SpO2 <90%   [] HR < 40 bpm   [] HR > 130 bpm  [] SBP < 90 mmHg    [] SpO2 <90%   [] LOC   [] Seizures    [] Significant Bleeding Event    [] Other: concern for stroke    Subjective:   CTSP regarding the above event, RRT was called for the concern of stroke. Patient's PMHx is significant for hepatic encephalopathy, liver cirrhosis, hyperammonemia. Patient was admitted initially due to the concern for confusion. On evaluation, patient was having some visual difficulties (bilateral hemianopia??) which resolved very quickly, and mild slurry speech. She is alert and oriented X 3, following commands, denies any acute concern. Bedside NIHSS score is ??4-5. Neurologist was present at bedside, recommended to order CT scans of brain.    Objective:   Vital signs: Temp: N/BP: 131/70/RR: 18/HR: 80  Initial Condition:  Conscious   [x] Yes  [] No     Breathing [x] Yes  [] No     Pulse  [x] Yes  [] No    Airway:   [] Open/ Clear     Intervention: [] None  [] Pooled secretions     [] Suctioned  [] Stridor      [] Intubation    Lungs:   [] Symmetrical chest rise/ CTABL Intervention: [] None  [] Use of accessory muscles    [] NIV (CPAP/BiPAP)  [] Cyanosis      [] Nasal Oxygen/Mask  [] Wheezing       [] ABG             [] CXR  [] Other:     Circulation:   Rhythm:  [] Sinus [] Other:   Intervention: [] None            [] IV Access  [] Peripheral              [] Central            [] EKG            [] Cardioversion            [] Defibrillation     Capillary Refill:  [] > 2 seconds [] < 2 seconds    Neurologic:   [] NIHSS:

## 2025-01-27 PROBLEM — R53.1 ACUTE RIGHT-SIDED WEAKNESS: Status: ACTIVE | Noted: 2025-01-27

## 2025-01-27 LAB
ALBUMIN SERPL-MCNC: 3 G/DL (ref 3.5–5.2)
ALP SERPL-CCNC: 102 U/L (ref 35–104)
ALT SERPL-CCNC: 41 U/L (ref 0–32)
AMMONIA PLAS-SCNC: 136 UMOL/L (ref 11–51)
ANION GAP SERPL CALCULATED.3IONS-SCNC: 9 MMOL/L (ref 7–16)
AST SERPL-CCNC: 44 U/L (ref 0–31)
BASOPHILS # BLD: 0.04 K/UL (ref 0–0.2)
BASOPHILS NFR BLD: 1 % (ref 0–2)
BILIRUB SERPL-MCNC: 1.7 MG/DL (ref 0–1.2)
BUN SERPL-MCNC: 4 MG/DL (ref 6–23)
CALCIUM SERPL-MCNC: 8.2 MG/DL (ref 8.6–10.2)
CHLORIDE SERPL-SCNC: 113 MMOL/L (ref 98–107)
CO2 SERPL-SCNC: 22 MMOL/L (ref 22–29)
CREAT SERPL-MCNC: 0.4 MG/DL (ref 0.5–1)
EOSINOPHIL # BLD: 0.1 K/UL (ref 0.05–0.5)
EOSINOPHILS RELATIVE PERCENT: 3 % (ref 0–6)
ERYTHROCYTE [DISTWIDTH] IN BLOOD BY AUTOMATED COUNT: 15.2 % (ref 11.5–15)
GFR, ESTIMATED: >90 ML/MIN/1.73M2
GLUCOSE BLD-MCNC: 102 MG/DL (ref 74–99)
GLUCOSE BLD-MCNC: 108 MG/DL (ref 74–99)
GLUCOSE BLD-MCNC: 141 MG/DL (ref 74–99)
GLUCOSE BLD-MCNC: 147 MG/DL (ref 74–99)
GLUCOSE BLD-MCNC: 76 MG/DL (ref 74–99)
GLUCOSE SERPL-MCNC: 94 MG/DL (ref 74–99)
HCT VFR BLD AUTO: 39.5 % (ref 34–48)
HGB BLD-MCNC: 13.3 G/DL (ref 11.5–15.5)
IMM GRANULOCYTES # BLD AUTO: <0.03 K/UL (ref 0–0.58)
IMM GRANULOCYTES NFR BLD: 1 % (ref 0–5)
LACTATE BLDV-SCNC: 1.6 MMOL/L (ref 0.5–2.2)
LACTATE BLDV-SCNC: 2 MMOL/L (ref 0.5–2.2)
LACTATE BLDV-SCNC: 2.1 MMOL/L (ref 0.5–2.2)
LACTATE BLDV-SCNC: 2.2 MMOL/L (ref 0.5–2.2)
LACTATE BLDV-SCNC: 2.2 MMOL/L (ref 0.5–2.2)
LACTATE BLDV-SCNC: 2.6 MMOL/L (ref 0.5–2.2)
LACTATE BLDV-SCNC: 2.8 MMOL/L (ref 0.5–2.2)
LYMPHOCYTES NFR BLD: 0.7 K/UL (ref 1.5–4)
LYMPHOCYTES RELATIVE PERCENT: 23 % (ref 20–42)
MCH RBC QN AUTO: 31.5 PG (ref 26–35)
MCHC RBC AUTO-ENTMCNC: 33.7 G/DL (ref 32–34.5)
MCV RBC AUTO: 93.6 FL (ref 80–99.9)
MONOCYTES NFR BLD: 0.37 K/UL (ref 0.1–0.95)
MONOCYTES NFR BLD: 12 % (ref 2–12)
NEUTROPHILS NFR BLD: 59 % (ref 43–80)
NEUTS SEG NFR BLD: 1.78 K/UL (ref 1.8–7.3)
PLATELET # BLD AUTO: 94 K/UL (ref 130–450)
PLATELET CONFIRMATION: NORMAL
PMV BLD AUTO: 9.4 FL (ref 7–12)
POTASSIUM SERPL-SCNC: 4.2 MMOL/L (ref 3.5–5)
PROT SERPL-MCNC: 4.6 G/DL (ref 6.4–8.3)
RBC # BLD AUTO: 4.22 M/UL (ref 3.5–5.5)
SODIUM SERPL-SCNC: 144 MMOL/L (ref 132–146)
WBC OTHER # BLD: 3 K/UL (ref 4.5–11.5)

## 2025-01-27 PROCEDURE — 36415 COLL VENOUS BLD VENIPUNCTURE: CPT

## 2025-01-27 PROCEDURE — 83605 ASSAY OF LACTIC ACID: CPT

## 2025-01-27 PROCEDURE — 6370000000 HC RX 637 (ALT 250 FOR IP): Performed by: STUDENT IN AN ORGANIZED HEALTH CARE EDUCATION/TRAINING PROGRAM

## 2025-01-27 PROCEDURE — 99232 SBSQ HOSP IP/OBS MODERATE 35: CPT | Performed by: NURSE PRACTITIONER

## 2025-01-27 PROCEDURE — 6370000000 HC RX 637 (ALT 250 FOR IP)

## 2025-01-27 PROCEDURE — 6360000002 HC RX W HCPCS: Performed by: NURSE PRACTITIONER

## 2025-01-27 PROCEDURE — 82140 ASSAY OF AMMONIA: CPT

## 2025-01-27 PROCEDURE — 80053 COMPREHEN METABOLIC PANEL: CPT

## 2025-01-27 PROCEDURE — 85025 COMPLETE CBC W/AUTO DIFF WBC: CPT

## 2025-01-27 PROCEDURE — 2500000003 HC RX 250 WO HCPCS: Performed by: NURSE PRACTITIONER

## 2025-01-27 PROCEDURE — 82962 GLUCOSE BLOOD TEST: CPT

## 2025-01-27 PROCEDURE — 1200000000 HC SEMI PRIVATE

## 2025-01-27 PROCEDURE — 2580000003 HC RX 258: Performed by: NURSE PRACTITIONER

## 2025-01-27 PROCEDURE — 6370000000 HC RX 637 (ALT 250 FOR IP): Performed by: NURSE PRACTITIONER

## 2025-01-27 RX ORDER — LACTULOSE 10 G/15ML
20 SOLUTION ORAL ONCE
Status: COMPLETED | OUTPATIENT
Start: 2025-01-27 | End: 2025-01-27

## 2025-01-27 RX ORDER — LACTULOSE 10 G/15ML
20 SOLUTION ORAL
Status: DISCONTINUED | OUTPATIENT
Start: 2025-01-27 | End: 2025-01-27

## 2025-01-27 RX ORDER — LACTULOSE 10 G/15ML
30 SOLUTION ORAL 4 TIMES DAILY
Status: DISCONTINUED | OUTPATIENT
Start: 2025-01-27 | End: 2025-01-27

## 2025-01-27 RX ORDER — LACTULOSE 10 G/15ML
20 SOLUTION ORAL
Status: DISCONTINUED | OUTPATIENT
Start: 2025-01-27 | End: 2025-01-30 | Stop reason: HOSPADM

## 2025-01-27 RX ADMIN — SODIUM CHLORIDE: 9 INJECTION, SOLUTION INTRAVENOUS at 13:10

## 2025-01-27 RX ADMIN — RIFAXIMIN 400 MG: 200 TABLET ORAL at 20:42

## 2025-01-27 RX ADMIN — POTASSIUM CHLORIDE 20 MEQ: 1500 TABLET, EXTENDED RELEASE ORAL at 09:34

## 2025-01-27 RX ADMIN — FOLIC ACID 1 MG: 1 TABLET ORAL at 09:34

## 2025-01-27 RX ADMIN — WATER 1000 MG: 1 INJECTION INTRAMUSCULAR; INTRAVENOUS; SUBCUTANEOUS at 23:15

## 2025-01-27 RX ADMIN — LEVOTHYROXINE SODIUM 100 MCG: 0.1 TABLET ORAL at 06:51

## 2025-01-27 RX ADMIN — LACTULOSE 30 G: 20 SOLUTION ORAL at 14:45

## 2025-01-27 RX ADMIN — LACTULOSE 20 G: 20 SOLUTION ORAL at 23:15

## 2025-01-27 RX ADMIN — Medication 1000 UNITS: at 09:34

## 2025-01-27 RX ADMIN — HYDROCHLOROTHIAZIDE 12.5 MG: 25 TABLET ORAL at 09:34

## 2025-01-27 RX ADMIN — RIFAXIMIN 400 MG: 200 TABLET ORAL at 14:45

## 2025-01-27 RX ADMIN — RIFAXIMIN 400 MG: 200 TABLET ORAL at 09:35

## 2025-01-27 RX ADMIN — Medication 50 MG: at 09:34

## 2025-01-27 RX ADMIN — LACTULOSE 20 G: 20 SOLUTION ORAL at 13:04

## 2025-01-27 RX ADMIN — LACTULOSE 30 G: 20 SOLUTION ORAL at 17:29

## 2025-01-27 RX ADMIN — CHOLESTYRAMINE 4 G: 4 POWDER, FOR SUSPENSION ORAL at 20:41

## 2025-01-27 RX ADMIN — LACTULOSE 20 G: 20 SOLUTION ORAL at 20:41

## 2025-01-27 RX ADMIN — LACTULOSE 20 G: 20 SOLUTION ORAL at 09:33

## 2025-01-27 RX ADMIN — PREDNISONE 10 MG: 10 TABLET ORAL at 09:35

## 2025-01-27 RX ADMIN — Medication 150 MG: at 09:36

## 2025-01-27 RX ADMIN — Medication 1 TABLET: at 09:33

## 2025-01-27 RX ADMIN — CHOLESTYRAMINE 4 G: 4 POWDER, FOR SUSPENSION ORAL at 09:36

## 2025-01-27 ASSESSMENT — PAIN SCALES - GENERAL: PAINLEVEL_OUTOF10: 0

## 2025-01-27 NOTE — PLAN OF CARE
Problem: Chronic Conditions and Co-morbidities  Goal: Patient's chronic conditions and co-morbidity symptoms are monitored and maintained or improved  Outcome: Progressing  Flowsheets (Taken 1/27/2025 7541)  Care Plan - Patient's Chronic Conditions and Co-Morbidity Symptoms are Monitored and Maintained or Improved: Monitor and assess patient's chronic conditions and comorbid symptoms for stability, deterioration, or improvement     Problem: Pain  Goal: Verbalizes/displays adequate comfort level or baseline comfort level  Outcome: Progressing     Problem: Safety - Adult  Goal: Free from fall injury  Outcome: Progressing

## 2025-01-27 NOTE — ACP (ADVANCE CARE PLANNING)
Advance Care Planning   Healthcare Decision Maker:    Primary Decision Maker: Amanuel Stoner - Child - [phone]    Click here to complete Healthcare Decision Makers including selection of the Healthcare Decision Maker Relationship (ie \"Primary\").  Today we documented Decision Maker(s) consistent with Legal Next of Kin hierarchy.       If the relationship to the patient does NOT follow our state's Next of Kin hierarchy, the patient MUST complete an ACP Document to allow him/her to act on the patient's behalf. :

## 2025-01-27 NOTE — CARE COORDINATION
[facility] transition of care.  Patient presented to [facility] ER secondary to abdominal pain and confusion.  PMH of liver cirrhosis.  Admitted inpatient with hyperammonemia.  GI consulted.  RRT called yesterday for concerns of stroke.  Neurology following, MRI Jus ordered.  IV rocephin q 24.  Ammonia 136, Lactulose ordered.      1445:  Met with patient at the bedside to discuss discharge planning.  She lives alone in a 1 story home.  She reports being independent and driving.  Uses a walker to ambulate.  Has used [facility] in the past.  No history of VICKY.  PCP is Dr Daniel.  She is unable to recall which pharmacy she uses.  She plans to return to home at discharge.  Her son will provide transport home on day of discharge.  Left VM message with patient son to confirm.  CM/ to follow.  Tacho Lambert RN  [phone].      Case Management Assessment  Initial Evaluation    Date/Time of Evaluation: 1/27/2025 2:59 PM  Assessment Completed by: Bhavna Lambert RN    If patient is discharged prior to next notation, then this note serves as note for discharge by case management.    Patient Name: Erna Stoner                   YOB: 1960  Diagnosis: Lactic acidosis [E87.20]  Hyperammonemia (HCC) [E72.20]                   Date / Time: 1/25/2025  5:45 PM    Patient Admission Status: Inpatient   Readmission Risk (Low < 19, Mod (19-27), High > 27): Readmission Risk Score: 28.1    Current PCP: Elia Daniel MD  PCP verified by [facility]? Yes    Chart Reviewed: Yes      History Provided by: Patient, Medical Record  Patient Orientation: Alert and Oriented    Patient Cognition: Alert    Hospitalization in the last 30 days (Readmission):  No    If yes, Readmission Assessment in  Navigator will be completed.    Advance Directives:      Code Status: Full Code   Patient's Primary Decision Maker is:      Primary Decision Maker: Amanuel Stoner - Child - [phone]    Discharge Planning:    Patient lives with: Family

## 2025-01-27 NOTE — CONSULTS
Gastroenterology, Hepatology, &  Advanced Endoscopy    Consult Note      Reason for Consult: Hyperammonemia, history of cirrhosis    HPI:   Erna Stoner is a 64 y.o. female w/ PMH of  has a past medical history of Asthma, Back pain, Diabetes mellitus (HCC), Hypothyroidism, and Thyroid disease., sarcoidosis, sarcoid cirrhosis, esophageal varices S/P TIPS and portal vein thrombosis presented to the hospital with complaints of altered mentation and abdominal pain on 1/26/2025.  Patient was found to have hyperammonemia during the admission.   Patient alert to self, place and year but on and off confused during the interview.  History limited due to altered mentation.  Patient stated she was having on and off abdominal pain and was being more confused since last few days.  She stated of being compliant with all of her home meds including lactulose.  She could not answer whether she was having regular bowel movements or not.  Pain was generalized and nonspecific.  She denied any fever, chills, cough, nausea, vomiting, melena and any urinary symptoms.  Patient lives with her brother and has she has found to be more confused was brought to the hospital.  On presentation to the hospital vitals were stable.  Labs showed lactic acidosis and hyperammonemia.  Bilirubin was increased to 2.7.    Recent Labs     01/25/25  1848 01/25/25  1853 01/27/25  0248   INR  --  1.4  --    ALT 45*  --  41*   AST 61*  --  44*   ALKPHOS 122*  --  102   BILITOT 2.7*  --  1.7*   BILIDIR 0.9*  --   --      Lab Results   Component Value Date    WBC 3.0 (L) 01/27/2025    HGB 13.3 01/27/2025    HCT 39.5 01/27/2025    PLT 94 (L) 01/27/2025     01/27/2025    K 4.2 01/27/2025     (H) 01/27/2025    CREATININE 0.4 (L) 01/27/2025    BUN 4 (L) 01/27/2025    CO2 22 01/27/2025    AMMONIA 136 (H) 01/27/2025    GLUCOSE 94 01/27/2025    INR 1.4 01/25/2025    PROTIME 15.3 (H) 01/25/2025    TSH 1.78 11/16/2024    LABA1C 4.2 05/13/2024     Lab

## 2025-01-28 ENCOUNTER — APPOINTMENT (OUTPATIENT)
Dept: MRI IMAGING | Age: 65
DRG: 423 | End: 2025-01-28
Payer: COMMERCIAL

## 2025-01-28 LAB
AMMONIA PLAS-SCNC: 100 UMOL/L (ref 11–51)
BASOPHILS # BLD: 0.03 K/UL (ref 0–0.2)
BASOPHILS NFR BLD: 1 % (ref 0–2)
EOSINOPHIL # BLD: 0.12 K/UL (ref 0.05–0.5)
EOSINOPHILS RELATIVE PERCENT: 4 % (ref 0–6)
ERYTHROCYTE [DISTWIDTH] IN BLOOD BY AUTOMATED COUNT: 15.1 % (ref 11.5–15)
GLUCOSE BLD-MCNC: 111 MG/DL (ref 74–99)
GLUCOSE BLD-MCNC: 132 MG/DL (ref 74–99)
GLUCOSE BLD-MCNC: 167 MG/DL (ref 74–99)
GLUCOSE BLD-MCNC: 178 MG/DL (ref 74–99)
HCT VFR BLD AUTO: 39.5 % (ref 34–48)
HGB BLD-MCNC: 13.3 G/DL (ref 11.5–15.5)
IMM GRANULOCYTES # BLD AUTO: <0.03 K/UL (ref 0–0.58)
IMM GRANULOCYTES NFR BLD: 1 % (ref 0–5)
LACTATE BLDV-SCNC: 1.9 MMOL/L (ref 0.5–2.2)
LACTATE BLDV-SCNC: 2.1 MMOL/L (ref 0.5–2.2)
LACTATE BLDV-SCNC: 2.4 MMOL/L (ref 0.5–2.2)
LACTATE BLDV-SCNC: 2.4 MMOL/L (ref 0.5–2.2)
LACTATE BLDV-SCNC: 2.5 MMOL/L (ref 0.5–2.2)
LYMPHOCYTES NFR BLD: 0.71 K/UL (ref 1.5–4)
LYMPHOCYTES RELATIVE PERCENT: 21 % (ref 20–42)
MCH RBC QN AUTO: 31.5 PG (ref 26–35)
MCHC RBC AUTO-ENTMCNC: 33.7 G/DL (ref 32–34.5)
MCV RBC AUTO: 93.6 FL (ref 80–99.9)
MONOCYTES NFR BLD: 0.45 K/UL (ref 0.1–0.95)
MONOCYTES NFR BLD: 13 % (ref 2–12)
NEUTROPHILS NFR BLD: 61 % (ref 43–80)
NEUTS SEG NFR BLD: 2.06 K/UL (ref 1.8–7.3)
PLATELET # BLD AUTO: 100 K/UL (ref 130–450)
PMV BLD AUTO: 9.2 FL (ref 7–12)
RBC # BLD AUTO: 4.22 M/UL (ref 3.5–5.5)
WBC OTHER # BLD: 3.4 K/UL (ref 4.5–11.5)

## 2025-01-28 PROCEDURE — 6360000004 HC RX CONTRAST MEDICATION: Performed by: RADIOLOGY

## 2025-01-28 PROCEDURE — 2500000003 HC RX 250 WO HCPCS: Performed by: NURSE PRACTITIONER

## 2025-01-28 PROCEDURE — 70553 MRI BRAIN STEM W/O & W/DYE: CPT

## 2025-01-28 PROCEDURE — 82962 GLUCOSE BLOOD TEST: CPT

## 2025-01-28 PROCEDURE — 6370000000 HC RX 637 (ALT 250 FOR IP): Performed by: NURSE PRACTITIONER

## 2025-01-28 PROCEDURE — 6370000000 HC RX 637 (ALT 250 FOR IP): Performed by: STUDENT IN AN ORGANIZED HEALTH CARE EDUCATION/TRAINING PROGRAM

## 2025-01-28 PROCEDURE — 36415 COLL VENOUS BLD VENIPUNCTURE: CPT

## 2025-01-28 PROCEDURE — A9579 GAD-BASE MR CONTRAST NOS,1ML: HCPCS | Performed by: RADIOLOGY

## 2025-01-28 PROCEDURE — 82140 ASSAY OF AMMONIA: CPT

## 2025-01-28 PROCEDURE — 85025 COMPLETE CBC W/AUTO DIFF WBC: CPT

## 2025-01-28 PROCEDURE — 83605 ASSAY OF LACTIC ACID: CPT

## 2025-01-28 PROCEDURE — 1200000000 HC SEMI PRIVATE

## 2025-01-28 RX ADMIN — HYDROCHLOROTHIAZIDE 12.5 MG: 25 TABLET ORAL at 08:37

## 2025-01-28 RX ADMIN — Medication 1000 UNITS: at 08:37

## 2025-01-28 RX ADMIN — Medication 50 MG: at 08:37

## 2025-01-28 RX ADMIN — PREDNISONE 10 MG: 10 TABLET ORAL at 08:37

## 2025-01-28 RX ADMIN — LACTULOSE 20 G: 20 SOLUTION ORAL at 16:45

## 2025-01-28 RX ADMIN — CHOLESTYRAMINE 4 G: 4 POWDER, FOR SUSPENSION ORAL at 22:35

## 2025-01-28 RX ADMIN — LEVOTHYROXINE SODIUM 100 MCG: 0.1 TABLET ORAL at 06:31

## 2025-01-28 RX ADMIN — Medication 1 TABLET: at 08:37

## 2025-01-28 RX ADMIN — RIFAXIMIN 400 MG: 200 TABLET ORAL at 20:34

## 2025-01-28 RX ADMIN — LACTULOSE 20 G: 20 SOLUTION ORAL at 08:36

## 2025-01-28 RX ADMIN — SODIUM CHLORIDE, PRESERVATIVE FREE 10 ML: 5 INJECTION INTRAVENOUS at 20:32

## 2025-01-28 RX ADMIN — LACTULOSE 20 G: 20 SOLUTION ORAL at 12:53

## 2025-01-28 RX ADMIN — Medication 150 MG: at 08:37

## 2025-01-28 RX ADMIN — CHOLESTYRAMINE 4 G: 4 POWDER, FOR SUSPENSION ORAL at 08:37

## 2025-01-28 RX ADMIN — RIFAXIMIN 400 MG: 200 TABLET ORAL at 13:55

## 2025-01-28 RX ADMIN — RIFAXIMIN 400 MG: 200 TABLET ORAL at 08:37

## 2025-01-28 RX ADMIN — FOLIC ACID 1 MG: 1 TABLET ORAL at 08:37

## 2025-01-28 RX ADMIN — LACTULOSE 20 G: 20 SOLUTION ORAL at 20:32

## 2025-01-28 RX ADMIN — GADOTERIDOL 13 ML: 279.3 INJECTION, SOLUTION INTRAVENOUS at 12:02

## 2025-01-28 RX ADMIN — POTASSIUM CHLORIDE 20 MEQ: 1500 TABLET, EXTENDED RELEASE ORAL at 08:37

## 2025-01-28 NOTE — CARE COORDINATION
Plan at this time is home with son when medically ready.  Neurology and GI are both following patient. MRI of the brain pending. Voicemail message left with patient's son Amanuel to confirm discharge plan. Await call back. Patientt said her son will transport her home at time of discharge.   Alta Joshi RN CM  [phone]      Received call back from patient's son Amanuel. He confirmed the discharge plan is for his mother to return home with hiim and he would like home health care for his mother and does not have a preference for an agency. Referral made to [facility] via [employer]. Await acceptance. Home health care orders are in. Patientt said her son will transport her home at time of discharge.    Alta Joshi RN CM  [phone]

## 2025-01-28 NOTE — CARE COORDINATION
Attempted to evaluate patient, she is off the floor for MRI testing.  Will attempt to reevaluate later as able.    Carly Canchola, SHANE - CNP  10:38 AM  1/28/2025

## 2025-01-28 NOTE — PLAN OF CARE
Problem: Chronic Conditions and Co-morbidities  Goal: Patient's chronic conditions and co-morbidity symptoms are monitored and maintained or improved  1/27/2025 2358 by Princess Andres RN  Outcome: Progressing  1/27/2025 1902 by Lucinda Baig RN  Outcome: Progressing  1/27/2025 1855 by Lucinda Baig RN  Outcome: Progressing  Flowsheets (Taken 1/27/2025 0930)  Care Plan - Patient's Chronic Conditions and Co-Morbidity Symptoms are Monitored and Maintained or Improved: Monitor and assess patient's chronic conditions and comorbid symptoms for stability, deterioration, or improvement     Problem: Safety - Adult  Goal: Free from fall injury  1/27/2025 2358 by Princess Andres RN  Outcome: Progressing  1/27/2025 1902 by Lucinda Baig RN  Outcome: Progressing  1/27/2025 1855 by Lucinda Baig RN  Outcome: Progressing

## 2025-01-29 ENCOUNTER — HOSPITAL ENCOUNTER (OUTPATIENT)
Dept: OCCUPATIONAL THERAPY | Age: 65
Setting detail: THERAPIES SERIES
Discharge: HOME | End: 2025-01-29
Payer: COMMERCIAL

## 2025-01-29 LAB
AMMONIA PLAS-SCNC: 92 UMOL/L (ref 11–51)
BASOPHILS # BLD: 0.06 K/UL (ref 0–0.2)
BASOPHILS NFR BLD: 2 % (ref 0–2)
EKG ATRIAL RATE: 99 BPM
EKG P AXIS: 30 DEGREES
EKG P-R INTERVAL: 128 MS
EKG Q-T INTERVAL: 356 MS
EKG QRS DURATION: 72 MS
EKG QTC CALCULATION (BAZETT): 456 MS
EKG R AXIS: -15 DEGREES
EKG T AXIS: 24 DEGREES
EKG VENTRICULAR RATE: 99 BPM
EOSINOPHIL # BLD: 0.1 K/UL (ref 0.05–0.5)
EOSINOPHILS RELATIVE PERCENT: 3 % (ref 0–6)
ERYTHROCYTE [DISTWIDTH] IN BLOOD BY AUTOMATED COUNT: 14.8 % (ref 11.5–15)
GLUCOSE BLD-MCNC: 142 MG/DL (ref 74–99)
GLUCOSE BLD-MCNC: 171 MG/DL (ref 74–99)
GLUCOSE BLD-MCNC: 185 MG/DL (ref 74–99)
GLUCOSE BLD-MCNC: 188 MG/DL (ref 74–99)
HCT VFR BLD AUTO: 40.3 % (ref 34–48)
HGB BLD-MCNC: 14 G/DL (ref 11.5–15.5)
IMM GRANULOCYTES # BLD AUTO: <0.03 K/UL (ref 0–0.58)
IMM GRANULOCYTES NFR BLD: 1 % (ref 0–5)
LACTATE BLDV-SCNC: 1.9 MMOL/L (ref 0.5–2.2)
LACTATE BLDV-SCNC: 2.6 MMOL/L (ref 0.5–2.2)
LYMPHOCYTES NFR BLD: 0.82 K/UL (ref 1.5–4)
LYMPHOCYTES RELATIVE PERCENT: 21 % (ref 20–42)
MCH RBC QN AUTO: 32 PG (ref 26–35)
MCHC RBC AUTO-ENTMCNC: 34.7 G/DL (ref 32–34.5)
MCV RBC AUTO: 92.2 FL (ref 80–99.9)
MONOCYTES NFR BLD: 0.51 K/UL (ref 0.1–0.95)
MONOCYTES NFR BLD: 13 % (ref 2–12)
NEUTROPHILS NFR BLD: 61 % (ref 43–80)
NEUTS SEG NFR BLD: 2.39 K/UL (ref 1.8–7.3)
PLATELET # BLD AUTO: 92 K/UL (ref 130–450)
PLATELET CONFIRMATION: NORMAL
PMV BLD AUTO: 8.9 FL (ref 7–12)
RBC # BLD AUTO: 4.37 M/UL (ref 3.5–5.5)
WBC OTHER # BLD: 3.9 K/UL (ref 4.5–11.5)

## 2025-01-29 PROCEDURE — 6370000000 HC RX 637 (ALT 250 FOR IP): Performed by: NURSE PRACTITIONER

## 2025-01-29 PROCEDURE — 82962 GLUCOSE BLOOD TEST: CPT

## 2025-01-29 PROCEDURE — 6360000002 HC RX W HCPCS: Performed by: NURSE PRACTITIONER

## 2025-01-29 PROCEDURE — 82140 ASSAY OF AMMONIA: CPT

## 2025-01-29 PROCEDURE — 83605 ASSAY OF LACTIC ACID: CPT

## 2025-01-29 PROCEDURE — 36415 COLL VENOUS BLD VENIPUNCTURE: CPT

## 2025-01-29 PROCEDURE — 6370000000 HC RX 637 (ALT 250 FOR IP): Performed by: STUDENT IN AN ORGANIZED HEALTH CARE EDUCATION/TRAINING PROGRAM

## 2025-01-29 PROCEDURE — 1200000000 HC SEMI PRIVATE

## 2025-01-29 PROCEDURE — 2500000003 HC RX 250 WO HCPCS: Performed by: NURSE PRACTITIONER

## 2025-01-29 PROCEDURE — 85025 COMPLETE CBC W/AUTO DIFF WBC: CPT

## 2025-01-29 RX ADMIN — LACTULOSE 20 G: 20 SOLUTION ORAL at 20:16

## 2025-01-29 RX ADMIN — Medication 50 MG: at 08:26

## 2025-01-29 RX ADMIN — CHOLESTYRAMINE 4 G: 4 POWDER, FOR SUSPENSION ORAL at 08:26

## 2025-01-29 RX ADMIN — WATER 1000 MG: 1 INJECTION INTRAMUSCULAR; INTRAVENOUS; SUBCUTANEOUS at 23:46

## 2025-01-29 RX ADMIN — LACTULOSE 20 G: 20 SOLUTION ORAL at 23:46

## 2025-01-29 RX ADMIN — LACTULOSE 20 G: 20 SOLUTION ORAL at 05:22

## 2025-01-29 RX ADMIN — FOLIC ACID 1 MG: 1 TABLET ORAL at 08:26

## 2025-01-29 RX ADMIN — Medication 1 TABLET: at 08:26

## 2025-01-29 RX ADMIN — LACTULOSE 20 G: 20 SOLUTION ORAL at 08:25

## 2025-01-29 RX ADMIN — Medication 1000 UNITS: at 08:25

## 2025-01-29 RX ADMIN — LACTULOSE 20 G: 20 SOLUTION ORAL at 00:41

## 2025-01-29 RX ADMIN — LACTULOSE 20 G: 20 SOLUTION ORAL at 17:08

## 2025-01-29 RX ADMIN — Medication 150 MG: at 08:26

## 2025-01-29 RX ADMIN — INSULIN LISPRO 1 UNITS: 100 INJECTION, SOLUTION INTRAVENOUS; SUBCUTANEOUS at 20:21

## 2025-01-29 RX ADMIN — RIFAXIMIN 400 MG: 200 TABLET ORAL at 08:26

## 2025-01-29 RX ADMIN — RIFAXIMIN 400 MG: 200 TABLET ORAL at 13:35

## 2025-01-29 RX ADMIN — POTASSIUM CHLORIDE 20 MEQ: 1500 TABLET, EXTENDED RELEASE ORAL at 08:26

## 2025-01-29 RX ADMIN — LACTULOSE 20 G: 20 SOLUTION ORAL at 11:37

## 2025-01-29 RX ADMIN — RIFAXIMIN 400 MG: 200 TABLET ORAL at 20:15

## 2025-01-29 RX ADMIN — WATER 1000 MG: 1 INJECTION INTRAMUSCULAR; INTRAVENOUS; SUBCUTANEOUS at 00:41

## 2025-01-29 RX ADMIN — HYDROCHLOROTHIAZIDE 12.5 MG: 25 TABLET ORAL at 08:26

## 2025-01-29 RX ADMIN — LEVOTHYROXINE SODIUM 100 MCG: 0.1 TABLET ORAL at 05:22

## 2025-01-29 RX ADMIN — PREDNISONE 10 MG: 10 TABLET ORAL at 08:26

## 2025-01-29 RX ADMIN — CHOLESTYRAMINE 4 G: 4 POWDER, FOR SUSPENSION ORAL at 20:16

## 2025-01-29 RX ADMIN — SODIUM CHLORIDE, PRESERVATIVE FREE 10 ML: 5 INJECTION INTRAVENOUS at 08:25

## 2025-01-29 ASSESSMENT — PAIN SCALES - GENERAL: PAINLEVEL_OUTOF10: 0

## 2025-01-29 NOTE — CARE COORDINATION
I confirmed with son, plan is for hi mother to return to her own home with [facility] when medically ready. [facility] accepted and home health care orders are in. Per neurology note today, MRI of the brain personally reviewed and no evidence of acute stroke.  The known cystic appearing lesion in her right thalamus appears unchanged from prior images and is most likely a dilated perivascular space, not contributing to her clinical situation. Neuro will sign off--call or reconsult with new issues. OK for discharge from [facility], continue to follow as OP.  Will check for discharge with internal med. Patient's son will transport her home at time of discharge.    Alta Joshi RN CM  [phone]        Per Adalgisa from [facility] beds at the [facility], patient is currently active with outpatient therapy at [facility]. I met with patient in room to discuss transition of care. She would like to continue with outpatient therapy and not have home health care. Message sent to Adalgisa to cancel the home care referral. Patient is requesting a BSC for home. DME order for BSC placed. Referral made to Crystal at [facility]. She will deliver it to the patent's room prior to discharge.     Alta Joshi RN CM  [phone]

## 2025-01-29 NOTE — PROGRESS NOTES
Occupational Therapy      Phone: [phone] Fax: [phone]     Occupational Therapy   Cancellation Note       Patient Name:  Erna Stoner  : 1960  Date:  2025  MRN: [id number]    For today's appointment patient:   [x]  Cancelled   []  Rescheduled appointment   []  No-show     Reason given by patient:   [x]  Patient ill   []  Conflicting appointment   []  No transportation   []  Conflict with work   []  No reason given   [x]  Other:    Comments: Pt not feeling well. Pt was admitted into hospital again.    Electronically signed by: Jelena VAZQUEZ, [id number]

## 2025-01-29 NOTE — PLAN OF CARE
Problem: Chronic Conditions and Co-morbidities  Goal: Patient's chronic conditions and co-morbidity symptoms are monitored and maintained or improved  1/29/2025 1008 by Krystin Cortes RN  Outcome: Progressing     Problem: Pain  Goal: Verbalizes/displays adequate comfort level or baseline comfort level  1/29/2025 1008 by Krystin Cortes, RN  Outcome: Progressing     Problem: Safety - Adult  Goal: Free from fall injury  1/29/2025 1008 by Krystin Cortes, RN  Outcome: Progressing

## 2025-01-29 NOTE — PLAN OF CARE
MRI of the brain personally reviewed and no evidence of acute stroke.  The known cystic appearing lesion in her right thalamus appears unchanged from prior images and is most likely a dilated perivascular space, not contributing to her clinical situation.    Neuro will sign off--call or reconsult with new issues    Heraclio Saha, SHANE - GABRIEL  9:18 AM

## 2025-01-30 VITALS
BODY MASS INDEX: 27.59 KG/M2 | TEMPERATURE: 97.8 F | DIASTOLIC BLOOD PRESSURE: 62 MMHG | HEIGHT: 62 IN | SYSTOLIC BLOOD PRESSURE: 98 MMHG | HEART RATE: 80 BPM | OXYGEN SATURATION: 95 % | WEIGHT: 149.91 LBS | RESPIRATION RATE: 17 BRPM

## 2025-01-30 DIAGNOSIS — K74.69 OTHER CIRRHOSIS OF LIVER (HCC): Primary | ICD-10-CM

## 2025-01-30 LAB
ALBUMIN SERPL-MCNC: 2.8 G/DL (ref 3.5–5.2)
ALP SERPL-CCNC: 112 U/L (ref 35–104)
ALT SERPL-CCNC: 50 U/L (ref 0–32)
AMMONIA PLAS-SCNC: 99 UMOL/L (ref 11–51)
ANION GAP SERPL CALCULATED.3IONS-SCNC: 9 MMOL/L (ref 7–16)
AST SERPL-CCNC: 54 U/L (ref 0–31)
BASOPHILS # BLD: 0.05 K/UL (ref 0–0.2)
BASOPHILS NFR BLD: 1 % (ref 0–2)
BILIRUB SERPL-MCNC: 2 MG/DL (ref 0–1.2)
BUN SERPL-MCNC: 4 MG/DL (ref 6–23)
CALCIUM SERPL-MCNC: 7.6 MG/DL (ref 8.6–10.2)
CHLORIDE SERPL-SCNC: 115 MMOL/L (ref 98–107)
CO2 SERPL-SCNC: 21 MMOL/L (ref 22–29)
CREAT SERPL-MCNC: 0.4 MG/DL (ref 0.5–1)
EOSINOPHIL # BLD: 0.12 K/UL (ref 0.05–0.5)
EOSINOPHILS RELATIVE PERCENT: 3 % (ref 0–6)
ERYTHROCYTE [DISTWIDTH] IN BLOOD BY AUTOMATED COUNT: 14.8 % (ref 11.5–15)
GFR, ESTIMATED: >90 ML/MIN/1.73M2
GLUCOSE BLD-MCNC: 146 MG/DL (ref 74–99)
GLUCOSE BLD-MCNC: 175 MG/DL (ref 74–99)
GLUCOSE SERPL-MCNC: 157 MG/DL (ref 74–99)
HCT VFR BLD AUTO: 38.2 % (ref 34–48)
HGB BLD-MCNC: 13.1 G/DL (ref 11.5–15.5)
IMM GRANULOCYTES # BLD AUTO: <0.03 K/UL (ref 0–0.58)
IMM GRANULOCYTES NFR BLD: 1 % (ref 0–5)
LYMPHOCYTES NFR BLD: 0.97 K/UL (ref 1.5–4)
LYMPHOCYTES RELATIVE PERCENT: 24 % (ref 20–42)
MAGNESIUM SERPL-MCNC: 2.2 MG/DL (ref 1.6–2.6)
MCH RBC QN AUTO: 31.6 PG (ref 26–35)
MCHC RBC AUTO-ENTMCNC: 34.3 G/DL (ref 32–34.5)
MCV RBC AUTO: 92 FL (ref 80–99.9)
MONOCYTES NFR BLD: 0.55 K/UL (ref 0.1–0.95)
MONOCYTES NFR BLD: 13 % (ref 2–12)
NEUTROPHILS NFR BLD: 59 % (ref 43–80)
NEUTS SEG NFR BLD: 2.42 K/UL (ref 1.8–7.3)
PLATELET # BLD AUTO: 94 K/UL (ref 130–450)
PLATELET CONFIRMATION: NORMAL
PMV BLD AUTO: 9.1 FL (ref 7–12)
POTASSIUM SERPL-SCNC: 3.5 MMOL/L (ref 3.5–5)
PROT SERPL-MCNC: 4.4 G/DL (ref 6.4–8.3)
RBC # BLD AUTO: 4.15 M/UL (ref 3.5–5.5)
SODIUM SERPL-SCNC: 145 MMOL/L (ref 132–146)
WBC OTHER # BLD: 4.1 K/UL (ref 4.5–11.5)

## 2025-01-30 PROCEDURE — 97165 OT EVAL LOW COMPLEX 30 MIN: CPT

## 2025-01-30 PROCEDURE — 6370000000 HC RX 637 (ALT 250 FOR IP): Performed by: STUDENT IN AN ORGANIZED HEALTH CARE EDUCATION/TRAINING PROGRAM

## 2025-01-30 PROCEDURE — 80053 COMPREHEN METABOLIC PANEL: CPT

## 2025-01-30 PROCEDURE — 6370000000 HC RX 637 (ALT 250 FOR IP): Performed by: NURSE PRACTITIONER

## 2025-01-30 PROCEDURE — 85025 COMPLETE CBC W/AUTO DIFF WBC: CPT

## 2025-01-30 PROCEDURE — 82962 GLUCOSE BLOOD TEST: CPT

## 2025-01-30 PROCEDURE — 82140 ASSAY OF AMMONIA: CPT

## 2025-01-30 PROCEDURE — 36415 COLL VENOUS BLD VENIPUNCTURE: CPT

## 2025-01-30 PROCEDURE — 83735 ASSAY OF MAGNESIUM: CPT

## 2025-01-30 PROCEDURE — 97535 SELF CARE MNGMENT TRAINING: CPT

## 2025-01-30 RX ADMIN — Medication 1 TABLET: at 08:24

## 2025-01-30 RX ADMIN — PREDNISONE 10 MG: 10 TABLET ORAL at 08:25

## 2025-01-30 RX ADMIN — LEVOTHYROXINE SODIUM 100 MCG: 0.1 TABLET ORAL at 05:34

## 2025-01-30 RX ADMIN — LACTULOSE 20 G: 20 SOLUTION ORAL at 04:11

## 2025-01-30 RX ADMIN — Medication 50 MG: at 08:25

## 2025-01-30 RX ADMIN — RIFAXIMIN 400 MG: 200 TABLET ORAL at 08:25

## 2025-01-30 RX ADMIN — LACTULOSE 20 G: 20 SOLUTION ORAL at 08:25

## 2025-01-30 RX ADMIN — POTASSIUM CHLORIDE 20 MEQ: 1500 TABLET, EXTENDED RELEASE ORAL at 08:25

## 2025-01-30 RX ADMIN — CHOLESTYRAMINE 4 G: 4 POWDER, FOR SUSPENSION ORAL at 08:25

## 2025-01-30 RX ADMIN — FOLIC ACID 1 MG: 1 TABLET ORAL at 08:25

## 2025-01-30 RX ADMIN — Medication 1000 UNITS: at 08:25

## 2025-01-30 RX ADMIN — Medication 150 MG: at 08:26

## 2025-01-30 RX ADMIN — LACTULOSE 20 G: 20 SOLUTION ORAL at 11:43

## 2025-01-30 NOTE — PROGRESS NOTES
[facility]                                Progress note    Subjective:    The patient is awake and alert.    Sitting at the side of the bed asking for grilled cheese     Objective:    /72   Pulse 84   Temp 97.3 °F (36.3 °C) (Temporal)   Resp 18   Ht 1.575 m (5' 2\")   Wt 66.2 kg (145 lb 15.1 oz)   SpO2 98%   BMI 26.69 kg/m²     In: 240 [P.O.:240]  Out: -   In: 240   Out: -     General appearance: NAD, conversant  HEENT: AT/NC, MMM  Neck: FROM, supple  Lungs: Clear to auscultation  CV: RRR, no MRGs  Vasc: Radial pulses 2+  Abdomen: Soft, non-tender; no masses or HSM  Extremities: No peripheral edema or digital cyanosis  Skin: no rash, lesions or ulcers  Psych: Alert and oriented to person, place and time  Neuro: Alert and interactive     Recent Labs     01/25/25  1848 01/26/25  1004 01/27/25  0248   WBC 4.4* 3.4* 3.0*   HGB 15.0 13.1 13.3   HCT 43.2 38.4 39.5   PLT  --  100* 94*       Recent Labs     01/25/25  1848 01/26/25  1004 01/27/25  0248    139 144   K 3.8 4.1 4.2    103 113*   CO2 25 20* 22   BUN 6 5* 4*   CREATININE 0.4* 0.4* 0.4*   CALCIUM 8.6 8.7 8.2*       Assessment:    Principal Problem:    Hyperammonemia (HCC)  Active Problems:    Gastric varices    S/P TIPS (transjugular intrahepatic portosystemic shunt)    Cirrhosis of liver (HCC)    DM (diabetes mellitus), type 2 (HCC)    Hepatic encephalopathy (HCC)    Lactic acid acidosis    Sarcoid  Resolved Problems:    * No resolved hospital problems. *      Plan:    Patient is a 64-year-old female admitted to [facility] for  Hyperammonemia  -Ammonia level 136  -Continue lactulose  -GI following  -RRT yesterday for altered mental status and possible CVA  -MRI of the brain pending completion  -CT is negative  -Neurology following      Code status: Full  Consultants: Neuro and GI      DVT Prophylaxis   PT/OT  Discharge planning         SHANE Archer - CNP  12:56 PM  1/27/2025  
    [facility]                                Progress note    Subjective:  Denies chest pain and dyspnea  Denies abdominal pain  Requesting all GI testing be completed prior to discharge--> nursing staff notified    Objective:  Sitting up in bed, conversing without difficulty  No acute distress  No family present at the bedside  /63   Pulse 79   Temp 97.2 °F (36.2 °C) (Temporal)   Resp 18   Ht 1.575 m (5' 2\")   Wt 66 kg (145 lb 8.1 oz)   SpO2 98%   BMI 26.61 kg/m²   General appearance: NAD, conversant  HEENT: AT/NC, MMM  Neck: FROM, supple  Lungs: Clear to auscultation  CV: RRR, no MRGs  Vasc: Radial pulses 2+  Abdomen: Soft, non-tender; no masses or HSM  Extremities: No peripheral edema or digital cyanosis  Skin: no rash, lesions or ulcers  Psych: Alert and oriented to person, place and time  Neuro: Alert and interactive     Recent Labs     01/27/25  0248 01/28/25  0427 01/29/25  0424   WBC 3.0* 3.4* 3.9*   HGB 13.3 13.3 14.0   HCT 39.5 39.5 40.3   PLT 94* 100* 92*       Recent Labs     01/27/25  0248      K 4.2   *   CO2 22   BUN 4*   CREATININE 0.4*   CALCIUM 8.2*     01/28/2025 MRI Brain W WO contrast:  1. No acute intracranial abnormality.  2. Stable 2.7 cm cystic lesion of CSF intensity located lateral to the right  thalamus.  This finding likely represents an incidental perivascular space.    Assessment:    Principal Problem:    Hyperammonemia (HCC)  Active Problems:    Gastric varices    S/P TIPS (transjugular intrahepatic portosystemic shunt)    Cirrhosis of liver (HCC)    DM (diabetes mellitus), type 2 (HCC)    Hepatic encephalopathy (HCC)    Lactic acid acidosis    Sarcoid    Acute right-sided weakness  Resolved Problems:    * No resolved hospital problems. *      Plan:    Patient is a 64-year-old female admitted to [facility] for  Hyperammonemia  -Ammonia level 136  -Continue lactulose  -GI following  -RRT yesterday for altered mental status and possible CVA  -MRI 
  Gastroenterology, Hepatology, &  Advanced Endoscopy    Progress Note    Subjective: Pt awake and alert today. Multiple BM's. States taking lactulose QID at home with BM's. I spent much time with her today regarding the importance of q 2 hour dosing of lactulose until diarrhea at the beginning of any change in cognition. Pt requesting to be discharged.    Reason for Consult: Hyperammonemia, history of cirrhosis, well known to our practice. Last seen in house 12/19/24 2/2 H.E.    HPI:   Erna Stoner is a 64 y.o. female w/ PMH of  has a past medical history of Asthma, Back pain, Cirrhosis of liver (HCC), Diabetes mellitus (HCC), Hypothyroidism, Sarcoidosis, and Thyroid disease., sarcoidosis, sarcoid cirrhosis, esophageal varices S/P TIPS and portal vein thrombosis presented to the hospital with complaints of altered mentation and abdominal pain on 1/26/2025.  Patient was found to have hyperammonemia during the admission.   Patient alert to self, place and year but on and off confused during the interview.  History limited due to altered mentation.  Patient stated she was having on and off abdominal pain and was being more confused since last few days.  She stated of being compliant with all of her home meds including lactulose.  She could not answer whether she was having regular bowel movements or not.  Pain was generalized and nonspecific.  She denied any fever, chills, cough, nausea, vomiting, melena and any urinary symptoms.  Patient lives with her brother and has she has found to be more confused was brought to the hospital.  On presentation to the hospital vitals were stable.  Labs showed lactic acidosis and hyperammonemia.  Bilirubin was increased to 2.7.    Recent Labs     01/30/25  0422   ALT 50*   AST 54*   ALKPHOS 112*   BILITOT 2.0*     Lab Results   Component Value Date    WBC 4.1 (L) 01/30/2025    HGB 13.1 01/30/2025    HCT 38.2 01/30/2025    PLT 94 (L) 01/30/2025     01/30/2025    K 3.5 
  Gastroenterology, Hepatology, &  Advanced Endoscopy    Progress Note  Subjective: Pt more awake and alert today. Multiple BM's. States taking lactulose QID at home with BM's. I spent much time with her today regarding the importance of q 2 hour dosing of lactulose until diarrhea at the beginning of any change in cognition.    Reason for Consult: Hyperammonemia, history of cirrhosis, well known to our practice. Last seen in house 12/19/24 2/2 H.E.    HPI:   Erna Stoner is a 64 y.o. female w/ PMH of  has a past medical history of Asthma, Back pain, Cirrhosis of liver (HCC), Diabetes mellitus (HCC), Hypothyroidism, Sarcoidosis, and Thyroid disease., sarcoidosis, sarcoid cirrhosis, esophageal varices S/P TIPS and portal vein thrombosis presented to the hospital with complaints of altered mentation and abdominal pain on 1/26/2025.  Patient was found to have hyperammonemia during the admission.   Patient alert to self, place and year but on and off confused during the interview.  History limited due to altered mentation.  Patient stated she was having on and off abdominal pain and was being more confused since last few days.  She stated of being compliant with all of her home meds including lactulose.  She could not answer whether she was having regular bowel movements or not.  Pain was generalized and nonspecific.  She denied any fever, chills, cough, nausea, vomiting, melena and any urinary symptoms.  Patient lives with her brother and has she has found to be more confused was brought to the hospital.  On presentation to the hospital vitals were stable.  Labs showed lactic acidosis and hyperammonemia.  Bilirubin was increased to 2.7.    Recent Labs     01/25/25  1848 01/25/25  1853 01/27/25  0248   INR  --  1.4  --    ALT 45*  --  41*   AST 61*  --  44*   ALKPHOS 122*  --  102   BILITOT 2.7*  --  1.7*   BILIDIR 0.9*  --   --      Lab Results   Component Value Date    WBC 3.4 (L) 01/28/2025    HGB 13.3 01/28/2025    
  Speech Language Pathology  NAME:  Erna Stoner  :  1960  DATE: 2025  ROOM:  [id number]    Pt unavailable at 1530 for Speech/ Language/ Cognitive Evaluation Discussed with patient nurse in person    REASON:  Evaluation no longer needed per RN; RN to cancel order. RN reported that she follows with [facility] on an outpatient basis. Please re-consult if needed.       Thank You,   Johnnie Madden MSCCC/SLP  Speech Language Pathologist           
4 Eyes Skin Assessment     NAME:  Erna Stoner  YOB: 1960  MEDICAL RECORD NUMBER:  [id number]    The patient is being assessed for  Admission    I agree that at least one RN has performed a thorough Head to Toe Skin Assessment on the patient. ALL assessment sites listed below have been assessed.      Areas assessed by both nurses:    Head, Face, Ears, Shoulders, Back, Chest, Arms, Elbows, Hands, Sacrum. Buttock, Coccyx, Ischium, Legs. Feet and Heels, and Under Medical Devices         Does the Patient have a Wound? No noted wound(s)       Jose M Prevention initiated by RN: Yes  Wound Care Orders initiated by RN: No    Pressure Injury (Stage 3,4, Unstageable, DTI, NWPT, and Complex wounds) if present, place Wound referral order by RN under : No    New Ostomies, if present place, Ostomy referral order under : No     Nurse 1 eSignature: Electronically signed by Beth Molina RN, RN on 1/26/25 at 10:00 AM EST    **SHARE this note so that the co-signing nurse can place an eSignature**    Nurse 2 eSignature: {Esignature:[id number]}   
[facility]  Neuro Inpatient Follow Up        Erna Stoner is a 64 y.o. right handed female     Neuro is following for stroke like symptoms    Significant PMH: Type 2 diabetes, hypothyroidism, cirrhosis, sarcoidosis    HPI:  The patient presented on 1/25 for abdominal pain and hyperammonemia (130s).  On 1/26 she was an inpatient stroke alert for altered mental status and left-sided drift.  She was not a candidate for thrombolytic therapy due to low platelet count, esophageal varices, borderline PT/INR.    Urgent CTAs and CTP showed no thrombus amenable to IR or perfusion abnormality    She follows with [facility] neuro oncology center for right basal ganglia cyst--and MRI with and without earlier this month was stable    Subjective:  She still feels that her right arm and leg are weaker than normal.  No language issues or paresthesias.  Ammonia still high and she still has some confusion but follows all commands well.    No chest pain or palpitations  No SOB  No vertigo, lightheadedness or loss of consciousness  No falls, tripping or stumbling  No incontinence of bowels or bladder  No itching or bruising appreciated  No speech or swallowing troubles    ROS otherwise negative    Current Facility-Administered Medications   Medication Dose Route Frequency Provider Last Rate Last Admin    sodium chloride flush 0.9 % injection 5-40 mL  5-40 mL IntraVENous 2 times per day John Paulus, April, APRN - GABRIEL        sodium chloride flush 0.9 % injection 5-40 mL  5-40 mL IntraVENous PRN Mohsen, April, APRN - CNP        0.9 % sodium chloride infusion   IntraVENous PRN Mohsen, April, APRN - GABRIEL        ondansetron (ZOFRAN-ODT) disintegrating tablet 4 mg  4 mg Oral Q8H PRN Mohsen, April, APRN - GABRIEL        Or    ondansetron (ZOFRAN) injection 4 mg  4 mg IntraVENous Q6H PRN Mohsen, April, APRN - CNP        bisacodyl (DULCOLAX) EC tablet 5 mg  5 mg Oral Daily PRN 
Dr. Davila aware of lactic acid 7.7  
GI consult called to Dr. Montes De Oca  
Glucose 136    
MRI screening form needs completed, thank you.  
OCCUPATIONAL THERAPY INITIAL EVALUATION    [facility] [address], [place]       Date:2025                                                  Patient Name: Erna Stoner  MRN: [id number]  : 1960  Room: [address] [place] [address]    Evaluating OT: Clarence Llanes OTR/L [id number]    Referring Provider:     Kimberly Gallo MD        Specific Provider Orders/Date: OT evaluation and treatment 25 1215    Diagnosis:  Lactic acidosis [E87.20]  Hyperammonemia (HCC) [E72.20]      Pertinent Medical History:  has a past medical history of Asthma, Back pain, Cirrhosis of liver (HCC), Diabetes mellitus (HCC), Hypothyroidism, Sarcoidosis, and Thyroid disease.   Past Surgical History:   Procedure Laterality Date    CARPAL TUNNEL RELEASE      bilateral     SECTION      CHOLECYSTECTOMY      COLONOSCOPY  2012    COLONOSCOPY N/A 10/10/2024    COLONOSCOPY DIAGNOSTIC performed by Augie Montes De Oca MD at [facility] ENDOSCOPY    HYSTERECTOMY (CERVIX STATUS UNKNOWN)      IR EMBOLIZATION VENOUS  2024    IR EMBOLIZATION VENOUS 2024 JENNIFER Pack MD [facility]    IR TIPS INSERTION  2024    IR TIPS INSERTION 2024 JENNIFER Pack MD [facility]    LIVER BIOPSY  2017    NOSE SURGERY N/A 2020    EXCISIONAL BIOPSY OF RIGHT NARES (PATHOLOGY NOTIFIED) performed by Bandar Solis Jr., MD at [facility] OR    OVARY REMOVAL      TONSILLECTOMY      UPPER GASTROINTESTINAL ENDOSCOPY  2012    UPPER GASTROINTESTINAL ENDOSCOPY  2024    ESOPHAGOGASTRODUODENOSCOPY CONTROL HEMORRHAGE performed by Noé Montilla MD at [facility] ENDOSCOPY    UPPER GASTROINTESTINAL ENDOSCOPY  2024    ESOPHAGOGASTRODUODENOSCOPY SUBMUCOSAL INJECTION performed by Augie Montes De Oca MD at [facility] ENDOSCOPY    UPPER GASTROINTESTINAL ENDOSCOPY N/A 10/10/2024    ESOPHAGOGASTRODUODENOSCOPY DIAGNOSTIC ONLY performed by Augie Montes De Oca MD at 
Patient received the Sacrament of the Anointing of the Sick by Father David Rivera on Tuesday, January 28, 2025.    If additional support is requested or needed please reach out to Spiritual Health ([phone]).    Chap. Neal Diaz MDIV, BCC    
palate elevation  Normal   IX,X: gag reflex    XI: trapezius strength  5/5   XI: sternocleidomastoid strength 5/5   XI: neck extension strength  5/5   XII: tongue strength  Normal     Motor:  5/5 throughout today  Mild asterixis with arms outstretched  Normal bulk    Sensory:  LT normal in all limbs    Coordination:   FN, FFM normal b/l today    Laboratory/Radiology:     CBC with Differential:    Lab Results   Component Value Date/Time    WBC 3.4 01/28/2025 04:27 AM    RBC 4.22 01/28/2025 04:27 AM    HGB 13.3 01/28/2025 04:27 AM    HCT 39.5 01/28/2025 04:27 AM     01/28/2025 04:27 AM    MCV 93.6 01/28/2025 04:27 AM    MCH 31.5 01/28/2025 04:27 AM    MCHC 33.7 01/28/2025 04:27 AM    RDW 15.1 01/28/2025 04:27 AM    NRBC 1 04/22/2024 05:36 AM    METASPCT 1 01/25/2025 06:48 PM    LYMPHOPCT 21 01/28/2025 04:27 AM    MONOPCT 13 01/28/2025 04:27 AM    MYELOPCT 2 01/25/2025 06:48 PM    EOSPCT 4 01/28/2025 04:27 AM    BASOPCT 1 01/28/2025 04:27 AM    MONOSABS 0.45 01/28/2025 04:27 AM    LYMPHSABS 0.71 01/28/2025 04:27 AM    EOSABS 0.12 01/28/2025 04:27 AM    BASOSABS 0.03 01/28/2025 04:27 AM     CMP:    Lab Results   Component Value Date/Time     01/27/2025 02:48 AM    K 4.2 01/27/2025 02:48 AM    K 3.9 02/14/2020 10:39 PM     01/27/2025 02:48 AM    CO2 22 01/27/2025 02:48 AM    BUN 4 01/27/2025 02:48 AM    CREATININE 0.4 01/27/2025 02:48 AM    GFRAA >60 11/13/2020 09:35 AM    LABGLOM >90 01/27/2025 02:48 AM    LABGLOM >90 04/29/2024 05:46 AM    GLUCOSE 94 01/27/2025 02:48 AM    GLUCOSE 171 05/07/2012 10:20 AM    CALCIUM 8.2 01/27/2025 02:48 AM    BILITOT 1.7 01/27/2025 02:48 AM    ALKPHOS 102 01/27/2025 02:48 AM    AST 44 01/27/2025 02:48 AM    ALT 41 01/27/2025 02:48 AM     Ammonia  1/25: 119  1/26: 135  1/27: 136  1/28: 100    CTA head and neck and CTP  IMPRESSION:  1. No stenosis of the cervical carotid or vertebral arteries using NASCET  criteria.  No acute intracranial abnormality. No perfusion 
tablet  1 tablet Oral QAM Araceliy-Cornelius, April, APRN - CNP   1 tablet at 01/29/25 0826    potassium chloride (KLOR-CON M) extended release tablet 20 mEq  20 mEq Oral Daily Storey-Cornelius, April, APRN - CNP   20 mEq at 01/29/25 0826    zinc sulfate (ZINCATE) 220 mg capsule - elemental zinc 50 mg  50 mg Oral Daily Storey-Cornelius, April, APRN - CNP   50 mg at 01/29/25 0826    Vitamin D (CHOLECALCIFEROL) tablet 1,000 Units  1,000 Units Oral QAM Storey-Cornelius, April, APRN - CNP   1,000 Units at 01/29/25 0825    rifAXIMin (XIFAXAN) tablet 400 mg  400 mg Oral TID Storey-Cornelius, April, APRN - CNP   400 mg at 01/29/25 0826    0.9 % sodium chloride infusion   IntraVENous Continuous Storey-Cornelius, April, APRN - CNP 75 mL/hr at 01/27/25 1310 New Bag at 01/27/25 1310    glucose chewable tablet 16 g  4 tablet Oral PRN Bayy-Cornelius, April, APRN - CNP        dextrose bolus 10% 125 mL  125 mL IntraVENous PRN Bay-Conorlius, April, APRN - CNP        Or    dextrose bolus 10% 250 mL  250 mL IntraVENous PRN Bay-San Antoniolius, April, APRN - CNP        glucagon injection 1 mg  1 mg SubCUTAneous PRN Bayy-Cornelius, April, APRN - CNP        dextrose 10 % infusion   IntraVENous Continuous PRN [facility], April, APRN - CNP        insulin lispro (HUMALOG,ADMELOG) injection vial 0-4 Units  0-4 Units SubCUTAneous 4x Daily AC & HS Bay-Conorlius, April, APRN - CNP        predniSONE (DELTASONE) tablet 10 mg  10 mg Oral Daily Storey-Cornelius, April, APRN - CNP   10 mg at 01/29/25 0826    cefTRIAXone (ROCEPHIN) 1,000 mg in sterile water 10 mL IV syringe  1,000 mg IntraVENous Q24H Storey-Cornelius, April, APRN - CNP   1,000 mg at 01/29/25 0041         REVIEW OF SYSTEMS:   General: Patient denies n/v/f/c or weight loss.  HEENT: Patient denies persistent postnasal drip, scleral icterus, drooling, persistent bleeding from nose/mouth.  Resp: Patient denies SOB, wheezing, productive cough.  Cardio: Patient denies CP,

## 2025-01-30 NOTE — CARE COORDINATION
Discharge order noted. Plan is home and patient will return to outpatient OT/ST, Scripts are in soft chart. RN aware. Crystal from [facility] notified of patient discharging today. She will deliver it to the patent's room prior to discharge today. Patient's son will transport her home today.  Alta Joshi RN CM  [phone]

## 2025-01-30 NOTE — DISCHARGE SUMMARY
maintained. CT PERFUSION: EXAM QUALITY: The examination is diagnostic with appropriate arterial inflow and venous outflow curves, and diagnostic perfusion maps. CORE INFARCT: The total area of ischemic core is 0 mL (CBF<30% volume). TOTAL HYPOPERFUSION: The total area of hypoperfusion is 0 mL (Tmax>6s volume). PENUMBRA: The penumbra (mismatch) volume is 0 mL and is located in the n/a. The mismatch ratio is n/a.     No acute intracranial abnormality. No perfusion abnormality. No aneurysm or stenosis.     XR CHEST PORTABLE    Result Date: 1/25/2025  EXAMINATION: ONE XRAY VIEW OF THE CHEST 1/25/2025 7:02 pm COMPARISON: 07/16/2024 HISTORY: ORDERING SYSTEM PROVIDED HISTORY: r/o pna TECHNOLOGIST PROVIDED HISTORY: Reason for exam:->r/o pna FINDINGS: The lungs are without acute focal process.  There is no effusion or pneumothorax. The cardiomediastinal silhouette is without acute process. The osseous structures are without acute process.     No acute process.       Discharge Exam:    HEENT: NCAT,  PERRLA, No JVD  Heart:  RRR, no murmurs, gallops, or rubs.  Lungs:  CTA bilaterally, no wheeze, rales or rhonchi  Abd: bowel sounds present, nontender, nondistended, no masses  Extrem:  No clubbing, cyanosis, or edema    Disposition: home     Patient Condition at Discharge: Stable    Patient Instructions:      Medication List        CONTINUE taking these medications      * albuterol sulfate  (90 Base) MCG/ACT inhaler  Commonly known as: PROVENTIL;VENTOLIN;PROAIR     * albuterol (2.5 MG/3ML) 0.083% nebulizer solution  Commonly known as: PROVENTIL     BIOTIN PO     Centrum Silver 50+Women Tabs     lactulose 10 GM/15ML solution  Commonly known as: CHRONULAC  Take 30 mLs by mouth 4 times daily Self-titrate to 2-3 BM daily. Additional doses if needed for symptoms of mental fog/slow processing.     levothyroxine 100 MCG tablet  Commonly known as: SYNTHROID     Mounjaro 5 MG/0.5ML Soaj  Generic drug: Tirzepatide     omeprazole 40

## 2025-01-31 LAB
MICROORGANISM SPEC CULT: NORMAL
MICROORGANISM SPEC CULT: NORMAL
SERVICE CMNT-IMP: NORMAL
SERVICE CMNT-IMP: NORMAL
SPECIMEN DESCRIPTION: NORMAL
SPECIMEN DESCRIPTION: NORMAL

## 2025-02-03 ENCOUNTER — TRANSCRIBE ORDERS (OUTPATIENT)
Dept: ADMINISTRATIVE | Age: 65
End: 2025-02-03

## 2025-02-03 DIAGNOSIS — K74.69 OTHER CIRRHOSIS OF LIVER (HCC): Primary | ICD-10-CM

## 2025-02-03 DIAGNOSIS — Z95.828 S/P TIPS (TRANSJUGULAR INTRAHEPATIC PORTOSYSTEMIC SHUNT): ICD-10-CM

## 2025-02-04 ENCOUNTER — HOSPITAL ENCOUNTER (OUTPATIENT)
Dept: ULTRASOUND IMAGING | Age: 65
Discharge: HOME | End: 2025-02-06
Attending: STUDENT IN AN ORGANIZED HEALTH CARE EDUCATION/TRAINING PROGRAM
Payer: COMMERCIAL

## 2025-02-04 DIAGNOSIS — Z95.828 S/P TIPS (TRANSJUGULAR INTRAHEPATIC PORTOSYSTEMIC SHUNT): ICD-10-CM

## 2025-02-04 DIAGNOSIS — K74.69 OTHER CIRRHOSIS OF LIVER (HCC): ICD-10-CM

## 2025-02-04 PROCEDURE — 76705 ECHO EXAM OF ABDOMEN: CPT

## 2025-02-04 PROCEDURE — 93975 VASCULAR STUDY: CPT

## 2025-02-05 ENCOUNTER — TELEPHONE (OUTPATIENT)
Age: 65
End: 2025-02-05

## 2025-02-05 NOTE — TELEPHONE ENCOUNTER
Patient called the office asking if she needed blood work done prior her follow up appt with DR Montes De Oca. Pt had blood drawn on 1/30 and there are not standing orders for her. Spoke to Gillian and we will work with the ones that she had done because by the time he gets back it will be late. Then patient was told that if anything we will place new orders at her appointment according with what Dr Montes De Oca says. Pt verbalized understanding. Electronically signed by Quyen Ho MA on 2/5/25 at 2:01 PM EST

## 2025-02-12 ENCOUNTER — OFFICE VISIT (OUTPATIENT)
Dept: GASTROENTEROLOGY | Age: 65
End: 2025-02-12

## 2025-02-12 ENCOUNTER — PREP FOR PROCEDURE (OUTPATIENT)
Dept: GASTROENTEROLOGY | Age: 65
End: 2025-02-12

## 2025-02-12 VITALS
DIASTOLIC BLOOD PRESSURE: 70 MMHG | SYSTOLIC BLOOD PRESSURE: 140 MMHG | TEMPERATURE: 97 F | OXYGEN SATURATION: 96 % | BODY MASS INDEX: 27.23 KG/M2 | HEIGHT: 62 IN | WEIGHT: 148 LBS | HEART RATE: 98 BPM

## 2025-02-12 DIAGNOSIS — K74.69 OTHER CIRRHOSIS OF LIVER (HCC): ICD-10-CM

## 2025-02-12 DIAGNOSIS — K74.69 OTHER CIRRHOSIS OF LIVER (HCC): Primary | ICD-10-CM

## 2025-02-12 DIAGNOSIS — K76.82 HEPATIC ENCEPHALOPATHY (HCC): ICD-10-CM

## 2025-02-12 LAB
BUN BLDV-MCNC: 7 MG/DL (ref 6–23)
CREAT SERPL-MCNC: 0.4 MG/DL (ref 0.5–1)
GFR, ESTIMATED: >90 ML/MIN/1.73M2

## 2025-02-12 RX ORDER — LACTULOSE 10 G/15ML
20 SOLUTION ORAL 4 TIMES DAILY
Qty: 2700 ML | Refills: 11 | Status: SHIPPED | OUTPATIENT
Start: 2025-02-12

## 2025-02-12 RX ORDER — POTASSIUM CHLORIDE 750 MG/1
TABLET, EXTENDED RELEASE ORAL
COMMUNITY
Start: 2025-02-10

## 2025-02-12 RX ORDER — IRON BIS-GLYCINAT/VIT C/FA/B12 28-60-0.4
CAPSULE ORAL
COMMUNITY
Start: 2025-01-08

## 2025-02-12 NOTE — PROGRESS NOTES
Gastroenterology, Hepatology, &  Advanced Endoscopy    Consult Note      Reason for Consult: F/u labs and US, needs medication refill.     HPI:   Erna Stoner is a 64 y.o. female w/ PMH of  has a past medical history of Asthma, Back pain, Cirrhosis of liver (HCC), Diabetes mellitus (HCC), Hypothyroidism, Sarcoidosis, and Thyroid disease. who presents to the OP for follow up labs, and medication refill.     Patient had recent hospital admissions due to hepatic encephalopathy due to hyperammonemia.    US liver showed Cirrhotic hepatic morphology.  2. Partially cystic or complex hypoechoic area in the right hepatic lobe  measuring 2 cm. This may represent a complex cyst. Recommend follow-up liver  protocol CT or MRI for further evaluation.  3. Status post cholecystectomy.  4. No evidence of ascites.  EGD: 10/2024: no varices, EGD: 04/2024: type 1 isolated gastric varices, clip was appreciated, and gastritis was found.   Colonoscopy: 10/2024: no acute concern  LIVER VASCULAR ULTRASOUND WITH DOPPLER IMAGING in 07/2023: no varices    She is taking Mounjaro. She is on a small dose and tolerating well but will need to be monitored closely.           Lab Results   Component Value Date    INR 1.4 01/25/2025    INR 1.3 01/13/2025    INR 1.2 11/17/2024    PROTIME 15.3 (H) 01/25/2025    PROTIME 14.2 (H) 01/13/2025    PROTIME 13.6 (H) 11/17/2024         ALT   Date Value Ref Range Status   01/30/2025 50 (H) 0 - 32 U/L Final   01/27/2025 41 (H) 0 - 32 U/L Final   01/25/2025 45 (H) 0 - 32 U/L Final     AST   Date Value Ref Range Status   01/30/2025 54 (H) 0 - 31 U/L Final   01/27/2025 44 (H) 0 - 31 U/L Final   01/25/2025 61 (H) 0 - 31 U/L Final     Alkaline Phosphatase   Date Value Ref Range Status   01/30/2025 112 (H) 35 - 104 U/L Final   01/27/2025 102 35 - 104 U/L Final   01/25/2025 122 (H) 35 - 104 U/L Final     Total Bilirubin   Date Value Ref Range Status   01/30/2025 2.0 (H) 0.0 - 1.2 mg/dL Final   01/27/2025 1.7 (H) 0.0

## 2025-02-19 ENCOUNTER — HOSPITAL ENCOUNTER (OUTPATIENT)
Dept: SPEECH THERAPY | Age: 65
Setting detail: THERAPIES SERIES
Discharge: HOME | End: 2025-02-19
Payer: COMMERCIAL

## 2025-02-19 ENCOUNTER — HOSPITAL ENCOUNTER (OUTPATIENT)
Dept: OCCUPATIONAL THERAPY | Age: 65
Setting detail: THERAPIES SERIES
Discharge: HOME | End: 2025-02-19
Payer: COMMERCIAL

## 2025-02-19 PROCEDURE — 97130 THER IVNTJ EA ADDL 15 MIN: CPT

## 2025-02-19 PROCEDURE — 97129 THER IVNTJ 1ST 15 MIN: CPT

## 2025-02-19 NOTE — PROGRESS NOTES
Occupational Therapy      Phone: [phone] Fax: [phone]     Occupational Therapy   Cancellation Note     Patient Name:  Erna Stoner  : 1960  Date:  2025  MRN: [id number]    For today's appointment patient:   [x]  Cancelled   [x]  Rescheduled appointment   []  No-show     Reason given by patient:   []  Patient ill   []  Conflicting appointment   []  No transportation   []  Conflict with work   []  No reason given   [x]  Other:    Comments: Insurance/ approved visits - needs reassess- scheduled for     Electronically signed by: Jelena VAZQUEZ, [id number]

## 2025-02-19 NOTE — PROGRESS NOTES
SPEECH LANGUAGE PATHOLOGY  DAILY PROGRESS NOTE        PATIENT NAME:  Erna Stoner      :  1960          TODAY'S DATE:  2025     POC:  Pt will improve immediate, short term, recent memory during structured and unstructured tasks with 80% accuracy   Pt will improve problem solving/thought organization during structured and unstructured tasks with 80% accuracy      Subjective:                Patient was seen for therapy today. She arrived independently. Pt was cooperative and pleasant. She reported feeling very tired today. This was the first time she has been back to therapy since 2 hospital admissions due to increased ammonia levels that impacted her cognition.      Objective:                             initiated SCATBI assessment to get updated baseline scores for patient since she has not been seen in a month and has been admitted to the hospital x2. Will finish next session.        Assessment:                 Making progress with goals     Plan:                 Continue POC.        CPT code(s) 90703  therapeutic interventions that focus on cognitive function , initial  15 min  12808  therapeutic interventions that focus on cognitive function, each additional 15 min  Total minutes :  30 minutes    Britt Santiago M.A., CCC-SLP

## 2025-02-24 ENCOUNTER — HOSPITAL ENCOUNTER (OUTPATIENT)
Dept: OCCUPATIONAL THERAPY | Age: 65
Setting detail: THERAPIES SERIES
Discharge: HOME | End: 2025-02-24
Payer: COMMERCIAL

## 2025-02-24 PROCEDURE — 97530 THERAPEUTIC ACTIVITIES: CPT

## 2025-02-24 NOTE — PROGRESS NOTES
OCCUPATIONAL THERAPY Re-Assessment  [facility]  [facility] OCCUPATIONAL THERAPY  [address]  [facility] [address]  Dept: [phone]  Loc: [phone]   [facility] OT Fax: [phone]      Date:  2025      Initial Evaluation Date: 07/15/2024                          Evaluating Therapist: Luz Mcgraw OT     Patient Name:  Erna Stoner                      :  1960     Restrictions/Precautions:  none, low fall risk  Diagnosis:  R27.8 (ICD-10-CM) - Other lack of coordination  R48.2 (ICD-10-CM) Apraxia                                                             Date of Surgery/Injury: Ongoing since 2024     Insurance/Certification information: [employer] [place] MEDICA[place] (30 visits then auth)    Plan of care signed (Y/N): No  Visit# / total visits       Referring Practitioner:  Elia Daniel MD   Specific Practitioner Orders: Eval and treat  25: 2-3x/week for 6 weeks from 25- 3/20/25    OT PLAN OF CARE   OT POC based on physician orders, patient diagnosis and results of clinical assessment     Frequency/Duration as of 25: 1-2x / week for 12 visits.   Certification period From: 2025  To: 2025    GOALS (Long term same as Short term):   1) Patient will demonstrate good understanding of home program (exercises/activities/EC techniques/adaptive/compensatory techniques) with good accuracy.   Progressing, HEP reviewed and updated with pt reporting carry over of techniques e.g., use of planner, EC techniques  2) Patient will pay her bills with Modified [place] and use of adaptive strategies as needed   Progressing, assist levels for bill pay fluctuate. Sometimes pt per performs independently, sometimes son assists, and pt's son jayshree supervise to ensure nor errors take place  3) Patient will complete meal prep and with Indep and no cueing for problem solving, memory, or sequencing   Progressing, pt reports low energy levels and memory affect

## 2025-02-25 ENCOUNTER — PROCEDURE VISIT (OUTPATIENT)
Dept: PODIATRY | Age: 65
End: 2025-02-25
Payer: COMMERCIAL

## 2025-02-25 VITALS — HEIGHT: 62 IN | BODY MASS INDEX: 27.23 KG/M2 | WEIGHT: 148 LBS

## 2025-02-25 DIAGNOSIS — I82.4Z2 ACUTE DEEP VEIN THROMBOSIS (DVT) OF DISTAL VEIN OF LEFT LOWER EXTREMITY (HCC): ICD-10-CM

## 2025-02-25 DIAGNOSIS — G60.8 HEREDITARY SENSORY NEUROPATHY: ICD-10-CM

## 2025-02-25 DIAGNOSIS — Z79.4 TYPE 2 DIABETES MELLITUS WITHOUT COMPLICATION, WITH LONG-TERM CURRENT USE OF INSULIN (HCC): Primary | ICD-10-CM

## 2025-02-25 DIAGNOSIS — L84 CORNS AND CALLOSITIES: ICD-10-CM

## 2025-02-25 DIAGNOSIS — B35.1 TINEA UNGUIUM: ICD-10-CM

## 2025-02-25 DIAGNOSIS — E11.9 TYPE 2 DIABETES MELLITUS WITHOUT COMPLICATION, WITH LONG-TERM CURRENT USE OF INSULIN (HCC): Primary | ICD-10-CM

## 2025-02-25 PROCEDURE — 11056 PARNG/CUTG B9 HYPRKR LES 2-4: CPT | Performed by: PODIATRIST

## 2025-02-25 PROCEDURE — 1111F DSCHRG MED/CURRENT MED MERGE: CPT | Performed by: PODIATRIST

## 2025-02-25 PROCEDURE — 1036F TOBACCO NON-USER: CPT | Performed by: PODIATRIST

## 2025-02-25 PROCEDURE — 11721 DEBRIDE NAIL 6 OR MORE: CPT | Performed by: PODIATRIST

## 2025-02-25 PROCEDURE — 3046F HEMOGLOBIN A1C LEVEL >9.0%: CPT | Performed by: PODIATRIST

## 2025-02-25 PROCEDURE — G8417 CALC BMI ABV UP PARAM F/U: HCPCS | Performed by: PODIATRIST

## 2025-02-25 PROCEDURE — 3017F COLORECTAL CA SCREEN DOC REV: CPT | Performed by: PODIATRIST

## 2025-02-25 PROCEDURE — G8427 DOCREV CUR MEDS BY ELIG CLIN: HCPCS | Performed by: PODIATRIST

## 2025-02-25 PROCEDURE — 2022F DILAT RTA XM EVC RTNOPTHY: CPT | Performed by: PODIATRIST

## 2025-02-25 PROCEDURE — 99213 OFFICE O/P EST LOW 20 MIN: CPT | Performed by: PODIATRIST

## 2025-02-25 NOTE — PROGRESS NOTES
Patient in today for nail care.  Type 2 diabetic.  Patient's PCP is Elia Daniel MD and date of last ov 9/16/2024.              CC:   Diabetic foot exam and painful elongated toenails 1-5 right and left  Left calf pain with swelling    HPI:   Diabetic foot ankle exam.  No recent injury.  Does state has had some swelling and tenderness on the back of the left calf for the past week.  No recent long trips or extended car driving.  No recent flights.  Does state tenderness worse in the morning after she is laying down.  No recent injury or trauma.  No current use of compression stockings.    ROS:  Const: Denies constitutional symptoms  Musculo: Denies symptoms other than stated above  Skin: Denies symptoms other than stated above      Current Outpatient Medications:     potassium chloride (KLOR-CON) 10 MEQ extended release tablet, , Disp: , Rfl:     Fe Bisgly-Vit C-Vit B12-FA (GENTLE IRON) 28-60-0.008-0.4 MG CAPS, Take by mouth, Disp: , Rfl:     lactulose (CHRONULAC) 10 GM/15ML solution, Take 30 mLs by mouth 4 times daily Self-titrate to 2-3 BM daily. Additional doses if needed for symptoms of mental fog/slow processing., Disp: 2700 mL, Rfl: 11    predniSONE (DELTASONE) 10 MG tablet, Take 1 tablet by mouth daily 15 mg daily, Disp: , Rfl:     omeprazole (PRILOSEC) 40 MG delayed release capsule, Take 1 capsule by mouth daily, Disp: , Rfl:     Empagliflozin-metFORMIN HCl (SYNJARDY) 5-1000 MG TABS, Take by mouth, Disp: , Rfl:     potassium chloride (KLOR-CON M) 20 MEQ extended release tablet, Take 1 tablet by mouth daily, Disp: 30 tablet, Rfl: 0    hydroCHLOROthiazide 12.5 MG tablet, Take 1 tablet by mouth daily, Disp: , Rfl:     iron polysaccharides (IFEREX 150) 150 MG capsule, Take 1 capsule by mouth daily, Disp: , Rfl:     Tirzepatide (MOUNJARO) 5 MG/0.5ML SOAJ, Inject 5 mg into the skin Once a week at 5 PM, Disp: , Rfl:     BIOTIN PO, Take 100 mg by mouth daily, Disp: , Rfl:     rifAXIMin (XIFAXAN) 550 MG

## 2025-02-26 ENCOUNTER — HOSPITAL ENCOUNTER (OUTPATIENT)
Dept: SPEECH THERAPY | Age: 65
Setting detail: THERAPIES SERIES
Discharge: HOME | End: 2025-02-26
Payer: COMMERCIAL

## 2025-02-26 ENCOUNTER — HOSPITAL ENCOUNTER (OUTPATIENT)
Dept: OCCUPATIONAL THERAPY | Age: 65
Setting detail: THERAPIES SERIES
Discharge: HOME | End: 2025-02-26
Payer: COMMERCIAL

## 2025-02-26 PROCEDURE — 97129 THER IVNTJ 1ST 15 MIN: CPT

## 2025-02-26 PROCEDURE — 97530 THERAPEUTIC ACTIVITIES: CPT

## 2025-02-26 PROCEDURE — 97130 THER IVNTJ EA ADDL 15 MIN: CPT

## 2025-02-26 NOTE — PROGRESS NOTES
complSPEECH LANGUAGE PATHOLOGY  DAILY PROGRESS NOTE        PATIENT NAME:  Erna Stoner      :  1960          TODAY'S DATE:  2025     POC:  Pt will improve immediate, short term, recent memory during structured and unstructured tasks with 80% accuracy   Pt will improve problem solving/thought organization during structured and unstructured tasks with 80% accuracy      Subjective:                Patient was seen for therapy today. She arrived independently. Pt was cooperative and pleasant. She reported feeling 'a little better' today.      Objective:                            completed scatbi this date.    The patient was administered the Scales of Cognitive Ability For Traumatic Brain Injury (SCATBI). The SCATBI was designed to measure cognitive processes that are often impaired as a result of traumatic brain injury. These cognitive processes include: perception, discrimination, organization, recall of information, and problem solving skills. The results are as follows:     Recall: raw score= 37, standard kxnce=186, percentile rank=63   Reasoning: raw score=28, standard score=94, percentile rank=35     Higher Function Score (recall ss + reasoning ss): sum of SS= 199, standard score=99, percentile rank= 47    SCATBI Severity Score= 10, indicating mild cognitive impairment         Assessment:                 Making progress with goals     Plan:                 Continue POC.        CPT code(s) 42717  therapeutic interventions that focus on cognitive function , initial  15 min  02328  therapeutic interventions that focus on cognitive function, each additional 15 min  Total minutes :  30 minutes    Britt Santiago M.A., CCC-SLP

## 2025-02-26 NOTE — PROGRESS NOTES
[facility]  Outpatient Speech Therapy  Phone: [phone] Fax: [phone]   SPEECH THERAPY   RE-EVALUATION / UPDATED PLAN OF CARE        PATIENT NAME:  Erna Stoner  (female)     MRN:  [id number]    :  1960  (64 y.o.)  STATUS:  Outpatient clinic   TODAY'S DATE:  2025  REFERRING PROVIDER:    Elia Daniel MD        PROVIDER NPI:  [id number]  SPECIFIC PROVIDER ORDER: SLP eval and treat  Date of order:  25  EVALUATING THERAPIST: SHAILA Farfan    CERTIFICATION/RECERTIFICATION PERIOD: 2025 to 25  INSURANCE PROVIDER:  Payor: [facility] / [facility] [place] [facility] / Product Type: *No Product type* /    INSURANCE ID:  [id number] - (Medicaid Managed)   SECONDARY INSURANCE (if applicable):        CPT Codes  EVALUATION: 55219 Evaluation of Speech Sound Language Comprehension     60 Minutes     TREATMENT:  Requesting treatment authorization for  10 visits over 12 weeks focusing on the following CPT codes:      36460 Speech/Language Therapy     30 Minutes      54218 cognitive therapy      77571 cognitive therapy additional 15 minutes          REFERRING/TREATMENT  DIAGNOSIS: Other lack of coordination [R27.8] R47.1         SUBJECTIVE  Patient attended 15 speech therapy sessions from 2024 to 2025 .Focus of current treatment sessions has been on memory, problem solving, and reasoning skills.    OBJECTIVE / GOAL STATUS   (Status Key: GM = Goal Met, MP = Making Progress, BP = Beginning Progress, NI = Not Introduced, D/C = Discontinue Goal, NM = Not Met)  Pt will improve immediate, short term, recent memory during structured and unstructured tasks with 80% accuracy MP  Pt will improve problem solving/thought organization during structured and unstructured tasks with 80% accuracy MP    ASSESSMENT / STANDARDIZED TEST RESULTS  Patient has made fair progress over the past 6 months    The patient was administered

## 2025-02-26 NOTE — PROGRESS NOTES
OCCUPATIONAL THERAPY DAILY NOTE  [facility]  [facility] OCCUPATIONAL THERAPY  [address]  [facility] [address]  Dept: [phone]  Loc: [phone]   [facility] OT Fax: [phone]      Date:  2025    Initial Evaluation Date: 07/15/2024                          Evaluating Therapist: Luz Mcgraw OT     Patient Name:  Erna Stoner                      :  1960     Restrictions/Precautions:  none, low fall risk  Diagnosis:  R27.8 (ICD-10-CM) - Other lack of coordination  R48.2 (ICD-10-CM) Apraxia                                                             Date of Surgery/Injury: Ongoing since 2024     Insurance/Certification information: [employer] [place] MEDICA[place] (30 visits then auth)     Plan of care signed (Y/N): No  Visit# / total visits       Referring Practitioner:  Elia Daniel MD   Specific Practitioner Orders: Eval and treat  25: 2-3x/week for 6 weeks from 25- 3/20/25     OT PLAN OF CARE   OT POC based on physician orders, patient diagnosis and results of clinical assessment     Frequency/Duration as of 25: 1-2x / week for 12 visits.   Certification period From: 2025  To: 2025     GOALS (Long term same as Short term):   1) Patient will demonstrate good understanding of home program (exercises/activities/EC techniques/adaptive/compensatory techniques) with good accuracy.   Progressing, HEP reviewed and updated with pt reporting carry over of techniques e.g., use of planner, EC techniques  2) Patient will pay her bills with Modified [place] and use of adaptive strategies as needed   Progressing, assist levels for bill pay fluctuate. Sometimes pt per performs independently, sometimes son assists, and pt's son jayshree supervise to ensure nor errors take place  3) Patient will complete meal prep and with Indep and no cueing for problem solving, memory, or sequencing   Progressing, pt reports low energy levels and memory affect participation

## 2025-02-28 RX ORDER — AMMONIUM LACTATE 12 G/100G
LOTION TOPICAL
Qty: 400 G | Refills: 2 | Status: SHIPPED | OUTPATIENT
Start: 2025-02-28

## 2025-02-28 NOTE — TELEPHONE ENCOUNTER
Needs foot cream sent to [facility] [phone]. Would like to have this sent today if possible.

## 2025-03-03 ENCOUNTER — HOSPITAL ENCOUNTER (OUTPATIENT)
Dept: OCCUPATIONAL THERAPY | Age: 65
Setting detail: THERAPIES SERIES
Discharge: HOME | End: 2025-03-03
Payer: COMMERCIAL

## 2025-03-03 ENCOUNTER — TELEPHONE (OUTPATIENT)
Dept: CARDIOLOGY CLINIC | Age: 65
End: 2025-03-03

## 2025-03-03 PROCEDURE — 97530 THERAPEUTIC ACTIVITIES: CPT

## 2025-03-03 ASSESSMENT — RHEUMATOLOGY NEW PATIENT QUESTIONNAIRE
JAUNDICE: N
SKIN TIGHTNESS: N
UNUSUAL BLEEDING: N
UNEXPLAINED HEARING LOSS: N
FEVER: N
DEPRESSION: N
SEIZURES: N
COLOR CHANGES OF HANDS OR FEET IN THE COLD: N
HEARTBURN OR REFLUX: N
MUSCLE WEAKNESS: Y
NAUSEA: N
ANXIETY: N
BLOOD IN STOOLS: N
RASH: Y
MEMORY LOSS: Y
MORNING STIFFNESS: Y
EASY BRUISING: Y
SORES IN MOUTH OR NOSE: N
SWOLLEN LEGS OR FEET: Y
EYE REDNESS: Y
LOSS OF CONSCIOUSNESS: N
HOW WOULD YOU DESCRIBE YOUR STIFFNESS ON AVERAGE: MILD
INCREASED SUSCEPTIBILITY TO INFECTION: N
NODULES/BUMPS: Y
SWOLLEN OR TENDER GLANDS: N
SKIN REDNESS: N
UNUSUAL FATIGUE: Y
BLACK STOOLS: N
JOINT SWELLING: Y
ABNORMAL URINE: N
JOINT PAIN: N
PAIN OR BURNING ON URINATION: N
SUN SENSITIVE (SUN ALLERGY): N
NIGHT SWEATS: Y
STOMACH PAIN: N
DIFFICULTY FALLING ASLEEP: N
EYE PAIN: Y
HEADACHES: Y
ANEMIA: Y
FAINTING: N
NUMBNESS OR TINGLING IN HANDS OR FEET: N
UNUSUALLY RAPID OR SLOWED HEART RATE: N
VAGINAL DRYNESS: N
PERSISTENT DIARRHEA: N
EASILY LOSING TEMPER: N
MORNING STIFFNESS IN LOWER BACK: Y
RASH OR ULCERS: N
DRYNESS OF MOUTH: Y
BEHAVIORAL CHANGES: N
EYE DRYNESS: Y
AGITATION: N
EXCESSIVE HAIR LOSS (MORE THAN YOUR NORM): Y
UNEXPLAINED WEIGHT CHANGE: N
DIFFICULTY STAYING ASLEEP: N

## 2025-03-03 NOTE — PROGRESS NOTES
Neuromuscular Re-Ed     86661 Manual Therapy     48381 ADL/COMP Tech Train     62784 Orthotic Management/Training      Other                 Total          Plan: OT 1-2x / week for 12 visits.   Certification period From: 2/24/2025  To: 5/24/2025     [x]  Continues Plan of care with focus on strength, activity tolerance, adaptive/safety techniques during IADLs, and healthy habits/routines for enhanced QOL : Treatment covered based on POC and graduated to patient's progress. Pt education continues at each visit to obtain maximum benefits from skilled OT intervention.  []  Alter Plan of care:   []  Discharge:      Jelena VAZQUEZ, [id number]

## 2025-03-03 NOTE — TELEPHONE ENCOUNTER
Patient called with complaints of left leg swelling.  She has chronic left lower extremity edema but states it is worse than usual. She states she had an U/S that was negative blood clots.

## 2025-03-03 NOTE — TELEPHONE ENCOUNTER
Her echo looked good.  This appears to be more likely from her liver.  I recommend that she check with whomever is managing that.

## 2025-03-04 ENCOUNTER — HOSPITAL ENCOUNTER (OUTPATIENT)
Dept: MRI IMAGING | Age: 65
Discharge: HOME | End: 2025-03-06
Attending: STUDENT IN AN ORGANIZED HEALTH CARE EDUCATION/TRAINING PROGRAM
Payer: COMMERCIAL

## 2025-03-04 DIAGNOSIS — K74.69 OTHER CIRRHOSIS OF LIVER (HCC): ICD-10-CM

## 2025-03-04 PROCEDURE — 6360000004 HC RX CONTRAST MEDICATION: Performed by: RADIOLOGY

## 2025-03-04 PROCEDURE — 74183 MRI ABD W/O CNTR FLWD CNTR: CPT

## 2025-03-04 PROCEDURE — A9579 GAD-BASE MR CONTRAST NOS,1ML: HCPCS | Performed by: RADIOLOGY

## 2025-03-04 RX ADMIN — GADOTERIDOL 13 ML: 279.3 INJECTION, SOLUTION INTRAVENOUS at 09:58

## 2025-03-05 ENCOUNTER — HOSPITAL ENCOUNTER (OUTPATIENT)
Dept: OCCUPATIONAL THERAPY | Age: 65
Setting detail: THERAPIES SERIES
Discharge: HOME | End: 2025-03-05
Payer: COMMERCIAL

## 2025-03-05 ENCOUNTER — HOSPITAL ENCOUNTER (OUTPATIENT)
Dept: SPEECH THERAPY | Age: 65
Setting detail: THERAPIES SERIES
Discharge: HOME | End: 2025-03-05
Payer: COMMERCIAL

## 2025-03-05 PROCEDURE — 97130 THER IVNTJ EA ADDL 15 MIN: CPT

## 2025-03-05 PROCEDURE — 97110 THERAPEUTIC EXERCISES: CPT

## 2025-03-05 PROCEDURE — 97129 THER IVNTJ 1ST 15 MIN: CPT

## 2025-03-05 NOTE — PROGRESS NOTES
OCCUPATIONAL THERAPY DAILY NOTE  [facility]  [facility] OCCUPATIONAL THERAPY  [address]  [place] [facility] [address]  Dept: [phone]  Loc: [phone]   [facility] OT Fax: [phone]      Date:  3/5/2025    Initial Evaluation Date: 07/15/2024                          Evaluating Therapist: Luz Mcgraw OT     Patient Name:  Erna Stoner                      :  1960     Restrictions/Precautions:  none, low fall risk  Diagnosis:  R27.8 (ICD-10-CM) - Other lack of coordination  R48.2 (ICD-10-CM) Apraxia                                                             Date of Surgery/Injury: Ongoing since 2024     Insurance/Certification information: [employer] [place]ICA[place] (30 visits then auth)     Plan of care signed (Y/N): Yes- cosigned electronically  Visit# / total visits       Referring Practitioner:  Elia Daniel MD   Specific Practitioner Orders: Eval and treat  25: 2-3x/week for 6 weeks from 25- 3/20/25     OT PLAN OF CARE   OT POC based on physician orders, patient diagnosis and results of clinical assessment     Frequency/Duration as of 25: 1-2x / week for 12 visits.   Certification period From: 2025  To: 2025     GOALS (Long term same as Short term):   1) Patient will demonstrate good understanding of home program (exercises/activities/EC techniques/adaptive/compensatory techniques) with good accuracy.   Progressing, HEP reviewed and updated with pt reporting carry over of techniques e.g., use of planner, EC techniques  2) Patient will pay her bills with Modified [place] and use of adaptive strategies as needed   Progressing, assist levels for bill pay fluctuate. Sometimes pt per performs independently, sometimes son assists, and pt's son jayshree supervise to ensure nor errors take place  3) Patient will complete meal prep and with Indep and no cueing for problem solving, memory, or sequencing   Progressing, pt reports low energy levels and

## 2025-03-05 NOTE — PROGRESS NOTES
SPEECH LANGUAGE PATHOLOGY  DAILY PROGRESS NOTE        PATIENT NAME:  Erna Stoner      :  1960          TODAY'S DATE:  3/5/2025     POC:  Pt will improve immediate, short term, recent memory during structured and unstructured tasks with 80% accuracy   Pt will improve problem solving/thought organization during structured and unstructured tasks with 80% accuracy      Subjective:                Patient was seen for therapy today. She arrived independently. Pt was cooperative and pleasant. She reported feeling tired from having an MRI yesterday, but overall was feeling okay.      Objective:                              Pt completed working memory task this date with 85% accuracy. She was able to recall a direction after completing a second directive with min assistance. She also completed a repeating the pattern task this date with 5 steps. She completed task with 70% accuracy given repetitions of the patterns. Pt feels visual recall is harder when there is a lot coming at her at once- pt processing information is slower than average with this task. Lastly, pt completed recall for 2 step sequential and spatial directions.  She completed task with 90% accuracy and good recall without any repetitions.        Assessment:                 Making progress with goals     Plan:                 Continue POC.        CPT code(s) 02340  therapeutic interventions that focus on cognitive function , initial  15 min  96953  therapeutic interventions that focus on cognitive function, each additional 15 min  Total minutes :  30 minutes    Britt Santiago M.A., CCC-SLP

## 2025-03-06 ENCOUNTER — OFFICE VISIT (OUTPATIENT)
Dept: RHEUMATOLOGY | Age: 65
End: 2025-03-06
Payer: COMMERCIAL

## 2025-03-06 VITALS
DIASTOLIC BLOOD PRESSURE: 63 MMHG | OXYGEN SATURATION: 99 % | SYSTOLIC BLOOD PRESSURE: 98 MMHG | BODY MASS INDEX: 27.05 KG/M2 | HEIGHT: 62 IN | WEIGHT: 147 LBS | HEART RATE: 93 BPM

## 2025-03-06 DIAGNOSIS — D84.9 IMMUNOSUPPRESSION: ICD-10-CM

## 2025-03-06 DIAGNOSIS — D86.9 SARCOID: Primary | ICD-10-CM

## 2025-03-06 DIAGNOSIS — Z79.899 HIGH RISK MEDICATION USE: ICD-10-CM

## 2025-03-06 DIAGNOSIS — K74.69 OTHER CIRRHOSIS OF LIVER (HCC): ICD-10-CM

## 2025-03-06 DIAGNOSIS — D86.9 SARCOID: ICD-10-CM

## 2025-03-06 DIAGNOSIS — I85.11 SECONDARY ESOPHAGEAL VARICES WITH BLEEDING (HCC): ICD-10-CM

## 2025-03-06 DIAGNOSIS — M81.0 AGE-RELATED OSTEOPOROSIS WITHOUT CURRENT PATHOLOGICAL FRACTURE: ICD-10-CM

## 2025-03-06 LAB
C-REACTIVE PROTEIN: 8 MG/L (ref 0–5)
CALCIUM IONIZED: 1.19 MMOL/L (ref 1.15–1.33)
SED RATE, AUTOMATED: 10 MM/HR (ref 0–20)
VITAMIN D 25-HYDROXY: 43.8 NG/ML (ref 30–100)

## 2025-03-06 PROCEDURE — G8417 CALC BMI ABV UP PARAM F/U: HCPCS | Performed by: INTERNAL MEDICINE

## 2025-03-06 PROCEDURE — 1036F TOBACCO NON-USER: CPT | Performed by: INTERNAL MEDICINE

## 2025-03-06 PROCEDURE — G2211 COMPLEX E/M VISIT ADD ON: HCPCS | Performed by: INTERNAL MEDICINE

## 2025-03-06 PROCEDURE — G8427 DOCREV CUR MEDS BY ELIG CLIN: HCPCS | Performed by: INTERNAL MEDICINE

## 2025-03-06 PROCEDURE — 99205 OFFICE O/P NEW HI 60 MIN: CPT | Performed by: INTERNAL MEDICINE

## 2025-03-06 PROCEDURE — 3017F COLORECTAL CA SCREEN DOC REV: CPT | Performed by: INTERNAL MEDICINE

## 2025-03-06 RX ORDER — METHOTREXATE 2.5 MG/1
2.5 TABLET ORAL WEEKLY
COMMUNITY
Start: 2025-02-18

## 2025-03-06 RX ORDER — FOLIC ACID 1 MG/1
2 TABLET ORAL EVERY MORNING
Qty: 60 TABLET | Refills: 5 | Status: SHIPPED | OUTPATIENT
Start: 2025-03-06

## 2025-03-06 ASSESSMENT — ENCOUNTER SYMPTOMS
TROUBLE SWALLOWING: 0
BACK PAIN: 1
NAUSEA: 0
ABDOMINAL PAIN: 0
SHORTNESS OF BREATH: 0
COUGH: 0
DIARRHEA: 0
COLOR CHANGE: 0
VOMITING: 0

## 2025-03-06 NOTE — PATIENT INSTRUCTIONS
Agree with methotrexate, will increase folic acid to 2mg daily    Have labs    Have bone density scan after March    Dr. Guerrero has ordered a radiology test for you such as \"Bone Density Scan, MRI, CT, etc\". The [facility] Radiology Scheduling Department should contact you within 1-2 weeks to schedule your appointment. If you do not hear from the scheduling department, please contact them at [phone]. If any questions, please call Dr. Guerrero's office if needed at [phone]. Thank you.

## 2025-03-06 NOTE — PROGRESS NOTES
on vaccinations.  In the event of any acute infectious illness she should hold methotrexate.    4.  High risk medication use-she will be having monthly blood work with the [facility].    5.  Chronic back pain-this is a separate issue.  Following with Eun and has had improvement with ablations.    6.  Cirrhosis as above secondary to sarcoidosis.  On lactulose.    Greater than 50% of this 63-minute visit was spent in record review, personal interpretation of prior diagnostic testing, counseling, documentation.     1. Sarcoid  -     JU; Future  -     Angiotensin Converting Enzyme; Future  -     ANCA Vasculitis Profile; Future  -     C-Reactive Protein; Future  -     Sedimentation Rate; Future  -     Calcium, Ionized; Future  -     Vitamin D 1,25 Dihydroxy; Future  -     Vitamin D 25 Hydroxy; Future  -     DEXA BONE DENSITY 2 SITES; Future  2. Age-related osteoporosis without current pathological fracture  -     DEXA BONE DENSITY 2 SITES; Future  3. Immunosuppression  4. High risk medication use  5. Secondary esophageal varices with bleeding (HCC)  6. Other cirrhosis of liver (HCC)      Return in about 4 months (around 7/6/2025).         Subjective   SUBJECTIVE/OBJECTIVE:    HPI: Erna Stoner 1960 is a 64 y.o. female seen in consult for sarcoidosis.    History of Present Illness  The patient presents for evaluation of sarcoidosis, osteoporosis, and osteoarthritis.    She was diagnosed with sarcoidosis in 2017, affecting the liver.  She ultimately had 2 biopsies with evidence of noncaseating granulomas.  She experienced hemoptysis, which led to a hospital admission.  Does not appear as though she has had lung involvement.  She reports no respiratory symptoms such as shortness of breath or cough. She has been under the care of a the sarcoidosis clinic in [place] but, traveling to [place] has been difficult.  She has osteoporosis.  She has been receiving Prolia injections at [facility]. She has been under

## 2025-03-07 LAB — ANTI-NUCLEAR ANTIBODY (ANA): NEGATIVE

## 2025-03-09 LAB — ANGIOTENSIN CONVERTING ENZYME: 85 U/L (ref 16–85)

## 2025-03-10 ENCOUNTER — HOSPITAL ENCOUNTER (OUTPATIENT)
Dept: OCCUPATIONAL THERAPY | Age: 65
Setting detail: THERAPIES SERIES
Discharge: HOME | End: 2025-03-10
Payer: COMMERCIAL

## 2025-03-10 LAB — VITAMIN D 1,25-DIHYDROXY: 42.5 PG/ML (ref 19.9–79.3)

## 2025-03-10 PROCEDURE — 97110 THERAPEUTIC EXERCISES: CPT

## 2025-03-10 NOTE — PROGRESS NOTES
OCCUPATIONAL THERAPY DAILY NOTE  [facility]  [facility] OCCUPATIONAL THERAPY  [address]  [facility] [address]  Dept: [phone]  Loc: [phone]   [facility] OT Fax: [phone]      Date:  3/10/2025    Initial Evaluation Date: 07/15/2024                          Evaluating Therapist: Luz Mcgraw OT     Patient Name:  Erna Stoner                      :  1960     Restrictions/Precautions:  none, low fall risk  Diagnosis:  R27.8 (ICD-10-CM) - Other lack of coordination  R48.2 (ICD-10-CM) Apraxia                                                             Date of Surgery/Injury: Ongoing since 2024     Insurance/Certification information: [employer] [place] MEDICA[employer] (30 visits then auth)     Plan of care signed (Y/N): Yes- cosigned electronically  Visit# / total visits       Referring Practitioner:  Elia Daniel MD   Specific Practitioner Orders: Eval and treat  25: 2-3x/week for 6 weeks from 25- 3/20/25     OT PLAN OF CARE   OT POC based on physician orders, patient diagnosis and results of clinical assessment     Frequency/Duration as of 25: 1-2x / week for 12 visits.   Certification period From: 2025  To: 2025     GOALS (Long term same as Short term):   1) Patient will demonstrate good understanding of home program (exercises/activities/EC techniques/adaptive/compensatory techniques) with good accuracy.   Progressing, HEP reviewed and updated with pt reporting carry over of techniques e.g., use of planner, EC techniques  2) Patient will pay her bills with Modified [place] and use of adaptive strategies as needed   Progressing, assist levels for bill pay fluctuate. Sometimes pt per performs independently, sometimes son assists, and pt's son jayshree supervise to ensure nor errors take place  3) Patient will complete meal prep and with Indep and no cueing for problem solving, memory, or sequencing   Progressing, pt reports low energy levels and

## 2025-03-11 ENCOUNTER — CLINICAL DOCUMENTATION (OUTPATIENT)
Dept: SPIRITUAL SERVICES | Age: 65
End: 2025-03-11

## 2025-03-11 ENCOUNTER — RESULTS FOLLOW-UP (OUTPATIENT)
Dept: RHEUMATOLOGY | Age: 65
End: 2025-03-11

## 2025-03-11 ENCOUNTER — APPOINTMENT (OUTPATIENT)
Dept: SPEECH THERAPY | Age: 65
End: 2025-03-11
Payer: COMMERCIAL

## 2025-03-11 ENCOUNTER — OFFICE VISIT (OUTPATIENT)
Dept: GASTROENTEROLOGY | Age: 65
End: 2025-03-11
Payer: COMMERCIAL

## 2025-03-11 VITALS
RESPIRATION RATE: 16 BRPM | BODY MASS INDEX: 28.52 KG/M2 | OXYGEN SATURATION: 97 % | HEIGHT: 62 IN | HEART RATE: 94 BPM | DIASTOLIC BLOOD PRESSURE: 56 MMHG | WEIGHT: 155 LBS | TEMPERATURE: 97.4 F | SYSTOLIC BLOOD PRESSURE: 100 MMHG

## 2025-03-11 DIAGNOSIS — K74.69 OTHER CIRRHOSIS OF LIVER (HCC): Primary | ICD-10-CM

## 2025-03-11 DIAGNOSIS — K74.69 OTHER CIRRHOSIS OF LIVER: ICD-10-CM

## 2025-03-11 DIAGNOSIS — K76.82 HEPATIC ENCEPHALOPATHY (HCC): ICD-10-CM

## 2025-03-11 LAB
ALBUMIN: 3.5 G/DL (ref 3.5–5.2)
ALP BLD-CCNC: 125 U/L (ref 35–104)
ALT SERPL-CCNC: 32 U/L (ref 0–32)
ANCA IFA PATTERN: NORMAL
ANCA IFA TITER: NORMAL
ANION GAP SERPL CALCULATED.3IONS-SCNC: 18 MMOL/L (ref 7–16)
AST SERPL-CCNC: 52 U/L (ref 0–31)
BILIRUB SERPL-MCNC: 2.4 MG/DL (ref 0–1.2)
BUN BLDV-MCNC: 6 MG/DL (ref 6–23)
CALCIUM SERPL-MCNC: 9.4 MG/DL (ref 8.6–10.2)
CHLORIDE BLD-SCNC: 106 MMOL/L (ref 98–107)
CO2: 20 MMOL/L (ref 22–29)
CREAT SERPL-MCNC: 0.4 MG/DL (ref 0.5–1)
GFR, ESTIMATED: >90 ML/MIN/1.73M2
GLUCOSE BLD-MCNC: 126 MG/DL (ref 74–99)
INR BLD: 1.2
MYELOPEROXIDASE AB: 0 AU/ML (ref 0–19)
POTASSIUM SERPL-SCNC: 3.7 MMOL/L (ref 3.5–5)
PROTHROMBIN TIME: 13 SEC (ref 9.3–12.4)
SERINE PROTEASE 3, IGG: 0 AU/ML (ref 0–19)
SODIUM BLD-SCNC: 144 MMOL/L (ref 132–146)
TOTAL PROTEIN: 5.4 G/DL (ref 6.4–8.3)

## 2025-03-11 PROCEDURE — 1036F TOBACCO NON-USER: CPT | Performed by: NURSE PRACTITIONER

## 2025-03-11 PROCEDURE — G8417 CALC BMI ABV UP PARAM F/U: HCPCS | Performed by: NURSE PRACTITIONER

## 2025-03-11 PROCEDURE — 99212 OFFICE O/P EST SF 10 MIN: CPT | Performed by: NURSE PRACTITIONER

## 2025-03-11 PROCEDURE — 3017F COLORECTAL CA SCREEN DOC REV: CPT | Performed by: NURSE PRACTITIONER

## 2025-03-11 PROCEDURE — G8427 DOCREV CUR MEDS BY ELIG CLIN: HCPCS | Performed by: NURSE PRACTITIONER

## 2025-03-11 RX ORDER — BLOOD-GLUCOSE METER
EACH MISCELLANEOUS
COMMUNITY
Start: 2025-02-07

## 2025-03-11 RX ORDER — POLYVINYL ALCOHOL, POVIDONE 14; 6 MG/ML; MG/ML
SOLUTION/ DROPS OPHTHALMIC
COMMUNITY
Start: 2025-02-14

## 2025-03-11 RX ORDER — BLOOD SUGAR DIAGNOSTIC
STRIP MISCELLANEOUS
COMMUNITY
Start: 2025-02-07

## 2025-03-11 RX ORDER — LANCETS 33 GAUGE
EACH MISCELLANEOUS
COMMUNITY
Start: 2025-02-07

## 2025-03-11 NOTE — ACP (ADVANCE CARE PLANNING)
office.  Upload completed ACP document(s) in their [employer] ACP page.  Review completed ACP document(s) and update, if needed, with changes health or future preferences    Electronically signed by Chaplain Emily on 3/11/2025 at 4:52 PM.

## 2025-03-11 NOTE — PROGRESS NOTES
Component Value Date    WBC 4.1 (L) 01/30/2025    HGB 13.1 01/30/2025    HCT 38.2 01/30/2025    MCV 92.0 01/30/2025    PLT 94 (L) 01/30/2025      Lab Results   Component Value Date     01/30/2025    K 3.5 01/30/2025     (H) 01/30/2025    CO2 21 (L) 01/30/2025    BUN 7 02/12/2025    CREATININE 0.4 (L) 02/12/2025    GLUCOSE 157 (H) 01/30/2025    CALCIUM 7.6 (L) 01/30/2025    BILITOT 2.0 (H) 01/30/2025    ALKPHOS 112 (H) 01/30/2025    AST 54 (H) 01/30/2025    ALT 50 (H) 01/30/2025    LABGLOM >90 02/12/2025    GFRAA >60 11/13/2020             Assessment & Plan          ASSESSMENT/PLAN:    1. Other cirrhosis of liver (HCC)  -     CTA TRIPHASIC LIVER; Future  -     CBC with Auto Differential; Future  -     Comprehensive Metabolic Panel; Future  -     Protime-INR; Future  2. Hepatic encephalopathy (HCC)  -     CTA TRIPHASIC LIVER; Future  -     CBC with Auto Differential; Future  -     Comprehensive Metabolic Panel; Future  -     Protime-INR; Future    -MRI limited due to movement.  Will proceed with CTA triphasic of the liver.  -CMP, PT/INR, and CBC      Return for Follow up secured.    An electronic signature was used to authenticate this note.    --Rekha Rain, APRTEJAL - CNP

## 2025-03-12 ENCOUNTER — HOSPITAL ENCOUNTER (OUTPATIENT)
Dept: SPEECH THERAPY | Age: 65
Setting detail: THERAPIES SERIES
Discharge: HOME | End: 2025-03-12
Payer: COMMERCIAL

## 2025-03-12 ENCOUNTER — HOSPITAL ENCOUNTER (OUTPATIENT)
Dept: OCCUPATIONAL THERAPY | Age: 65
Setting detail: THERAPIES SERIES
Discharge: HOME | End: 2025-03-12
Payer: COMMERCIAL

## 2025-03-12 ENCOUNTER — APPOINTMENT (OUTPATIENT)
Dept: SPEECH THERAPY | Age: 65
End: 2025-03-12
Payer: COMMERCIAL

## 2025-03-12 LAB
BASOPHILS ABSOLUTE: 0.03 K/UL (ref 0–0.2)
BASOPHILS RELATIVE PERCENT: 1 % (ref 0–2)
EOSINOPHILS ABSOLUTE: 0.1 K/UL (ref 0.05–0.5)
EOSINOPHILS RELATIVE PERCENT: 5 % (ref 0–6)
HCT VFR BLD CALC: 39.8 % (ref 34–48)
HEMOGLOBIN: 13.2 G/DL (ref 11.5–15.5)
IMMATURE GRANULOCYTES %: 0 % (ref 0–5)
IMMATURE GRANULOCYTES ABSOLUTE: <0.03 K/UL (ref 0–0.58)
LYMPHOCYTES ABSOLUTE: 0.46 K/UL (ref 1.5–4)
LYMPHOCYTES RELATIVE PERCENT: 22 % (ref 20–42)
MCH RBC QN AUTO: 32.2 PG (ref 26–35)
MCHC RBC AUTO-ENTMCNC: 33.2 G/DL (ref 32–34.5)
MCV RBC AUTO: 97.1 FL (ref 80–99.9)
MONOCYTES ABSOLUTE: 0.37 K/UL (ref 0.1–0.95)
MONOCYTES RELATIVE PERCENT: 17 % (ref 2–12)
NEUTROPHILS ABSOLUTE: 1.18 K/UL (ref 1.8–7.3)
NEUTROPHILS RELATIVE PERCENT: 55 % (ref 43–80)
PDW BLD-RTO: 14.8 % (ref 11.5–15)
PLATELET # BLD: 144 K/UL (ref 130–450)
PMV BLD AUTO: 9.8 FL (ref 7–12)
RBC # BLD: 4.1 M/UL (ref 3.5–5.5)
RBC # BLD: ABNORMAL 10*6/UL
WBC # BLD: 2.1 K/UL (ref 4.5–11.5)

## 2025-03-12 PROCEDURE — 97130 THER IVNTJ EA ADDL 15 MIN: CPT

## 2025-03-12 PROCEDURE — 97110 THERAPEUTIC EXERCISES: CPT

## 2025-03-12 PROCEDURE — 97129 THER IVNTJ 1ST 15 MIN: CPT

## 2025-03-12 NOTE — PROGRESS NOTES
X              X              X Highlighter yellow mod resistive T band bilat shoulder flexion, abd, ext, ER, IR & diagonals x 10 ea  Weight bearing on all fours/ tabletop position on mat: balance with one hand up - then other -same with legs then opposite hand & leg to ^ challenge x 10 second holds each- pt has to keep her toe down or loses balance. Pt to add to HEP    Carry 5# basket around gym 6 laps    3# dowel scott: Bicep Curls, Preacher Curls, Chest Press side to side, OH Press, Serving Arms , Rows x 20-30 reps each as bianca. Rest breaks PRN. Pt sits    Balloon tap with 3# dowel scott back & forth with therapist x 3 mins    Shoulder thru hands - all planes mobility including fxl mobility with 1# wrist weights added- x 20 reps each as tolerated    Swiss ball for seated pushes back & forth w/ therapist (4ft), seated eulogio arm to  ball ,  ball with stand up, seated ball  w/core rotation x 10-20 reps each as bianca   Eulogio hands/wrists X      X Red 15# therabar for wringing, up bends, down bends, hand offs OH x  30 reps each    2# dowel scott for wrist flexion x 30 reps, wrist extension x 30 reps    Green resistance silicone digigel gripper for gripping followed by digital extension individual hands   Other:     HEP X  -T band program, Energy Conservation techniques          Assessment/Comments: Pt arrives to tx session reporting she had a vein procedure done on her leg yesterday and it is sore. Therapist readjusts tx plan secondary to pts procedure. Therapist adds weight bearing on all fours/ tabletop position on mat: balance with one hand up - then other -same with legs then opposite hand & leg to ^ challenge x 10 second holds each- pt has to keep her toe down or loses balance. Pt to add to HEP.  Pt reports being highly challenged with sit to stand swiss ball task due to lack of strength. Breathing techniques reviewed throughout session. Pt reports min to mod fatigue end of session. Progress pt as

## 2025-03-12 NOTE — PROGRESS NOTES
SPEECH LANGUAGE PATHOLOGY  DAILY PROGRESS NOTE        PATIENT NAME:  Erna Stoner      :  1960          TODAY'S DATE:  3/12/2025     POC:  Pt will improve immediate, short term, recent memory during structured and unstructured tasks with 80% accuracy   Pt will improve problem solving/thought organization during structured and unstructured tasks with 80% accuracy      Subjective:                Patient was seen for therapy today. She arrived independently. Pt was cooperative and pleasant. She reported having continued leg pain, but as far as her cognition she was feeling good.      Objective:                              Pt completed deductive reasoning puzzles. Pt required max assistance with task this date. The patient had difficulty with inference and reasoning skills skills during task. Pt demonstrated fair- organization skills with organizing information in the clues for the puzzle.        Assessment:                 Making progress with goals     Plan:                 Continue POC.        CPT code(s) 85398  therapeutic interventions that focus on cognitive function , initial  15 min  14051  therapeutic interventions that focus on cognitive function, each additional 15 min  Total minutes :  30 minutes    Britt Santiago M.A., CCC-SLP

## 2025-03-17 ENCOUNTER — APPOINTMENT (OUTPATIENT)
Dept: OCCUPATIONAL THERAPY | Age: 65
End: 2025-03-17
Payer: COMMERCIAL

## 2025-03-18 ENCOUNTER — APPOINTMENT (OUTPATIENT)
Dept: SPEECH THERAPY | Age: 65
End: 2025-03-18
Payer: COMMERCIAL

## 2025-03-19 ENCOUNTER — HOSPITAL ENCOUNTER (OUTPATIENT)
Dept: OCCUPATIONAL THERAPY | Age: 65
Setting detail: THERAPIES SERIES
Discharge: HOME | End: 2025-03-19
Payer: COMMERCIAL

## 2025-03-19 ENCOUNTER — APPOINTMENT (OUTPATIENT)
Dept: SPEECH THERAPY | Age: 65
End: 2025-03-19
Payer: COMMERCIAL

## 2025-03-19 ENCOUNTER — HOSPITAL ENCOUNTER (OUTPATIENT)
Dept: SPEECH THERAPY | Age: 65
Setting detail: THERAPIES SERIES
Discharge: HOME | End: 2025-03-19
Payer: COMMERCIAL

## 2025-03-19 PROCEDURE — 97130 THER IVNTJ EA ADDL 15 MIN: CPT

## 2025-03-19 PROCEDURE — 97129 THER IVNTJ 1ST 15 MIN: CPT

## 2025-03-19 PROCEDURE — 97110 THERAPEUTIC EXERCISES: CPT

## 2025-03-19 NOTE — PROGRESS NOTES
improving. Overall strength displays improvement. Progress pt as tolerated cont to instruct in EC techniques and habits/routines to increase [place] & improve energy level with mgmt of chronic condition.     -Rehab Potential: Fair  -Patient Response to Treatment: SOB with physical exertion, rest breaks as needed      Patient. Education:  [] Plans/Goals, Risks/Benefits discussed  [] Home exercise program  Method of Education: [x] Verbal  [x] Demo  [] Written  Comprehension of Education:  [x] Verbalizes understanding.  [x] Demonstrates understanding.  [] Needs Review.  [] Demonstrates/verbalizes understanding of HEP/Ed previously given.      Time In: 10am            Time Out: 11am             CODE  Minutes  Units   58189 Fluidotherapy     39115 Paraffin     81620 Ultrasound     36291 Electrical Stim - Attended     54706 Iontophoresis     95683 Therapeutic Ex 60 4   91916 Therapeutic Activity     73024 Neuromuscular Re-Ed     10113 Manual Therapy     87271 ADL/COMP Tech Train     89524 Orthotic Management/Training      Other                 Total  60 4       Plan: OT 1-2x / week for 12 visits.   Certification period From: 2/24/2025  To: 5/24/2025     [x]  Continues Plan of care with focus on strength, activity tolerance, adaptive/safety techniques during IADLs, and healthy habits/routines for enhanced QOL : Treatment covered based on POC and graduated to patient's progress. Pt education continues at each visit to obtain maximum benefits from skilled OT intervention.  []  Alter Plan of care:   []  Discharge:      Jelena VAZQUEZ, [id number]

## 2025-03-19 NOTE — PROGRESS NOTES
SPEECH LANGUAGE PATHOLOGY  DAILY PROGRESS NOTE        PATIENT NAME:  Erna Stoner      :  1960          TODAY'S DATE:  3/19/2025     POC:  Pt will improve immediate, short term, recent memory during structured and unstructured tasks with 80% accuracy   Pt will improve problem solving/thought organization during structured and unstructured tasks with 80% accuracy      Subjective:                Patient was seen for therapy today. She arrived independently. Pt was cooperative and pleasant. Patient reported she was having a good day.      Objective:                              Pt completed sorting and remembering word task this date. Pt sorted 16 words in 4 groups based on categories. The patient then used repetition and rehearsal recall methods to recall 4 words at a tome. She was able to recall 4 words a at a time with 90% accuracy. Pt then was given new set of 16 words and asked to try to recall all 16 at one time following delay. She was able to recall them by writing them down with 85% accuracy.      Pt also completed 6 step sequencing task with daily tasks (I.e. taking out trash, making a Rodman, ect.). She completed task with 80% accuracy. Pt with errors such as mixing up 2 steps that were closely related.        Assessment:                 Making progress with goals     Plan:                 Continue POC.        CPT code(s) 88611  therapeutic interventions that focus on cognitive function , initial  15 min  99637  therapeutic interventions that focus on cognitive function, each additional 15 min  Total minutes :  30 minutes    Britt Santiago M.A., CCC-SLP

## 2025-03-21 PROBLEM — D86.89 GRANULOMA OF LIVER ASSOCIATED WITH SARCOIDOSIS: Status: ACTIVE | Noted: 2018-07-30

## 2025-03-21 PROBLEM — Z90.49 STATUS POST CHOLECYSTECTOMY: Status: ACTIVE | Noted: 2017-10-29

## 2025-03-26 ENCOUNTER — HOSPITAL ENCOUNTER (OUTPATIENT)
Dept: SPEECH THERAPY | Age: 65
Setting detail: THERAPIES SERIES
Discharge: HOME | End: 2025-03-26
Payer: COMMERCIAL

## 2025-03-26 ENCOUNTER — HOSPITAL ENCOUNTER (OUTPATIENT)
Dept: OCCUPATIONAL THERAPY | Age: 65
Setting detail: THERAPIES SERIES
Discharge: HOME | End: 2025-03-26
Payer: COMMERCIAL

## 2025-03-26 PROCEDURE — 97129 THER IVNTJ 1ST 15 MIN: CPT

## 2025-03-26 PROCEDURE — 97110 THERAPEUTIC EXERCISES: CPT

## 2025-03-26 PROCEDURE — 97130 THER IVNTJ EA ADDL 15 MIN: CPT

## 2025-03-26 NOTE — PROGRESS NOTES
OCCUPATIONAL THERAPY DAILY NOTE  [facility]  [facility]  [address]  [place] [address]  Dept: [phone]  Loc: [phone]   [facility] OT Fax: [phone]      Date:  3/26/2025    Initial Evaluation Date: 07/15/2024                          Evaluating Therapist: Luz Mcgraw OT     Patient Name:  Erna Stoner                      :  1960     Restrictions/Precautions:  none, low fall risk  Diagnosis:  R27.8 (ICD-10-CM) - Other lack of coordination  R48.2 (ICD-10-CM) Apraxia                                                             Date of Surgery/Injury: Ongoing since 2024     Insurance/Certification information: [employer] [place] [employer] (30 visits then auth)     Plan of care signed (Y/N): Yes- cosigned electronically  Visit# / total visits       Referring Practitioner:  Elia Daniel MD   Specific Practitioner Orders: Eval and treat  25: 2-3x/week for 6 weeks from 25- 3/20/25     OT PLAN OF CARE   OT POC based on physician orders, patient diagnosis and results of clinical assessment     Frequency/Duration as of 25: 1-2x / week for 12 visits.   Certification period From: 2025  To: 2025     GOALS (Long term same as Short term):   1) Patient will demonstrate good understanding of home program (exercises/activities/EC techniques/adaptive/compensatory techniques) with good accuracy.   Progressing, HEP reviewed and updated with pt reporting carry over of techniques e.g., use of planner, EC techniques  2) Patient will pay her bills with Modified [place] and use of adaptive strategies as needed   Progressing, assist levels for bill pay fluctuate. Sometimes pt per performs independently, sometimes son assists, and pt's son jayshree supervise to ensure nor errors take place  3) Patient will complete meal prep and with Indep and no cueing for problem solving, memory, or sequencing   Progressing, pt reports low energy levels and

## 2025-03-26 NOTE — PROGRESS NOTES
SPEECH LANGUAGE PATHOLOGY  DAILY PROGRESS NOTE        PATIENT NAME:  Erna Stoner      :  1960          TODAY'S DATE:  3/26/2025     POC:  Pt will improve immediate, short term, recent memory during structured and unstructured tasks with 80% accuracy   Pt will improve problem solving/thought organization during structured and unstructured tasks with 80% accuracy      Subjective:                Patient was seen for therapy today. She arrived independently. Pt was cooperative and pleasant. Patient reported she was having a good day. Patient has demonstrated increases in overall mental stamina in last few weeks of sessions.      Objective:                              Pt participated in a completing a schedule task this date. Given a paragraph with tasks and times, patient was able to organize the tasks in order with 100% accuracy. She demonstrated good organization and fair+ reasoning skills with the task throughout.        Assessment:                 Making progress with goals     Plan:                 Continue POC.        CPT code(s) 21988  therapeutic interventions that focus on cognitive function , initial  15 min  68022  therapeutic interventions that focus on cognitive function, each additional 15 min  Total minutes :  30 minutes    Britt Santiago M.A., CCC-SLP

## 2025-04-01 ENCOUNTER — HOSPITAL ENCOUNTER (OUTPATIENT)
Dept: CT IMAGING | Age: 65
Discharge: HOME | End: 2025-04-03
Payer: COMMERCIAL

## 2025-04-01 DIAGNOSIS — K74.69 OTHER CIRRHOSIS OF LIVER: ICD-10-CM

## 2025-04-01 DIAGNOSIS — K76.82 HEPATIC ENCEPHALOPATHY (HCC): ICD-10-CM

## 2025-04-01 PROCEDURE — 2500000003 HC RX 250 WO HCPCS: Performed by: RADIOLOGY

## 2025-04-01 PROCEDURE — 74175 CTA ABDOMEN W/CONTRAST: CPT

## 2025-04-01 PROCEDURE — 6360000004 HC RX CONTRAST MEDICATION: Performed by: RADIOLOGY

## 2025-04-01 RX ORDER — IOPAMIDOL 755 MG/ML
75 INJECTION, SOLUTION INTRAVASCULAR
Status: COMPLETED | OUTPATIENT
Start: 2025-04-01 | End: 2025-04-01

## 2025-04-01 RX ORDER — SODIUM CHLORIDE 0.9 % (FLUSH) 0.9 %
10 SYRINGE (ML) INJECTION PRN
Status: DISCONTINUED | OUTPATIENT
Start: 2025-04-01 | End: 2025-04-04 | Stop reason: HOSPADM

## 2025-04-01 RX ADMIN — Medication 10 ML: at 17:29

## 2025-04-01 RX ADMIN — IOPAMIDOL 75 ML: 755 INJECTION, SOLUTION INTRAVENOUS at 17:32

## 2025-04-02 ENCOUNTER — HOSPITAL ENCOUNTER (OUTPATIENT)
Dept: SPEECH THERAPY | Age: 65
Setting detail: THERAPIES SERIES
Discharge: HOME | End: 2025-04-02
Payer: COMMERCIAL

## 2025-04-02 ENCOUNTER — HOSPITAL ENCOUNTER (OUTPATIENT)
Dept: OCCUPATIONAL THERAPY | Age: 65
Setting detail: THERAPIES SERIES
Discharge: HOME | End: 2025-04-02
Payer: COMMERCIAL

## 2025-04-02 PROCEDURE — 97130 THER IVNTJ EA ADDL 15 MIN: CPT

## 2025-04-02 PROCEDURE — 97129 THER IVNTJ 1ST 15 MIN: CPT

## 2025-04-02 PROCEDURE — 97110 THERAPEUTIC EXERCISES: CPT

## 2025-04-02 NOTE — PROGRESS NOTES
SPEECH LANGUAGE PATHOLOGY  DAILY PROGRESS NOTE        PATIENT NAME:  Erna Stoner      :  1960          TODAY'S DATE:  2025     POC:  Pt will improve immediate, short term, recent memory during structured and unstructured tasks with 80% accuracy   Pt will improve problem solving/thought organization during structured and unstructured tasks with 80% accuracy      Subjective:                Patient was seen for therapy today. She arrived independently. Pt was cooperative and pleasant. Patient reported she was having a good day and that each day she feels cognitively better now that her medications have been managed and adjusted.      Objective:                              Pt participated in a deductive reasoning task with a  word story. She organized the information in the story and used fair reasoning skills to put steps 1-7 in order of the events in the story. Minimal assistance provided by the clinician for assistance in ordering the steps.       Pt also completed a pattern sequencing task. Moderate assistance and verbal cues provided by clinician to identify the patterns (I.e. AB BC CD DE __ __ __). Once the patterns were identified, pt was able to complete patterns with 75% accuracy, increasing to 90% with min assistance from clinician. Pt with fair reasoning skills to help identify patterns.        Assessment:                 Making progress with goals     Plan:                 Continue POC.        CPT code(s) 16491  therapeutic interventions that focus on cognitive function , initial  15 min  74953  therapeutic interventions that focus on cognitive function, each additional 15 min  Total minutes :  30 minutes    Britt Santiago M.A., CCC-SLP

## 2025-04-02 NOTE — PROGRESS NOTES
focus on strength, activity tolerance, adaptive/safety techniques during IADLs, and healthy habits/routines for enhanced QOL : Treatment covered based on POC and graduated to patient's progress. Pt education continues at each visit to obtain maximum benefits from skilled OT intervention.  []  Alter Plan of care:   []  Discharge:      Jelena VAZQUEZ, [id number]

## 2025-04-09 ENCOUNTER — HOSPITAL ENCOUNTER (OUTPATIENT)
Dept: SPEECH THERAPY | Age: 65
Setting detail: THERAPIES SERIES
Discharge: HOME | End: 2025-04-09
Payer: COMMERCIAL

## 2025-04-09 ENCOUNTER — HOSPITAL ENCOUNTER (OUTPATIENT)
Dept: OCCUPATIONAL THERAPY | Age: 65
Setting detail: THERAPIES SERIES
Discharge: HOME | End: 2025-04-09
Payer: COMMERCIAL

## 2025-04-09 PROCEDURE — 97110 THERAPEUTIC EXERCISES: CPT

## 2025-04-09 PROCEDURE — 97129 THER IVNTJ 1ST 15 MIN: CPT

## 2025-04-09 PROCEDURE — 97130 THER IVNTJ EA ADDL 15 MIN: CPT

## 2025-04-09 NOTE — PROGRESS NOTES
Pt eating/drinking   habits/routines for enhanced QOL : Treatment covered based on POC and graduated to patient's progress. Pt education continues at each visit to obtain maximum benefits from skilled OT intervention.  []  Alter Plan of care:   []  Discharge:      Jelena VAZQUEZ, [id number]

## 2025-04-09 NOTE — PROGRESS NOTES
SPEECH LANGUAGE PATHOLOGY  DAILY PROGRESS NOTE        PATIENT NAME:  Erna Stoner      :  1960          TODAY'S DATE:  2025     POC:  Pt will improve immediate, short term, recent memory during structured and unstructured tasks with 80% accuracy   Pt will improve problem solving/thought organization during structured and unstructured tasks with 80% accuracy      Subjective:                Patient was seen for therapy today. She arrived independently. Pt was cooperative and pleasant. Patient reported she was having a good day. Clinician and patient discussed discharge from therapy after final three sessions of her poc. Patient and clinician agreeable.      Objective:                              Pt participated problem solving and reasoning task with fair+ reasoning skills. Task was to create words from 2 halves (I.e. blan, ket = blanket). Given a chart with 16 half words, pt was tasked with creating the 8 words the halves made together. She was able to complete task and organize words with minimal assistance and 80% accuracy.      Patient also completed recall for directions this date. After using rehearsal and visualization method, pt recalled a direction (I.e. walk down the hallway straight, at the end of the hallway go though the door on the left into the lobby) following a short 2 minute delay and conversation with 90% accuracy.        Assessment:                 Making progress with goals     Plan:                 Continue POC.        CPT code(s) 45408  therapeutic interventions that focus on cognitive function , initial  15 min  96185  therapeutic interventions that focus on cognitive function, each additional 15 min  Total minutes :  30 minutes    Britt Santiago M.A., CCC-SLP

## 2025-04-16 ENCOUNTER — HOSPITAL ENCOUNTER (OUTPATIENT)
Dept: SPEECH THERAPY | Age: 65
Setting detail: THERAPIES SERIES
Discharge: HOME | End: 2025-04-16
Payer: COMMERCIAL

## 2025-04-16 ENCOUNTER — HOSPITAL ENCOUNTER (OUTPATIENT)
Dept: OCCUPATIONAL THERAPY | Age: 65
Setting detail: THERAPIES SERIES
Discharge: HOME | End: 2025-04-16
Payer: COMMERCIAL

## 2025-04-16 NOTE — PROGRESS NOTES
Speech-Language Pathology  Cancellation/No Show Note      For today's appointment patient:    [x]  Cancelled                  []  Rescheduled appointment    []  No show       []  Therapist cancelled             Reason given by patient:  []  No reason given  [x]  Conflicting appointment  []  No transportation  []  Conflict with work  []  Illness  []  Inclement weather   []  Insurance related issues  []  Other           Comments:    Continue as per established POC    Britt Santiago M.A., CCC-SLP    4/16/2025

## 2025-04-16 NOTE — PROGRESS NOTES
Occupational Therapy      Phone: [phone] Fax: [phone]     Occupational Therapy   Cancellation Note     Patient Name:  Erna Stoner  : 1960  Date:  2025  MRN: [id number]    For today's appointment patient:   [x]  Cancelled   [x]  Rescheduled appointment   []  No-show     Reason given by patient:   []  Patient ill   [x]  Conflicting appointment   []  No transportation   []  Conflict with work   []  No reason given   []  Other:    Comments:     Electronically signed by: Jelena VAZQUEZ, [id number]

## 2025-04-17 ENCOUNTER — TELEPHONE (OUTPATIENT)
Dept: GASTROENTEROLOGY | Age: 65
End: 2025-04-17

## 2025-04-17 DIAGNOSIS — K74.69 OTHER CIRRHOSIS OF LIVER: Primary | ICD-10-CM

## 2025-04-17 RX ORDER — LORAZEPAM 1 MG/1
1 TABLET ORAL EVERY 8 HOURS PRN
Qty: 1 TABLET | Refills: 0 | Status: SHIPPED | OUTPATIENT
Start: 2025-04-17 | End: 2025-05-17

## 2025-04-17 NOTE — TELEPHONE ENCOUNTER
Called patient with results of CTA triphasic.  Dr. Montes De Oca recommends MRI in 3 to 6 months with premedication

## 2025-04-23 ENCOUNTER — HOSPITAL ENCOUNTER (OUTPATIENT)
Dept: SPEECH THERAPY | Age: 65
Setting detail: THERAPIES SERIES
Discharge: HOME | End: 2025-04-23
Payer: COMMERCIAL

## 2025-04-23 ENCOUNTER — HOSPITAL ENCOUNTER (OUTPATIENT)
Dept: OCCUPATIONAL THERAPY | Age: 65
Setting detail: THERAPIES SERIES
Discharge: HOME | End: 2025-04-23
Payer: COMMERCIAL

## 2025-04-23 PROCEDURE — 97129 THER IVNTJ 1ST 15 MIN: CPT

## 2025-04-23 PROCEDURE — 97110 THERAPEUTIC EXERCISES: CPT

## 2025-04-23 PROCEDURE — 97130 THER IVNTJ EA ADDL 15 MIN: CPT

## 2025-04-23 NOTE — PROGRESS NOTES
SPEECH LANGUAGE PATHOLOGY  DAILY PROGRESS NOTE        PATIENT NAME:  Erna Stoner      :  1960          TODAY'S DATE:  2025     POC:  Pt will improve immediate, short term, recent memory during structured and unstructured tasks with 80% accuracy   Pt will improve problem solving/thought organization during structured and unstructured tasks with 80% accuracy      Subjective:                Patient was seen for therapy today. She arrived independently. Pt was cooperative and pleasant. Patient reported she was having a good day.      Objective:                              Pt participated problem solving and reasoning task with fair+ reasoning skills. Pt completed identifying combined associated words task this date. Given two words scrambled together (I.e. Ldieonn) pt was able to identify the two words given clues (I.e. lion and den). Pt relied on first letter cues in trials. Overall, pt completed task with 80% accuracy.        Assessment:                 Making progress with goals     Plan:                 Continue POC.        CPT code(s) 84875  therapeutic interventions that focus on cognitive function , initial  15 min  84471  therapeutic interventions that focus on cognitive function, each additional 15 min  Total minutes :  30 minutes    Britt Santiago M.A., CCC-SLP

## 2025-04-23 NOTE — PROGRESS NOTES
OCCUPATIONAL THERAPY DAILY NOTE  [facility]  [facility]  [address]  [place] [address]  Dept: [phone]  Loc: [phone]   [facility] OT Fax: [phone]      Date:  2025    Initial Evaluation Date: 07/15/2024                          Evaluating Therapist: Luz Mcgraw OT     Patient Name:  Erna Stoner                      :  1960     Restrictions/Precautions:  none, low fall risk  Diagnosis:  R27.8 (ICD-10-CM) - Other lack of coordination  R48.2 (ICD-10-CM) Apraxia    /      fatigue/UE weakness                                                 Date of Surgery/Injury: Ongoing since 2024     Insurance/Certification information: [employer] [place] [employer] (30 visits then auth)     Plan of care signed (Y/N): Yes- cosigned electronically  Visit# / total visits       Referring Practitioner:  Elia Daniel MD   Specific Practitioner Orders: Eval and treat  25: 2-3x/week for 6 weeks from 25- 3/20/25     OT PLAN OF CARE   OT POC based on physician orders, patient diagnosis and results of clinical assessment     Frequency/Duration as of 25: 1-2x / week for 12 visits.   Certification period From: 2025  To: 2025     GOALS (Long term same as Short term):   1) Patient will demonstrate good understanding of home program (exercises/activities/EC techniques/adaptive/compensatory techniques) with good accuracy.   Progressing, HEP reviewed and updated with pt reporting carry over of techniques e.g., use of planner, EC techniques  2) Patient will pay her bills with Modified [place] and use of adaptive strategies as needed   Progressing, assist levels for bill pay fluctuate. Sometimes pt per performs independently, sometimes son assists, and pt's son jayshree supervise to ensure nor errors take place  3) Patient will complete meal prep and with Indep and no cueing for problem solving, memory, or sequencing   Progressing, pt reports low

## 2025-04-29 ENCOUNTER — PROCEDURE VISIT (OUTPATIENT)
Dept: PODIATRY | Age: 65
End: 2025-04-29
Payer: COMMERCIAL

## 2025-04-29 VITALS — BODY MASS INDEX: 26.52 KG/M2 | WEIGHT: 145 LBS

## 2025-04-29 DIAGNOSIS — M20.42 HAMMER TOES OF BOTH FEET: ICD-10-CM

## 2025-04-29 DIAGNOSIS — L84 CORNS AND CALLOSITIES: ICD-10-CM

## 2025-04-29 DIAGNOSIS — B35.1 TINEA UNGUIUM: ICD-10-CM

## 2025-04-29 DIAGNOSIS — Z79.4 TYPE 2 DIABETES MELLITUS WITHOUT COMPLICATION, WITH LONG-TERM CURRENT USE OF INSULIN (HCC): Primary | ICD-10-CM

## 2025-04-29 DIAGNOSIS — E11.9 TYPE 2 DIABETES MELLITUS WITHOUT COMPLICATION, WITH LONG-TERM CURRENT USE OF INSULIN (HCC): Primary | ICD-10-CM

## 2025-04-29 DIAGNOSIS — M20.41 HAMMER TOES OF BOTH FEET: ICD-10-CM

## 2025-04-29 DIAGNOSIS — G60.8 HEREDITARY SENSORY NEUROPATHY: ICD-10-CM

## 2025-04-29 PROCEDURE — 11721 DEBRIDE NAIL 6 OR MORE: CPT | Performed by: PODIATRIST

## 2025-04-29 PROCEDURE — 11056 PARNG/CUTG B9 HYPRKR LES 2-4: CPT | Performed by: PODIATRIST

## 2025-04-29 RX ORDER — TRAMADOL HYDROCHLORIDE 50 MG/1
50 TABLET ORAL EVERY 6 HOURS PRN
COMMUNITY
Start: 2025-04-28

## 2025-04-29 NOTE — PROGRESS NOTES
Patient in for nail and callous care  Type 2 diabetic  Elia Daniel MD  LOV 4/18/25     Electronically signed by Brittni Campbell LPN on 4/29/2025 at 2:11 PM    CC:   Diabetic foot exam and painful elongated toenails 1-5 right and left      HPI:   Follow-up diabetic foot ankle exam.  No open wounds no recent injury or trauma.  Pain-free foot and ankle today.  Denies any calf pain or any calf swelling.  ROS:  Const: Denies constitutional symptoms  Musculo: Denies symptoms other than stated above  Skin: Denies symptoms other than stated above      Current Outpatient Medications:     traMADol (ULTRAM) 50 MG tablet, Take 1 tablet by mouth every 6 hours as needed., Disp: , Rfl:     LORazepam (ATIVAN) 1 MG tablet, Take 1 tablet by mouth every 8 hours as needed for Anxiety (take 1/2 hour prior to imaging.  Must have a ) for up to 30 days. Max Daily Amount: 3 mg, Disp: 1 tablet, Rfl: 0    UNABLE TO FIND, Please provide and administer Prevnar 20 vaccine., Disp: 1 each, Rfl: 0    furosemide (LASIX) 20 MG tablet, Take 1 tablet by mouth daily, Disp: 30 tablet, Rfl: 5    Blood Glucose Monitoring Suppl (ONE TOUCH ULTRA 2) w/Device KIT, USE TO CHECK BLOOD SUGAR TWICE DAILY, Disp: , Rfl:     ONETOUCH ULTRA strip, by Other route, Disp: , Rfl:     Lancets (ONETOUCH DELICA PLUS [id number]) MISC, USE (2)TWO TIMES DAILY TO TEST BLOOD SUGAR, Disp: , Rfl:     REFRESH 1.4-0.6 % SOLN ophthalmic solution, USE (1)ONE TO (2)TWO DROPS IN EACH EYE AS NEEDED., Disp: , Rfl:     methotrexate (RHEUMATREX) 2.5 MG chemo tablet, Take 1 tablet by mouth once a week, Disp: , Rfl:     folic acid (FOLVITE) 1 MG tablet, Take 2 tablets by mouth every morning, Disp: 60 tablet, Rfl: 5    ammonium lactate (LAC-HYDRIN) 12 % lotion, Apply topically twice daily, Disp: 400 g, Rfl: 2    Fe Bisgly-Vit C-Vit B12-FA (GENTLE IRON) 28-60-0.008-0.4 MG CAPS, Take by mouth, Disp: , Rfl:     lactulose (CHRONULAC) 10 GM/15ML solution, Take 30 mLs by mouth 4

## 2025-04-30 ENCOUNTER — HOSPITAL ENCOUNTER (OUTPATIENT)
Dept: SPEECH THERAPY | Age: 65
Setting detail: THERAPIES SERIES
Discharge: HOME | End: 2025-04-30
Payer: COMMERCIAL

## 2025-04-30 ENCOUNTER — HOSPITAL ENCOUNTER (OUTPATIENT)
Dept: OCCUPATIONAL THERAPY | Age: 65
Setting detail: THERAPIES SERIES
Discharge: HOME | End: 2025-04-30
Payer: COMMERCIAL

## 2025-04-30 PROCEDURE — 92507 TX SP LANG VOICE COMM INDIV: CPT

## 2025-04-30 PROCEDURE — 97129 THER IVNTJ 1ST 15 MIN: CPT

## 2025-04-30 PROCEDURE — 97530 THERAPEUTIC ACTIVITIES: CPT

## 2025-04-30 PROCEDURE — 97130 THER IVNTJ EA ADDL 15 MIN: CPT

## 2025-04-30 NOTE — PROGRESS NOTES
OCCUPATIONAL THERAPY DISCHARGE  [facility]  [facility]UP[facility]  [address]  [place] [address]  Dept: [phone]  Loc: [phone]   [facility] OT Fax: [phone]      Date:  2025    Initial Evaluation Date: 07/15/2024                          Evaluating Therapist: Luz Mcgraw OT     Patient Name:  Erna Stoner                      :  1960     Restrictions/Precautions:  none, low fall risk  Diagnosis:  R27.8 (ICD-10-CM) - Other lack of coordination  R48.2 (ICD-10-CM) Apraxia    /      fatigue/UE weakness                                                 Date of Surgery/Injury: Ongoing since 2024     Insurance/Certification information: [employer] [place] [employer] (30 visits then auth)     Plan of care signed (Y/N): Yes- cosigned electronically  Visit# / total visits       Referring Practitioner:  Elia Daniel MD   Specific Practitioner Orders: Eval and treat  25: 2-3x/week for 6 weeks from 25- 3/20/25     OT PLAN OF CARE   OT POC based on physician orders, patient diagnosis and results of clinical assessment     Frequency/Duration as of 25: 1-2x / week for 12 visits.   Certification period From: 2025  To: 2025     GOALS (Long term same as Short term):   1) Patient will demonstrate good understanding of home program (exercises/activities/EC techniques/adaptive/compensatory techniques) with good accuracy.   Goal Discharged, pt reports using small dumbbells at home but is inconsistent with putty & T band programs  2) Patient will pay her bills with Modified [place] and use of adaptive strategies as needed   Goal Met, pt reports improved cognitive functioning during bill pay  3) Patient will complete meal prep and with Indep and no cueing for problem solving, memory, or sequencing   Goal Met, completes cooking with Mod I and activity modification e.g., placing all ingredients on the counter to compensate for memory

## 2025-04-30 NOTE — PROGRESS NOTES
Head, normocephalic, atraumatic, Face, Face within normal limits, Ears, External ears within normal limits, Nose/Nasopharynx, External nose  normal appearance, nares patent, no nasal discharge, Mouth and Throat, Oral cavity appearance normal, Breath odor normal, Lips, Appearance normal SPEECH LANGUAGE PATHOLOGY  DAILY PROGRESS NOTE        PATIENT NAME:  Erna Stoner      :  1960          TODAY'S DATE:  2025     POC:  Pt will improve immediate, short term, recent memory during structured and unstructured tasks with 80% accuracy   Pt will improve problem solving/thought organization during structured and unstructured tasks with 80% accuracy      Subjective:                Patient was seen for therapy today. She arrived independently. Pt was cooperative and pleasant. Patient reported she was having a good day.      Objective:                              Pt participated problem solving and reasoning task with fair+ reasoning skills. Pt completed clock math activity. Pt was able to complete activities with minimal assistance and 85% accuracy. Patient also completed deduction by exclusion task. The patient was able to use good deductive reasoning skills and min-mod assistance from clinician to complete exclusion deduction task this date.        Assessment:                 Making progress with goals     Plan:                 Continue POC.        CPT code(s) 31280  therapeutic interventions that focus on cognitive function , initial  15 min  41952  therapeutic interventions that focus on cognitive function, each additional 15 min  Total minutes :  30 minutes    Britt Santiago M.A., CCC-SLP

## 2025-05-07 ENCOUNTER — HOSPITAL ENCOUNTER (OUTPATIENT)
Dept: SPEECH THERAPY | Age: 65
Setting detail: THERAPIES SERIES
Discharge: HOME | End: 2025-05-07
Payer: COMMERCIAL

## 2025-05-07 ENCOUNTER — APPOINTMENT (OUTPATIENT)
Dept: OCCUPATIONAL THERAPY | Age: 65
End: 2025-05-07
Payer: COMMERCIAL

## 2025-05-07 PROCEDURE — 97129 THER IVNTJ 1ST 15 MIN: CPT

## 2025-05-07 PROCEDURE — 97130 THER IVNTJ EA ADDL 15 MIN: CPT

## 2025-05-07 NOTE — PROGRESS NOTES
SPEECH LANGUAGE PATHOLOGY  DAILY PROGRESS NOTE        PATIENT NAME:  Erna Stoner      :  1960          TODAY'S DATE:  2025     POC:  Pt will improve immediate, short term, recent memory during structured and unstructured tasks with 80% accuracy   Pt will improve problem solving/thought organization during structured and unstructured tasks with 80% accuracy      Subjective:                Patient was seen for therapy today. She arrived independently. Pt was cooperative and pleasant. Patient reported she was having a good day. Patient will have one more therapy session before discharge.      Objective:                              Pt participated problem solving and reasoning task with fair+ reasoning skills. Pt acrostic puzzle this date with minimal assistance. Pt was able to organize and solve the acrostic puzzle with minimal assistance.     Pt completed describing without naming task. She was given a word/object to describe without using the given word (I.e. describe softness without using the word 'softness'). The patient was able to describe words with fair outcome. Minimal vebral assistance given to assist with descriptive words that were not so broad.        Assessment:                 Making progress with goals     Plan:                 Continue POC.        CPT code(s) 85793  therapeutic interventions that focus on cognitive function , initial  15 min  05982  therapeutic interventions that focus on cognitive function, each additional 15 min  Total minutes :  30 minutes    Britt Santiago M.A., CCC-SLP

## 2025-05-17 PROBLEM — Z95.828 S/P TIPS (TRANSJUGULAR INTRAHEPATIC PORTOSYSTEMIC SHUNT): Status: ACTIVE | Noted: 2017-10-29

## 2025-05-21 ENCOUNTER — HOSPITAL ENCOUNTER (OUTPATIENT)
Dept: SPEECH THERAPY | Age: 65
Setting detail: THERAPIES SERIES
Discharge: HOME | End: 2025-05-21
Payer: COMMERCIAL

## 2025-05-21 PROCEDURE — 97130 THER IVNTJ EA ADDL 15 MIN: CPT

## 2025-05-21 PROCEDURE — 97129 THER IVNTJ 1ST 15 MIN: CPT

## 2025-05-21 NOTE — PROGRESS NOTES
[facility]  Outpatient Speech Therapy  Phone: [phone] Fax: [phone]    SPEECH THERAPY    DISCHARGE SUMMARY    Date of Report: 2025    Patient Name:Erna Stoner  : 1960  MRN: [id number]    Diagnosis: Other lack of coordination [R27.8]  Cognitive communication deficit [R41.841]    Referring Physician: Elia Daniel MD     SUBJECTIVE  Patient attended speech therapy sessions from 2024 to may 2025. Focus of current treatment sessions has been on cognitive skills such as memory and problem solving/reasoning skills. The patient is being discharged at this time as she has met therapy goals and is functioning at a level that does not require skilled intervention any longer. The patient reports that she is able to manage finances and medications at this time without any difficulty.     OBJECTIVE / GOAL STATUS   (Status Key: GM = Goal Met, MP = Making Progress, BP = Beginning Progress, NI = Not Introduced, D/C = Discontinue Goal, NM = Not Met)  Pt will improve immediate, short term, recent memory during structured and unstructured tasks with 80% accuracyGM  Pt will improve problem solving/thought organization during structured and unstructured tasks with 80% accuracy GM    ASSESSMENT / STANDARDIZED TEST RESULTS  Patient has made good progress over the past 9 months    RECOMMENDATIONS  Patient is discharged at this time yes    Patient and/or caregiver aware of diagnosis? yes   Patient and/or caregiver agree with POC? yes       Thank you for this referral.     Britt Santiago M.A., CCC-SLP    2025

## 2025-05-21 NOTE — PROGRESS NOTES
SPEECH LANGUAGE PATHOLOGY  DAILY PROGRESS NOTE        PATIENT NAME:  Erna Stoner      :  1960          TODAY'S DATE:  2025     POC:  Pt will improve immediate, short term, recent memory during structured and unstructured tasks with 80% accuracy   Pt will improve problem solving/thought organization during structured and unstructured tasks with 80% accuracy      Subjective:                Patient was seen for therapy today. She arrived independently. Pt was cooperative and pleasant. Patient reported she was having a good day. Today was her final therapy session. Patient reported that she has been feeling better and feels ready to be discharged.      Objective:                              Pt participated problem solving and reasoning task with fair+ reasoning skills. Pt completed hard deductive reasoning puzzles this date with min-mod assistance. The patient had fair+ reasoning skills and was able to use inference skills to solve the puzzles given verbal cues.        Assessment:                 Making progress with goals     Plan:                 Continue POC.        CPT code(s) 87607  therapeutic interventions that focus on cognitive function , initial  15 min  11854  therapeutic interventions that focus on cognitive function, each additional 15 min  Total minutes :  30 minutes    Britt Santiago M.A., CCC-SLP

## 2025-06-13 PROCEDURE — 88305 TISSUE EXAM BY PATHOLOGIST: CPT | Performed by: DERMATOLOGY

## 2025-06-17 ENCOUNTER — LAB REQUISITION (OUTPATIENT)
Dept: DERMATOPATHOLOGY | Facility: CLINIC | Age: 65
End: 2025-06-17
Payer: COMMERCIAL

## 2025-06-17 DIAGNOSIS — L98.8 OTHER SPECIFIED DISORDERS OF THE SKIN AND SUBCUTANEOUS TISSUE: ICD-10-CM

## 2025-06-18 LAB
LABORATORY COMMENT REPORT: NORMAL
PATH REPORT.FINAL DX SPEC: NORMAL
PATH REPORT.GROSS SPEC: NORMAL
PATH REPORT.MICROSCOPIC SPEC OTHER STN: NORMAL
PATH REPORT.RELEVANT HX SPEC: NORMAL
PATH REPORT.TOTAL CANCER: NORMAL

## 2025-06-30 DIAGNOSIS — K74.69 OTHER CIRRHOSIS OF LIVER (HCC): Primary | ICD-10-CM

## 2025-07-03 ENCOUNTER — PROCEDURE VISIT (OUTPATIENT)
Dept: PODIATRY | Age: 65
End: 2025-07-03
Payer: COMMERCIAL

## 2025-07-03 VITALS — BODY MASS INDEX: 26.52 KG/M2 | WEIGHT: 145 LBS

## 2025-07-03 DIAGNOSIS — G60.8 HEREDITARY SENSORY NEUROPATHY: ICD-10-CM

## 2025-07-03 DIAGNOSIS — Z79.4 TYPE 2 DIABETES MELLITUS WITHOUT COMPLICATION, WITH LONG-TERM CURRENT USE OF INSULIN (HCC): Primary | ICD-10-CM

## 2025-07-03 DIAGNOSIS — B35.1 TINEA UNGUIUM: ICD-10-CM

## 2025-07-03 DIAGNOSIS — L84 CORNS AND CALLOSITIES: ICD-10-CM

## 2025-07-03 DIAGNOSIS — M20.41 HAMMER TOES OF BOTH FEET: ICD-10-CM

## 2025-07-03 DIAGNOSIS — M20.42 HAMMER TOES OF BOTH FEET: ICD-10-CM

## 2025-07-03 DIAGNOSIS — E11.9 TYPE 2 DIABETES MELLITUS WITHOUT COMPLICATION, WITH LONG-TERM CURRENT USE OF INSULIN (HCC): Primary | ICD-10-CM

## 2025-07-03 DIAGNOSIS — K74.69 OTHER CIRRHOSIS OF LIVER (HCC): ICD-10-CM

## 2025-07-03 LAB
ALBUMIN: 3.5 G/DL (ref 3.5–5.2)
ALP BLD-CCNC: 126 U/L (ref 35–104)
ALT SERPL-CCNC: 24 U/L (ref 0–35)
ANION GAP SERPL CALCULATED.3IONS-SCNC: 12 MMOL/L (ref 7–16)
AST SERPL-CCNC: 46 U/L (ref 0–35)
BASOPHILS ABSOLUTE: 0.04 K/UL (ref 0–0.2)
BASOPHILS RELATIVE PERCENT: 1 % (ref 0–2)
BILIRUB SERPL-MCNC: 2 MG/DL (ref 0–1.2)
BUN BLDV-MCNC: 10 MG/DL (ref 8–23)
CALCIUM SERPL-MCNC: 9.3 MG/DL (ref 8.8–10.2)
CHLORIDE BLD-SCNC: 105 MMOL/L (ref 98–107)
CO2: 24 MMOL/L (ref 22–29)
CREAT SERPL-MCNC: 0.5 MG/DL (ref 0.5–1)
EOSINOPHILS ABSOLUTE: 0.12 K/UL (ref 0.05–0.5)
EOSINOPHILS RELATIVE PERCENT: 4 % (ref 0–6)
GFR, ESTIMATED: >90 ML/MIN/1.73M2
GLUCOSE BLD-MCNC: 167 MG/DL (ref 74–99)
HCT VFR BLD CALC: 37.2 % (ref 34–48)
HEMOGLOBIN: 12.4 G/DL (ref 11.5–15.5)
IMMATURE GRANULOCYTES %: 0 % (ref 0–5)
IMMATURE GRANULOCYTES ABSOLUTE: <0.03 K/UL (ref 0–0.58)
INR BLD: 1.2
LYMPHOCYTES ABSOLUTE: 0.66 K/UL (ref 1.5–4)
LYMPHOCYTES RELATIVE PERCENT: 22 % (ref 20–42)
MCH RBC QN AUTO: 29.5 PG (ref 26–35)
MCHC RBC AUTO-ENTMCNC: 33.3 G/DL (ref 32–34.5)
MCV RBC AUTO: 88.6 FL (ref 80–99.9)
MONOCYTES ABSOLUTE: 0.35 K/UL (ref 0.1–0.95)
MONOCYTES RELATIVE PERCENT: 12 % (ref 2–12)
NEUTROPHILS ABSOLUTE: 1.8 K/UL (ref 1.8–7.3)
NEUTROPHILS RELATIVE PERCENT: 61 % (ref 43–80)
PDW BLD-RTO: 14.5 % (ref 11.5–15)
PLATELET # BLD: 142 K/UL (ref 130–450)
PMV BLD AUTO: 10 FL (ref 7–12)
POTASSIUM SERPL-SCNC: 4 MMOL/L (ref 3.5–5.1)
PROTHROMBIN TIME: 12.5 SEC (ref 9.3–12.4)
RBC # BLD: 4.2 M/UL (ref 3.5–5.5)
SODIUM BLD-SCNC: 141 MMOL/L (ref 136–145)
TOTAL PROTEIN: 5.8 G/DL (ref 6.4–8.3)
WBC # BLD: 3 K/UL (ref 4.5–11.5)

## 2025-07-03 PROCEDURE — 11721 DEBRIDE NAIL 6 OR MORE: CPT | Performed by: PODIATRIST

## 2025-07-03 PROCEDURE — 11056 PARNG/CUTG B9 HYPRKR LES 2-4: CPT | Performed by: PODIATRIST

## 2025-07-03 RX ORDER — AMMONIUM LACTATE 12 G/100G
LOTION TOPICAL
Qty: 400 G | Refills: 2 | Status: SHIPPED | OUTPATIENT
Start: 2025-07-03

## 2025-07-03 NOTE — PROGRESS NOTES
Patient in for nail and callous care  Type 2 diabetic   Elia Daniel MD  LOV 6/17/25        CC:   Follow-up diabetic foot exam and painful elongated toenails 1-5 right and left      HPI:   Follow-up diabetic foot ankle exam.  Would like a refill lotion for her feet.  No open wounds.  No additional pedal complaints.      ROS:  Const: Denies constitutional symptoms  Musculo: Denies symptoms other than stated above  Skin: Denies symptoms other than stated above      Current Outpatient Medications:     ammonium lactate (LAC-HYDRIN) 12 % lotion, Apply topically twice daily, Disp: 400 g, Rfl: 2    traMADol (ULTRAM) 50 MG tablet, Take 1 tablet by mouth every 6 hours as needed., Disp: , Rfl:     UNABLE TO FIND, Please provide and administer Prevnar 20 vaccine., Disp: 1 each, Rfl: 0    furosemide (LASIX) 20 MG tablet, Take 1 tablet by mouth daily, Disp: 30 tablet, Rfl: 5    Blood Glucose Monitoring Suppl (ONE TOUCH ULTRA 2) w/Device KIT, USE TO CHECK BLOOD SUGAR TWICE DAILY, Disp: , Rfl:     ONETOUCH ULTRA strip, by Other route, Disp: , Rfl:     Lancets (ONETOUCH DELICA PLUS [id number]) MISC, USE (2)TWO TIMES DAILY TO TEST BLOOD SUGAR, Disp: , Rfl:     REFRESH 1.4-0.6 % SOLN ophthalmic solution, USE (1)ONE TO (2)TWO DROPS IN EACH EYE AS NEEDED., Disp: , Rfl:     methotrexate (RHEUMATREX) 2.5 MG chemo tablet, Take 1 tablet by mouth once a week, Disp: , Rfl:     folic acid (FOLVITE) 1 MG tablet, Take 2 tablets by mouth every morning, Disp: 60 tablet, Rfl: 5    Fe Bisgly-Vit C-Vit B12-FA (GENTLE IRON) 28-60-0.008-0.4 MG CAPS, Take by mouth, Disp: , Rfl:     lactulose (CHRONULAC) 10 GM/15ML solution, Take 30 mLs by mouth 4 times daily Self-titrate to 2-3 BM daily. Additional doses if needed for symptoms of mental fog/slow processing., Disp: 2700 mL, Rfl: 11    omeprazole (PRILOSEC) 40 MG delayed release capsule, Take 1 capsule by mouth daily, Disp: , Rfl:     Empagliflozin-metFORMIN HCl (SYNJARDY) 5-1000 MG TABS, Take by

## 2025-07-07 ENCOUNTER — HOSPITAL ENCOUNTER (OUTPATIENT)
Dept: INFUSION THERAPY | Age: 65
Setting detail: INFUSION SERIES
Discharge: HOME | End: 2025-07-07
Payer: COMMERCIAL

## 2025-07-07 VITALS
OXYGEN SATURATION: 100 % | HEIGHT: 62 IN | WEIGHT: 159 LBS | TEMPERATURE: 96.8 F | BODY MASS INDEX: 29.26 KG/M2 | HEART RATE: 83 BPM | SYSTOLIC BLOOD PRESSURE: 116 MMHG | RESPIRATION RATE: 16 BRPM | DIASTOLIC BLOOD PRESSURE: 63 MMHG

## 2025-07-07 DIAGNOSIS — M81.0 AGE-RELATED OSTEOPOROSIS WITHOUT CURRENT PATHOLOGICAL FRACTURE: Primary | ICD-10-CM

## 2025-07-07 PROCEDURE — 6360000002 HC RX W HCPCS: Performed by: FAMILY MEDICINE

## 2025-07-07 PROCEDURE — 96372 THER/PROPH/DIAG INJ SC/IM: CPT

## 2025-07-07 RX ADMIN — DENOSUMAB 60 MG: 60 INJECTION SUBCUTANEOUS at 10:16

## 2025-07-07 ASSESSMENT — PAIN DESCRIPTION - ONSET: ONSET: ON-GOING

## 2025-07-07 ASSESSMENT — PAIN DESCRIPTION - ORIENTATION: ORIENTATION: LOWER

## 2025-07-07 ASSESSMENT — PAIN SCALES - GENERAL: PAINLEVEL_OUTOF10: 7

## 2025-07-07 ASSESSMENT — PAIN DESCRIPTION - PAIN TYPE: TYPE: CHRONIC PAIN

## 2025-07-07 ASSESSMENT — PAIN DESCRIPTION - DESCRIPTORS: DESCRIPTORS: ACHING

## 2025-07-07 ASSESSMENT — PAIN DESCRIPTION - LOCATION: LOCATION: BACK

## 2025-07-10 ENCOUNTER — OFFICE VISIT (OUTPATIENT)
Dept: RHEUMATOLOGY | Age: 65
End: 2025-07-10
Payer: COMMERCIAL

## 2025-07-10 VITALS — HEIGHT: 62 IN | BODY MASS INDEX: 29.08 KG/M2 | WEIGHT: 158 LBS

## 2025-07-10 DIAGNOSIS — I85.11 SECONDARY ESOPHAGEAL VARICES WITH BLEEDING (HCC): ICD-10-CM

## 2025-07-10 DIAGNOSIS — D84.9 IMMUNOSUPPRESSION: ICD-10-CM

## 2025-07-10 DIAGNOSIS — Z79.899 HIGH RISK MEDICATION USE: ICD-10-CM

## 2025-07-10 DIAGNOSIS — D86.89 GRANULOMA OF LIVER ASSOCIATED WITH SARCOIDOSIS: ICD-10-CM

## 2025-07-10 DIAGNOSIS — D86.9 SARCOIDOSIS: Primary | ICD-10-CM

## 2025-07-10 DIAGNOSIS — M81.0 AGE-RELATED OSTEOPOROSIS WITHOUT CURRENT PATHOLOGICAL FRACTURE: ICD-10-CM

## 2025-07-10 PROCEDURE — 99214 OFFICE O/P EST MOD 30 MIN: CPT | Performed by: INTERNAL MEDICINE

## 2025-07-10 PROCEDURE — 1036F TOBACCO NON-USER: CPT | Performed by: INTERNAL MEDICINE

## 2025-07-10 PROCEDURE — G2211 COMPLEX E/M VISIT ADD ON: HCPCS | Performed by: INTERNAL MEDICINE

## 2025-07-10 PROCEDURE — G8417 CALC BMI ABV UP PARAM F/U: HCPCS | Performed by: INTERNAL MEDICINE

## 2025-07-10 PROCEDURE — 3017F COLORECTAL CA SCREEN DOC REV: CPT | Performed by: INTERNAL MEDICINE

## 2025-07-10 PROCEDURE — G8427 DOCREV CUR MEDS BY ELIG CLIN: HCPCS | Performed by: INTERNAL MEDICINE

## 2025-07-10 ASSESSMENT — ENCOUNTER SYMPTOMS
BACK PAIN: 1
DIARRHEA: 0
COUGH: 0
ABDOMINAL PAIN: 0
COLOR CHANGE: 0
NAUSEA: 0
VOMITING: 0
SHORTNESS OF BREATH: 0
TROUBLE SWALLOWING: 0

## 2025-07-10 NOTE — PROGRESS NOTES
disease         Review of Systems   Constitutional:  Negative for fatigue and fever.   HENT:  Negative for mouth sores and trouble swallowing.    Respiratory:  Negative for cough and shortness of breath.    Cardiovascular:  Negative for chest pain.   Gastrointestinal:  Negative for abdominal pain, diarrhea, nausea and vomiting.   Genitourinary:  Negative for dysuria and hematuria.   Musculoskeletal:  Positive for arthralgias, back pain and myalgias. Negative for joint swelling.   Skin:  Negative for color change and rash.   Neurological:  Negative for weakness and numbness.   Hematological:  Negative for adenopathy.   All other systems reviewed and are negative.         Objective   There were no vitals filed for this visit.     Physical Exam  Constitutional:       General: She is not in acute distress.     Appearance: Normal appearance.   HENT:      Head: Normocephalic and atraumatic.      Nose: Nose normal.   Eyes:      General: No scleral icterus.  Pulmonary:      Effort: Pulmonary effort is normal.      Breath sounds: Normal breath sounds.   Musculoskeletal:         General: Deformity present. No swelling or tenderness.      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   Skin:     General: Skin is warm and dry.      Findings: No rash.   Neurological:      General: No focal deficit present.      Mental Status: She is alert and oriented to person, place, and time. Mental status is at baseline.   Psychiatric:         Mood and Affect: Mood normal.         Behavior: Behavior normal.            Lab Results   Component Value Date    WBC 3.0 (L) 07/03/2025    HGB 12.4 07/03/2025    HCT 37.2 07/03/2025    MCV 88.6 07/03/2025     07/03/2025     Lab Results   Component Value Date     07/03/2025    K 4.0 07/03/2025     07/03/2025    CO2 24 07/03/2025    BUN 10 07/03/2025    CREATININE 0.5 07/03/2025    GLUCOSE 167 (H) 07/03/2025    CALCIUM

## 2025-07-15 ENCOUNTER — HOSPITAL ENCOUNTER (OUTPATIENT)
Dept: MRI IMAGING | Age: 65
Discharge: HOME | End: 2025-07-17
Payer: COMMERCIAL

## 2025-07-15 DIAGNOSIS — K74.69 OTHER CIRRHOSIS OF LIVER (HCC): ICD-10-CM

## 2025-07-15 PROCEDURE — 6360000004 HC RX CONTRAST MEDICATION: Performed by: RADIOLOGY

## 2025-07-15 PROCEDURE — 74183 MRI ABD W/O CNTR FLWD CNTR: CPT

## 2025-07-15 PROCEDURE — A9577 INJ MULTIHANCE: HCPCS | Performed by: RADIOLOGY

## 2025-07-15 RX ADMIN — GADOBENATE DIMEGLUMINE 14 ML: 529 INJECTION, SOLUTION INTRAVENOUS at 19:38

## 2025-07-21 ENCOUNTER — TELEPHONE (OUTPATIENT)
Age: 65
End: 2025-07-21

## 2025-07-21 NOTE — TELEPHONE ENCOUNTER
Pt called the office regarding her Humira. Pt was seen by CC recently and pt was told that she needed to start on Humira for her Sarcoidosis. Pt was told that our office can't prescribe Humira for this diagnosis and this is more likely for her Rheumatologist to start her on. Pt stated she will try but initially she was waiting on her Pulmonologist but he is out till August 5th so she was hoping our office will do it. Pt was told that due to the diagnosis we are unable to do so. Pt will call her other specialists and check If they can start with the medication. Electronically signed by Quyen Ho MA on 7/21/25 at 9:28 AM EDT

## 2025-08-08 DIAGNOSIS — D86.9 SARCOID: ICD-10-CM

## 2025-08-08 DIAGNOSIS — D86.9 SARCOIDOSIS: Primary | ICD-10-CM

## 2025-08-08 DIAGNOSIS — Z79.899 HIGH RISK MEDICATION USE: ICD-10-CM

## 2025-08-08 DIAGNOSIS — D84.9 IMMUNOSUPPRESSION: ICD-10-CM

## 2025-08-08 DIAGNOSIS — M81.0 AGE-RELATED OSTEOPOROSIS WITHOUT CURRENT PATHOLOGICAL FRACTURE: ICD-10-CM

## 2025-08-11 RX ORDER — FOLIC ACID 1 MG/1
2 TABLET ORAL EVERY MORNING
Qty: 60 TABLET | Refills: 11 | Status: SHIPPED | OUTPATIENT
Start: 2025-08-11

## 2025-08-20 ENCOUNTER — OFFICE VISIT (OUTPATIENT)
Dept: GASTROENTEROLOGY | Age: 65
End: 2025-08-20
Payer: COMMERCIAL

## 2025-08-20 VITALS
BODY MASS INDEX: 29.52 KG/M2 | WEIGHT: 160.4 LBS | SYSTOLIC BLOOD PRESSURE: 90 MMHG | HEART RATE: 86 BPM | TEMPERATURE: 97.6 F | DIASTOLIC BLOOD PRESSURE: 60 MMHG | HEIGHT: 62 IN | OXYGEN SATURATION: 99 % | RESPIRATION RATE: 18 BRPM

## 2025-08-20 DIAGNOSIS — I86.4 GASTRIC VARICES: ICD-10-CM

## 2025-08-20 DIAGNOSIS — K74.69 OTHER CIRRHOSIS OF LIVER (HCC): Primary | ICD-10-CM

## 2025-08-20 DIAGNOSIS — Z95.828 S/P TIPS (TRANSJUGULAR INTRAHEPATIC PORTOSYSTEMIC SHUNT): ICD-10-CM

## 2025-08-20 DIAGNOSIS — K76.9 LIVER LESION: ICD-10-CM

## 2025-08-20 DIAGNOSIS — K76.82 HEPATIC ENCEPHALOPATHY (HCC): ICD-10-CM

## 2025-08-20 DIAGNOSIS — I85.10 ESOPHAGEAL VARICES IN CIRRHOSIS (HCC): ICD-10-CM

## 2025-08-20 DIAGNOSIS — D86.9 SARCOIDOSIS: ICD-10-CM

## 2025-08-20 DIAGNOSIS — K74.60 ESOPHAGEAL VARICES IN CIRRHOSIS (HCC): ICD-10-CM

## 2025-08-20 PROCEDURE — 1036F TOBACCO NON-USER: CPT | Performed by: STUDENT IN AN ORGANIZED HEALTH CARE EDUCATION/TRAINING PROGRAM

## 2025-08-20 PROCEDURE — G8417 CALC BMI ABV UP PARAM F/U: HCPCS | Performed by: STUDENT IN AN ORGANIZED HEALTH CARE EDUCATION/TRAINING PROGRAM

## 2025-08-20 PROCEDURE — 3017F COLORECTAL CA SCREEN DOC REV: CPT | Performed by: STUDENT IN AN ORGANIZED HEALTH CARE EDUCATION/TRAINING PROGRAM

## 2025-08-20 PROCEDURE — 99214 OFFICE O/P EST MOD 30 MIN: CPT | Performed by: STUDENT IN AN ORGANIZED HEALTH CARE EDUCATION/TRAINING PROGRAM

## 2025-08-20 PROCEDURE — G8427 DOCREV CUR MEDS BY ELIG CLIN: HCPCS | Performed by: STUDENT IN AN ORGANIZED HEALTH CARE EDUCATION/TRAINING PROGRAM

## 2025-08-20 RX ORDER — DIAZEPAM 2 MG/1
2 TABLET ORAL PRN
Qty: 1 TABLET | Refills: 0 | Status: SHIPPED | OUTPATIENT
Start: 2025-08-20 | End: 2025-12-31

## 2025-09-03 ENCOUNTER — LAB REQUISITION (OUTPATIENT)
Dept: DERMATOPATHOLOGY | Facility: CLINIC | Age: 65
End: 2025-09-03
Payer: COMMERCIAL

## 2025-09-03 DIAGNOSIS — L98.9 DISORDER OF THE SKIN AND SUBCUTANEOUS TISSUE, UNSPECIFIED: ICD-10-CM

## 2025-09-03 PROBLEM — K76.9 LIVER LESION: Status: ACTIVE | Noted: 2025-09-03

## 2025-09-03 PROBLEM — I85.10 ESOPHAGEAL VARICES IN CIRRHOSIS (HCC): Status: ACTIVE | Noted: 2024-05-23

## 2025-09-04 LAB
LABORATORY COMMENT REPORT: NORMAL
PATH REPORT.FINAL DX SPEC: NORMAL
PATH REPORT.GROSS SPEC: NORMAL
PATH REPORT.MICROSCOPIC SPEC OTHER STN: NORMAL
PATH REPORT.RELEVANT HX SPEC: NORMAL
PATH REPORT.TOTAL CANCER: NORMAL

## (undated) DEVICE — SPONGE GZ W4XL4IN RAYON POLY CVR W/NONWOVEN FAB STRL 2/PK

## (undated) DEVICE — SET SURG BASIN MAYO REUSABLE

## (undated) DEVICE — RETAINER 16IN ADJ EAR LOOP DRSG NSL NS

## (undated) DEVICE — BLOCK BITE 60FR RUBBER ADLT DENTAL

## (undated) DEVICE — YANKAUER,OPEN TIP,W/O VENT,STERILE: Brand: MEDLINE INDUSTRIES, INC.

## (undated) DEVICE — ELECTRODE PT RET AD L9FT HI MOIST COND ADH HYDRGEL CORDED

## (undated) DEVICE — FORCEPS BX OVL CUP FEN DISPOSABLE CAP L 160CM RAD JAW 4

## (undated) DEVICE — CODMAN® SURGICAL PATTIES 1/2" X 3" (1.27CM X 7.62CM): Brand: CODMAN®

## (undated) DEVICE — TOWEL,OR,DSP,ST,BLUE,STD,6/PK,12PK/CS: Brand: MEDLINE

## (undated) DEVICE — PAD,NON-ADHERENT,2X3,STERILE,LF,1/PK: Brand: MEDLINE

## (undated) DEVICE — LIGHT SOURCE WHT

## (undated) DEVICE — GRADUATE TRIANG MEASURE 1000ML BLK PRNT

## (undated) DEVICE — KIT,ANTI FOG,W/SPONGE & FLUID,SOFT PACK: Brand: MEDLINE

## (undated) DEVICE — SNARE ENDOSCP AD L240CM LOOP W10MM SHTH DIA2.4MM RND INSUL

## (undated) DEVICE — CONTROL SYRINGE LUER-LOCK TIP: Brand: MONOJECT

## (undated) DEVICE — 4-PORT MANIFOLD: Brand: NEPTUNE 2

## (undated) DEVICE — SURGICAL PROCEDURE PACK EENT CUST

## (undated) DEVICE — CAMERA STRYKER 1488

## (undated) DEVICE — SPONGE GZ W4XL4IN RAYON POLY FILL CVR W/ NONWOVEN FAB

## (undated) DEVICE — GOWN,SIRUS,FABRNF,2XL,18/CS: Brand: MEDLINE

## (undated) DEVICE — MARKER,SKIN,WI/RULER AND LABELS: Brand: MEDLINE

## (undated) DEVICE — Z INACTIVE USE 2735373 APPLICATOR FBR LAIN COT WOOD TIP ECONOMICAL

## (undated) DEVICE — DOUBLE BASIN SET: Brand: MEDLINE INDUSTRIES, INC.

## (undated) DEVICE — COAGULATOR SUCT 10FR LAIN FTSWCH ACTIVATION DISP VALLEYLAB

## (undated) DEVICE — SOLUTION IV IRRIG POUR BRL 0.9% SODIUM CHL 2F7124

## (undated) DEVICE — SET SINUS SCOPE

## (undated) DEVICE — Device

## (undated) DEVICE — WORKING LENGTH 155CM, WORKING CHANNEL 2.8MM: Brand: RESOLUTION 360 CLIP

## (undated) DEVICE — SET NASAL

## (undated) DEVICE — GLOVE ORANGE PI 7 1/2   MSG9075

## (undated) DEVICE — NEEDLE SCLERO L200CM OD0.51MM ID0.24MM SHTH DIA1.8MM LOK

## (undated) DEVICE — BANDAGE,GAUZE,BULKEE II,4.5"X4.1YD,STRL: Brand: MEDLINE